# Patient Record
Sex: FEMALE | Race: WHITE | NOT HISPANIC OR LATINO | Employment: OTHER | URBAN - METROPOLITAN AREA
[De-identification: names, ages, dates, MRNs, and addresses within clinical notes are randomized per-mention and may not be internally consistent; named-entity substitution may affect disease eponyms.]

---

## 2017-07-21 ENCOUNTER — TRANSCRIBE ORDERS (OUTPATIENT)
Dept: ADMINISTRATIVE | Facility: HOSPITAL | Age: 62
End: 2017-07-21

## 2017-07-21 DIAGNOSIS — Z12.31 ENCOUNTER FOR MAMMOGRAM TO ESTABLISH BASELINE MAMMOGRAM: Primary | ICD-10-CM

## 2017-07-25 ENCOUNTER — HOSPITAL ENCOUNTER (OUTPATIENT)
Dept: MAMMOGRAPHY | Facility: HOSPITAL | Age: 62
Discharge: HOME | End: 2017-07-25
Payer: COMMERCIAL

## 2017-07-25 DIAGNOSIS — Z12.31 ENCOUNTER FOR MAMMOGRAM TO ESTABLISH BASELINE MAMMOGRAM: ICD-10-CM

## 2017-07-25 PROCEDURE — G0202 SCR MAMMO BI INCL CAD: HCPCS

## 2018-07-03 ENCOUNTER — TRANSCRIBE ORDERS (OUTPATIENT)
Dept: ADMINISTRATIVE | Facility: HOSPITAL | Age: 63
End: 2018-07-03

## 2018-07-03 DIAGNOSIS — Z12.31 VISIT FOR SCREENING MAMMOGRAM: Primary | ICD-10-CM

## 2018-08-30 ENCOUNTER — HOSPITAL ENCOUNTER (OUTPATIENT)
Dept: MAMMOGRAPHY | Facility: HOSPITAL | Age: 63
Discharge: HOME | End: 2018-08-30
Payer: COMMERCIAL

## 2018-08-30 DIAGNOSIS — Z12.31 VISIT FOR SCREENING MAMMOGRAM: ICD-10-CM

## 2018-08-30 PROCEDURE — 77067 SCR MAMMO BI INCL CAD: CPT

## 2018-08-30 PROCEDURE — 77063 BREAST TOMOSYNTHESIS BI: CPT

## 2019-10-21 ENCOUNTER — TRANSCRIBE ORDERS (OUTPATIENT)
Dept: ADMINISTRATIVE | Facility: HOSPITAL | Age: 64
End: 2019-10-21

## 2019-10-21 DIAGNOSIS — Z12.31 SCREENING MAMMOGRAM FOR HIGH-RISK PATIENT: Primary | ICD-10-CM

## 2019-12-17 ENCOUNTER — HOSPITAL ENCOUNTER (OUTPATIENT)
Dept: RADIOLOGY | Age: 64
Discharge: HOME | End: 2019-12-17
Payer: COMMERCIAL

## 2019-12-17 VITALS — HEIGHT: 57 IN | BODY MASS INDEX: 28.05 KG/M2 | WEIGHT: 130 LBS

## 2019-12-17 DIAGNOSIS — Z12.31 SCREENING MAMMOGRAM FOR HIGH-RISK PATIENT: ICD-10-CM

## 2019-12-17 DIAGNOSIS — R92.2 DENSE BREAST TISSUE ON MAMMOGRAM: ICD-10-CM

## 2019-12-17 PROCEDURE — 77063 BREAST TOMOSYNTHESIS BI: CPT

## 2019-12-17 PROCEDURE — 77067 SCR MAMMO BI INCL CAD: CPT

## 2020-12-22 ENCOUNTER — HOSPITAL ENCOUNTER (OUTPATIENT)
Dept: RADIOLOGY | Age: 65
Discharge: HOME | End: 2020-12-22
Payer: COMMERCIAL

## 2020-12-22 VITALS — BODY MASS INDEX: 28.05 KG/M2 | HEIGHT: 57 IN | WEIGHT: 130 LBS

## 2020-12-22 DIAGNOSIS — Z12.31 ENCOUNTER FOR SCREENING MAMMOGRAM FOR MALIGNANT NEOPLASM OF BREAST: ICD-10-CM

## 2020-12-22 PROCEDURE — 77067 SCR MAMMO BI INCL CAD: CPT

## 2020-12-22 PROCEDURE — 77063 BREAST TOMOSYNTHESIS BI: CPT

## 2021-01-12 ENCOUNTER — OFFICE VISIT (OUTPATIENT)
Dept: CARDIAC SURGERY | Facility: CLINIC | Age: 66
End: 2021-01-12
Payer: COMMERCIAL

## 2021-01-12 VITALS
HEIGHT: 57 IN | DIASTOLIC BLOOD PRESSURE: 82 MMHG | TEMPERATURE: 98 F | WEIGHT: 132 LBS | OXYGEN SATURATION: 99 % | SYSTOLIC BLOOD PRESSURE: 158 MMHG | HEART RATE: 70 BPM | BODY MASS INDEX: 28.48 KG/M2 | RESPIRATION RATE: 16 BRPM

## 2021-01-12 DIAGNOSIS — C34.91 SQUAMOUS CELL LUNG CANCER, RIGHT (HCC): Primary | ICD-10-CM

## 2021-01-12 PROCEDURE — 1160F RVW MEDS BY RX/DR IN RCRD: CPT | Performed by: PHYSICIAN ASSISTANT

## 2021-01-12 PROCEDURE — 99245 OFF/OP CONSLTJ NEW/EST HI 55: CPT | Performed by: PHYSICIAN ASSISTANT

## 2021-01-12 PROCEDURE — 1036F TOBACCO NON-USER: CPT | Performed by: PHYSICIAN ASSISTANT

## 2021-01-12 PROCEDURE — 3008F BODY MASS INDEX DOCD: CPT | Performed by: PHYSICIAN ASSISTANT

## 2021-01-12 RX ORDER — HEPARIN SODIUM 5000 [USP'U]/ML
5000 INJECTION, SOLUTION INTRAVENOUS; SUBCUTANEOUS
Status: CANCELLED | OUTPATIENT
Start: 2021-01-12 | End: 2021-01-13

## 2021-01-12 RX ORDER — CEFAZOLIN SODIUM 1 G/50ML
1000 SOLUTION INTRAVENOUS ONCE
Status: CANCELLED | OUTPATIENT
Start: 2021-01-12 | End: 2021-01-12

## 2021-01-12 RX ORDER — ACETAMINOPHEN 325 MG/1
975 TABLET ORAL ONCE
Status: CANCELLED | OUTPATIENT
Start: 2021-01-12 | End: 2021-01-12

## 2021-01-12 RX ORDER — ALBUTEROL SULFATE 90 UG/1
2 AEROSOL, METERED RESPIRATORY (INHALATION) EVERY 4 HOURS PRN
COMMUNITY
End: 2021-03-05

## 2021-01-12 RX ORDER — DIPHENHYDRAMINE HYDROCHLORIDE 25 MG/1
5000 TABLET ORAL DAILY
COMMUNITY

## 2021-01-12 RX ORDER — GABAPENTIN 300 MG/1
600 CAPSULE ORAL ONCE
Status: CANCELLED | OUTPATIENT
Start: 2021-01-12 | End: 2021-01-12

## 2021-01-12 RX ORDER — BUPROPION HYDROCHLORIDE 300 MG/1
300 TABLET ORAL EVERY MORNING
COMMUNITY
Start: 2012-03-27 | End: 2021-06-21 | Stop reason: SDUPTHER

## 2021-01-12 RX ORDER — UMECLIDINIUM BROMIDE AND VILANTEROL TRIFENATATE 62.5; 25 UG/1; UG/1
1 POWDER RESPIRATORY (INHALATION) DAILY
COMMUNITY

## 2021-01-12 RX ORDER — ACETAMINOPHEN 160 MG
5000 TABLET,DISINTEGRATING ORAL DAILY
COMMUNITY

## 2021-01-12 RX ORDER — TRAZODONE HYDROCHLORIDE 150 MG/1
150 TABLET ORAL AS NEEDED
COMMUNITY
End: 2021-04-01 | Stop reason: SDDI

## 2021-01-12 RX ORDER — FAMOTIDINE 40 MG/1
40 TABLET, FILM COATED ORAL EVERY MORNING
COMMUNITY
Start: 2012-03-27 | End: 2021-06-21 | Stop reason: SDUPTHER

## 2021-01-12 RX ORDER — PRAMIPEXOLE DIHYDROCHLORIDE 0.5 MG/1
0.5 TABLET ORAL
COMMUNITY
Start: 2020-12-07 | End: 2021-06-21 | Stop reason: SDUPTHER

## 2021-01-12 NOTE — ASSESSMENT & PLAN NOTE
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

## 2021-01-12 NOTE — PROGRESS NOTES
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  She also underwent a brain MRI for dizziness and there was no evidence of metastatic disease  Her PFTs are adequate for surgical resection  Dr Román Ball is recommending a right thoracoscopic upper lobectomy for treatment of this  Other options including SBRT were discussed  The surgery was discussed and all questions addressed  She is still smoking a cigarette every few days  She must completely quit to prevent post-operative pneumonia  Risks of surgery discussed, consent obtained  Will plan for 1/28  Diagnoses and all orders for this visit:    Squamous cell lung cancer, right ([facility] 75 )  -     Case request operating room: RIGHT VATS UPPER LOBECTOMY, BRONCHOSCOPY FLEXIBLE; Standing  -     PAT Covid Screening; Future  -     Type and screen; Future  -     CBC and Platelet; Future  -     APTT; Future  -     Protime-INR; Future  -     Basic metabolic panel; Future  -     EKG 12 lead; Future  -     Case request operating room: RIGHT VATS UPPER LOBECTOMY, BRONCHOSCOPY FLEXIBLE    Other orders  -     umeclidinium-vilanterol (Anoro Ellipta) 62 5-25 MCG/INH inhaler; Anoro Ellipta 62 5 mcg-25 mcg/actuation powder for inhalation  -     Biotin 5 MG CAPS; Take by mouth daily   -     Cholecalciferol (Vitamin D3) 50 MCG (2000 UT) capsule; Take 5,000 Units by mouth daily   -     buPROPion (WELLBUTRIN XL) 300 mg 24 hr tablet  -     famotidine (PEPCID) 40 MG tablet  -     pramipexole (MIRAPEX) 0 5 mg tablet; Take 0 5 mg by mouth   -     albuterol (PROVENTIL HFA,VENTOLIN HFA) 90 mcg/act inhaler; Inhale 2 puffs every 4 (four) hours as needed  -     traZODone (DESYREL) 150 mg tablet;  Take 150 mg by mouth daily at bedtime  -     Diet NPO; Sips with meds; Standing  -     Nursing communication Please give pre-op Carbohydrate drink to patient 2-4 hours prior to surgery; Standing  -     Void on call to OR; Standing  -     Insert peripheral IV; Standing  -     Place sequential compression device; Standing  -     heparin (porcine) subcutaneous injection 5,000 Units  -     ceFAZolin (ANCEF) IVPB (premix in dextrose) 1,000 mg 50 mL  -     acetaminophen (TYLENOL) tablet 975 mg  -     gabapentin (NEURONTIN) capsule 600 mg          Thoracic History   Diagnosis: Right upper lobe squamous cell carcinoma  Procedures/Surgeries:    Pathology:    Adjuvant Therapy:       Subjective:    Patient ID: Srinath Stevenson is a 77 y o  female  HPI   Ms Lester Oreilly is a 77year old female who presents for evaluation of a recently diagnosed lung cancer  She has a 30 pack year smoking history, still smoking one half cigarette every 2 days  She takes Wellbutrin and uses nicotine gum  She underwent a lung cancer screening CT 11/3/20 which revealed a 2 8x2 7cm right upper lobe spiculated nodule  She underwent a bronchoscopy and EBUS 11/11/20, and no malignancy was found  PET-CT 11/18/20 demonstrates 2 7x2 4cm right upper lobe nodule with SUV 18  There is a small cluster of nodules in the right apex with SUV 1 9  There are no FDG avid mediastinal or hilar lymph nodes  There is uptake in the right parotid gland with SUV 14 without any corresponding mass  IR biopsy 12/2/20 revealed squamous cell carcinoma  PFTs 12/10/20 show an FEV1 82% and DLCO 98%  MRI of neck 12/18/20 for parotid uptake revealed questionable right parotid lesion, question level 2 lymph node  Brain MRI 12/30 was negative for metastatic disease  She had a biopsy of the parotid lesion 1/5/20 and cytology demonstrates a pleomorphic adenoma without evidence of malignancy  She was seen by Dr Burton Blair at [facility] and is scheduled for a lobectomy 2/4     On discussion, her past medical history includes COPD, HTN, HLD, MS diagnosed in 1989, arthritis, anxiety  She has never undergone CT surgery in the past  There is no family history of lung cancer  She has dizziness not daily  She walks every day, and climbs stairs  She gest winded after a few flights but can climb one flight without trouble  She denies fevers, chills or unintentional weight loss  No vision changes or headaches  She has a "twinge" on her left anterior chest occasionally, not daily, which she attributes to anxiety  She denies any coughing or hemoptysis       The following portions of the patient's history were reviewed and updated as appropriate: allergies, current medications, past family history, past medical history, past social history, past surgical history and problem list     Past Medical History:   Diagnosis Date    [facility]      Past Surgical History:   Procedure Laterality Date    AUGMENTATION MAMMAPLASTY Bilateral 11/2014    retro-pectoral silicone    BREAST BIOPSY Left 03/23/2015    u/s core bx    BREAST BIOPSY Left 08/02/2010    high-risk lesion    BREAST EXCISIONAL BIOPSY Left 08/26/2010    high-risk lesion    BREAST EXCISIONAL BIOPSY Left 1994    BREAST EXCISIONAL BIOPSY Right 1984    TOTAL ABDOMINAL HYSTERECTOMY  2010    age 60    TOTAL ABDOMINAL HYSTERECTOMY W/ BILATERAL SALPINGOOPHORECTOMY Bilateral 2010    age 60      Family History   Problem Relation Age of Onset    No Known Problems Mother     No Known Problems Father     No Known Problems Sister     No Known Problems Daughter     No Known Problems Maternal Grandmother     No Known Problems Maternal Grandfather     No Known Problems Paternal Grandmother     Rectal cancer Paternal Grandfather         unknown age   Balbina Slipper No Known Problems Sister     No Known Problems Maternal Aunt     Breast cancer Paternal Aunt         unknown age   Balbina Slipper No Known Problems Paternal Aunt     No Known Problems Paternal Aunt     No Known Problems Paternal Aunt     No Known Problems Paternal Aunt     No Known Problems Paternal Aunt     No Known Problems Paternal Aunt       Social History     Socioeconomic History    Marital status: /Civil Union     Spouse name: Not on file    Number of children: Not on file    Years of education: Not on file    Highest education level: Not on file   Occupational History    Not on file   Social Needs    Financial resource strain: Not on file    Food insecurity     Worry: Not on file     Inability: Not on file   [employer] needs     Medical: Not on file     Non-medical: Not on file   Tobacco Use    Smoking status: Former Smoker     Packs/day: 1 00     Years: 30 00     Pack years: 30 00     Types: Cigarettes    Tobacco comment: [address]   Substance and Sexual Activity    Alcohol use: Not on file    Drug use: Not on file    Sexual activity: Not on file   Lifestyle    Physical activity     Days per week: Not on file     Minutes per session: Not on file    Stress: Not on file   Relationships    Social connections     Talks on phone: Not on file     Gets together: Not on file     Attends Cheondoism service: Not on file     Active member of club or organization: Not on file     Attends meetings of clubs or organizations: Not on file     Relationship status: Not on file    Intimate partner violence     Fear of current or ex partner: Not on file     Emotionally abused: Not on file     Physically abused: Not on file     Forced sexual activity: Not on file   Other Topics Concern    Not on file   Social History Narrative    Not on file      Current Outpatient Medications   Medication Instructions    albuterol (PROVENTIL HFA,VENTOLIN HFA) 90 mcg/act inhaler 2 puffs, Inhalation, Every 4 hours PRN    Biotin 5 MG CAPS Oral, Daily    buPROPion (WELLBUTRIN XL) 300 mg 24 hr tablet No dose, route, or frequency recorded      famotidine (PEPCID) 40 MG tablet No dose, route, or frequency recorded   pramipexole (MIRAPEX) 0 5 mg tablet Take 0 5 mg by mouth     traZODone (DESYREL) 150 mg, Oral, Daily at bedtime    umeclidinium-vilanterol (Anoro Ellipta) 62 5-25 MCG/INH inhaler Anoro Ellipta 62 5 mcg-25 mcg/actuation powder for inhalation    Vitamin D3 5,000 Units, Oral, Daily     Allergies   Allergen Reactions    Sulfa Antibiotics Hives       Review of Systems   Constitutional: Negative for activity change, appetite change, chills, diaphoresis, fatigue, fever and unexpected weight change  Eyes: Negative for visual disturbance  Respiratory: Positive for shortness of breath  Negative for cough, chest tightness and wheezing  Cardiovascular: Negative for chest pain and palpitations  Gastrointestinal: Negative for abdominal pain  Musculoskeletal: Negative for joint swelling and myalgias  Skin: Negative for color change and rash  Neurological: Negative for weakness and headaches  Hematological: Negative for adenopathy  Does not bruise/bleed easily  Psychiatric/Behavioral: Negative for confusion  Objective:   Physical Exam  Constitutional:       General: She is not in acute distress  Appearance: Normal appearance  HENT:      Head: Normocephalic and atraumatic  Eyes:      General: No scleral icterus  Conjunctiva/sclera: Conjunctivae normal    Neck:      Musculoskeletal: Neck supple  Cardiovascular:      Rate and Rhythm: Normal rate and regular rhythm  Pulmonary:      Effort: Pulmonary effort is normal  No respiratory distress  Breath sounds: Normal breath sounds  Abdominal:      General: There is no distension  Palpations: Abdomen is soft  Lymphadenopathy:      Cervical: No cervical adenopathy  Skin:     General: Skin is warm and dry  Neurological:      General: No focal deficit present  Mental Status: She is alert and oriented to person, place, and time     Psychiatric:         Mood and Affect: Mood normal          Behavior: Behavior normal          Thought Content:  Thought content normal          Judgment: Judgment normal      /82   Pulse 70   Temp 98 °F (36 7 °C) (Temporal)   Resp 16   Ht 4' 9" (1 448 m)   Wt 59 9 kg (132 lb)   SpO2 99%   BMI 28 56 kg/m²

## 2021-01-12 NOTE — H&P (VIEW-ONLY)
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  She also underwent a brain MRI for dizziness and there was no evidence of metastatic disease  Her PFTs are adequate for surgical resection  Dr Alexia Clarke is recommending a right thoracoscopic upper lobectomy for treatment of this  Other options including SBRT were discussed  The surgery was discussed and all questions addressed  She is still smoking a cigarette every few days  She must completely quit to prevent post-operative pneumonia  Risks of surgery discussed, consent obtained  Will plan for 1/28  Diagnoses and all orders for this visit:    Squamous cell lung cancer, right ([facility] )  -     Case request operating room: RIGHT VATS UPPER LOBECTOMY, BRONCHOSCOPY FLEXIBLE; Standing  -     PAT Covid Screening; Future  -     Type and screen; Future  -     CBC and Platelet; Future  -     APTT; Future  -     Protime-INR; Future  -     Basic metabolic panel; Future  -     EKG 12 lead; Future  -     Case request operating room: RIGHT VATS UPPER LOBECTOMY, BRONCHOSCOPY FLEXIBLE    Other orders  -     umeclidinium-vilanterol (Anoro Ellipta) 62 5-25 MCG/INH inhaler; Anoro Ellipta 62 5 mcg-25 mcg/actuation powder for inhalation  -     Biotin 5 MG CAPS; Take by mouth daily   -     Cholecalciferol (Vitamin D3) 50 MCG (2000 UT) capsule; Take 5,000 Units by mouth daily   -     buPROPion (WELLBUTRIN XL) 300 mg 24 hr tablet  -     famotidine (PEPCID) 40 MG tablet  -     pramipexole (MIRAPEX) 0 5 mg tablet; Take 0 5 mg by mouth   -     albuterol (PROVENTIL HFA,VENTOLIN HFA) 90 mcg/act inhaler; Inhale 2 puffs every 4 (four) hours as needed  -     traZODone (DESYREL) 150 mg tablet;  Take 150 mg by mouth daily at bedtime  -     Diet NPO; Sips with meds; Standing  -     Nursing communication Please give pre-op Carbohydrate drink to patient 2-4 hours prior to surgery; Standing  -     Void on call to OR; Standing  -     Insert peripheral IV; Standing  -     Place sequential compression device; Standing  -     heparin (porcine) subcutaneous injection 5,000 Units  -     ceFAZolin (ANCEF) IVPB (premix in dextrose) 1,000 mg 50 mL  -     acetaminophen (TYLENOL) tablet 975 mg  -     gabapentin (NEURONTIN) capsule 600 mg          Thoracic History   Diagnosis: Right upper lobe squamous cell carcinoma  Procedures/Surgeries:    Pathology:    Adjuvant Therapy:       Subjective:    Patient ID: Colonel Johnson is a 77 y o  female  HPI   Ms Jovan Garcia is a 77year old female who presents for evaluation of a recently diagnosed lung cancer  She has a 30 pack year smoking history, still smoking one half cigarette every 2 days  She takes Wellbutrin and uses nicotine gum  She underwent a lung cancer screening CT 11/3/20 which revealed a 2 8x2 7cm right upper lobe spiculated nodule  She underwent a bronchoscopy and EBUS 11/11/20, and no malignancy was found  PET-CT 11/18/20 demonstrates 2 7x2 4cm right upper lobe nodule with SUV 18  There is a small cluster of nodules in the right apex with SUV 1 9  There are no FDG avid mediastinal or hilar lymph nodes  There is uptake in the right parotid gland with SUV 14 without any corresponding mass  IR biopsy 12/2/20 revealed squamous cell carcinoma  PFTs 12/10/20 show an FEV1 82% and DLCO 98%  MRI of neck 12/18/20 for parotid uptake revealed questionable right parotid lesion, question level 2 lymph node  Brain MRI 12/30 was negative for metastatic disease  She had a biopsy of the parotid lesion 1/5/20 and cytology demonstrates a pleomorphic adenoma without evidence of malignancy  She was seen by Dr Mayra Long at [facility] and is scheduled for a lobectomy 2/4     On discussion, her past medical history includes COPD, HTN, HLD, MS diagnosed in 1989, arthritis, anxiety  She has never undergone CT surgery in the past  There is no family history of lung cancer  She has dizziness not daily  She walks every day, and climbs stairs  She gest winded after a few flights but can climb one flight without trouble  She denies fevers, chills or unintentional weight loss  No vision changes or headaches  She has a "twinge" on her left anterior chest occasionally, not daily, which she attributes to anxiety  She denies any coughing or hemoptysis       The following portions of the patient's history were reviewed and updated as appropriate: allergies, current medications, past family history, past medical history, past social history, past surgical history and problem list     Past Medical History:   Diagnosis Date    [facility]      Past Surgical History:   Procedure Laterality Date    AUGMENTATION MAMMAPLASTY Bilateral 11/2014    retro-pectoral silicone    BREAST BIOPSY Left 03/23/2015    u/s core bx    BREAST BIOPSY Left 08/02/2010    high-risk lesion    BREAST EXCISIONAL BIOPSY Left 08/26/2010    high-risk lesion    BREAST EXCISIONAL BIOPSY Left 1994    BREAST EXCISIONAL BIOPSY Right 1984    TOTAL ABDOMINAL HYSTERECTOMY  2010    age 60    TOTAL ABDOMINAL HYSTERECTOMY W/ BILATERAL SALPINGOOPHORECTOMY Bilateral 2010    age 60      Family History   Problem Relation Age of Onset    No Known Problems Mother     No Known Problems Father     No Known Problems Sister     No Known Problems Daughter     No Known Problems Maternal Grandmother     No Known Problems Maternal Grandfather     No Known Problems Paternal Grandmother     Rectal cancer Paternal Grandfather         unknown age   Julieann Frankel No Known Problems Sister     No Known Problems Maternal Aunt     Breast cancer Paternal Aunt         unknown age   Julieann Frankel No Known Problems Paternal Aunt     No Known Problems Paternal Aunt     No Known Problems Paternal Aunt     No Known Problems Paternal Aunt     No Known Problems Paternal Aunt     No Known Problems Paternal Aunt       Social History     Socioeconomic History    Marital status: /Civil Union     Spouse name: Not on file    Number of children: Not on file    Years of education: Not on file    Highest education level: Not on file   Occupational History    Not on file   Social Needs    Financial resource strain: Not on file    Food insecurity     Worry: Not on file     Inability: Not on file   [employer] needs     Medical: Not on file     Non-medical: Not on file   Tobacco Use    Smoking status: Former Smoker     Packs/day: 1 00     Years: 30 00     Pack years: 30 00     Types: Cigarettes    Tobacco comment: [address]   Substance and Sexual Activity    Alcohol use: Not on file    Drug use: Not on file    Sexual activity: Not on file   Lifestyle    Physical activity     Days per week: Not on file     Minutes per session: Not on file    Stress: Not on file   Relationships    Social connections     Talks on phone: Not on file     Gets together: Not on file     Attends Gnosticism service: Not on file     Active member of club or organization: Not on file     Attends meetings of clubs or organizations: Not on file     Relationship status: Not on file    Intimate partner violence     Fear of current or ex partner: Not on file     Emotionally abused: Not on file     Physically abused: Not on file     Forced sexual activity: Not on file   Other Topics Concern    Not on file   Social History Narrative    Not on file      Current Outpatient Medications   Medication Instructions    albuterol (PROVENTIL HFA,VENTOLIN HFA) 90 mcg/act inhaler 2 puffs, Inhalation, Every 4 hours PRN    Biotin 5 MG CAPS Oral, Daily    buPROPion (WELLBUTRIN XL) 300 mg 24 hr tablet No dose, route, or frequency recorded      famotidine (PEPCID) 40 MG tablet No dose, route, or frequency recorded   pramipexole (MIRAPEX) 0 5 mg tablet Take 0 5 mg by mouth     traZODone (DESYREL) 150 mg, Oral, Daily at bedtime    umeclidinium-vilanterol (Anoro Ellipta) 62 5-25 MCG/INH inhaler Anoro Ellipta 62 5 mcg-25 mcg/actuation powder for inhalation    Vitamin D3 5,000 Units, Oral, Daily     Allergies   Allergen Reactions    Sulfa Antibiotics Hives       Review of Systems   Constitutional: Negative for activity change, appetite change, chills, diaphoresis, fatigue, fever and unexpected weight change  Eyes: Negative for visual disturbance  Respiratory: Positive for shortness of breath  Negative for cough, chest tightness and wheezing  Cardiovascular: Negative for chest pain and palpitations  Gastrointestinal: Negative for abdominal pain  Musculoskeletal: Negative for joint swelling and myalgias  Skin: Negative for color change and rash  Neurological: Negative for weakness and headaches  Hematological: Negative for adenopathy  Does not bruise/bleed easily  Psychiatric/Behavioral: Negative for confusion  Objective:   Physical Exam  Constitutional:       General: She is not in acute distress  Appearance: Normal appearance  HENT:      Head: Normocephalic and atraumatic  Eyes:      General: No scleral icterus  Conjunctiva/sclera: Conjunctivae normal    Neck:      Musculoskeletal: Neck supple  Cardiovascular:      Rate and Rhythm: Normal rate and regular rhythm  Pulmonary:      Effort: Pulmonary effort is normal  No respiratory distress  Breath sounds: Normal breath sounds  Abdominal:      General: There is no distension  Palpations: Abdomen is soft  Lymphadenopathy:      Cervical: No cervical adenopathy  Skin:     General: Skin is warm and dry  Neurological:      General: No focal deficit present  Mental Status: She is alert and oriented to person, place, and time     Psychiatric:         Mood and Affect: Mood normal          Behavior: Behavior normal          Thought Content:  Thought content normal          Judgment: Judgment normal      /82   Pulse 70   Temp 98 °F (36 7 °C) (Temporal)   Resp 16   Ht 4' 9" (1 448 m)   Wt 59 9 kg (132 lb)   SpO2 99%   BMI 28 56 kg/m²

## 2021-01-13 ENCOUNTER — TELEPHONE (OUTPATIENT)
Dept: HEMATOLOGY ONCOLOGY | Facility: CLINIC | Age: 66
End: 2021-01-13

## 2021-01-13 NOTE — TELEPHONE ENCOUNTER
Patient states that she has surgery scheduled on 1/28/2021  She was given several lab slips and would like to know when these las should be drawn and if any require fasting  Patient is also inquiring about the Covid vaccine, " Am I allowed to get it?"    Patient requesting a call back from Wade Yao, [phone]

## 2021-01-14 ENCOUNTER — LAB (OUTPATIENT)
Dept: LAB | Facility: CLINIC | Age: 66
End: 2021-01-14
Payer: COMMERCIAL

## 2021-01-14 ENCOUNTER — APPOINTMENT (OUTPATIENT)
Dept: LAB | Facility: CLINIC | Age: 66
End: 2021-01-14
Payer: COMMERCIAL

## 2021-01-14 ENCOUNTER — IMMUNIZATIONS (OUTPATIENT)
Dept: FAMILY MEDICINE CLINIC | Facility: HOSPITAL | Age: 66
End: 2021-01-14

## 2021-01-14 ENCOUNTER — TRANSCRIBE ORDERS (OUTPATIENT)
Dept: LAB | Facility: CLINIC | Age: 66
End: 2021-01-14

## 2021-01-14 DIAGNOSIS — Z23 ENCOUNTER FOR IMMUNIZATION: Primary | ICD-10-CM

## 2021-01-14 DIAGNOSIS — C34.91 PRIMARY LUNG CANCER WITH METASTASIS FROM LUNG TO OTHER SITE, RIGHT (HCC): Primary | ICD-10-CM

## 2021-01-14 DIAGNOSIS — C34.91 SQUAMOUS CELL LUNG CANCER, RIGHT (HCC): ICD-10-CM

## 2021-01-14 DIAGNOSIS — C34.91 PRIMARY LUNG CANCER WITH METASTASIS FROM LUNG TO OTHER SITE, RIGHT (HCC): ICD-10-CM

## 2021-01-14 LAB
ANION GAP SERPL CALCULATED.3IONS-SCNC: 7 MMOL/L (ref 4–13)
APTT PPP: 82 SECONDS (ref 23–37)
BUN SERPL-MCNC: 13 MG/DL (ref 5–25)
CALCIUM SERPL-MCNC: 9.2 MG/DL (ref 8.3–10.1)
CHLORIDE SERPL-SCNC: 101 MMOL/L (ref 100–108)
CO2 SERPL-SCNC: 30 MMOL/L (ref 21–32)
CREAT SERPL-MCNC: 0.69 MG/DL (ref 0.6–1.3)
ERYTHROCYTE [DISTWIDTH] IN BLOOD BY AUTOMATED COUNT: 12.7 % (ref 11.6–15.1)
GFR SERPL CREATININE-BSD FRML MDRD: 90 ML/MIN/1.73SQ M
GLUCOSE SERPL-MCNC: 71 MG/DL (ref 65–140)
HCT VFR BLD AUTO: 44.3 % (ref 34.8–46.1)
HGB BLD-MCNC: 14.5 G/DL (ref 11.5–15.4)
INR PPP: 1 (ref 0.84–1.19)
MCH RBC QN AUTO: 31 PG (ref 26.8–34.3)
MCHC RBC AUTO-ENTMCNC: 32.7 G/DL (ref 31.4–37.4)
MCV RBC AUTO: 95 FL (ref 82–98)
PLATELET # BLD AUTO: 388 THOUSANDS/UL (ref 149–390)
PMV BLD AUTO: 10.2 FL (ref 8.9–12.7)
POTASSIUM SERPL-SCNC: 3.8 MMOL/L (ref 3.5–5.3)
PROTHROMBIN TIME: 13.4 SECONDS (ref 11.6–14.5)
RBC # BLD AUTO: 4.68 MILLION/UL (ref 3.81–5.12)
SODIUM SERPL-SCNC: 138 MMOL/L (ref 136–145)
WBC # BLD AUTO: 6.01 THOUSAND/UL (ref 4.31–10.16)

## 2021-01-14 PROCEDURE — 85027 COMPLETE CBC AUTOMATED: CPT

## 2021-01-14 PROCEDURE — 0001A SARS-COV-2 / COVID-19 MRNA VACCINE (PFIZER-BIONTECH) 30 MCG: CPT

## 2021-01-14 PROCEDURE — 80048 BASIC METABOLIC PNL TOTAL CA: CPT

## 2021-01-14 PROCEDURE — 85610 PROTHROMBIN TIME: CPT

## 2021-01-14 PROCEDURE — 85730 THROMBOPLASTIN TIME PARTIAL: CPT

## 2021-01-14 PROCEDURE — 91300 SARS-COV-2 / COVID-19 MRNA VACCINE (PFIZER-BIONTECH) 30 MCG: CPT

## 2021-01-14 PROCEDURE — 93005 ELECTROCARDIOGRAM TRACING: CPT

## 2021-01-16 LAB
ATRIAL RATE: 69 BPM
P AXIS: 73 DEGREES
PR INTERVAL: 126 MS
QRS AXIS: 66 DEGREES
QRSD INTERVAL: 78 MS
QT INTERVAL: 390 MS
QTC INTERVAL: 417 MS
T WAVE AXIS: 52 DEGREES
VENTRICULAR RATE: 69 BPM

## 2021-01-16 PROCEDURE — 93010 ELECTROCARDIOGRAM REPORT: CPT | Performed by: INTERNAL MEDICINE

## 2021-01-20 NOTE — PRE-PROCEDURE INSTRUCTIONS
Pre-Surgery Instructions:   Medication Instructions    Biotin 5 MG CAPS PRN    buPROPion (WELLBUTRIN XL) 300 mg 24 hr tablet take am DOS with small sip of water    Cholecalciferol (Vitamin D3) 50 MCG (2000 UT) capsule stop 7 days prior    famotidine (PEPCID) 40 MG tablet take am DOS with small sip of water    pramipexole (MIRAPEX) 0 5 mg tablet takes @ hs    traZODone (DESYREL) 150 mg tablet takes @ hs    umeclidinium-vilanterol (Anoro Ellipta) 62 5-25 MCG/INH inhaler take am DOS      Spoke to pt med list reviewed and instructed as above   NPO after midnight the hs prior   Instructed no NSAIDs 7 days prior, tylenol only   Showering instructions provided by surgeon office, reviewed @ time of call   Negative COVID screening, 1/21 [facility]   Reviewed visitation policy   Advised no alcohol 24 hours prior  Pt aware   All instructions verbally understood by patient  All questions answered

## 2021-01-21 ENCOUNTER — LAB REQUISITION (OUTPATIENT)
Dept: LAB | Facility: HOSPITAL | Age: 66
End: 2021-01-21
Payer: COMMERCIAL

## 2021-01-21 DIAGNOSIS — C34.91 MALIGNANT NEOPLASM OF UNSPECIFIED PART OF RIGHT BRONCHUS OR LUNG (HCC): ICD-10-CM

## 2021-01-21 DIAGNOSIS — C34.91 SQUAMOUS CELL LUNG CANCER, RIGHT (HCC): ICD-10-CM

## 2021-01-21 PROCEDURE — 88321 CONSLTJ&REPRT SLD PREP ELSWR: CPT | Performed by: PATHOLOGY

## 2021-01-21 PROCEDURE — 87635 SARS-COV-2 COVID-19 AMP PRB: CPT | Performed by: PHYSICIAN ASSISTANT

## 2021-01-22 LAB — SARS-COV-2 RNA RESP QL NAA+PROBE: NEGATIVE

## 2021-01-28 ENCOUNTER — ANESTHESIA (OUTPATIENT)
Dept: PERIOP | Facility: HOSPITAL | Age: 66
DRG: 164 | End: 2021-01-28
Payer: COMMERCIAL

## 2021-01-28 ENCOUNTER — ANESTHESIA EVENT (OUTPATIENT)
Dept: PERIOP | Facility: HOSPITAL | Age: 66
DRG: 164 | End: 2021-01-28
Payer: COMMERCIAL

## 2021-01-28 ENCOUNTER — HOSPITAL ENCOUNTER (INPATIENT)
Facility: HOSPITAL | Age: 66
LOS: 6 days | Discharge: HOME | DRG: 164 | End: 2021-02-03
Attending: THORACIC SURGERY (CARDIOTHORACIC VASCULAR SURGERY) | Admitting: THORACIC SURGERY (CARDIOTHORACIC VASCULAR SURGERY)
Payer: COMMERCIAL

## 2021-01-28 ENCOUNTER — APPOINTMENT (INPATIENT)
Dept: RADIOLOGY | Facility: HOSPITAL | Age: 66
DRG: 164 | End: 2021-01-28
Payer: COMMERCIAL

## 2021-01-28 VITALS — HEART RATE: 78 BPM

## 2021-01-28 DIAGNOSIS — C34.91 SQUAMOUS CELL LUNG CANCER, RIGHT (HCC): ICD-10-CM

## 2021-01-28 LAB
ANION GAP SERPL CALCULATED.3IONS-SCNC: 2 MMOL/L (ref 4–13)
BUN SERPL-MCNC: 11 MG/DL (ref 5–25)
CALCIUM SERPL-MCNC: 8.9 MG/DL (ref 8.3–10.1)
CHLORIDE SERPL-SCNC: 107 MMOL/L (ref 100–108)
CO2 SERPL-SCNC: 31 MMOL/L (ref 21–32)
CREAT SERPL-MCNC: 0.79 MG/DL (ref 0.6–1.3)
ERYTHROCYTE [DISTWIDTH] IN BLOOD BY AUTOMATED COUNT: 12.8 % (ref 11.6–15.1)
GFR SERPL CREATININE-BSD FRML MDRD: 78 ML/MIN/1.73SQ M
GLUCOSE SERPL-MCNC: 98 MG/DL (ref 65–140)
HCT VFR BLD AUTO: 43.4 % (ref 34.8–46.1)
HGB BLD-MCNC: 13.9 G/DL (ref 11.5–15.4)
MAGNESIUM SERPL-MCNC: 1.9 MG/DL (ref 1.6–2.6)
MCH RBC QN AUTO: 30.8 PG (ref 26.8–34.3)
MCHC RBC AUTO-ENTMCNC: 32 G/DL (ref 31.4–37.4)
MCV RBC AUTO: 96 FL (ref 82–98)
PLATELET # BLD AUTO: 353 THOUSANDS/UL (ref 149–390)
PMV BLD AUTO: 10.3 FL (ref 8.9–12.7)
POTASSIUM SERPL-SCNC: 3.8 MMOL/L (ref 3.5–5.3)
RBC # BLD AUTO: 4.51 MILLION/UL (ref 3.81–5.12)
SODIUM SERPL-SCNC: 140 MMOL/L (ref 136–145)
WBC # BLD AUTO: 11.31 THOUSAND/UL (ref 4.31–10.16)

## 2021-01-28 PROCEDURE — 07B74ZX EXCISION OF THORAX LYMPHATIC, PERCUTANEOUS ENDOSCOPIC APPROACH, DIAGNOSTIC: ICD-10-PCS | Performed by: THORACIC SURGERY (CARDIOTHORACIC VASCULAR SURGERY)

## 2021-01-28 PROCEDURE — C2615 SEALANT, PULMONARY, LIQUID: HCPCS | Performed by: THORACIC SURGERY (CARDIOTHORACIC VASCULAR SURGERY)

## 2021-01-28 PROCEDURE — 88309 TISSUE EXAM BY PATHOLOGIST: CPT | Performed by: PATHOLOGY

## 2021-01-28 PROCEDURE — 88307 TISSUE EXAM BY PATHOLOGIST: CPT | Performed by: PATHOLOGY

## 2021-01-28 PROCEDURE — 88305 TISSUE EXAM BY PATHOLOGIST: CPT | Performed by: PATHOLOGY

## 2021-01-28 PROCEDURE — C9290 INJ, BUPIVACAINE LIPOSOME: HCPCS | Performed by: THORACIC SURGERY (CARDIOTHORACIC VASCULAR SURGERY)

## 2021-01-28 PROCEDURE — 31622 DX BRONCHOSCOPE/WASH: CPT | Performed by: THORACIC SURGERY (CARDIOTHORACIC VASCULAR SURGERY)

## 2021-01-28 PROCEDURE — 32674 THORACOSCOPY LYMPH NODE EXC: CPT | Performed by: THORACIC SURGERY (CARDIOTHORACIC VASCULAR SURGERY)

## 2021-01-28 PROCEDURE — 32655 THORACOSCOPY RESECT BULLAE: CPT | Performed by: THORACIC SURGERY (CARDIOTHORACIC VASCULAR SURGERY)

## 2021-01-28 PROCEDURE — 0BBF4ZZ EXCISION OF RIGHT LOWER LUNG LOBE, PERCUTANEOUS ENDOSCOPIC APPROACH: ICD-10-PCS | Performed by: THORACIC SURGERY (CARDIOTHORACIC VASCULAR SURGERY)

## 2021-01-28 PROCEDURE — 88363 XM ARCHIVE TISSUE MOLEC ANAL: CPT | Performed by: PATHOLOGY

## 2021-01-28 PROCEDURE — 83735 ASSAY OF MAGNESIUM: CPT | Performed by: PHYSICIAN ASSISTANT

## 2021-01-28 PROCEDURE — 80048 BASIC METABOLIC PNL TOTAL CA: CPT | Performed by: PHYSICIAN ASSISTANT

## 2021-01-28 PROCEDURE — 71045 X-RAY EXAM CHEST 1 VIEW: CPT

## 2021-01-28 PROCEDURE — 32663 THORACOSCOPY W/LOBECTOMY: CPT | Performed by: THORACIC SURGERY (CARDIOTHORACIC VASCULAR SURGERY)

## 2021-01-28 PROCEDURE — 0BJ08ZZ INSPECTION OF TRACHEOBRONCHIAL TREE, VIA NATURAL OR ARTIFICIAL OPENING ENDOSCOPIC: ICD-10-PCS | Performed by: THORACIC SURGERY (CARDIOTHORACIC VASCULAR SURGERY)

## 2021-01-28 PROCEDURE — 85027 COMPLETE CBC AUTOMATED: CPT | Performed by: PHYSICIAN ASSISTANT

## 2021-01-28 PROCEDURE — 0BTC4ZZ RESECTION OF RIGHT UPPER LUNG LOBE, PERCUTANEOUS ENDOSCOPIC APPROACH: ICD-10-PCS | Performed by: THORACIC SURGERY (CARDIOTHORACIC VASCULAR SURGERY)

## 2021-01-28 RX ORDER — HYDROMORPHONE HCL/PF 1 MG/ML
0.2 SYRINGE (ML) INJECTION
Status: CANCELLED | OUTPATIENT
Start: 2021-01-28

## 2021-01-28 RX ORDER — SODIUM CHLORIDE 9 MG/ML
INJECTION, SOLUTION INTRAVENOUS CONTINUOUS PRN
Status: DISCONTINUED | OUTPATIENT
Start: 2021-01-28 | End: 2021-01-28

## 2021-01-28 RX ORDER — LIDOCAINE HYDROCHLORIDE 10 MG/ML
INJECTION, SOLUTION EPIDURAL; INFILTRATION; INTRACAUDAL; PERINEURAL AS NEEDED
Status: DISCONTINUED | OUTPATIENT
Start: 2021-01-28 | End: 2021-01-28

## 2021-01-28 RX ORDER — MIDAZOLAM HYDROCHLORIDE 2 MG/2ML
INJECTION, SOLUTION INTRAMUSCULAR; INTRAVENOUS AS NEEDED
Status: DISCONTINUED | OUTPATIENT
Start: 2021-01-28 | End: 2021-01-28

## 2021-01-28 RX ORDER — KETAMINE HCL IN NACL, ISO-OSM 100MG/10ML
SYRINGE (ML) INJECTION AS NEEDED
Status: DISCONTINUED | OUTPATIENT
Start: 2021-01-28 | End: 2021-01-28

## 2021-01-28 RX ORDER — FENTANYL CITRATE/PF 50 MCG/ML
25 SYRINGE (ML) INJECTION
Status: CANCELLED | OUTPATIENT
Start: 2021-01-28

## 2021-01-28 RX ORDER — ACETAMINOPHEN 325 MG/1
975 TABLET ORAL ONCE
Status: COMPLETED | OUTPATIENT
Start: 2021-01-28 | End: 2021-01-28

## 2021-01-28 RX ORDER — FENTANYL CITRATE 50 UG/ML
INJECTION, SOLUTION INTRAMUSCULAR; INTRAVENOUS AS NEEDED
Status: DISCONTINUED | OUTPATIENT
Start: 2021-01-28 | End: 2021-01-28

## 2021-01-28 RX ORDER — BUPROPION HYDROCHLORIDE 150 MG/1
300 TABLET ORAL EVERY MORNING
Status: DISCONTINUED | OUTPATIENT
Start: 2021-01-29 | End: 2021-02-03 | Stop reason: HOSPADM

## 2021-01-28 RX ORDER — ONDANSETRON 2 MG/ML
4 INJECTION INTRAMUSCULAR; INTRAVENOUS EVERY 6 HOURS PRN
Status: DISCONTINUED | OUTPATIENT
Start: 2021-01-28 | End: 2021-02-03 | Stop reason: HOSPADM

## 2021-01-28 RX ORDER — DOCUSATE SODIUM 100 MG/1
100 CAPSULE, LIQUID FILLED ORAL 2 TIMES DAILY
Status: DISCONTINUED | OUTPATIENT
Start: 2021-01-28 | End: 2021-02-03 | Stop reason: HOSPADM

## 2021-01-28 RX ORDER — GABAPENTIN 300 MG/1
600 CAPSULE ORAL ONCE
Status: COMPLETED | OUTPATIENT
Start: 2021-01-28 | End: 2021-01-28

## 2021-01-28 RX ORDER — HYDROMORPHONE HCL/PF 1 MG/ML
0.5 SYRINGE (ML) INJECTION
Status: DISCONTINUED | OUTPATIENT
Start: 2021-01-28 | End: 2021-02-03 | Stop reason: HOSPADM

## 2021-01-28 RX ORDER — PROPOFOL 10 MG/ML
INJECTION, EMULSION INTRAVENOUS AS NEEDED
Status: DISCONTINUED | OUTPATIENT
Start: 2021-01-28 | End: 2021-01-28

## 2021-01-28 RX ORDER — SIMVASTATIN 40 MG
40 TABLET ORAL
COMMUNITY
End: 2021-06-21 | Stop reason: SDUPTHER

## 2021-01-28 RX ORDER — SODIUM CHLORIDE, SODIUM LACTATE, POTASSIUM CHLORIDE, CALCIUM CHLORIDE 600; 310; 30; 20 MG/100ML; MG/100ML; MG/100ML; MG/100ML
60 INJECTION, SOLUTION INTRAVENOUS CONTINUOUS
Status: DISCONTINUED | OUTPATIENT
Start: 2021-01-28 | End: 2021-01-29

## 2021-01-28 RX ORDER — DIPHENHYDRAMINE HYDROCHLORIDE 50 MG/ML
12.5 INJECTION INTRAMUSCULAR; INTRAVENOUS ONCE AS NEEDED
Status: CANCELLED | OUTPATIENT
Start: 2021-01-28

## 2021-01-28 RX ORDER — SODIUM CHLORIDE 9 MG/ML
INJECTION INTRAVENOUS AS NEEDED
Status: DISCONTINUED | OUTPATIENT
Start: 2021-01-28 | End: 2021-01-28 | Stop reason: HOSPADM

## 2021-01-28 RX ORDER — SODIUM CHLORIDE, SODIUM LACTATE, POTASSIUM CHLORIDE, CALCIUM CHLORIDE 600; 310; 30; 20 MG/100ML; MG/100ML; MG/100ML; MG/100ML
125 INJECTION, SOLUTION INTRAVENOUS CONTINUOUS
Status: DISCONTINUED | OUTPATIENT
Start: 2021-01-28 | End: 2021-01-28

## 2021-01-28 RX ORDER — HEPARIN SODIUM 5000 [USP'U]/ML
5000 INJECTION, SOLUTION INTRAVENOUS; SUBCUTANEOUS
Status: COMPLETED | OUTPATIENT
Start: 2021-01-28 | End: 2021-01-28

## 2021-01-28 RX ORDER — SENNOSIDES 8.6 MG
1 TABLET ORAL DAILY
Status: DISCONTINUED | OUTPATIENT
Start: 2021-01-28 | End: 2021-02-03 | Stop reason: HOSPADM

## 2021-01-28 RX ORDER — TRIAMTERENE AND HYDROCHLOROTHIAZIDE 37.5; 25 MG/1; MG/1
1 CAPSULE ORAL EVERY MORNING
COMMUNITY
End: 2021-09-28 | Stop reason: SDUPTHER

## 2021-01-28 RX ORDER — FAMOTIDINE 20 MG/1
40 TABLET, FILM COATED ORAL EVERY MORNING
Status: DISCONTINUED | OUTPATIENT
Start: 2021-01-29 | End: 2021-02-03 | Stop reason: HOSPADM

## 2021-01-28 RX ORDER — BUPIVACAINE HYDROCHLORIDE 2.5 MG/ML
INJECTION, SOLUTION EPIDURAL; INFILTRATION; INTRACAUDAL AS NEEDED
Status: DISCONTINUED | OUTPATIENT
Start: 2021-01-28 | End: 2021-01-28 | Stop reason: HOSPADM

## 2021-01-28 RX ORDER — DEXAMETHASONE SODIUM PHOSPHATE 10 MG/ML
INJECTION, SOLUTION INTRAMUSCULAR; INTRAVENOUS AS NEEDED
Status: DISCONTINUED | OUTPATIENT
Start: 2021-01-28 | End: 2021-01-28

## 2021-01-28 RX ORDER — POLYETHYLENE GLYCOL 3350 17 G/17G
17 POWDER, FOR SOLUTION ORAL DAILY
Status: DISCONTINUED | OUTPATIENT
Start: 2021-01-29 | End: 2021-02-03 | Stop reason: HOSPADM

## 2021-01-28 RX ORDER — SODIUM CHLORIDE, SODIUM LACTATE, POTASSIUM CHLORIDE, CALCIUM CHLORIDE 600; 310; 30; 20 MG/100ML; MG/100ML; MG/100ML; MG/100ML
INJECTION, SOLUTION INTRAVENOUS CONTINUOUS PRN
Status: DISCONTINUED | OUTPATIENT
Start: 2021-01-28 | End: 2021-01-28

## 2021-01-28 RX ORDER — EPHEDRINE SULFATE 50 MG/ML
INJECTION INTRAVENOUS AS NEEDED
Status: DISCONTINUED | OUTPATIENT
Start: 2021-01-28 | End: 2021-01-28

## 2021-01-28 RX ORDER — ONDANSETRON 2 MG/ML
INJECTION INTRAMUSCULAR; INTRAVENOUS AS NEEDED
Status: DISCONTINUED | OUTPATIENT
Start: 2021-01-28 | End: 2021-01-28

## 2021-01-28 RX ORDER — ROCURONIUM BROMIDE 10 MG/ML
INJECTION, SOLUTION INTRAVENOUS AS NEEDED
Status: DISCONTINUED | OUTPATIENT
Start: 2021-01-28 | End: 2021-01-28

## 2021-01-28 RX ORDER — ALBUTEROL SULFATE 90 UG/1
2 AEROSOL, METERED RESPIRATORY (INHALATION) EVERY 4 HOURS PRN
Status: DISCONTINUED | OUTPATIENT
Start: 2021-01-28 | End: 2021-02-03 | Stop reason: HOSPADM

## 2021-01-28 RX ORDER — CEFOXITIN 1 G/1
INJECTION, POWDER, FOR SOLUTION INTRAVENOUS AS NEEDED
Status: DISCONTINUED | OUTPATIENT
Start: 2021-01-28 | End: 2021-01-28

## 2021-01-28 RX ORDER — CEFAZOLIN SODIUM 1 G/50ML
1000 SOLUTION INTRAVENOUS ONCE
Status: COMPLETED | OUTPATIENT
Start: 2021-01-28 | End: 2021-01-28

## 2021-01-28 RX ORDER — OXYCODONE HYDROCHLORIDE 5 MG/1
5 TABLET ORAL EVERY 4 HOURS PRN
Status: DISCONTINUED | OUTPATIENT
Start: 2021-01-28 | End: 2021-01-31

## 2021-01-28 RX ORDER — HYDROMORPHONE HCL 110MG/55ML
PATIENT CONTROLLED ANALGESIA SYRINGE INTRAVENOUS AS NEEDED
Status: DISCONTINUED | OUTPATIENT
Start: 2021-01-28 | End: 2021-01-28

## 2021-01-28 RX ORDER — GABAPENTIN 300 MG/1
300 CAPSULE ORAL 3 TIMES DAILY
Status: DISCONTINUED | OUTPATIENT
Start: 2021-01-28 | End: 2021-02-03 | Stop reason: HOSPADM

## 2021-01-28 RX ORDER — PRAMIPEXOLE DIHYDROCHLORIDE 0.5 MG/1
0.5 TABLET ORAL
Status: DISCONTINUED | OUTPATIENT
Start: 2021-01-28 | End: 2021-02-03 | Stop reason: HOSPADM

## 2021-01-28 RX ORDER — ACETAMINOPHEN 325 MG/1
975 TABLET ORAL EVERY 6 HOURS
Status: DISCONTINUED | OUTPATIENT
Start: 2021-01-28 | End: 2021-02-03 | Stop reason: HOSPADM

## 2021-01-28 RX ORDER — ONDANSETRON 2 MG/ML
4 INJECTION INTRAMUSCULAR; INTRAVENOUS ONCE AS NEEDED
Status: CANCELLED | OUTPATIENT
Start: 2021-01-28

## 2021-01-28 RX ADMIN — DOCUSATE SODIUM 100 MG: 100 CAPSULE, LIQUID FILLED ORAL at 18:07

## 2021-01-28 RX ADMIN — ROCURONIUM BROMIDE 50 MG: 50 INJECTION, SOLUTION INTRAVENOUS at 12:35

## 2021-01-28 RX ADMIN — FENTANYL CITRATE 100 MCG: 50 INJECTION INTRAMUSCULAR; INTRAVENOUS at 12:33

## 2021-01-28 RX ADMIN — LIDOCAINE HYDROCHLORIDE 100 MG: 10 INJECTION, SOLUTION EPIDURAL; INFILTRATION; INTRACAUDAL; PERINEURAL at 12:33

## 2021-01-28 RX ADMIN — GABAPENTIN 600 MG: 300 CAPSULE ORAL at 10:25

## 2021-01-28 RX ADMIN — ENOXAPARIN SODIUM 40 MG: 40 INJECTION SUBCUTANEOUS at 18:08

## 2021-01-28 RX ADMIN — HEPARIN SODIUM 5000 UNITS: 5000 INJECTION INTRAVENOUS; SUBCUTANEOUS at 10:29

## 2021-01-28 RX ADMIN — HYDROMORPHONE HYDROCHLORIDE 0.5 MG: 2 INJECTION, SOLUTION INTRAMUSCULAR; INTRAVENOUS; SUBCUTANEOUS at 13:13

## 2021-01-28 RX ADMIN — PROPOFOL 150 MG: 10 INJECTION, EMULSION INTRAVENOUS at 12:34

## 2021-01-28 RX ADMIN — EPHEDRINE SULFATE 10 MG: 50 INJECTION, SOLUTION INTRAVENOUS at 12:35

## 2021-01-28 RX ADMIN — CEFAZOLIN SODIUM 1000 MG: 1 SOLUTION INTRAVENOUS at 12:23

## 2021-01-28 RX ADMIN — MIDAZOLAM 2 MG: 1 INJECTION INTRAMUSCULAR; INTRAVENOUS at 12:22

## 2021-01-28 RX ADMIN — DEXAMETHASONE SODIUM PHOSPHATE 10 MG: 10 INJECTION, SOLUTION INTRAMUSCULAR; INTRAVENOUS at 13:06

## 2021-01-28 RX ADMIN — ONDANSETRON 4 MG: 2 INJECTION INTRAMUSCULAR; INTRAVENOUS at 14:32

## 2021-01-28 RX ADMIN — ACETAMINOPHEN 975 MG: 325 TABLET, FILM COATED ORAL at 23:51

## 2021-01-28 RX ADMIN — SODIUM CHLORIDE, SODIUM LACTATE, POTASSIUM CHLORIDE, AND CALCIUM CHLORIDE 125 ML/HR: .6; .31; .03; .02 INJECTION, SOLUTION INTRAVENOUS at 10:35

## 2021-01-28 RX ADMIN — PHENYLEPHRINE HYDROCHLORIDE 200 MCG: 10 INJECTION INTRAVENOUS at 13:20

## 2021-01-28 RX ADMIN — SODIUM CHLORIDE, SODIUM LACTATE, POTASSIUM CHLORIDE, AND CALCIUM CHLORIDE: .6; .31; .03; .02 INJECTION, SOLUTION INTRAVENOUS at 12:57

## 2021-01-28 RX ADMIN — PRAMIPEXOLE DIHYDROCHLORIDE 0.5 MG: 0.5 TABLET ORAL at 23:52

## 2021-01-28 RX ADMIN — SALMETEROL XINAFOATE 1 PUFF: 50 POWDER, METERED ORAL; RESPIRATORY (INHALATION) at 21:15

## 2021-01-28 RX ADMIN — SENNOSIDES 8.6 MG: 8.6 TABLET, FILM COATED ORAL at 18:07

## 2021-01-28 RX ADMIN — SODIUM CHLORIDE: 9 INJECTION, SOLUTION INTRAVENOUS at 12:40

## 2021-01-28 RX ADMIN — ACETAMINOPHEN 975 MG: 325 TABLET, FILM COATED ORAL at 10:25

## 2021-01-28 RX ADMIN — PHENYLEPHRINE HYDROCHLORIDE 200 MCG: 10 INJECTION INTRAVENOUS at 12:42

## 2021-01-28 RX ADMIN — SUGAMMADEX 150 MG: 100 INJECTION, SOLUTION INTRAVENOUS at 15:10

## 2021-01-28 RX ADMIN — SODIUM CHLORIDE, SODIUM LACTATE, POTASSIUM CHLORIDE, AND CALCIUM CHLORIDE: .6; .31; .03; .02 INJECTION, SOLUTION INTRAVENOUS at 10:32

## 2021-01-28 RX ADMIN — PHENYLEPHRINE HYDROCHLORIDE 30 MCG/MIN: 10 INJECTION INTRAVENOUS at 13:22

## 2021-01-28 RX ADMIN — GABAPENTIN 300 MG: 300 CAPSULE ORAL at 18:07

## 2021-01-28 RX ADMIN — GABAPENTIN 300 MG: 300 CAPSULE ORAL at 23:51

## 2021-01-28 RX ADMIN — EPHEDRINE SULFATE 10 MG: 50 INJECTION, SOLUTION INTRAVENOUS at 12:39

## 2021-01-28 RX ADMIN — ACETAMINOPHEN 975 MG: 325 TABLET, FILM COATED ORAL at 18:07

## 2021-01-28 RX ADMIN — Medication 50 MG: at 12:34

## 2021-01-28 RX ADMIN — ROCURONIUM BROMIDE 20 MG: 50 INJECTION, SOLUTION INTRAVENOUS at 13:45

## 2021-01-28 RX ADMIN — ROCURONIUM BROMIDE 10 MG: 50 INJECTION, SOLUTION INTRAVENOUS at 14:44

## 2021-01-28 NOTE — ANESTHESIA PREPROCEDURE EVALUATION
Procedure:  RIGHT VATS UPPER LOBECTOMY (Right Chest)  BRONCHOSCOPY FLEXIBLE (N/A Bronchus)    Relevant Problems   No relevant active problems             Anesthesia Plan  ASA Score- 3     Anesthesia Type- general with ASA Monitors  Additional Monitors: arterial line  Airway Plan: ETT  Plan Factors-        Patient is a current smoker  Patient instructed to abstain from smoking on day of procedure  Patient did not smoke on day of surgery  Induction- intravenous  Postoperative Plan- Plan for postoperative opioid use  Planned trial extubation    Informed Consent- Anesthetic plan and risks discussed with patient  I personally reviewed this patient with the CRNA  Discussed and agreed on the Anesthesia Plan with the CRNA  Peri Quintanilla

## 2021-01-28 NOTE — QUICK NOTE
Post Op Check:    Saw patient at the bedside once they arrived to the floor  Patients pain is well controlled  They denied any nausea, chest pain, or shortness of breath  Patient is resting comfortably on 2L NC with O2 sat of 99%  Patient asking when she can get out of the bed and walk    PACU CXR: No ptx, subsegmental left lower lobe atelectasis    GEN: NAD, A&O3  Chest: Normal work of breathing, no respiratory distress  On 2L NC O2 sat of 99%  CT to [facility] with AL of 1-1 2L  No palpable crepitus    Abd: S, ND, NT    Plan:  Diet Regular; Regular House   [email or web address]  Pulmonary toliet, IS atleast 10x per hour  Continue to monitor  Pain and nausea control PRN    Tarun Dean, DO  Surgery, PGY-1

## 2021-01-28 NOTE — INTERVAL H&P NOTE
H&P reviewed  After examining the patient I find no changes in the patients condition since the H&P had been written      Vitals:    01/28/21 0937   BP: 131/60   Pulse: 68   Resp: 16   Temp: 98 °F (36 7 °C)   SpO2: 97%

## 2021-01-28 NOTE — OP NOTE
OPERATIVE REPORT  PATIENT NAME: Yanick Sanchez    :  1955  MRN: [id number]  Pt Location: BE OR ROOM 08    SURGERY DATE: 2021    Surgeon(s) and Role:     * Neeraj Cole MD - Primary     * KRISTIN Valera-FRANCHESCA - Assisting     * Iris Castañeda MD - Assisting    Preop Diagnosis:  Squamous cell lung cancer, right ([facility] [address] ) [C34 91]  Clinical stage IA      Post-Op Diagnosis Codes:     * Squamous cell lung cancer, right (HCC) [C34 91]  Clinical stage IA      Procedure(s) (LRB):    BRONCHOSCOPY FLEXIBLE (N/A)  RIGHT THORACOSCOPIC UPPER LOBECTOMY  MEDIASTINAL LYMPH NODE DISSECTION  RIGHT LOWER LOBE BLEB RESECTION    Specimen(s):  ID Type Source Tests Collected by Time Destination   1 : level 10 posterior Tissue Lymph Node TISSUE EXAM Neeraj Cole MD 2021 1324    2 : level 10 superior Tissue Lymph Node TISSUE EXAM Neeraj Cole MD 2021 1325    3 : level 7 Tissue Lymph Node TISSUE EXAM Neeraj Cole MD 2021 1329    4 : AZYGOS NODE Tissue Lymph Node TISSUE EXAM Neeraj Cole MD 2021 1350    5 : RIGHT UPPER LOBE Tissue Lung TISSUE EXAM Neeraj Cole MD 2021 1324    6 : LEVEL 4R Tissue Lymph Node TISSUE EXAM Neeraj Cole MD 2021 1416    7 : LEVEL 2R Tissue Lymph Node TISSUE EXAM Neeraj Cole MD 2021 1417    8 : RIGHT LOWER LOBE BLEB Tissue Lung TISSUE EXAM Neeraj Cole MD 2021 1419        Estimated Blood Loss:   Minimal    Drains:  Chest Tube Right Pleural 24 Fr  (Active)   Number of days: 0       Anesthesia Type:   General    Operative Indications:  Squamous cell lung cancer, right (HCC) [C34 91]  Clinical stage I  Ms Tere Stevenson has a biopsy-proven clinical stage I lung cancer right upper lobe  PET-CT scan was not demonstrate hilar, mediastinal, or distant disease  Her pulmonary function tests are adequate for lobectomy and she is brought to the operating room for resection      Operative Findings:  Right upper lobe lung mass completely resected  Bleb of the right lower lobe is resected  Complications:   None    Procedure and Technique:  The patient is brought to the operating room and placed in the supine position  After institution of adequate general anesthesia, the fiberoptic bronchoscope was inserted  The distal trachea is normal in the jesus is sharp  The airways are examined to a subsegmental level bilaterally  There are no endobronchial lesions, the anatomy appears normal, and secretions are scant  After placement of a double-lumen endotracheal tube the patient is turned into the left lateral decubitus position  The patient's entire right chest is prepped and draped in the usual sterile fashion  Standard port incisions are utilized with a 1 cm incision in the eighth intercostal space in the posterior axillary line as well as a 4 cm incision more anteriorly  The thoracoscope is inserted and there is no evidence of pleural carcinomatosis  The mass is identified within the right upper lobe  The inferior pulmonary ligament is divided  The pleura overlying the posterior hilum was opened  Lymph nodes at level 7 are completely dissected and sent as separate specimens  There are no obvious level 9 or 8 lymph nodes  A lymph node posterior to the upper lobe bronchus is labeled level 10 posterior and another lymph node just superior to the right upper lobe bronchus is labeled 10 superior  The junction between the upper lobe bronchus and the bronchus intermedius is identified in the level 11 lymph node is swept away from the bronchus up into the specimen  The lung was then retracted posteriorly and the pleura overlying the anterior hilum is opened  The middle lobe vein is clearly identified  The upper lobe component of the superior pulmonary vein was then circumferentially dissected and divided with a linear stapler   This exposes the underlying pulmonary artery and the lymphatics between the ongoing pulmonary artery and the truncus anterior are divided and swept up into the specimen  A lymph node just above the truncus anterior and underneath the azygos is resected and labeled azygos node  The truncus anterior is then circumferentially dissected and divided with a linear stapler  A level 12 lymph node is slept up into the specimen  The level  11 lymph node is kept with the specimen  The upper-lobe bronchus is circumferentially dissected and divided with a linear stapler  The distal divided end of the bronchial stump is grasped and retracted laterally exposing the posterior ascending artery  This is circumferentially dissected and divided with a linear stapler  The plane overlying the ongoing pulmonary artery is dissected to completely undermine the fissure  Finally, the fissure is divided first by dividing the horizontal fissure followed by the posterior portion of the oblique fissure  The specimen is then placed inside of a protective bag and removed from the body  The pleura above the azygos vein is then opened and both levels 4R and 2R are dissected for permanent sections  Hemostasis is assured  She is noted to have an approximate 3 cm bleb coming from her anterior basilar segment of her right lower lobe  This is resected with a linear stapler  Paravertebral blocks are then placed with 20 cc of 0 25% Marcaine mixed with 20 cc of Exparel over 8 levels  A 24 English chest tube was then placed through the camera port to lie posteriorly and upwards the apex  The remaining lung is then watched as it reexpands  The anterior access incision was then closed in layers with running absorbable suture to the pectoral fascia, the subcutaneous and subcuticular layers  The chest tube is affixed to the chest wall and placed to [place] drainage  Surprisingly, she is noted to have a fairly large air leak when her lung looked very good at the completion of the operative portion  As such, her incision was reopened and her chest tube removed    Repeat thoracoscopy demonstrates that the pleura has pulled away from the staple line along the superior segment of the right lower lobe  Repeat stapling of this is performed  ProGEL was then utilized through promote pneumo stasis along the staple line  The chest was then reclosed in a similar fashion and the chest tube connected to [place] drainage  Now the air leak is only several hundred mL/min  Dry sterile dressings were applied, the patient was extubated, and transferred to the thoracic recovery area  I was present and scrubbed for the entire procedure  The physician assistant was critical to the performance of this operation  She was scrubbed for the entire procedure, drove the thoracoscope, provided retraction, as well as wound closure       I was present for the entire procedure    Patient Disposition:  PACU     SIGNATURE: Sis Boswell MD  DATE: January 28, 2021  TIME: 2:34 PM

## 2021-01-28 NOTE — ANESTHESIA PREPROCEDURE EVALUATION
Procedure:  RIGHT VATS UPPER LOBECTOMY (Right Chest)  BRONCHOSCOPY FLEXIBLE (N/A Bronchus)    Relevant Problems   No relevant active problems        Physical Exam    Airway    Mallampati score: I  TM Distance: >3 FB  Neck ROM: full     Dental       Cardiovascular      Pulmonary      Other Findings        Anesthesia Plan  ASA Score- 3     Anesthesia Type- general with ASA Monitors  Additional Monitors:   Airway Plan: ETT  Plan Factors-        Patient is a current smoker  Patient did not smoke on day of surgery  Induction- intravenous  Postoperative Plan- Plan for postoperative opioid use  Planned trial extubation    Informed Consent- Anesthetic plan and risks discussed with patient  I personally reviewed this patient with the CRNA  Discussed and agreed on the Anesthesia Plan with the CRNA  Alisia Feliz

## 2021-01-28 NOTE — ANESTHESIA POSTPROCEDURE EVALUATION
Post-Op Assessment Note    CV Status:  Stable  Pain Score: 0    Pain management: satisfactory to patient     Mental Status:  Arousable   Hydration Status:  Stable   PONV Controlled:  None   Airway Patency:  Patent  Airway: intubated   Two or more mitigation strategies used for obstructive sleep apnea   Post Op Vitals Reviewed: Yes      Staff: Anesthesiologist, with CRNAs         No complications documented      BP   120/55   Temp   97 5   Pulse  82   Resp   18   SpO2   100

## 2021-01-29 ENCOUNTER — APPOINTMENT (INPATIENT)
Dept: RADIOLOGY | Facility: HOSPITAL | Age: 66
DRG: 164 | End: 2021-01-29
Payer: COMMERCIAL

## 2021-01-29 LAB
ANION GAP SERPL CALCULATED.3IONS-SCNC: 5 MMOL/L (ref 4–13)
BUN SERPL-MCNC: 11 MG/DL (ref 5–25)
CALCIUM SERPL-MCNC: 8.6 MG/DL (ref 8.3–10.1)
CHLORIDE SERPL-SCNC: 105 MMOL/L (ref 100–108)
CO2 SERPL-SCNC: 28 MMOL/L (ref 21–32)
CREAT SERPL-MCNC: 0.71 MG/DL (ref 0.6–1.3)
ERYTHROCYTE [DISTWIDTH] IN BLOOD BY AUTOMATED COUNT: 12.4 % (ref 11.6–15.1)
GFR SERPL CREATININE-BSD FRML MDRD: 88 ML/MIN/1.73SQ M
GLUCOSE SERPL-MCNC: 125 MG/DL (ref 65–140)
HCT VFR BLD AUTO: 37.8 % (ref 34.8–46.1)
HGB BLD-MCNC: 12.4 G/DL (ref 11.5–15.4)
MAGNESIUM SERPL-MCNC: 2 MG/DL (ref 1.6–2.6)
MCH RBC QN AUTO: 31.2 PG (ref 26.8–34.3)
MCHC RBC AUTO-ENTMCNC: 32.8 G/DL (ref 31.4–37.4)
MCV RBC AUTO: 95 FL (ref 82–98)
PLATELET # BLD AUTO: 363 THOUSANDS/UL (ref 149–390)
PMV BLD AUTO: 10.4 FL (ref 8.9–12.7)
POTASSIUM SERPL-SCNC: 3.9 MMOL/L (ref 3.5–5.3)
RBC # BLD AUTO: 3.98 MILLION/UL (ref 3.81–5.12)
SODIUM SERPL-SCNC: 138 MMOL/L (ref 136–145)
WBC # BLD AUTO: 13.79 THOUSAND/UL (ref 4.31–10.16)

## 2021-01-29 PROCEDURE — 71045 X-RAY EXAM CHEST 1 VIEW: CPT

## 2021-01-29 PROCEDURE — 97166 OT EVAL MOD COMPLEX 45 MIN: CPT

## 2021-01-29 PROCEDURE — 80048 BASIC METABOLIC PNL TOTAL CA: CPT | Performed by: PHYSICIAN ASSISTANT

## 2021-01-29 PROCEDURE — 85027 COMPLETE CBC AUTOMATED: CPT | Performed by: PHYSICIAN ASSISTANT

## 2021-01-29 PROCEDURE — 99024 POSTOP FOLLOW-UP VISIT: CPT | Performed by: THORACIC SURGERY (CARDIOTHORACIC VASCULAR SURGERY)

## 2021-01-29 PROCEDURE — 83735 ASSAY OF MAGNESIUM: CPT | Performed by: PHYSICIAN ASSISTANT

## 2021-01-29 PROCEDURE — 97162 PT EVAL MOD COMPLEX 30 MIN: CPT

## 2021-01-29 RX ORDER — LANOLIN ALCOHOL/MO/W.PET/CERES
3 CREAM (GRAM) TOPICAL
Status: DISCONTINUED | OUTPATIENT
Start: 2021-01-29 | End: 2021-02-03 | Stop reason: HOSPADM

## 2021-01-29 RX ADMIN — SODIUM CHLORIDE, SODIUM LACTATE, POTASSIUM CHLORIDE, AND CALCIUM CHLORIDE 60 ML/HR: .6; .31; .03; .02 INJECTION, SOLUTION INTRAVENOUS at 06:24

## 2021-01-29 RX ADMIN — ACETAMINOPHEN 975 MG: 325 TABLET, FILM COATED ORAL at 23:24

## 2021-01-29 RX ADMIN — ENOXAPARIN SODIUM 40 MG: 40 INJECTION SUBCUTANEOUS at 08:04

## 2021-01-29 RX ADMIN — SALMETEROL XINAFOATE 1 PUFF: 50 POWDER, METERED ORAL; RESPIRATORY (INHALATION) at 08:04

## 2021-01-29 RX ADMIN — HYDROMORPHONE HYDROCHLORIDE 0.5 MG: 1 INJECTION, SOLUTION INTRAMUSCULAR; INTRAVENOUS; SUBCUTANEOUS at 23:25

## 2021-01-29 RX ADMIN — DOCUSATE SODIUM 100 MG: 100 CAPSULE, LIQUID FILLED ORAL at 17:11

## 2021-01-29 RX ADMIN — ACETAMINOPHEN 975 MG: 325 TABLET, FILM COATED ORAL at 17:11

## 2021-01-29 RX ADMIN — ACETAMINOPHEN 975 MG: 325 TABLET, FILM COATED ORAL at 11:52

## 2021-01-29 RX ADMIN — MELATONIN TAB 3 MG 3 MG: 3 TAB at 01:44

## 2021-01-29 RX ADMIN — GABAPENTIN 300 MG: 300 CAPSULE ORAL at 17:11

## 2021-01-29 RX ADMIN — OXYCODONE HYDROCHLORIDE 5 MG: 5 TABLET ORAL at 22:17

## 2021-01-29 RX ADMIN — FAMOTIDINE 40 MG: 20 TABLET ORAL at 08:04

## 2021-01-29 RX ADMIN — MELATONIN TAB 3 MG 3 MG: 3 TAB at 22:17

## 2021-01-29 RX ADMIN — NICOTINE POLACRILEX 2 MG: 2 GUM, CHEWING ORAL at 18:33

## 2021-01-29 RX ADMIN — BUPROPION HYDROCHLORIDE 300 MG: 150 TABLET, FILM COATED, EXTENDED RELEASE ORAL at 08:03

## 2021-01-29 RX ADMIN — GABAPENTIN 300 MG: 300 CAPSULE ORAL at 08:04

## 2021-01-29 RX ADMIN — SENNOSIDES 8.6 MG: 8.6 TABLET, FILM COATED ORAL at 08:04

## 2021-01-29 RX ADMIN — ACETAMINOPHEN 975 MG: 325 TABLET, FILM COATED ORAL at 06:51

## 2021-01-29 RX ADMIN — DOCUSATE SODIUM 100 MG: 100 CAPSULE, LIQUID FILLED ORAL at 08:04

## 2021-01-29 RX ADMIN — TRAZODONE HYDROCHLORIDE 150 MG: 100 TABLET ORAL at 22:17

## 2021-01-29 RX ADMIN — GABAPENTIN 300 MG: 300 CAPSULE ORAL at 20:09

## 2021-01-29 RX ADMIN — POLYETHYLENE GLYCOL 3350 17 G: 17 POWDER, FOR SOLUTION ORAL at 08:04

## 2021-01-29 RX ADMIN — NICOTINE POLACRILEX 2 MG: 2 GUM, CHEWING ORAL at 06:54

## 2021-01-29 NOTE — CASE MANAGEMENT
66yo female is POD#1 right VATS/RUL lobectomy  One chest tube with air leak  She is alert and oriented, independent ADLS, retired, drives  She is  but  from her , they have separate residences  She resides alone in [place] in a 1 story home with 3 MARTHA  She has no DME, no hx HHC, no rehab, no mental illness or D&A  She has no POA  Her  is Fe Loza at [phone] or cell [phone]  He will transport her home at discharge  Her PCP is Dr Petty Rodriguez and she prefers [facility] on [address]  No HHC needs at this time  CM reviewed d/c planning process including the following: identifying help at home, patient preference for d/c planning needs, Discharge Lounge, [facility] Meds to Bed program, availability of treatment team to discuss questions or concerns patient and/or family may have regarding understanding medications and recognizing signs and symptoms once discharged  CM also encouraged patient to follow up with all recommended appointments after discharge  Patient advised of importance for patient and family to participate in managing patients medical well being  Patient/caregiver received discharge checklist  Content reviewed  Patient/caregiver encouraged to participate in discharge plan of care prior to discharge home

## 2021-01-29 NOTE — PLAN OF CARE
Problem: Prexisting or High Potential for Compromised Skin Integrity  Goal: Skin integrity is maintained or improved  Description: INTERVENTIONS:  - Identify patients at risk for skin breakdown  - Assess and monitor skin integrity  - Assess and monitor nutrition and hydration status  - Monitor labs   - Assess for incontinence   - Turn and reposition patient  - Assist with mobility/ambulation  - Relieve pressure over bony prominences  - Avoid friction and shearing  - Provide appropriate hygiene as needed including keeping skin clean and dry  - Evaluate need for skin moisturizer/barrier cream  - Collaborate with interdisciplinary team   - Patient/family teaching  - Consider wound care consult   Outcome: Progressing     Problem: PAIN - ADULT  Goal: Verbalizes/displays adequate comfort level or baseline comfort level  Description: Interventions:  - Encourage patient to monitor pain and request assistance  - Assess pain using appropriate pain scale  - Administer analgesics based on type and severity of pain and evaluate response  - Implement non-pharmacological measures as appropriate and evaluate response  - Consider cultural and social influences on pain and pain management  - Notify physician/advanced practitioner if interventions unsuccessful or patient reports new pain  Outcome: Progressing     Problem: INFECTION - ADULT  Goal: Absence or prevention of progression during hospitalization  Description: INTERVENTIONS:  - Assess and monitor for signs and symptoms of infection  - Monitor lab/diagnostic results  - Monitor all insertion sites, i e  indwelling lines, tubes, and drains  - Monitor endotracheal if appropriate and nasal secretions for changes in amount and color  - [place] appropriate cooling/warming therapies per order  - Administer medications as ordered  - Instruct and encourage patient and family to use good hand hygiene technique  - Identify and instruct in appropriate isolation precautions for identified infection/condition  Outcome: Progressing  Goal: Absence of fever/infection during neutropenic period  Description: INTERVENTIONS:  - Monitor WBC    Outcome: Progressing     Problem: SAFETY ADULT  Goal: Patient will remain free of falls  Description: INTERVENTIONS:  - Assess patient frequently for physical needs  -  Identify cognitive and physical deficits and behaviors that affect risk of falls    -  [place] fall precautions as indicated by assessment   - Educate patient/family on patient safety including physical limitations  - Instruct patient to call for assistance with activity based on assessment  - Modify environment to reduce risk of injury  - Consider OT/PT consult to assist with strengthening/mobility  Outcome: Progressing  Goal: Maintain or return to baseline ADL function  Description: INTERVENTIONS:  -  Assess patient's ability to carry out ADLs; assess patient's baseline for ADL function and identify physical deficits which impact ability to perform ADLs (bathing, care of mouth/teeth, toileting, grooming, dressing, etc )  - Assess/evaluate cause of self-care deficits   - Assess range of motion  - Assess patient's mobility; develop plan if impaired  - Assess patient's need for assistive devices and provide as appropriate  - Encourage maximum independence but intervene and supervise when necessary  - Involve family in performance of ADLs  - Assess for home care needs following discharge   - Consider OT consult to assist with ADL evaluation and planning for discharge  - Provide patient education as appropriate  Outcome: Progressing  Goal: Maintain or return mobility status to optimal level  Description: INTERVENTIONS:  - Assess patient's baseline mobility status (ambulation, transfers, stairs, etc )    - Identify cognitive and physical deficits and behaviors that affect mobility  - Identify mobility aids required to assist with transfers and/or ambulation (gait belt, sit-to-stand, lift, walker, cane, etc )  - [place] fall precautions as indicated by assessment  - Record patient progress and toleration of activity level on Mobility SBAR; progress patient to next Phase/Stage  - Instruct patient to call for assistance with activity based on assessment  - Consider rehabilitation consult to assist with strengthening/weightbearing, etc   Outcome: Progressing     Problem: DISCHARGE PLANNING  Goal: Discharge to home or other facility with appropriate resources  Description: INTERVENTIONS:  - Identify barriers to discharge w/patient and caregiver  - Arrange for needed discharge resources and transportation as appropriate  - Identify discharge learning needs (meds, wound care, etc )  - Arrange for interpretive services to assist at discharge as needed  - Refer to Case Management Department for coordinating discharge planning if the patient needs post-hospital services based on physician/advanced practitioner order or complex needs related to functional status, cognitive ability, or social support system  Outcome: Progressing     Problem: Knowledge Deficit  Goal: Patient/family/caregiver demonstrates understanding of disease process, treatment plan, medications, and discharge instructions  Description: Complete learning assessment and assess knowledge base    Interventions:  - Provide teaching at level of understanding  - Provide teaching via preferred learning methods  Outcome: Progressing     Problem: RESPIRATORY - ADULT  Goal: Achieves optimal ventilation and oxygenation  Description: INTERVENTIONS:  - Assess for changes in respiratory status  - Assess for changes in mentation and behavior  - Position to facilitate oxygenation and minimize respiratory effort  - Oxygen administered by appropriate delivery if ordered  - Initiate smoking cessation education as indicated  - Encourage broncho-pulmonary hygiene including cough, deep breathe, Incentive Spirometry  - Assess the need for suctioning and aspirate as needed  - Assess and instruct to report SOB or any respiratory difficulty  - Respiratory Therapy support as indicated  Outcome: Progressing

## 2021-01-29 NOTE — PHYSICAL THERAPY NOTE
Physical Therapy Evaluation    Patient's Name: Charity Kevin    Admitting Diagnosis  Squamous cell lung cancer, right [facility]) [C34 91]    Problem List  Patient Active Problem List   Diagnosis    Squamous cell lung cancer, right [facility])       Past Medical History  Past Medical History:   Diagnosis Date    Cancer ([facility] )     lung    GERD (gastroesophageal reflux disease)     Hypertension     Pulmonary emphysema ([facility] )     Restless leg syndrome        Past Surgical History  Past Surgical History:   Procedure Laterality Date    AUGMENTATION MAMMAPLASTY Bilateral 11/2014    retro-pectoral silicone    BREAST BIOPSY Left 03/23/2015    u/s core bx    BREAST BIOPSY Left 08/02/2010    high-risk lesion    BREAST EXCISIONAL BIOPSY Left 08/26/2010    high-risk lesion    BREAST EXCISIONAL BIOPSY Left 1994    BREAST EXCISIONAL BIOPSY Right 1984    [place] [address] N/A 1/28/2021    Procedure: Catrina Venegas;  Surgeon: Bel Rodney MD;  Location: [facility] MAIN OR;  Service: Thoracic    [place] THORACOSCOPY SURG LOBECTOMY Right 1/28/2021    Procedure: RIGHT VATS UPPER LOBECTOMY ; MEDIASTINAL LYMPH NODE DISSECTION; right lower lobe bleb resection  ;  Surgeon: Bel Rodney MD;  Location: [facility] MAIN OR;  Service: Thoracic    TOTAL ABDOMINAL HYSTERECTOMY  2010    age 60    TOTAL ABDOMINAL HYSTERECTOMY W/ BILATERAL SALPINGOOPHORECTOMY Bilateral 2010    age 60        01/29/21 0853   PT Last Visit   PT Visit Date 01/29/21   Note Type   Note type Evaluation   Pain Assessment   Pain Assessment Tool Pain Assessment not indicated - pt denies pain   Home Living   Type of [address] [place] [address] One level;Stairs to enter with rails   Bathroom Shower/Tub Walk-in shower   Additional Comments Pt lives in a ranch home with 3 MARTHA   Denies owning or using any DME PTA   Prior Function   Level of [place] Independent with ADLs and functional mobility   Lives With Spouse   Receives Help From Family   ADL Assistance Independent IADLs Independent   Falls in the last 6 months 0   Vocational Retired   Restrictions/Precautions   [facility] Bearing Precautions Per Order No   Other Precautions   (CT)   Cognition   Overall Cognitive Status WFL   Attention Within functional limits   Orientation Level Oriented X4   Memory Within functional limits   Following Commands Follows all commands and directions without difficulty   RLE Assessment   RLE Assessment WFL   LLE Assessment   LLE Assessment WFL   Transfers   Sit to Stand 5  Supervision   Stand to Sit 5  Supervision   Additional Comments Transfers w/o AD   Ambulation/Elevation   Gait pattern   (slow, cautious)   Gait Assistance 5  Supervision   Additional items Assist x 1   Assistive Device None   Distance 160ft   Balance   Static Sitting Good   Dynamic Sitting Fair +   Static Standing Fair +   Dynamic Standing Fair   Ambulatory Fair   Activity Tolerance   Activity Tolerance Patient tolerated treatment well   Medical Staff Made Aware OT Jameson   Nurse Made Aware pt okay to see per RN   Assessment   Assessment Pt is a 77 y o  female seen for PT evaluation s/p admit to [facility] on 1/28/2021  Pt was admitted with a primary dx of: squamous cell lung cancer  Pt is POD#1 s/p "BRONCHOSCOPY FLEXIBLE, RIGHT THORACOSCOPIC UPPER LOBECTOMY, MEDIASTINAL LYMPH NODE DISSECTION, RIGHT LOWER LOBE BLEB RESECTION "  PT now consulted for assessment of mobility and d/c needs  Pt with Ambulate orders  Pts current comorbidities effecting treatment include: h/o Cancer ([address] ), GERD (gastroesophageal reflux disease), Hypertension, Pulmonary emphysema ([address] ), and Restless leg syndrome  Pts current clinical presentation is Evolving (medium complexity) due to Ongoing medical management for primary dx, Current WBS, s/p surgical intervention, CT in place  Prior to admission, pt was independent with ADLs and ambulating w/o an AD  Pt lives with her  in a ranch home with 3 MARTHA   Upon evaluation, pt currently is requiring supervision for transfers and supervision for ambulation 150 ft w/ no AD  Pt presents at PT eval functioning at/near baseline mobility level  No acute PT needs at this time, D/C PT  At conclusion of PT session pt returned back in chair with phone and call bell within reach  Pt denies any further questions at this time  Recommend home with social support upon hospital D/C     Goals   Patient Goals to go home   Plan   PT Frequency One time visit  (D/C PT)   Recommendation   PT Discharge Recommendation Return to previous environment with social support   PT - OK to Discharge Yes  (when medically cleared)   [place] 8 in Bed Without Bedrails 4   Lying on Back to Sitting on Edge of Flat Bed 4   Moving Bed to Chair 3   Standing Up From Chair 3   Walk in Room 3   Climb 3-5 Stairs 3   Basic Mobility Inpatient Raw Score 20   Basic Mobility Standardized Score 43 99       Hema Pelaez, PT, DPT

## 2021-01-29 NOTE — UTILIZATION REVIEW
Initial Clinical Review    Elective surgical procedure  Age/Sex: 77 y o  female  Surgery Date: 01/28/2021  Procedure: BRONCHOSCOPY FLEXIBLE (N/A)  RIGHT THORACOSCOPIC UPPER LOBECTOMY  MEDIASTINAL LYMPH NODE DISSECTION  RIGHT LOWER LOBE BLEB RESECTION  Anesthesia: General  Operative Findings: Right upper lobe lung mass completely resected  Bleb of the right lower lobe is resected  POD#1 Progress Note (01/29/2021):  S/P VATS right upper lobectomy with RLL bleb resection  Continue chest tube to water seal, with air leak      Admission Orders: Date/Time/Statement:   Admission Orders (From admission, onward)     Ordered        01/28/21 1534  INPATIENT ADMISSION  Once                   Orders Placed This Encounter   Procedures    INPATIENT ADMISSION     Standing Status:   Standing     Number of Occurrences:   1     Order Specific Question:   Level of Care     Answer:   Level 1 Stepdown [13]     Order Specific Question:   Estimated length of stay     Answer:   Not Applicable     Vital Signs: /60 (BP Location: Left arm)   Pulse 65   Temp (!) 97 3 °F (36 3 °C) (Oral)   Resp 18   Ht 4' 9" (1 448 m)   Wt 62 1 kg (136 lb 14 5 oz)   SpO2 92%   BMI 29 63 kg/m²   Diet: Regular diet  Mobility: OOB and Ambulating  DVT Prophylaxis: VTE Pharmacologic Prophylaxis: Enoxaparin (Lovenox)  VTE Mechanical Prophylaxis: sequential compression device  Medications/Pain Control:   Scheduled Medications:  acetaminophen, 975 mg, Oral, Q6H  buPROPion, 300 mg, Oral, QAM  docusate sodium, 100 mg, Oral, BID  enoxaparin, 40 mg, Subcutaneous, Daily  famotidine, 40 mg, Oral, QAM  gabapentin, 300 mg, Oral, TID  melatonin, 3 mg, Oral, HS  polyethylene glycol, 17 g, Oral, Daily  pramipexole, 0 5 mg, Oral, HS  salmeterol, 1 puff, Inhalation, Q12H ERUM  senna, 1 tablet, Oral, Daily  tiotropium, 18 mcg, Inhalation, Daily  traZODone, 150 mg, Oral, HS      Continuous IV Infusions:     PRN Meds:  albuterol, 2 puff, Inhalation, Q4H PRN  bisacodyl, 10 mg, Oral, Daily PRN  HYDROmorphone, 0 5 mg, Intravenous, Q3H PRN  nicotine polacrilex, 2 mg, Oral, Q2H PRN  ondansetron, 4 mg, Intravenous, Q6H PRN  oxyCODONE, 5 mg, Oral, Q4H PRN        Network Utilization Review Department  ATTENTION: Please call with any questions or concerns to [phone] and carefully listen to the prompts so that you are directed to the right person  All voicemails are confidential   [place] all requests for admission clinical reviews, approved or denied determinations and any other requests to dedicated fax number below belonging to the campus where the patient is receiving treatment   List of dedicated fax numbers for the Facilities:  [address] DENIALS (Administrative/Medical Necessity) [phone]   [address] (Maternity/NICU/Pediatrics) [phone]   [address] [place] [address] Dr Kanika Landrum 0541 (  Emre Rosario [facility] "Evelina" 103) [address] [address] [address] [address] [place]) [address] [place] [address] [phone]

## 2021-01-29 NOTE — PROGRESS NOTES
Progress Note - Thoracic Surgery   Melina Zhao 77 y o  female MRN: [id number]  Unit/Bed#: [facility] 405-01 Encounter: [id number]    Assessment:  70yF POD1 VATS right upper lobectomy with RLL bleb resection     serosang out on WS with airleak 800-1200  On RA    Plan:  Continue chest tube   Continue regular diet  D/c IVF  IS, OOB and ambulating  DVT PPx Lovenox  Med surg    Subjective/Objective   Subjective:   No acute events  AVSS  Objective:     Blood pressure 119/54, pulse 72, temperature 98 °F (36 7 °C), temperature source Oral, resp  rate 18, height 4' 9" (1 448 m), weight 58 5 kg (129 lb), SpO2 95 %, not currently breastfeeding  ,Body mass index is 27 92 kg/m²  Intake/Output Summary (Last 24 hours) at 1/29/2021 1526  Last data filed at 1/29/2021 0353  Gross per 24 hour   Intake 1500 ml   Output 1800 ml   Net -300 ml       Invasive Devices     Peripheral Intravenous Line            Peripheral IV 01/28/21 Left Hand less than 1 day    Peripheral IV 01/28/21 Right Hand less than 1 day          Drain            Chest Tube Right Pleural 24 Fr  less than 1 day                Physical Exam:    Gen: NAD, AAOx3  CV: RRR  Pulm: no resp distress on RA  R Chest incisions c/d/i  Chest tube in place to WS with airleak  Abd: Soft, non-distended, non-tender      Lab, Imaging and other studies:  I have personally reviewed pertinent lab results    , CBC:   Lab Results   Component Value Date    WBC 13 79 (H) 01/29/2021    HGB 12 4 01/29/2021    HCT 37 8 01/29/2021    MCV 95 01/29/2021     01/29/2021    MCH 31 2 01/29/2021    MCHC 32 8 01/29/2021    RDW 12 4 01/29/2021    MPV 10 4 01/29/2021   , CMP:   Lab Results   Component Value Date    SODIUM 138 01/29/2021    K 3 9 01/29/2021     01/29/2021    CO2 28 01/29/2021    BUN 11 01/29/2021    CREATININE 0 71 01/29/2021    CALCIUM 8 6 01/29/2021    EGFR 88 01/29/2021   , Coagulation: No results found for: PT, INR, APTT  VTE Pharmacologic Prophylaxis: Enoxaparin (Lovenox)  VTE Mechanical Prophylaxis: sequential compression device

## 2021-01-29 NOTE — OCCUPATIONAL THERAPY NOTE
Occupational Therapy Evaluation     Patient Name: Glendy Martinez  FCKCG'Z Date: 1/29/2021  Problem List  Principal Problem:    Squamous cell lung cancer, right [facility])    Past Medical History  Past Medical History:   Diagnosis Date    Cancer ([facility] )     lung    GERD (gastroesophageal reflux disease)     Hypertension     [facility]     Restless leg syndrome      Past Surgical History  Past Surgical History:   Procedure Laterality Date    AUGMENTATION MAMMAPLASTY Bilateral 11/2014    retro-pectoral silicone    BREAST BIOPSY Left 03/23/2015    u/s core bx    BREAST BIOPSY Left 08/02/2010    high-risk lesion    BREAST EXCISIONAL BIOPSY Left 08/26/2010    high-risk lesion    BREAST EXCISIONAL BIOPSY Left 1994    BREAST EXCISIONAL BIOPSY Right 1984    TOTAL ABDOMINAL HYSTERECTOMY  2010    age 60    TOTAL ABDOMINAL HYSTERECTOMY W/ BILATERAL SALPINGOOPHORECTOMY Bilateral 2010    age 60           01/29/21 0852   OT Last Visit   OT Visit Date 01/29/21   Note Type   Note type Evaluation   Restrictions/Precautions   Weight Bearing Precautions Per Order No   Other Precautions   (CT)   Pain Assessment   Pain Assessment Tool 0-10   Pain Score No Pain   Home Living   Type of [address] [place] [address] One level;Stairs to enter with rails   Bathroom Shower/Tub Walk-in shower   Bathroom Toilet Standard   Bathroom Equipment   (denies)   [address]   (denies)   Additional Comments Pt reports living in a 1 story home with 3 MARTHA      Prior Function   Level of [place] Independent with ADLs and functional mobility   Lives With Spouse   Receives Help From Family   ADL Assistance Independent   IADLs Independent   Falls in the last 6 months 0   Vocational Retired   Lifestyle   Autonomy Pt reports being I with ADLS, IADLS and mobility without device PTA  (+)     Reciprocal Relationships Pt reports that her  is able to assist as needed upon d/c    Service to Others Retired   Intrinsic Gratification Enjoys walking her dogs    Subjective   Subjective Pt reports that she has no concerns about returning home  ADL   Where Assessed Chair   Eating Assistance 7  Independent   Grooming Assistance 5  Supervision/Setup   UB Bathing Assistance 5  Supervision/Setup   LB Bathing Assistance 5  Supervision/Setup   UB Dressing Assistance 5  Supervision/Setup   LB Dressing Assistance 5  Supervision/Setup   Toileting Assistance  5  Supervision/Setup   Bed Mobility   Additional Comments Unable to assess, pt seated OOB in chair upon OT arrival  After OT session pt in chair with all needs within reach  Transfers   Sit to Stand 5  Supervision   Stand to Sit 5  Supervision   Functional Mobility   Functional Mobility 5  Supervision   Additional Comments Pt demonstrated household mobility with no device or rest breaks    Balance   Static Sitting Good   Dynamic Sitting Fair +   Static Standing Fair +   Dynamic Standing Fair   Ambulatory Fair   Activity Tolerance   Activity Tolerance Patient tolerated treatment well   Medical Staff Made Aware PT Dipika   Nurse Made Aware RN confirmed okay to see pt   RUE Assessment   RUE Assessment WFL   LUE Assessment   LUE Assessment WFL   Cognition   Overall Cognitive Status WFL   Arousal/Participation Alert; Cooperative   Attention Within functional limits   Orientation Level Oriented X4   Memory Within functional limits   Following Commands Follows all commands and directions without difficulty   Comments Pt is overall pleasant and cooperative  Assessment   Assessment Pt is a 77 y o  female admitted to B on 1/28/2021 w/ squamous cell lung cancer s/p "BRONCHOSCOPY FLEXIBLE, RIGHT THORACOSCOPIC UPPER LOBECTOMY, MEDIASTINAL LYMPH NODE DISSECTION, RIGHT LOWER LOBE BLEB RESECTION" on 1/28/2021  Comorbidities include a h/o Cancer ([address] ), GERD (gastroesophageal reflux disease), Hypertension, Pulmonary emphysema ([address] ), and Restless leg syndrome   Pt with active OT orders and ambulate  orders  Pt resides in a 1 story home with 3 MARTHA with her  who is able to assist as needed upon d/c  Pt was I w/  ADLS and IADLS, (+) drove, & required no use of DME PTA  Currently pt is supervision for functional transfers, functional mobility and ADLS overall  Based on the aforementioned OT evaluation, functional performance deficits, and assessments, pt has been identified as a moderate complexity evaluation  From OT standpoint, anticipate d/c home with family support  Recommend continued participation in [facility] and functional mobility with staff  No further acute OT needs, d/c OT      Goals   Patient Goals To return home   Recommendation   OT Discharge Recommendation Return to previous environment with social support   OT - OK to Discharge Yes  (When medically appropriate)   Modified Jaspreet Scale   Modified Jaspreet Scale 3     DEREK Corral, OTR/L

## 2021-01-29 NOTE — PROGRESS NOTES
Pastoral Care Progress Note    2021  Patient: Charity Kevin : 1955  Admission Date & Time: 2021 0059  MRN: [id number] CSN: [id number]        Fr Noy Beal visited Pt                 21 1300   Clinical Encounter Type   Visited With Patient   Oriental orthodox Encounters   Oriental orthodox Needs Prayer   Sacramental Encounters   Sacrament of Sick-Anointing Anointed

## 2021-01-30 PROCEDURE — 99024 POSTOP FOLLOW-UP VISIT: CPT | Performed by: THORACIC SURGERY (CARDIOTHORACIC VASCULAR SURGERY)

## 2021-01-30 RX ORDER — METHOCARBAMOL 500 MG/1
500 TABLET, FILM COATED ORAL EVERY 6 HOURS SCHEDULED
Status: DISCONTINUED | OUTPATIENT
Start: 2021-01-30 | End: 2021-02-03 | Stop reason: HOSPADM

## 2021-01-30 RX ORDER — KETOROLAC TROMETHAMINE 30 MG/ML
15 INJECTION, SOLUTION INTRAMUSCULAR; INTRAVENOUS ONCE
Status: COMPLETED | OUTPATIENT
Start: 2021-01-30 | End: 2021-01-30

## 2021-01-30 RX ORDER — HYDROMORPHONE HYDROCHLORIDE 2 MG/1
2 TABLET ORAL EVERY 4 HOURS PRN
Status: DISCONTINUED | OUTPATIENT
Start: 2021-01-30 | End: 2021-02-03 | Stop reason: HOSPADM

## 2021-01-30 RX ADMIN — SALMETEROL XINAFOATE 1 PUFF: 50 POWDER, METERED ORAL; RESPIRATORY (INHALATION) at 21:02

## 2021-01-30 RX ADMIN — BUPROPION HYDROCHLORIDE 300 MG: 150 TABLET, FILM COATED, EXTENDED RELEASE ORAL at 09:35

## 2021-01-30 RX ADMIN — METHOCARBAMOL 500 MG: 500 TABLET, FILM COATED ORAL at 17:29

## 2021-01-30 RX ADMIN — MELATONIN TAB 3 MG 3 MG: 3 TAB at 22:42

## 2021-01-30 RX ADMIN — HYDROMORPHONE HYDROCHLORIDE 2 MG: 2 TABLET ORAL at 22:39

## 2021-01-30 RX ADMIN — NICOTINE POLACRILEX 2 MG: 2 GUM, CHEWING ORAL at 09:36

## 2021-01-30 RX ADMIN — METHOCARBAMOL 500 MG: 500 TABLET, FILM COATED ORAL at 23:06

## 2021-01-30 RX ADMIN — NICOTINE POLACRILEX 2 MG: 2 GUM, CHEWING ORAL at 21:10

## 2021-01-30 RX ADMIN — GABAPENTIN 300 MG: 300 CAPSULE ORAL at 16:44

## 2021-01-30 RX ADMIN — POLYETHYLENE GLYCOL 3350 17 G: 17 POWDER, FOR SOLUTION ORAL at 09:35

## 2021-01-30 RX ADMIN — KETOROLAC TROMETHAMINE 15 MG: 30 INJECTION, SOLUTION INTRAMUSCULAR at 09:50

## 2021-01-30 RX ADMIN — METHOCARBAMOL 500 MG: 500 TABLET, FILM COATED ORAL at 12:47

## 2021-01-30 RX ADMIN — GABAPENTIN 300 MG: 300 CAPSULE ORAL at 20:50

## 2021-01-30 RX ADMIN — ACETAMINOPHEN 975 MG: 325 TABLET, FILM COATED ORAL at 12:47

## 2021-01-30 RX ADMIN — NICOTINE POLACRILEX 2 MG: 2 GUM, CHEWING ORAL at 17:29

## 2021-01-30 RX ADMIN — ENOXAPARIN SODIUM 40 MG: 40 INJECTION SUBCUTANEOUS at 09:36

## 2021-01-30 RX ADMIN — OXYCODONE HYDROCHLORIDE 5 MG: 5 TABLET ORAL at 02:24

## 2021-01-30 RX ADMIN — METHOCARBAMOL 500 MG: 500 TABLET, FILM COATED ORAL at 09:36

## 2021-01-30 RX ADMIN — ONDANSETRON 4 MG: 2 INJECTION INTRAMUSCULAR; INTRAVENOUS at 08:04

## 2021-01-30 RX ADMIN — FAMOTIDINE 40 MG: 20 TABLET ORAL at 09:36

## 2021-01-30 RX ADMIN — ACETAMINOPHEN 975 MG: 325 TABLET, FILM COATED ORAL at 06:08

## 2021-01-30 RX ADMIN — TRAZODONE HYDROCHLORIDE 150 MG: 100 TABLET ORAL at 22:39

## 2021-01-30 RX ADMIN — ACETAMINOPHEN 975 MG: 325 TABLET, FILM COATED ORAL at 17:29

## 2021-01-30 RX ADMIN — GABAPENTIN 300 MG: 300 CAPSULE ORAL at 09:36

## 2021-01-30 RX ADMIN — HYDROMORPHONE HYDROCHLORIDE 0.5 MG: 1 INJECTION, SOLUTION INTRAMUSCULAR; INTRAVENOUS; SUBCUTANEOUS at 04:07

## 2021-01-30 RX ADMIN — OXYCODONE HYDROCHLORIDE 5 MG: 5 TABLET ORAL at 06:07

## 2021-01-30 RX ADMIN — OXYCODONE HYDROCHLORIDE 5 MG: 5 TABLET ORAL at 20:50

## 2021-01-30 RX ADMIN — DOCUSATE SODIUM 100 MG: 100 CAPSULE, LIQUID FILLED ORAL at 17:29

## 2021-01-30 RX ADMIN — SENNOSIDES 8.6 MG: 8.6 TABLET, FILM COATED ORAL at 09:36

## 2021-01-30 RX ADMIN — ACETAMINOPHEN 975 MG: 325 TABLET, FILM COATED ORAL at 23:06

## 2021-01-30 RX ADMIN — DOCUSATE SODIUM 100 MG: 100 CAPSULE, LIQUID FILLED ORAL at 09:35

## 2021-01-30 NOTE — PROGRESS NOTES
Progress Note - Thoracic Surgery   Stephanie Carr 77 y o  female MRN: [id number]  Unit/Bed#: [facility] 405-01 Encounter: [id number]    Assessment:  68yo F POD#2 s/p VATS RULobectomy with RLL bleb resection    R CT: minimal output, air leak currently 700-1100 with spikes up to 1800    AVSS on 2L NC    Plan:   Maintain R chest tube to suction given persistent air leak   Pain control -- will add robaxin, consider toradol   Incentive spirometry    Aggressive pulmonary toilet   OOB/ambualte   DVT PPx    Subjective/Objective     Subjective:   Patient reports significant worsening of her pain over past 24 hours  Only pulling about 500 on IS (had previously been 750)  Objective:    Blood pressure 123/56, pulse 67, temperature 97 9 °F (36 6 °C), temperature source Oral, resp  rate 18, height 4' 9" (1 448 m), weight 62 7 kg (138 lb 3 7 oz), SpO2 96 %, not currently breastfeeding  ,Body mass index is 29 91 kg/m²        Intake/Output Summary (Last 24 hours) at 1/30/2021 0728  Last data filed at 1/30/2021 0601  Gross per 24 hour   Intake 1065 ml   Output 1175 ml   Net -110 ml       Invasive Devices     Peripheral Intravenous Line            Peripheral IV 01/28/21 Right Hand 1 day          Drain            Chest Tube Right Pleural 24 Fr  1 day                Physical Exam:   Gen:  appears uncomfortable  CV:  warm, well-perfused  Lungs: nl effort on 2L NC   R CT in place with air leak  Abd:  soft, NT/ND  Ext:  no CCE  Neuro: A&Ox3     Results from last 7 days   Lab Units 01/29/21  0434 01/28/21  1555   WBC Thousand/uL 13 79* 11 31*   HEMOGLOBIN g/dL 12 4 13 9   HEMATOCRIT % 37 8 43 4   PLATELETS Thousands/uL 363 353     Results from last 7 days   Lab Units 01/29/21  0434 01/28/21  1555   POTASSIUM mmol/L 3 9 3 8   CHLORIDE mmol/L 105 107   CO2 mmol/L 28 31   BUN mg/dL 11 11   CREATININE mg/dL 0 71 0 79   CALCIUM mg/dL 8 6 8 9

## 2021-01-31 PROCEDURE — 99024 POSTOP FOLLOW-UP VISIT: CPT | Performed by: THORACIC SURGERY (CARDIOTHORACIC VASCULAR SURGERY)

## 2021-01-31 RX ORDER — HYDROMORPHONE HYDROCHLORIDE 4 MG/1
4 TABLET ORAL EVERY 4 HOURS PRN
Status: DISCONTINUED | OUTPATIENT
Start: 2021-01-31 | End: 2021-02-03 | Stop reason: HOSPADM

## 2021-01-31 RX ADMIN — POLYETHYLENE GLYCOL 3350 17 G: 17 POWDER, FOR SOLUTION ORAL at 08:42

## 2021-01-31 RX ADMIN — BUPROPION HYDROCHLORIDE 300 MG: 150 TABLET, FILM COATED, EXTENDED RELEASE ORAL at 08:42

## 2021-01-31 RX ADMIN — SALMETEROL XINAFOATE 1 PUFF: 50 POWDER, METERED ORAL; RESPIRATORY (INHALATION) at 21:00

## 2021-01-31 RX ADMIN — NICOTINE POLACRILEX 2 MG: 2 GUM, CHEWING ORAL at 18:07

## 2021-01-31 RX ADMIN — FAMOTIDINE 40 MG: 20 TABLET ORAL at 08:42

## 2021-01-31 RX ADMIN — GABAPENTIN 300 MG: 300 CAPSULE ORAL at 17:55

## 2021-01-31 RX ADMIN — ENOXAPARIN SODIUM 40 MG: 40 INJECTION SUBCUTANEOUS at 08:42

## 2021-01-31 RX ADMIN — NICOTINE POLACRILEX 2 MG: 2 GUM, CHEWING ORAL at 08:42

## 2021-01-31 RX ADMIN — HYDROMORPHONE HYDROCHLORIDE 2 MG: 2 TABLET ORAL at 13:19

## 2021-01-31 RX ADMIN — DOCUSATE SODIUM 100 MG: 100 CAPSULE, LIQUID FILLED ORAL at 17:55

## 2021-01-31 RX ADMIN — TIOTROPIUM BROMIDE 18 MCG: 18 CAPSULE ORAL; RESPIRATORY (INHALATION) at 08:42

## 2021-01-31 RX ADMIN — MELATONIN TAB 3 MG 3 MG: 3 TAB at 22:41

## 2021-01-31 RX ADMIN — ACETAMINOPHEN 975 MG: 325 TABLET, FILM COATED ORAL at 13:07

## 2021-01-31 RX ADMIN — TRAZODONE HYDROCHLORIDE 150 MG: 100 TABLET ORAL at 22:41

## 2021-01-31 RX ADMIN — SENNOSIDES 8.6 MG: 8.6 TABLET, FILM COATED ORAL at 08:42

## 2021-01-31 RX ADMIN — GABAPENTIN 300 MG: 300 CAPSULE ORAL at 22:42

## 2021-01-31 RX ADMIN — HYDROMORPHONE HYDROCHLORIDE 2 MG: 2 TABLET ORAL at 06:21

## 2021-01-31 RX ADMIN — METHOCARBAMOL 500 MG: 500 TABLET, FILM COATED ORAL at 17:55

## 2021-01-31 RX ADMIN — SALMETEROL XINAFOATE 1 PUFF: 50 POWDER, METERED ORAL; RESPIRATORY (INHALATION) at 06:41

## 2021-01-31 RX ADMIN — HYDROMORPHONE HYDROCHLORIDE 0.5 MG: 1 INJECTION, SOLUTION INTRAMUSCULAR; INTRAVENOUS; SUBCUTANEOUS at 22:42

## 2021-01-31 RX ADMIN — ACETAMINOPHEN 975 MG: 325 TABLET, FILM COATED ORAL at 06:21

## 2021-01-31 RX ADMIN — ACETAMINOPHEN 975 MG: 325 TABLET, FILM COATED ORAL at 17:55

## 2021-01-31 RX ADMIN — ACETAMINOPHEN 975 MG: 325 TABLET, FILM COATED ORAL at 23:29

## 2021-01-31 RX ADMIN — METHOCARBAMOL 500 MG: 500 TABLET, FILM COATED ORAL at 23:29

## 2021-01-31 RX ADMIN — GABAPENTIN 300 MG: 300 CAPSULE ORAL at 08:42

## 2021-01-31 RX ADMIN — METHOCARBAMOL 500 MG: 500 TABLET, FILM COATED ORAL at 13:07

## 2021-01-31 RX ADMIN — DOCUSATE SODIUM 100 MG: 100 CAPSULE, LIQUID FILLED ORAL at 08:42

## 2021-01-31 RX ADMIN — METHOCARBAMOL 500 MG: 500 TABLET, FILM COATED ORAL at 06:21

## 2021-01-31 NOTE — PROGRESS NOTES
Progress Note - Thoracic Surgery   Manuel Hsieh 77 y o  female MRN: [id number]  Unit/Bed#: [facility] 405-01 Encounter: [id number]    Assessment:  66yo F POD#3 s/p VATS RULobectomy with RLL bleb resection     R CT: 100 cc serosang, air leak ~600 mL/min overnight    AVSS on RA    Plan:   Maintain R chest tube to water seal, monitor air leak   Incentive spirometry    Pulmonary toilet   OOB/ambulate frequently   Pain control   DVT PPx    Subjective/Objective     Subjective:   No acute events overnight  Pain has been better controlled with addition of robaxin and once oxycodone had been switched to PO dilaudid  No further nausea/vomiting  Only pulling 250 on IS  Objective:    Blood pressure 118/52, pulse 75, temperature 97 7 °F (36 5 °C), resp  rate 20, height 4' 9" (1 448 m), weight 63 3 kg (139 lb 8 8 oz), SpO2 91 %, not currently breastfeeding  ,Body mass index is 30 2 kg/m²        Intake/Output Summary (Last 24 hours) at 1/31/2021 0703  Last data filed at 1/31/2021 5501  Gross per 24 hour   Intake 885 ml   Output 2200 ml   Net -1315 ml       Invasive Devices     Peripheral Intravenous Line            Peripheral IV 01/30/21 Right Wrist less than 1 day          Drain            Chest Tube Right Pleural 24 Fr  2 days                Physical Exam:   Gen:  NAD  CV:  warm, well-perfused  Lungs: nl effort on RA   R CT in place with air leak  Abd:  soft, NT/ND  Ext:  no CCE  Neuro: A&Ox3     Results from last 7 days   Lab Units 01/29/21  0434 01/28/21  1555   WBC Thousand/uL 13 79* 11 31*   HEMOGLOBIN g/dL 12 4 13 9   HEMATOCRIT % 37 8 43 4   PLATELETS Thousands/uL 363 353     Results from last 7 days   Lab Units 01/29/21  0434 01/28/21  1555   POTASSIUM mmol/L 3 9 3 8   CHLORIDE mmol/L 105 107   CO2 mmol/L 28 31   BUN mg/dL 11 11   CREATININE mg/dL 0 71 0 79   CALCIUM mg/dL 8 6 8 9

## 2021-02-01 PROCEDURE — 99024 POSTOP FOLLOW-UP VISIT: CPT | Performed by: THORACIC SURGERY (CARDIOTHORACIC VASCULAR SURGERY)

## 2021-02-01 RX ADMIN — DOCUSATE SODIUM 100 MG: 100 CAPSULE, LIQUID FILLED ORAL at 08:43

## 2021-02-01 RX ADMIN — ACETAMINOPHEN 975 MG: 325 TABLET, FILM COATED ORAL at 23:08

## 2021-02-01 RX ADMIN — SALMETEROL XINAFOATE 1 PUFF: 50 POWDER, METERED ORAL; RESPIRATORY (INHALATION) at 08:45

## 2021-02-01 RX ADMIN — MELATONIN TAB 3 MG 3 MG: 3 TAB at 21:02

## 2021-02-01 RX ADMIN — SENNOSIDES 8.6 MG: 8.6 TABLET, FILM COATED ORAL at 08:43

## 2021-02-01 RX ADMIN — DOCUSATE SODIUM 100 MG: 100 CAPSULE, LIQUID FILLED ORAL at 18:05

## 2021-02-01 RX ADMIN — ENOXAPARIN SODIUM 40 MG: 40 INJECTION SUBCUTANEOUS at 08:43

## 2021-02-01 RX ADMIN — PRAMIPEXOLE DIHYDROCHLORIDE 0.5 MG: 0.5 TABLET ORAL at 21:03

## 2021-02-01 RX ADMIN — BUPROPION HYDROCHLORIDE 300 MG: 150 TABLET, FILM COATED, EXTENDED RELEASE ORAL at 08:42

## 2021-02-01 RX ADMIN — GABAPENTIN 300 MG: 300 CAPSULE ORAL at 08:42

## 2021-02-01 RX ADMIN — HYDROMORPHONE HYDROCHLORIDE 4 MG: 4 TABLET ORAL at 23:08

## 2021-02-01 RX ADMIN — FAMOTIDINE 40 MG: 20 TABLET ORAL at 08:42

## 2021-02-01 RX ADMIN — NICOTINE POLACRILEX 2 MG: 2 GUM, CHEWING ORAL at 11:22

## 2021-02-01 RX ADMIN — TRAZODONE HYDROCHLORIDE 150 MG: 100 TABLET ORAL at 21:02

## 2021-02-01 RX ADMIN — METHOCARBAMOL 500 MG: 500 TABLET, FILM COATED ORAL at 18:06

## 2021-02-01 RX ADMIN — NICOTINE POLACRILEX 2 MG: 2 GUM, CHEWING ORAL at 18:06

## 2021-02-01 RX ADMIN — GABAPENTIN 300 MG: 300 CAPSULE ORAL at 18:05

## 2021-02-01 RX ADMIN — ACETAMINOPHEN 975 MG: 325 TABLET, FILM COATED ORAL at 06:09

## 2021-02-01 RX ADMIN — ACETAMINOPHEN 975 MG: 325 TABLET, FILM COATED ORAL at 11:21

## 2021-02-01 RX ADMIN — METHOCARBAMOL 500 MG: 500 TABLET, FILM COATED ORAL at 11:20

## 2021-02-01 RX ADMIN — POLYETHYLENE GLYCOL 3350 17 G: 17 POWDER, FOR SOLUTION ORAL at 08:43

## 2021-02-01 RX ADMIN — METHOCARBAMOL 500 MG: 500 TABLET, FILM COATED ORAL at 23:07

## 2021-02-01 RX ADMIN — HYDROMORPHONE HYDROCHLORIDE 2 MG: 2 TABLET ORAL at 08:42

## 2021-02-01 RX ADMIN — ACETAMINOPHEN 975 MG: 325 TABLET, FILM COATED ORAL at 18:05

## 2021-02-01 RX ADMIN — GABAPENTIN 300 MG: 300 CAPSULE ORAL at 21:02

## 2021-02-01 RX ADMIN — METHOCARBAMOL 500 MG: 500 TABLET, FILM COATED ORAL at 06:09

## 2021-02-01 RX ADMIN — TIOTROPIUM BROMIDE 18 MCG: 18 CAPSULE ORAL; RESPIRATORY (INHALATION) at 08:46

## 2021-02-01 NOTE — PLAN OF CARE
Problem: Prexisting or High Potential for Compromised Skin Integrity  Goal: Skin integrity is maintained or improved  Description: INTERVENTIONS:  - Identify patients at risk for skin breakdown  - Assess and monitor skin integrity  - Assess and monitor nutrition and hydration status  - Monitor labs   - Assess for incontinence   - Turn and reposition patient  - Assist with mobility/ambulation  - Relieve pressure over bony prominences  - Avoid friction and shearing  - Provide appropriate hygiene as needed including keeping skin clean and dry  - Evaluate need for skin moisturizer/barrier cream  - Collaborate with interdisciplinary team   - Patient/family teaching  - Consider wound care consult   Outcome: Progressing     Problem: PAIN - ADULT  Goal: Verbalizes/displays adequate comfort level or baseline comfort level  Description: Interventions:  - Encourage patient to monitor pain and request assistance  - Assess pain using appropriate pain scale  - Administer analgesics based on type and severity of pain and evaluate response  - Implement non-pharmacological measures as appropriate and evaluate response  - Consider cultural and social influences on pain and pain management  - Notify physician/advanced practitioner if interventions unsuccessful or patient reports new pain  Outcome: Progressing     Problem: INFECTION - ADULT  Goal: Absence or prevention of progression during hospitalization  Description: INTERVENTIONS:  - Assess and monitor for signs and symptoms of infection  - Monitor lab/diagnostic results  - Monitor all insertion sites, i e  indwelling lines, tubes, and drains  - Monitor endotracheal if appropriate and nasal secretions for changes in amount and color  - [place] appropriate cooling/warming therapies per order  - Administer medications as ordered  - Instruct and encourage patient and family to use good hand hygiene technique  - Identify and instruct in appropriate isolation precautions for identified infection/condition  Outcome: Progressing  Goal: Absence of fever/infection during neutropenic period  Description: INTERVENTIONS:  - Monitor WBC    Outcome: Progressing     Problem: SAFETY ADULT  Goal: Patient will remain free of falls  Description: INTERVENTIONS:  - Assess patient frequently for physical needs  -  Identify cognitive and physical deficits and behaviors that affect risk of falls    -  [place] fall precautions as indicated by assessment   - Educate patient/family on patient safety including physical limitations  - Instruct patient to call for assistance with activity based on assessment  - Modify environment to reduce risk of injury  - Consider OT/PT consult to assist with strengthening/mobility  Outcome: Progressing  Goal: Maintain or return to baseline ADL function  Description: INTERVENTIONS:  -  Assess patient's ability to carry out ADLs; assess patient's baseline for ADL function and identify physical deficits which impact ability to perform ADLs (bathing, care of mouth/teeth, toileting, grooming, dressing, etc )  - Assess/evaluate cause of self-care deficits   - Assess range of motion  - Assess patient's mobility; develop plan if impaired  - Assess patient's need for assistive devices and provide as appropriate  - Encourage maximum independence but intervene and supervise when necessary  - Involve family in performance of ADLs  - Assess for home care needs following discharge   - Consider OT consult to assist with ADL evaluation and planning for discharge  - Provide patient education as appropriate  Outcome: Progressing  Goal: Maintain or return mobility status to optimal level  Description: INTERVENTIONS:  - Assess patient's baseline mobility status (ambulation, transfers, stairs, etc )    - Identify cognitive and physical deficits and behaviors that affect mobility  - Identify mobility aids required to assist with transfers and/or ambulation (gait belt, sit-to-stand, lift, walker, cane, etc )  - [place] fall precautions as indicated by assessment  - Record patient progress and toleration of activity level on Mobility SBAR; progress patient to next Phase/Stage  - Instruct patient to call for assistance with activity based on assessment  - Consider rehabilitation consult to assist with strengthening/weightbearing, etc   Outcome: Progressing     Problem: DISCHARGE PLANNING  Goal: Discharge to home or other facility with appropriate resources  Description: INTERVENTIONS:  - Identify barriers to discharge w/patient and caregiver  - Arrange for needed discharge resources and transportation as appropriate  - Identify discharge learning needs (meds, wound care, etc )  - Arrange for interpretive services to assist at discharge as needed  - Refer to Case Management Department for coordinating discharge planning if the patient needs post-hospital services based on physician/advanced practitioner order or complex needs related to functional status, cognitive ability, or social support system  Outcome: Progressing     Problem: Knowledge Deficit  Goal: Patient/family/caregiver demonstrates understanding of disease process, treatment plan, medications, and discharge instructions  Description: Complete learning assessment and assess knowledge base    Interventions:  - Provide teaching at level of understanding  - Provide teaching via preferred learning methods  Outcome: Progressing     Problem: RESPIRATORY - ADULT  Goal: Achieves optimal ventilation and oxygenation  Description: INTERVENTIONS:  - Assess for changes in respiratory status  - Assess for changes in mentation and behavior  - Position to facilitate oxygenation and minimize respiratory effort  - Oxygen administered by appropriate delivery if ordered  - Initiate smoking cessation education as indicated  - Encourage broncho-pulmonary hygiene including cough, deep breathe, Incentive Spirometry  - Assess the need for suctioning and aspirate as needed  - Assess and instruct to report SOB or any respiratory difficulty  - Respiratory Therapy support as indicated  Outcome: Progressing     Problem: Potential for Falls  Goal: Patient will remain free of falls  Description: INTERVENTIONS:  - Assess patient frequently for physical needs  -  Identify cognitive and physical deficits and behaviors that affect risk of falls    -  [place] fall precautions as indicated by assessment   - Educate patient/family on patient safety including physical limitations  - Instruct patient to call for assistance with activity based on assessment  - Modify environment to reduce risk of injury  - Consider OT/PT consult to assist with strengthening/mobility  Outcome: Progressing     Problem: GENITOURINARY - ADULT  Goal: Maintains or returns to baseline urinary function  Description: INTERVENTIONS:  - Assess urinary function  - Encourage oral fluids to ensure adequate hydration if ordered  - Administer IV fluids as ordered to ensure adequate hydration  - Administer ordered medications as needed  - Offer frequent toileting  - Follow urinary retention protocol if ordered  Outcome: Progressing  Goal: Absence of urinary retention  Description: INTERVENTIONS:  - Assess patients ability to void and empty bladder  - Monitor I/O  - Bladder scan as needed  - Discuss with physician/AP medications to alleviate retention as needed  - Discuss catheterization for long term situations as appropriate  Outcome: Progressing     Problem: SKIN/TISSUE INTEGRITY - ADULT  Goal: Skin integrity remains intact  Description: INTERVENTIONS  - Identify patients at risk for skin breakdown  - Assess and monitor skin integrity  - Assess and monitor nutrition and hydration status  - Monitor labs (i e  albumin)  - Assess for incontinence   - Turn and reposition patient  - Assist with mobility/ambulation  - Relieve pressure over bony prominences  - Avoid friction and shearing  - Provide appropriate hygiene as needed including keeping skin clean and dry  - Evaluate need for skin moisturizer/barrier cream  - Collaborate with interdisciplinary team (i e  Nutrition, Rehabilitation, etc )   - Patient/family teaching  Outcome: Progressing  Goal: Incision(s), wounds(s) or drain site(s) healing without S/S of infection  Description: INTERVENTIONS  - Assess and document risk factors for skin impairment   - Assess and document dressing, incision, wound bed, drain sites and surrounding tissue  - Consider nutrition services referral as needed  - Oral mucous membranes remain intact  - Provide patient/ family education  Outcome: Progressing  Goal: Oral mucous membranes remain intact  Description: INTERVENTIONS  - Assess oral mucosa and hygiene practices  - Implement preventative oral hygiene regimen  - Implement oral medicated treatments as ordered  - Initiate Nutrition services referral as needed  Outcome: Progressing     Problem: HEMATOLOGIC - ADULT  Goal: Maintains hematologic stability  Description: INTERVENTIONS  - Assess for signs and symptoms of bleeding or hemorrhage  - Monitor labs  - Administer supportive blood products/factors as ordered and appropriate  Outcome: Progressing     Problem: MUSCULOSKELETAL - ADULT  Goal: Maintain or return mobility to safest level of function  Description: INTERVENTIONS:  - Assess patient's ability to carry out ADLs; assess patient's baseline for ADL function and identify physical deficits which impact ability to perform ADLs (bathing, care of mouth/teeth, toileting, grooming, dressing, etc )  - Assess/evaluate cause of self-care deficits   - Assess range of motion  - Assess patient's mobility  - Assess patient's need for assistive devices and provide as appropriate  - Encourage maximum independence but intervene and supervise when necessary  - Involve family in performance of ADLs  - Assess for home care needs following discharge   - Consider OT consult to assist with ADL evaluation and planning for discharge  - Provide patient education as appropriate  Outcome: Progressing  Goal: Maintain proper alignment of affected body part  Description: INTERVENTIONS:  - Support, maintain and protect limb and body alignment  - Provide patient/ family with appropriate education  Outcome: Progressing

## 2021-02-01 NOTE — PROGRESS NOTES
Progress Note - Thoracic Surgery   Antonette Bell 77 y o  female MRN: [id number]  Unit/Bed#: [facility] 405-01 Encounter: [id number]    Assessment:  72yo F POD#4 s/p VATS RULobectomy with RLL bleb resection     R CT: 50 cc , air leak <200 mL/min overnight (600-800 during day)    Plan:   Maintain R chest tube tube water seal   Hopefully will be able to remove CT tomorrow if air leak continues to improve   Incentive spirometry   Pulmonary toilet   OOB/ambulate frequently   [place] control    Subjective/Objective     Subjective:   No acute events overnight  Pain has been better controlled  Using IS but continues to have low volumes  Objective:    Blood pressure 134/59, pulse 76, temperature 99 2 °F (37 3 °C), resp  rate 16, height 4' 9" (1 448 m), weight 64 kg (141 lb), SpO2 92 %, not currently breastfeeding  ,Body mass index is 30 51 kg/m²        Intake/Output Summary (Last 24 hours) at 2/1/2021 0651  Last data filed at 1/31/2021 2200  Gross per 24 hour   Intake 1200 ml   Output 1950 ml   Net -750 ml       Invasive Devices     Peripheral Intravenous Line            Peripheral IV 01/30/21 Right Wrist 1 day          Drain            Chest Tube Right Pleural 24 Fr  3 days                Physical Exam:   Gen:  NAD  CV:  warm, well-perfused  Lungs: nl effort on RA   R chest tube in place with air leak   Incision C/D/I, small amt crepitus  Abd:  soft, NT/ND  Ext:  no CCE  Neuro: A&Ox3     Results from last 7 days   Lab Units 01/29/21  0434 01/28/21  1555   WBC Thousand/uL 13 79* 11 31*   HEMOGLOBIN g/dL 12 4 13 9   HEMATOCRIT % 37 8 43 4   PLATELETS Thousands/uL 363 353     Results from last 7 days   Lab Units 01/29/21  0434 01/28/21  1555   POTASSIUM mmol/L 3 9 3 8   CHLORIDE mmol/L 105 107   CO2 mmol/L 28 31   BUN mg/dL 11 11   CREATININE mg/dL 0 71 0 79   CALCIUM mg/dL 8 6 8 9

## 2021-02-02 LAB
ANION GAP SERPL CALCULATED.3IONS-SCNC: 4 MMOL/L (ref 4–13)
BASOPHILS # BLD AUTO: 0.03 THOUSANDS/ΜL (ref 0–0.1)
BASOPHILS NFR BLD AUTO: 0 % (ref 0–1)
BUN SERPL-MCNC: 12 MG/DL (ref 5–25)
CALCIUM SERPL-MCNC: 9.3 MG/DL (ref 8.3–10.1)
CHLORIDE SERPL-SCNC: 110 MMOL/L (ref 100–108)
CO2 SERPL-SCNC: 27 MMOL/L (ref 21–32)
CREAT SERPL-MCNC: 0.73 MG/DL (ref 0.6–1.3)
EOSINOPHIL # BLD AUTO: 0.32 THOUSAND/ΜL (ref 0–0.61)
EOSINOPHIL NFR BLD AUTO: 5 % (ref 0–6)
ERYTHROCYTE [DISTWIDTH] IN BLOOD BY AUTOMATED COUNT: 13.2 % (ref 11.6–15.1)
GFR SERPL CREATININE-BSD FRML MDRD: 85 ML/MIN/1.73SQ M
GLUCOSE SERPL-MCNC: 85 MG/DL (ref 65–140)
HCT VFR BLD AUTO: 38.4 % (ref 34.8–46.1)
HGB BLD-MCNC: 12 G/DL (ref 11.5–15.4)
IMM GRANULOCYTES # BLD AUTO: 0.03 THOUSAND/UL (ref 0–0.2)
IMM GRANULOCYTES NFR BLD AUTO: 0 % (ref 0–2)
LYMPHOCYTES # BLD AUTO: 1.69 THOUSANDS/ΜL (ref 0.6–4.47)
LYMPHOCYTES NFR BLD AUTO: 25 % (ref 14–44)
MCH RBC QN AUTO: 30.8 PG (ref 26.8–34.3)
MCHC RBC AUTO-ENTMCNC: 31.3 G/DL (ref 31.4–37.4)
MCV RBC AUTO: 99 FL (ref 82–98)
MONOCYTES # BLD AUTO: 0.66 THOUSAND/ΜL (ref 0.17–1.22)
MONOCYTES NFR BLD AUTO: 10 % (ref 4–12)
NEUTROPHILS # BLD AUTO: 4.05 THOUSANDS/ΜL (ref 1.85–7.62)
NEUTS SEG NFR BLD AUTO: 60 % (ref 43–75)
NRBC BLD AUTO-RTO: 0 /100 WBCS
PLATELET # BLD AUTO: 426 THOUSANDS/UL (ref 149–390)
PMV BLD AUTO: 10.1 FL (ref 8.9–12.7)
POTASSIUM SERPL-SCNC: 4.4 MMOL/L (ref 3.5–5.3)
RBC # BLD AUTO: 3.89 MILLION/UL (ref 3.81–5.12)
SODIUM SERPL-SCNC: 141 MMOL/L (ref 136–145)
WBC # BLD AUTO: 6.78 THOUSAND/UL (ref 4.31–10.16)

## 2021-02-02 PROCEDURE — 80048 BASIC METABOLIC PNL TOTAL CA: CPT | Performed by: SURGERY

## 2021-02-02 PROCEDURE — 85025 COMPLETE CBC W/AUTO DIFF WBC: CPT | Performed by: SURGERY

## 2021-02-02 PROCEDURE — 99024 POSTOP FOLLOW-UP VISIT: CPT | Performed by: THORACIC SURGERY (CARDIOTHORACIC VASCULAR SURGERY)

## 2021-02-02 RX ORDER — KETOROLAC TROMETHAMINE 30 MG/ML
15 INJECTION, SOLUTION INTRAMUSCULAR; INTRAVENOUS EVERY 6 HOURS PRN
Status: DISCONTINUED | OUTPATIENT
Start: 2021-02-02 | End: 2021-02-03 | Stop reason: HOSPADM

## 2021-02-02 RX ADMIN — TIOTROPIUM BROMIDE 18 MCG: 18 CAPSULE ORAL; RESPIRATORY (INHALATION) at 08:03

## 2021-02-02 RX ADMIN — SALMETEROL XINAFOATE 1 PUFF: 50 POWDER, METERED ORAL; RESPIRATORY (INHALATION) at 08:03

## 2021-02-02 RX ADMIN — SENNOSIDES 8.6 MG: 8.6 TABLET, FILM COATED ORAL at 08:06

## 2021-02-02 RX ADMIN — NICOTINE POLACRILEX 2 MG: 2 GUM, CHEWING ORAL at 08:06

## 2021-02-02 RX ADMIN — TRAZODONE HYDROCHLORIDE 150 MG: 100 TABLET ORAL at 21:57

## 2021-02-02 RX ADMIN — DOCUSATE SODIUM 100 MG: 100 CAPSULE, LIQUID FILLED ORAL at 08:06

## 2021-02-02 RX ADMIN — ENOXAPARIN SODIUM 40 MG: 40 INJECTION SUBCUTANEOUS at 08:06

## 2021-02-02 RX ADMIN — HYDROMORPHONE HYDROCHLORIDE 4 MG: 4 TABLET ORAL at 06:35

## 2021-02-02 RX ADMIN — POLYETHYLENE GLYCOL 3350 17 G: 17 POWDER, FOR SOLUTION ORAL at 08:02

## 2021-02-02 RX ADMIN — GABAPENTIN 300 MG: 300 CAPSULE ORAL at 08:06

## 2021-02-02 RX ADMIN — NICOTINE POLACRILEX 2 MG: 2 GUM, CHEWING ORAL at 17:58

## 2021-02-02 RX ADMIN — FAMOTIDINE 40 MG: 20 TABLET ORAL at 08:06

## 2021-02-02 RX ADMIN — GABAPENTIN 300 MG: 300 CAPSULE ORAL at 17:56

## 2021-02-02 RX ADMIN — SALMETEROL XINAFOATE 1 PUFF: 50 POWDER, METERED ORAL; RESPIRATORY (INHALATION) at 21:59

## 2021-02-02 RX ADMIN — MELATONIN TAB 3 MG 3 MG: 3 TAB at 21:57

## 2021-02-02 RX ADMIN — METHOCARBAMOL 500 MG: 500 TABLET, FILM COATED ORAL at 06:35

## 2021-02-02 RX ADMIN — HYDROMORPHONE HYDROCHLORIDE 2 MG: 2 TABLET ORAL at 21:57

## 2021-02-02 RX ADMIN — METHOCARBAMOL 500 MG: 500 TABLET, FILM COATED ORAL at 17:56

## 2021-02-02 RX ADMIN — ACETAMINOPHEN 975 MG: 325 TABLET, FILM COATED ORAL at 06:35

## 2021-02-02 RX ADMIN — GABAPENTIN 300 MG: 300 CAPSULE ORAL at 21:57

## 2021-02-02 RX ADMIN — ACETAMINOPHEN 975 MG: 325 TABLET, FILM COATED ORAL at 11:52

## 2021-02-02 RX ADMIN — BUPROPION HYDROCHLORIDE 300 MG: 150 TABLET, FILM COATED, EXTENDED RELEASE ORAL at 08:04

## 2021-02-02 RX ADMIN — PRAMIPEXOLE DIHYDROCHLORIDE 0.5 MG: 0.5 TABLET ORAL at 21:59

## 2021-02-02 RX ADMIN — KETOROLAC TROMETHAMINE 15 MG: 30 INJECTION, SOLUTION INTRAMUSCULAR at 11:51

## 2021-02-02 RX ADMIN — METHOCARBAMOL 500 MG: 500 TABLET, FILM COATED ORAL at 11:51

## 2021-02-02 RX ADMIN — ACETAMINOPHEN 975 MG: 325 TABLET, FILM COATED ORAL at 18:30

## 2021-02-02 NOTE — UTILIZATION REVIEW
Continued Stay Review    Date: 02/02/2021                         Current Patient Class: Inpatient  Current Level of Care: Med/Surg    HPI:65 y o  female initially admitted on 01/28/2021   S/P RIGHT VATS UPPER LOBECTOMY ; MEDIASTINAL LYMPH NODE DISSECTION; right lower lobe bleb resection [62030 (CPT®)]  BRONCHOSCOPY FLEXIBLE [id number] (CPT®)]  1/28/21  Assessment/Plan: POD#5  Right chest Tube to water seal, + AL,  Increased drainage thia AM (140ml),  Keep Chest Tube in today  Analgesia PRN  Pulmonary toilet - IS  Continue ICS (medication)        Pertinent Labs/Diagnostic Results:     Results from last 7 days   Lab Units 02/02/21  0623 01/29/21  0434 01/28/21  1555   WBC Thousand/uL 6 78 13 79* 11 31*   HEMOGLOBIN g/dL 12 0 12 4 13 9   HEMATOCRIT % 38 4 37 8 43 4   PLATELETS Thousands/uL 426* 363 353   NEUTROS ABS Thousands/µL 4 05  --   --      Results from last 7 days   Lab Units 02/02/21  0623 01/29/21  0434 01/28/21  1555   SODIUM mmol/L 141 138 140   POTASSIUM mmol/L 4 4 3 9 3 8   CHLORIDE mmol/L 110* 105 107   CO2 mmol/L 27 28 31   ANION GAP mmol/L 4 5 2*   BUN mg/dL 12 11 11   CREATININE mg/dL 0 73 0 71 0 79   EGFR ml/min/1 73sq m 85 88 78   CALCIUM mg/dL 9 3 8 6 8 9   MAGNESIUM mg/dL  --  2 0 1 9     Results from last 7 days   Lab Units 02/02/21  0623 01/29/21  0434 01/28/21  1555   GLUCOSE RANDOM mg/dL 85 125 98     Vital Signs: /53   Pulse 69   Temp 97 9 °F (36 6 °C)   Resp 18   Ht 4' 9" (1 448 m)   Wt 63 5 kg (140 lb)   SpO2 91%   BMI 30 30 kg/m²       Medications:   Scheduled Medications:  acetaminophen, 975 mg, Oral, Q6H  buPROPion, 300 mg, Oral, QAM  docusate sodium, 100 mg, Oral, BID  enoxaparin, 40 mg, Subcutaneous, Daily  famotidine, 40 mg, Oral, QAM  gabapentin, 300 mg, Oral, TID  melatonin, 3 mg, Oral, HS  methocarbamol, 500 mg, Oral, Q6H ERUM  polyethylene glycol, 17 g, Oral, Daily  pramipexole, 0 5 mg, Oral, HS  salmeterol, 1 puff, Inhalation, Q12H ERUM  senna, 1 tablet, Oral, Daily  tiotropium, 18 mcg, Inhalation, Daily  traZODone, 150 mg, Oral, HS      Continuous IV Infusions:     PRN Meds:  albuterol, 2 puff, Inhalation, Q4H PRN  bisacodyl, 10 mg, Oral, Daily PRN  HYDROmorphone, 0 5 mg, Intravenous, Q3H PRN  HYDROmorphone, 2 mg, Oral, Q4H PRN  HYDROmorphone, 4 mg, Oral, Q4H PRN  ketorolac, 15 mg, Intravenous, Q6H PRN  nicotine polacrilex, 2 mg, Oral, Q2H PRN  ondansetron, 4 mg, Intravenous, Q6H PRN        Discharge Plan: D    Network Utilization Review Department  ATTENTION: Please call with any questions or concerns to [phone] and carefully listen to the prompts so that you are directed to the right person  All voicemails are confidential   Tammie Lorenzana all requests for admission clinical reviews, approved or denied determinations and any other requests to dedicated fax number below belonging to the campus where the patient is receiving treatment   List of dedicated fax numbers for the Facilities:  [address] DENIALS (Administrative/Medical Necessity) [phone]   [address] (Maternity/NICU/Pediatrics) [phone] [address] Kanika Landrum 0782 (  Emre Lai "Evelina" 103) [address] [place] [address] [place] [address] [place] [address] [phone]

## 2021-02-02 NOTE — PROGRESS NOTES
Progress Note - Thoracic Surgery   Hernandez Rowan 77 y o  female MRN: [id number]  Unit/Bed#: [facility] 405-01 Encounter: [id number]    Assessment:  77 y o  female with SCC of the lung POD5 from RU Lobectomy    Chest tube: R-sided, 50 mL of SS over last 24 hours on WS, + AL of 60-80 over the past 12 hours, increased to 120-140 with cough this morning    400 incentive spirometry, was closer to 1000 yesterday per RN      Plan:    Likely keep chest tube today  ICS/Pulmonary toilet/Pain control  Lovenox dvt ppx    Subjective/Objective     Subjective: No issues per nursing, no acute events  Pain moderately well controlled      Objective:     Vitals: Temp:  [97 5 °F (36 4 °C)-97 7 °F (36 5 °C)] 97 7 °F (36 5 °C)  HR:  [63-77] 77  Resp:  [15-17] 15  BP: (107-148)/(47-63) 148/63  Body mass index is 30 51 kg/m²  I/O       01/31 0701 - 02/01 0700 02/01 0701 - 02/02 0700    P  O  1200 1340    Total Intake(mL/kg) 1200 (18 8) 1340 (20 9)    Urine (mL/kg/hr) 1900 (1 2) 1200 (0 8)    Chest Tube 50 50    Total Output 1950 1250    Net -750 +90                Physical Exam:  GEN: NAD  HEENT: MMM  CV: RRR  Lung: Normal effort, no respiratory distress  Ab: Soft, NT/ND  Extrem: No CCE  Neuro: A+Ox3    Incisions CDI    Lab, Imaging and other studies: I have personally reviewed pertinent reports    , CBC with diff: No results found for: WBC, HGB, HCT, MCV, PLT, ADJUSTEDWBC, MCH, MCHC, RDW, MPV, NRBC, BMP/CMP: No results found for: SODIUM, K, CL, CO2, ANIONGAP, BUN, CREATININE, GLUCOSE, CALCIUM, AST, ALT, ALKPHOS, PROT, BILITOT, EGFR, Coags: No results found for: PT, PTT, INR  VTE Pharmacologic Prophylaxis: Enoxaparin (Lovenox)  VTE Mechanical Prophylaxis: sequential compression device

## 2021-02-02 NOTE — PLAN OF CARE
Problem: Prexisting or High Potential for Compromised Skin Integrity  Goal: Skin integrity is maintained or improved  Description: INTERVENTIONS:  - Identify patients at risk for skin breakdown  - Assess and monitor skin integrity  - Assess and monitor nutrition and hydration status  - Monitor labs   - Assess for incontinence   - Turn and reposition patient  - Assist with mobility/ambulation  - Relieve pressure over bony prominences  - Avoid friction and shearing  - Provide appropriate hygiene as needed including keeping skin clean and dry  - Evaluate need for skin moisturizer/barrier cream  - Collaborate with interdisciplinary team   - Patient/family teaching  - Consider wound care consult   Outcome: Progressing     Problem: PAIN - ADULT  Goal: Verbalizes/displays adequate comfort level or baseline comfort level  Description: Interventions:  - Encourage patient to monitor pain and request assistance  - Assess pain using appropriate pain scale  - Administer analgesics based on type and severity of pain and evaluate response  - Implement non-pharmacological measures as appropriate and evaluate response  - Consider cultural and social influences on pain and pain management  - Notify physician/advanced practitioner if interventions unsuccessful or patient reports new pain  Outcome: Progressing     Problem: INFECTION - ADULT  Goal: Absence or prevention of progression during hospitalization  Description: INTERVENTIONS:  - Assess and monitor for signs and symptoms of infection  - Monitor lab/diagnostic results  - Monitor all insertion sites, i e  indwelling lines, tubes, and drains  - Monitor endotracheal if appropriate and nasal secretions for changes in amount and color  - [place] appropriate cooling/warming therapies per order  - Administer medications as ordered  - Instruct and encourage patient and family to use good hand hygiene technique  - Identify and instruct in appropriate isolation precautions for identified infection/condition  Outcome: Progressing  Goal: Absence of fever/infection during neutropenic period  Description: INTERVENTIONS:  - Monitor WBC    Outcome: Progressing     Problem: SAFETY ADULT  Goal: Patient will remain free of falls  Description: INTERVENTIONS:  - Assess patient frequently for physical needs  -  Identify cognitive and physical deficits and behaviors that affect risk of falls    -  [place] fall precautions as indicated by assessment   - Educate patient/family on patient safety including physical limitations  - Instruct patient to call for assistance with activity based on assessment  - Modify environment to reduce risk of injury  - Consider OT/PT consult to assist with strengthening/mobility  Outcome: Progressing  Goal: Maintain or return to baseline ADL function  Description: INTERVENTIONS:  -  Assess patient's ability to carry out ADLs; assess patient's baseline for ADL function and identify physical deficits which impact ability to perform ADLs (bathing, care of mouth/teeth, toileting, grooming, dressing, etc )  - Assess/evaluate cause of self-care deficits   - Assess range of motion  - Assess patient's mobility; develop plan if impaired  - Assess patient's need for assistive devices and provide as appropriate  - Encourage maximum independence but intervene and supervise when necessary  - Involve family in performance of ADLs  - Assess for home care needs following discharge   - Consider OT consult to assist with ADL evaluation and planning for discharge  - Provide patient education as appropriate  Outcome: Progressing  Goal: Maintain or return mobility status to optimal level  Description: INTERVENTIONS:  - Assess patient's baseline mobility status (ambulation, transfers, stairs, etc )    - Identify cognitive and physical deficits and behaviors that affect mobility  - Identify mobility aids required to assist with transfers and/or ambulation (gait belt, sit-to-stand, lift, walker, cane, etc )  - [place] fall precautions as indicated by assessment  - Record patient progress and toleration of activity level on Mobility SBAR; progress patient to next Phase/Stage  - Instruct patient to call for assistance with activity based on assessment  - Consider rehabilitation consult to assist with strengthening/weightbearing, etc   Outcome: Progressing     Problem: DISCHARGE PLANNING  Goal: Discharge to home or other facility with appropriate resources  Description: INTERVENTIONS:  - Identify barriers to discharge w/patient and caregiver  - Arrange for needed discharge resources and transportation as appropriate  - Identify discharge learning needs (meds, wound care, etc )  - Arrange for interpretive services to assist at discharge as needed  - Refer to Case Management Department for coordinating discharge planning if the patient needs post-hospital services based on physician/advanced practitioner order or complex needs related to functional status, cognitive ability, or social support system  Outcome: Progressing     Problem: Knowledge Deficit  Goal: Patient/family/caregiver demonstrates understanding of disease process, treatment plan, medications, and discharge instructions  Description: Complete learning assessment and assess knowledge base    Interventions:  - Provide teaching at level of understanding  - Provide teaching via preferred learning methods  Outcome: Progressing     Problem: RESPIRATORY - ADULT  Goal: Achieves optimal ventilation and oxygenation  Description: INTERVENTIONS:  - Assess for changes in respiratory status  - Assess for changes in mentation and behavior  - Position to facilitate oxygenation and minimize respiratory effort  - Oxygen administered by appropriate delivery if ordered  - Initiate smoking cessation education as indicated  - Encourage broncho-pulmonary hygiene including cough, deep breathe, Incentive Spirometry  - Assess the need for suctioning and aspirate as needed  - Assess and instruct to report SOB or any respiratory difficulty  - Respiratory Therapy support as indicated  Outcome: Progressing     Problem: GENITOURINARY - ADULT  Goal: Maintains or returns to baseline urinary function  Description: INTERVENTIONS:  - Assess urinary function  - Encourage oral fluids to ensure adequate hydration if ordered  - Administer IV fluids as ordered to ensure adequate hydration  - Administer ordered medications as needed  - Offer frequent toileting  - Follow urinary retention protocol if ordered  Outcome: Progressing  Goal: Absence of urinary retention  Description: INTERVENTIONS:  - Assess patients ability to void and empty bladder  - Monitor I/O  - Bladder scan as needed  - Discuss with physician/AP medications to alleviate retention as needed  - Discuss catheterization for long term situations as appropriate  Outcome: Progressing     Problem: SKIN/TISSUE INTEGRITY - ADULT  Goal: Skin integrity remains intact  Description: INTERVENTIONS  - Identify patients at risk for skin breakdown  - Assess and monitor skin integrity  - Assess and monitor nutrition and hydration status  - Monitor labs (i e  albumin)  - Assess for incontinence   - Turn and reposition patient  - Assist with mobility/ambulation  - Relieve pressure over bony prominences  - Avoid friction and shearing  - Provide appropriate hygiene as needed including keeping skin clean and dry  - Evaluate need for skin moisturizer/barrier cream  - Collaborate with interdisciplinary team (i e  Nutrition, Rehabilitation, etc )   - Patient/family teaching  Outcome: Progressing  Goal: Incision(s), wounds(s) or drain site(s) healing without S/S of infection  Description: INTERVENTIONS  - Assess and document risk factors for skin impairment   - Assess and document dressing, incision, wound bed, drain sites and surrounding tissue  - Consider nutrition services referral as needed  - Oral mucous membranes remain intact  - Provide patient/ family education  Outcome: Progressing  Goal: Oral mucous membranes remain intact  Description: INTERVENTIONS  - Assess oral mucosa and hygiene practices  - Implement preventative oral hygiene regimen  - Implement oral medicated treatments as ordered  - Initiate Nutrition services referral as needed  Outcome: Progressing     Problem: HEMATOLOGIC - ADULT  Goal: Maintains hematologic stability  Description: INTERVENTIONS  - Assess for signs and symptoms of bleeding or hemorrhage  - Monitor labs  - Administer supportive blood products/factors as ordered and appropriate  Outcome: Progressing     Problem: MUSCULOSKELETAL - ADULT  Goal: Maintain or return mobility to safest level of function  Description: INTERVENTIONS:  - Assess patient's ability to carry out ADLs; assess patient's baseline for ADL function and identify physical deficits which impact ability to perform ADLs (bathing, care of mouth/teeth, toileting, grooming, dressing, etc )  - Assess/evaluate cause of self-care deficits   - Assess range of motion  - Assess patient's mobility  - Assess patient's need for assistive devices and provide as appropriate  - Encourage maximum independence but intervene and supervise when necessary  - Involve family in performance of ADLs  - Assess for home care needs following discharge   - Consider OT consult to assist with ADL evaluation and planning for discharge  - Provide patient education as appropriate  Outcome: Progressing  Goal: Maintain proper alignment of affected body part  Description: INTERVENTIONS:  - Support, maintain and protect limb and body alignment  - Provide patient/ family with appropriate education  Outcome: Progressing     Problem: Potential for Falls  Goal: Patient will remain free of falls  Description: INTERVENTIONS:  - Assess patient frequently for physical needs  -  Identify cognitive and physical deficits and behaviors that affect risk of falls    -  [place] fall precautions as indicated by assessment   - Educate patient/family on patient safety including physical limitations  - Instruct patient to call for assistance with activity based on assessment  - Modify environment to reduce risk of injury  - Consider OT/PT consult to assist with strengthening/mobility  Outcome: Progressing

## 2021-02-03 ENCOUNTER — IMMUNIZATIONS (INPATIENT)
Dept: FAMILY MEDICINE CLINIC | Facility: HOSPITAL | Age: 66
DRG: 164 | End: 2021-02-03
Payer: COMMERCIAL

## 2021-02-03 ENCOUNTER — APPOINTMENT (INPATIENT)
Dept: RADIOLOGY | Facility: HOSPITAL | Age: 66
DRG: 164 | End: 2021-02-03
Payer: COMMERCIAL

## 2021-02-03 VITALS
OXYGEN SATURATION: 91 % | RESPIRATION RATE: 20 BRPM | HEIGHT: 57 IN | TEMPERATURE: 97.9 F | WEIGHT: 141.98 LBS | SYSTOLIC BLOOD PRESSURE: 127 MMHG | DIASTOLIC BLOOD PRESSURE: 54 MMHG | HEART RATE: 70 BPM | BODY MASS INDEX: 30.63 KG/M2

## 2021-02-03 DIAGNOSIS — Z23 ENCOUNTER FOR IMMUNIZATION: Primary | ICD-10-CM

## 2021-02-03 PROCEDURE — NC001 PR NO CHARGE: Performed by: THORACIC SURGERY (CARDIOTHORACIC VASCULAR SURGERY)

## 2021-02-03 PROCEDURE — 71046 X-RAY EXAM CHEST 2 VIEWS: CPT

## 2021-02-03 PROCEDURE — 99024 POSTOP FOLLOW-UP VISIT: CPT | Performed by: THORACIC SURGERY (CARDIOTHORACIC VASCULAR SURGERY)

## 2021-02-03 PROCEDURE — XW023T6 INTRODUCTION OF COVID-19 VACCINE DOSE 2 INTO MUSCLE, PERCUTANEOUS APPROACH, NEW TECHNOLOGY GROUP 6: ICD-10-PCS | Performed by: THORACIC SURGERY (CARDIOTHORACIC VASCULAR SURGERY)

## 2021-02-03 PROCEDURE — 91300 SARS-COV-2 / COVID-19 MRNA VACCINE (PFIZER-BIONTECH) 30 MCG: CPT

## 2021-02-03 PROCEDURE — 0002A SARS-COV-2 / COVID-19 MRNA VACCINE (PFIZER-BIONTECH) 30 MCG: CPT

## 2021-02-03 RX ORDER — IBUPROFEN 600 MG/1
600 TABLET ORAL EVERY 6 HOURS SCHEDULED
Qty: 28 TABLET | Refills: 0 | Status: SHIPPED | OUTPATIENT
Start: 2021-02-03 | End: 2021-04-01

## 2021-02-03 RX ORDER — OXYCODONE HYDROCHLORIDE 5 MG/1
5 TABLET ORAL EVERY 4 HOURS PRN
Qty: 30 TABLET | Refills: 0 | Status: SHIPPED | OUTPATIENT
Start: 2021-02-03 | End: 2021-02-13

## 2021-02-03 RX ORDER — OXYCODONE HYDROCHLORIDE 5 MG/1
5 TABLET ORAL EVERY 4 HOURS PRN
Qty: 30 TABLET | Refills: 0
Start: 2021-02-03 | End: 2021-02-03 | Stop reason: SDUPTHER

## 2021-02-03 RX ORDER — GABAPENTIN 300 MG/1
300 CAPSULE ORAL 3 TIMES DAILY
Qty: 63 CAPSULE | Refills: 0 | Status: SHIPPED | OUTPATIENT
Start: 2021-02-03 | End: 2021-03-05

## 2021-02-03 RX ORDER — DOCUSATE SODIUM 100 MG/1
100 CAPSULE, LIQUID FILLED ORAL 2 TIMES DAILY
Qty: 20 CAPSULE | Refills: 0 | Status: SHIPPED | OUTPATIENT
Start: 2021-02-03 | End: 2021-03-05

## 2021-02-03 RX ORDER — ACETAMINOPHEN 325 MG/1
975 TABLET ORAL EVERY 6 HOURS
Qty: 84 TABLET | Refills: 0 | Status: SHIPPED | OUTPATIENT
Start: 2021-02-03 | End: 2021-02-10

## 2021-02-03 RX ADMIN — FAMOTIDINE 40 MG: 20 TABLET ORAL at 08:39

## 2021-02-03 RX ADMIN — ENOXAPARIN SODIUM 40 MG: 40 INJECTION SUBCUTANEOUS at 08:39

## 2021-02-03 RX ADMIN — NICOTINE POLACRILEX 2 MG: 2 GUM, CHEWING ORAL at 10:49

## 2021-02-03 RX ADMIN — HYDROMORPHONE HYDROCHLORIDE 4 MG: 4 TABLET ORAL at 02:59

## 2021-02-03 RX ADMIN — TIOTROPIUM BROMIDE 18 MCG: 18 CAPSULE ORAL; RESPIRATORY (INHALATION) at 08:40

## 2021-02-03 RX ADMIN — ACETAMINOPHEN 975 MG: 325 TABLET, FILM COATED ORAL at 13:06

## 2021-02-03 RX ADMIN — DOCUSATE SODIUM 100 MG: 100 CAPSULE, LIQUID FILLED ORAL at 08:39

## 2021-02-03 RX ADMIN — GABAPENTIN 300 MG: 300 CAPSULE ORAL at 08:39

## 2021-02-03 RX ADMIN — SENNOSIDES 8.6 MG: 8.6 TABLET, FILM COATED ORAL at 08:39

## 2021-02-03 RX ADMIN — ACETAMINOPHEN 975 MG: 325 TABLET, FILM COATED ORAL at 05:08

## 2021-02-03 RX ADMIN — BUPROPION HYDROCHLORIDE 300 MG: 150 TABLET, FILM COATED, EXTENDED RELEASE ORAL at 08:40

## 2021-02-03 RX ADMIN — METHOCARBAMOL 500 MG: 500 TABLET, FILM COATED ORAL at 13:05

## 2021-02-03 RX ADMIN — METHOCARBAMOL 500 MG: 500 TABLET, FILM COATED ORAL at 05:08

## 2021-02-03 RX ADMIN — SALMETEROL XINAFOATE 1 PUFF: 50 POWDER, METERED ORAL; RESPIRATORY (INHALATION) at 08:40

## 2021-02-03 RX ADMIN — KETOROLAC TROMETHAMINE 15 MG: 30 INJECTION, SOLUTION INTRAMUSCULAR at 07:34

## 2021-02-03 NOTE — QUICK NOTE
02/03/21    Procedure: Chest tube removal    Right chest tube removed in routine fashion without incident  The patient tolerated the procedure well  A dry, sterile dressing was placed  Will check a pa/lat chest x-ray       Edison Wooten PA-C

## 2021-02-03 NOTE — DISCHARGE SUMMARY
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  She also underwent a brain MRI for dizziness and there was no evidence of metastatic disease  Her PFTs are adequate for surgical resection  Dr Emmanuelle Burton is recommending a right thoracoscopic upper lobectomy for treatment of this  Other options including SBRT were discussed  The surgery was discussed and all questions addressed  Procedures Performed:   1/28/2021 VATS right upper lobectomy and right lower lobe bleb resection    Summary of Hospital Course: Patient presented for elective VATS right upper lobectomy and right lower lobe bleb resection which she underwent with no immediate complications on 5/57/6111  Post-operatively she went to the floor with her chest tube to water seal and was breathing comfortably on 2L NC the evening of POD0  Her air leak was initially about 1 2L on thopaz although output was never high  She was weaned from nasal cannula by POD3 and her air leak steadily improved day by day  Her air leak ceased the evening of 2/2 and did not return so early afternoon 2/3 (POD6) her chest tube was removed  A post-pull CXR was performed and she was deemed appropriate for discharge  On the day of discharge she was ambulating in the crane without assistive device or help from an aide, and was tolerating a regular diet  All questions were answered to her satisfaction and she was discharged in good condition      Significant Findings, Care, Treatment and Services Provided:   1/28/2021 VATS right upper lobectomy and right lower lobe bleb resection    Complications: None    Discharge Diagnosis: RUL squamous cell carcinoma, [id number]     Resolved Problems  Date Reviewed: 2/3/2021    None          Condition at Discharge: good         Discharge instructions/Information to patient and family:   See after visit summary for information provided to patient and family  Provisions for Follow-Up Care:  See after visit summary for information related to follow-up care and any pertinent home health orders  PCP: Luis Angel Sims DO    Disposition: Home    Planned Readmission: No      Discharge Statement   I spent 25 minutes discharging the patient  This time was spent on the day of discharge  I had direct contact with the patient on the day of discharge  Additional documentation is required if more than 30 minutes were spent on discharge  Discharge Medications:  See after visit summary for reconciled discharge medications provided to patient and family

## 2021-02-03 NOTE — PROGRESS NOTES
Progress Note - Thoracic Surgery   Rolo Marine 77 y o  female MRN: [id number]  Unit/Bed#: [facility] 405-01 Encounter: [id number]      Assessment:  77 y o  female POD6 from RULobectomy     Chest tube (R): 175 cc SS, WS, negative AL for past 8 hours    Plan:  Likely discontinue chest tube and follow up PA/Lateral CXR  Possible discharge later today pending chest tube discontinuation/CXR      Subjective/Objective     Subjective: No issues per nursing, no acute events  Objective:     Vitals: Temp:  [97 9 °F (36 6 °C)-98 4 °F (36 9 °C)] 97 9 °F (36 6 °C)  HR:  [59-72] 70  Resp:  [16-20] 20  BP: (115-150)/(54-71) 127/54  Body mass index is 30 72 kg/m²  I/O       02/01 0701 - 02/02 0700 02/02 0701 - 02/03 0700    P  O  1340 1720    Total Intake(mL/kg) 1340 (21 1) 1720 (27 1)    Urine (mL/kg/hr) 1200 (0 8) 1700 (1 1)    Chest Tube 150 175    Total Output 1350 1875    Net -10 -155                Physical Exam:  GEN: NAD  HEENT: MMM  CV: RRR  Lung: Normal effort, no respiratory distress  Ab: Soft, NT/ND  Extrem: No CCE  Neuro: A+Ox3    Lab, Imaging and other studies: I have personally reviewed pertinent reports    , CBC with diff:   No results found for: WBC, HGB, HCT, MCV, PLT, ADJUSTEDWBC, MCH, MCHC, RDW, MPV, NRBC, BMP/CMP:   No results found for: SODIUM, K, CL, CO2, ANIONGAP, BUN, CREATININE, GLUCOSE, CALCIUM, AST, ALT, ALKPHOS, PROT, BILITOT, EGFR, Coags: No results found for: PT, PTT, INR  VTE Pharmacologic Prophylaxis: Enoxaparin (Lovenox)  VTE Mechanical Prophylaxis: sequential compression device

## 2021-02-03 NOTE — DISCHARGE INSTRUCTIONS
Gently wash your incisions daily with soap and water, do not soak in a tub  Do not apply any lotions, creams, or ointments to incisions  No lifting over 10 lbs or strenuous exercise  No driving until seen at your post operative visit  The blue stitch will be removed at your post operative visit  Please obtain a pa/lat chest xray at a [facility] within 3 days of your follow up visit  You will be prescribed 3 medications to take at home, that should be taken exactly as directed  These have been shown to greatly improve post-operative pain:     1  Gabapentin 300mg tablet  Take 1 tablet by mouth 3x a day for 3 weeks  2    Tylenol 325mg tablet  Take 2 tablets by mouth, every 6 hours, for 1 week  3    Ibuprofen 200mg tablet  Take 3 tablets by mouth, 3x a day with meals for 1 week  You will also be prescribed a medication that you may take on an as needed basis:  Oxycodone 5mg tablet  Take 1-2 tablets every 4 hours as needed for pain

## 2021-02-04 ENCOUNTER — TELEPHONE (OUTPATIENT)
Dept: OTHER | Facility: HOSPITAL | Age: 66
End: 2021-02-04

## 2021-02-04 NOTE — TELEPHONE ENCOUNTER
Spoke to patient at 1220  She is having pain at her chest tube site, taking only oxycodone  No redness or pus like drainage  I recommended taking the gabapentin TID, tylenol QID and ibuprofen TID as prescribed which will help significantly with pain  She will call us with temperature over 100 4 or any redness/drainage of incision

## 2021-02-08 ENCOUNTER — HOSPITAL ENCOUNTER (OUTPATIENT)
Dept: VASCULAR ULTRASOUND | Facility: HOSPITAL | Age: 66
Discharge: HOME | End: 2021-02-08
Payer: COMMERCIAL

## 2021-02-08 ENCOUNTER — TELEPHONE (OUTPATIENT)
Dept: CARDIAC SURGERY | Facility: CLINIC | Age: 66
End: 2021-02-08

## 2021-02-08 DIAGNOSIS — M79.661 PAIN IN BOTH LOWER LEGS: ICD-10-CM

## 2021-02-08 DIAGNOSIS — M79.661 PAIN IN BOTH LOWER LEGS: Primary | ICD-10-CM

## 2021-02-08 DIAGNOSIS — M79.662 PAIN IN BOTH LOWER LEGS: ICD-10-CM

## 2021-02-08 DIAGNOSIS — M79.662 PAIN IN BOTH LOWER LEGS: Primary | ICD-10-CM

## 2021-02-08 PROCEDURE — 93970 EXTREMITY STUDY: CPT

## 2021-02-08 NOTE — TELEPHONE ENCOUNTER
Spoke to patient at 1500 for routine post-op call  She is feeling better since starting pain regimen we discussed a few days ago  She is not using any oxycodone  She denies fevers, chills, cough, constipation, or incisional redness/purulent drainage  She has some bruising near one of her incisions, not worsening  She has some shortness of breath with stair climbing  She walks her dog around the block every day and feels well doing it  She reports cramping in her lower legs for the past few days, no swelling or color change  I will order bilateral venous duplex of lower extremities to rule out DVT

## 2021-02-09 ENCOUNTER — TELEPHONE (OUTPATIENT)
Dept: CARDIAC SURGERY | Facility: CLINIC | Age: 66
End: 2021-02-09

## 2021-02-09 PROCEDURE — 93970 EXTREMITY STUDY: CPT | Performed by: SURGERY

## 2021-02-09 NOTE — TELEPHONE ENCOUNTER
Spoke to patient at 1225  LE duplex negative bilaterally  Encouraged plenty of fluids, trying Gatorade, a banana, and plenty of walking

## 2021-02-13 ENCOUNTER — APPOINTMENT (OUTPATIENT)
Dept: RADIOLOGY | Age: 66
End: 2021-02-13
Payer: COMMERCIAL

## 2021-02-13 DIAGNOSIS — C34.91 SQUAMOUS CELL LUNG CANCER, RIGHT (HCC): ICD-10-CM

## 2021-02-13 PROCEDURE — 71046 X-RAY EXAM CHEST 2 VIEWS: CPT

## 2021-02-16 ENCOUNTER — TELEPHONE (OUTPATIENT)
Dept: SURGICAL ONCOLOGY | Facility: CLINIC | Age: 66
End: 2021-02-16

## 2021-02-16 ENCOUNTER — OFFICE VISIT (OUTPATIENT)
Dept: CARDIAC SURGERY | Facility: CLINIC | Age: 66
End: 2021-02-16

## 2021-02-16 VITALS
SYSTOLIC BLOOD PRESSURE: 163 MMHG | TEMPERATURE: 98.7 F | HEART RATE: 67 BPM | DIASTOLIC BLOOD PRESSURE: 71 MMHG | RESPIRATION RATE: 16 BRPM | WEIGHT: 134.7 LBS | BODY MASS INDEX: 29.06 KG/M2 | HEIGHT: 57 IN | OXYGEN SATURATION: 96 %

## 2021-02-16 DIAGNOSIS — Z78.9 NEED FOR FOLLOW-UP BY SOCIAL WORKER: Primary | ICD-10-CM

## 2021-02-16 DIAGNOSIS — C34.91 SQUAMOUS CELL LUNG CANCER, RIGHT (HCC): ICD-10-CM

## 2021-02-16 PROCEDURE — 99024 POSTOP FOLLOW-UP VISIT: CPT | Performed by: PHYSICIAN ASSISTANT

## 2021-02-16 NOTE — TELEPHONE ENCOUNTER
Chart reviewed  Patient underwent surgery 1/28/2021  Should start chemo six weeks post op  Please schedule with medical oncology within the next seven to ten days

## 2021-02-16 NOTE — PROGRESS NOTES
Thoracic Follow-Up  Assessment/Plan:    Squamous cell lung cancer, right ([facility] [address] )  Ms Peri Daily presents for her first post-operative visit following right upper lobectomy for known lung cancer  Her final pathology demonstrates T2N1 disease, or Stage IIB  She will require adjuvant chemotherapy for this, will place medical oncology referral to discuss  Her CXR looks appropriate post-operatively  She is still experiencing some incisional pain for which we recommended continuing gabapentin and ibuprofen  She has no formal activity restrictions but should listen to her body for pain  She will return in 4 weeks with another CXR for a second post-operative visit  Diagnoses and all orders for this visit:    Need for follow-up by   -     Ambulatory referral to social work care management program; Future    Squamous cell lung cancer, right ([facility] [address] )  -     XR chest pa & lateral; Future  -     Ambulatory referral to Hematology / Oncology; Future          Thoracic History   Diagnosis: 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  Procedure: right thoracoscopic upper lobectomy and lower lobe wedge resection 1/28/21  Pathology:  Single focus 3 1x3  1x3 0cm G3 invasive, keratinizing squamous cell carcinoma right upper lobe  No visceral pleura invasion, there is lymphatic invasion present  All margins uninvolved by tumor  1/14 lymph nodes involved, +right peribronchial  Negative 2R, 4R, 10R, azygous and 7  AJCC Prognostic Stage Group (8th Ed ):  Stage IIB - pT2a, pN1, MX, G3           Subjective:    Patient ID: Varghese Young is a 74 y o  female  HPI   Ms Peri Daily is a 74year old female who underwent a right thoracoscopic upper lobectomy and lower lobe wedge resection 1/28/21 for clinical stage I lung cancer  Her hospital course was complicated by air leak which eventually resolved by POD#6  Her chest tube was removed, and she was discharged that day, 2/3/21     She had called the office in the interim with complains of lower extremity pain, so bilateral ultrasounds were obtained and negative for DVT  CXR 2/13/21 reveals no consolidation, pneumothorax or significant pleural effusion  On discussion, she is not smoking  She is experiencing incisional pain, slowly improving  She is taking gabapentin, ibuprofen, Tylenol and oxycodone only at night  She has some shortness of breath on exertion which is also slowly improving  No fevers, chills, incisional redness or drainage  Her leg pain has resolved  The following portions of the patient's history were reviewed and updated as appropriate: allergies, current medications, past family history, past medical history, past social history, past surgical history and problem list     Review of Systems   Constitutional: Negative for activity change, appetite change, chills, diaphoresis, fatigue, fever and unexpected weight change  Respiratory: Positive for shortness of breath  Negative for cough and chest tightness  Cardiovascular: Positive for chest pain (incisional)  Musculoskeletal: Negative for joint swelling and myalgias  Skin: Negative for color change and rash  Neurological: Negative for weakness  Hematological: Negative for adenopathy  Does not bruise/bleed easily  Objective:   Physical Exam  Constitutional:       General: She is not in acute distress  Appearance: Normal appearance  HENT:      Head: Normocephalic and atraumatic  Eyes:      General: No scleral icterus  Conjunctiva/sclera: Conjunctivae normal    Neck:      Musculoskeletal: Neck supple  Cardiovascular:      Rate and Rhythm: Normal rate and regular rhythm  Pulmonary:      Effort: Pulmonary effort is normal  No respiratory distress  Breath sounds: Normal breath sounds  Comments: Right thoracoscopic incisions healing well, one prolene suture removed without incident   Abdominal:      General: There is no distension  Palpations: Abdomen is soft  Lymphadenopathy:      Cervical: No cervical adenopathy  Skin:     General: Skin is warm and dry  Neurological:      General: No focal deficit present  Mental Status: She is alert and oriented to person, place, and time  Psychiatric:         Mood and Affect: Mood normal          Behavior: Behavior normal          Thought Content:  Thought content normal          Judgment: Judgment normal      /71   Pulse 67   Temp 98 7 °F (37 1 °C) (Tympanic)   Resp 16   Ht 4' 9" (1 448 m)   Wt 61 1 kg (134 lb 11 2 oz)   SpO2 96%   BMI 29 15 kg/m²     Xr Chest Pa & Lateral    Result Date: 2/3/2021  Impression Development of small right apical pneumothorax and subcutaneous emphysema status post chest tube removal Workstation performed: [id number]

## 2021-02-16 NOTE — TELEPHONE ENCOUNTER
New Patient Encounter    New Patient Intake Form   Patient Details:  Charity eKvin  1955  [id number]    Background Information:  [address] starts by opening a telephone encounter and gathering the following information   Who is calling to schedule? If not self, relationship to patient? provider   Referring Provider [place]   What is the diagnosis? Lung ca   Is this diagnosis confirmed? Yes   When was the diagnosis? 2/16   Is there a confirmed diagnosis from a biopsy/tissue reviewed by pathology? 1/28   Were outside slides requested? No   Is patient aware of diagnosis? Yes   Is there a personal history and what kind? na   Is there a family history and what kind? na   Reason for visit? New Diagnosis   Have you had any imaging or labs done? If so: when, where? yes  [facility]   Are records in [employer]? yes   If patient has a prior history of breast cancer were old records obtained? NA   Was the patient told to bring a disk? no   Does the patient smoke or Vape? no   If yes, how many packs or cartridges per day? na   Scheduling Information:   Preferred [place]: [facility] and [place]     Are there any dates/time the patient cannot be seen? na   Miscellaneous: appointment was requested  After completing the above information, please route to Financial Counselor and the appropriate Nurse Navigator for review

## 2021-02-17 ENCOUNTER — TELEPHONE (OUTPATIENT)
Dept: CARDIAC SURGERY | Facility: CLINIC | Age: 66
End: 2021-02-17

## 2021-02-17 NOTE — TELEPHONE ENCOUNTER
Pt had questions about CXR report  All questions addressed  Still working on medical oncology appointment      ----- Message from markedup sent at 2/17/2021  2:18 PM EST -----  Regarding: Pathology report questions  Contact: [phone]  Patient reached out to us in regards to concerns regarding her pathology report       Please advise

## 2021-02-19 NOTE — TELEPHONE ENCOUNTER
Pt returned my call  I reviewed appt details and office location  I explained that her follow up appointments can be in [facility] or [facility]

## 2021-02-19 NOTE — TELEPHONE ENCOUNTER
Per Kalyn Nelson, pt has been scheduled to see Dr Roger Allan on 3/2/2021  I left detailed VM for pacheco sutton appt info and asked that she call [facility] [place] to confirm  I also notified Dr Firman Boeck office

## 2021-02-24 ENCOUNTER — PATIENT OUTREACH (OUTPATIENT)
Dept: CASE MANAGEMENT | Facility: HOSPITAL | Age: 66
End: 2021-02-24

## 2021-02-24 NOTE — PROGRESS NOTES
Oncology LSW attempted to reach out to PT to review her distress thermometer  PT self-scored a 6/10 and reported depression, fears, nervousness, and worry as her primary stressors  PT also indicated 4 physical concerns  LSW left detailed voicemail requesting return call at PT's earliest convenience  This is LSW's first outreach attempt

## 2021-02-24 NOTE — PROGRESS NOTES
Oncology LSW received return call from PT  PT reported that she was still feeling nervous after her consult, as she had several follow up doctors appointments that she needed to attend  PT explained that, at this time, she was primarily concerned about vaginal bleeding that she experiencing for several days  PT reported that she had been self-diagnosing herself online and was making herself worry more  LSW asked PT if she was being seen to address this issue, to which PT explained that she would been seen this Friday at [address]  PT continued to voice concerns about not being seen until Friday  LSW reviewed PT's appointment schedule and explained that she would reach out to PT's doctor to see if they would be able to move up the appointment or at least have a member of office staff reach out  PT was agreeable with this plan  LSW proceeded to send in-basket message to Dr Eran Kimble  PT then asked if [address] [place] [address] was facilitating any support groups that she could attend  LSW explained that, while the [facility] was not directly facilitating groups, she would certainly be able to provide her with information for the [address] of the [address]  LSW explained where to find their program calendar and that she would send her the information to PT's email directly  PT was agreeable with this plan  LSW will continue to follow up with PT provide her with emotional support throughout the duration of her treatment

## 2021-02-26 ENCOUNTER — OFFICE VISIT (OUTPATIENT)
Dept: OBGYN CLINIC | Facility: CLINIC | Age: 66
End: 2021-02-26
Payer: COMMERCIAL

## 2021-02-26 VITALS
WEIGHT: 134 LBS | BODY MASS INDEX: 30.14 KG/M2 | HEIGHT: 56 IN | SYSTOLIC BLOOD PRESSURE: 136 MMHG | DIASTOLIC BLOOD PRESSURE: 72 MMHG

## 2021-02-26 DIAGNOSIS — N89.8 VAGINAL EROSION SECONDARY TO PESSARY USE, INITIAL ENCOUNTER (HCC): Primary | ICD-10-CM

## 2021-02-26 DIAGNOSIS — T83.89XA VAGINAL EROSION SECONDARY TO PESSARY USE, INITIAL ENCOUNTER (HCC): Primary | ICD-10-CM

## 2021-02-26 PROBLEM — H93.13 TINNITUS OF BOTH EARS: Status: ACTIVE | Noted: 2018-11-06

## 2021-02-26 PROBLEM — G25.81 RESTLESS LEGS SYNDROME (RLS): Status: ACTIVE | Noted: 2017-11-07

## 2021-02-26 PROBLEM — K21.9 GERD (GASTROESOPHAGEAL REFLUX DISEASE): Status: ACTIVE | Noted: 2021-02-26

## 2021-02-26 PROBLEM — I10 HYPERTENSION, ESSENTIAL: Status: ACTIVE | Noted: 2017-11-07

## 2021-02-26 PROBLEM — J44.9 CHRONIC OBSTRUCTIVE PULMONARY DISEASE (HCC): Status: ACTIVE | Noted: 2019-10-07

## 2021-02-26 PROCEDURE — 99203 OFFICE O/P NEW LOW 30 MIN: CPT | Performed by: OBSTETRICS & GYNECOLOGY

## 2021-02-26 NOTE — PROGRESS NOTES
Gynecology   Merrick Vargas 74 y o  female MRN: [id number]    Assessment/Plan     Assessment:  Pessary ulceration from vaginal atrophy    Plan: Will touch base with Oncologist re: safety of using vaginal estrogen pre-chemo in light of newly-diagnosed CA  If contraindicated then will take pessary break and use Trimosan  History of Present Illness     HPI:  Merrick Vargas is a 74 y o  female who presents with intermittent VB x 3 days  Has had a pessary since 2017 for vaginal vault prolapse  Has never been on vaginal estrogen or other vaginal ointment  Usually takes her pessary out for cleaning once a week  Recently diagnosed with squamous cell CA of right lung with one positive lymph node 1/28/2021  Plan is start chemotherapy 6 weeks postop  Has not seen Oncologist yet          Historical Information   Past Medical History:   Diagnosis Date    Cancer ([facility] [address] )     lung    GERD (gastroesophageal reflux disease)     Hypertension     Pulmonary emphysema (HCC)     Restless leg syndrome      Past Surgical History:   Procedure Laterality Date    AUGMENTATION MAMMAPLASTY Bilateral 11/2014    retro-pectoral silicone    BREAST BIOPSY Left 03/23/2015    u/s core bx    BREAST BIOPSY Left 08/02/2010    high-risk lesion    BREAST EXCISIONAL BIOPSY Left 08/26/2010    high-risk lesion    BREAST EXCISIONAL BIOPSY Left 1994    BREAST EXCISIONAL BIOPSY Right 1984    [place] [address] N/A 1/28/2021    Procedure: Tayler Wright;  Surgeon: Virginia Andino MD;  [facility]: [facility];  Service: Thoracic    NC THORACOSCOPY SURG LOBECTOMY Right 1/28/2021    Procedure: RIGHT VATS UPPER LOBECTOMY ; MEDIASTINAL LYMPH NODE DISSECTION; right lower lobe bleb resection  ;  Surgeon: Virginia Andino MD;  [facility]: [facility];  Service: Thoracic              TOTAL ABDOMINAL HYSTERECTOMY W/ BILATERAL SALPINGOOPHORECTOMY with bladder suspension Bilateral 2010    age 60     OB/GYN History:   OB History    1    Para   1    Term   1            AB        Living           SAB        TAB        Ectopic        Multiple        Live Births                     Family History   Problem Relation Age of Onset    No Known Problems Mother     COPD Father     Hypertension Father     No Known Problems Sister     No Known Problems Daughter     No Known Problems Maternal Grandmother     No Known Problems Maternal Grandfather     No Known Problems Paternal Grandmother     Rectal cancer Paternal Grandfather         unknown age   Willena Rand No Known Problems Sister     No Known Problems Maternal Aunt     Breast cancer Paternal Aunt         unknown age   Willena Rand No Known Problems Paternal Aunt     No Known Problems Paternal Aunt     No Known Problems Paternal Aunt     No Known Problems Paternal Aunt     No Known Problems Paternal Aunt     No Known Problems Paternal Aunt     COPD Brother      Social History   Social History     Substance and Sexual Activity   Alcohol Use Yes    Frequency: 4 or more times a week    Comment: wine @ hs      Social History     Substance and Sexual Activity   Drug Use Never     Social History     Tobacco Use   Smoking Status Former Smoker    Packs/day:     Years:     Pack years:     Types: Cigarettes    Start date:     Quit date:     Years since quittin 1   Smokeless Tobacco Never Used   Tobacco Comment    quit 2021   NICORETTE GUM SUBSTITUE CURRENTLY     E-Cigarette/Vaping     E-Cigarette/Vaping Substances       Meds/Allergies   Current Outpatient Medications on File Prior to Visit   Medication Sig    Biotin 5 MG CAPS Take by mouth daily     buPROPion (WELLBUTRIN XL) 300 mg 24 hr tablet Take 300 mg by mouth every morning     Cholecalciferol (Vitamin D3) 50 MCG ( UT) capsule Take 5,000 Units by mouth daily     famotidine (PEPCID) 40 MG tablet Take 40 mg by mouth every morning     gabapentin (NEURONTIN) 300 mg capsule Take 1 capsule (300 mg total) by mouth 3 (three) times a day for 21 days    ibuprofen (MOTRIN) 600 mg tablet Take 1 tablet (600 mg total) by mouth every 6 (six) hours for 7 days    pramipexole (MIRAPEX) 0 5 mg tablet Take 0 5 mg by mouth daily at bedtime     simvastatin (ZOCOR) 40 mg tablet Take 40 mg by mouth every other day    triamterene-hydrochlorothiazide (DYAZIDE) 37 5-25 mg per capsule Take 1 capsule by mouth every morning    umeclidinium-vilanterol (Anoro Ellipta) 62 5-25 MCG/INH inhaler Inhale 1 puff daily     albuterol (PROVENTIL HFA,VENTOLIN HFA) 90 mcg/act inhaler Inhale 2 puffs every 4 (four) hours as needed    docusate sodium (COLACE) 100 mg capsule Take 1 capsule (100 mg total) by mouth 2 (two) times a day    traZODone (DESYREL) 150 mg tablet Take 150 mg by mouth daily at bedtime     No current facility-administered medications on file prior to visit  Allergies   Allergen Reactions    Sulfa Antibiotics Hives     Review of Systems   Constitution: Negative for chills, decreased appetite, fever and malaise/fatigue  Respiratory: Negative for cough, shortness of breath, sputum production and wheezing  Gastrointestinal: Negative for bloating, abdominal pain, nausea and vomiting  Genitourinary: Positive for non-menstrual bleeding and pelvic pain  Negative for dysuria and urgency  Objective   Vitals: Blood pressure 136/72, height 4' 7 75" (1 416 m), weight 60 8 kg (134 lb), not currently breastfeeding  Physical Exam  Constitutional:       Appearance: Normal appearance  Genitourinary:      Vulva, inguinal canal and urethra normal       No inguinal adenopathy present in the right or left side  Bladder is not tender  Vaginal bleeding, ulceration and prolapse present  Cervix is absent  Uterus is absent  No right or left adnexal mass present  Right adnexa absent  Right adnexa not tender  Left adnexa absent  Left adnexa not tender  HENT:      Head: Normocephalic  Cardiovascular:      Rate and Rhythm: Normal rate and regular rhythm  Pulmonary:      Effort: Pulmonary effort is normal    Abdominal:      General: There is no distension  Palpations: Abdomen is soft  Tenderness: There is no abdominal tenderness  Musculoskeletal:         General: No swelling  Lymphadenopathy:      Lower Body: No right inguinal adenopathy  No left inguinal adenopathy  Neurological:      General: No focal deficit present  Mental Status: She is alert and oriented to person, place, and time  Skin:     General: Skin is warm and dry  Psychiatric:         Mood and Affect: Mood normal          Behavior: Behavior normal    Vitals signs reviewed

## 2021-03-02 ENCOUNTER — TELEPHONE (OUTPATIENT)
Dept: HEMATOLOGY ONCOLOGY | Facility: CLINIC | Age: 66
End: 2021-03-02

## 2021-03-02 ENCOUNTER — CONSULT (OUTPATIENT)
Dept: HEMATOLOGY ONCOLOGY | Facility: CLINIC | Age: 66
End: 2021-03-02
Payer: COMMERCIAL

## 2021-03-02 ENCOUNTER — DOCUMENTATION (OUTPATIENT)
Dept: HEMATOLOGY ONCOLOGY | Facility: CLINIC | Age: 66
End: 2021-03-02

## 2021-03-02 VITALS
TEMPERATURE: 98.2 F | HEART RATE: 63 BPM | HEIGHT: 57 IN | WEIGHT: 134 LBS | BODY MASS INDEX: 28.91 KG/M2 | OXYGEN SATURATION: 98 % | RESPIRATION RATE: 18 BRPM | DIASTOLIC BLOOD PRESSURE: 78 MMHG | SYSTOLIC BLOOD PRESSURE: 160 MMHG

## 2021-03-02 DIAGNOSIS — C34.91 SQUAMOUS CELL LUNG CANCER, RIGHT (HCC): Primary | ICD-10-CM

## 2021-03-02 DIAGNOSIS — T45.1X5A CHEMOTHERAPY INDUCED NAUSEA AND VOMITING: ICD-10-CM

## 2021-03-02 DIAGNOSIS — C34.91 SQUAMOUS CELL LUNG CANCER, RIGHT (HCC): ICD-10-CM

## 2021-03-02 DIAGNOSIS — C77.9 REGIONAL LYMPH NODE METASTASIS PRESENT (HCC): Primary | ICD-10-CM

## 2021-03-02 DIAGNOSIS — R11.2 CHEMOTHERAPY INDUCED NAUSEA AND VOMITING: ICD-10-CM

## 2021-03-02 DIAGNOSIS — J44.9 CHRONIC OBSTRUCTIVE PULMONARY DISEASE, UNSPECIFIED COPD TYPE (HCC): ICD-10-CM

## 2021-03-02 DIAGNOSIS — G35 MULTIPLE SCLEROSIS (HCC): ICD-10-CM

## 2021-03-02 DIAGNOSIS — I10 HYPERTENSION, ESSENTIAL: ICD-10-CM

## 2021-03-02 PROCEDURE — 99245 OFF/OP CONSLTJ NEW/EST HI 55: CPT | Performed by: INTERNAL MEDICINE

## 2021-03-02 RX ORDER — ONDANSETRON 4 MG/1
4 TABLET, FILM COATED ORAL EVERY 6 HOURS PRN
Qty: 30 TABLET | Refills: 1 | Status: SHIPPED | OUTPATIENT
Start: 2021-03-02 | End: 2021-06-17 | Stop reason: ALTCHOICE

## 2021-03-02 NOTE — PROGRESS NOTES
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  With invasion of lymphatic channel and metastatic carcinoma in 1 of 8 peribronchial lymph nodes  Other lymph nodes at level 10 7 his eye Walter 4R and 2R were negative for malignancy  1 of 14 positive peribronchial lymph node  Patient is recovering from surgery  She has some discomfort at place of surgery  Has chronic exertional dyspnea  Very little nonproductive cough  Has tiredness  PET scan had also shown positive activity in right parotid gland that was biopsied and was negative for malignancy and she will be followed by ENT specialist   Remote past history of suspected MS in 1989  She had some tingling  CSF came back suspicious and MRI brain came back suspicious  Patient was treated for MS at that time  No active treatment for the last several years  No more tingling in her hands  Patient gave me all this information  ROS:  03/02/21 Reviewed 12 systems: See symptoms in HPI  Presently no other neurological, cardiac, pulmonary, GI and  symptoms other than listed in HPI  Other symptoms are in HPI  No  fever, chills, bleeding, bone pains, skin rash, weight loss, night sweats, arthritic symptoms,   weakness, numbness, claudication and gait problem  No frequent infections  Not unusually sensitive to heat or cold  No swelling of the ankles  No swollen glands  Patient is anxious  Historical Information   Past Medical History:   Diagnosis Date    Cancer ([facility] [address] )     lung    GERD (gastroesophageal reflux disease)     Hypertension     Pulmonary emphysema (HCC)     Restless leg syndrome      Past Surgical History:   Procedure Laterality Date    AUGMENTATION MAMMAPLASTY Bilateral 2014    retro-pectoral silicone    BREAST BIOPSY Left 2015    u/s core bx    BREAST BIOPSY Left 2010    high-risk lesion    BREAST EXCISIONAL BIOPSY Left 2010    high-risk lesion    BREAST EXCISIONAL BIOPSY Left     BREAST EXCISIONAL BIOPSY Right     HI [address] N/A 2021    Procedure: Oral Amanda;  Surgeon: Mari Feliciano MD;  [facility];  Service: Thoracic    HI THORACOSCOPY SURG LOBECTOMY Right 2021    Procedure: RIGHT VATS UPPER LOBECTOMY ; MEDIASTINAL LYMPH NODE DISSECTION; right lower lobe bleb resection  ;  Surgeon: Mari Feliciano MD;  [facility];  Service: Thoracic    TOTAL ABDOMINAL HYSTERECTOMY  2010    age 60    TOTAL ABDOMINAL HYSTERECTOMY W/ BILATERAL SALPINGOOPHORECTOMY Bilateral 2010    age 60     Social History   Social History     Substance and Sexual Activity   Alcohol Use Yes    Frequency: 4 or more times a week    Comment: wine @ hs      Social History     Substance and Sexual Activity   Drug Use Never     Social History     Tobacco Use   Smoking Status Former Smoker    Packs/day: 1 00    Years: 30 00    Pack years: 30 00    Types: Cigarettes    Start date:     Quit date:     Years since quittin 1   Smokeless Tobacco Never Used   Tobacco Comment    quit 2021   NICORETTE GUM SUBSTITUE CURRENTLY     Family History:   Family History   Problem Relation Age of Onset    No Known Problems Mother     COPD Father     Hypertension Father     No Known Problems Sister     No Known Problems Daughter     No Known Problems Maternal Grandmother     No Known Problems Maternal Grandfather     No Known Problems Paternal Grandmother     Rectal cancer Paternal Grandfather         unknown age   Rajinder Moose No Known Problems Sister     No Known Problems Maternal Aunt     Breast cancer Paternal Aunt         unknown age   Rajinder Moose No Known Problems Paternal Aunt     No Known Problems Paternal Aunt     No Known Problems Paternal Aunt     No Known Problems Paternal Aunt     No Known Problems Paternal Aunt     No Known Problems Paternal Aunt     COPD Brother          Current Outpatient Medications:     albuterol (PROVENTIL HFA,VENTOLIN HFA) 90 mcg/act inhaler, Inhale 2 puffs every 4 (four) hours as needed, Disp: , Rfl:     Biotin 5 MG CAPS, Take by mouth daily , Disp: , Rfl:     buPROPion (WELLBUTRIN XL) 300 mg 24 hr tablet, Take 300 mg by mouth every morning , Disp: , Rfl:     Cholecalciferol (Vitamin D3) 50 MCG (2000 UT) capsule, Take 5,000 Units by mouth daily , Disp: , Rfl:     famotidine (PEPCID) 40 MG tablet, Take 40 mg by mouth every morning , Disp: , Rfl:     ibuprofen (MOTRIN) 600 mg tablet, Take 1 tablet (600 mg total) by mouth every 6 (six) hours for 7 days, Disp: 28 tablet, Rfl: 0    pramipexole (MIRAPEX) 0 5 mg tablet, Take 0 5 mg by mouth daily at bedtime , Disp: , Rfl:     simvastatin (ZOCOR) 40 mg tablet, Take 40 mg by mouth every other day, Disp: , Rfl:     traZODone (DESYREL) 150 mg tablet, Take 150 mg by mouth daily at bedtime, Disp: , Rfl:     triamterene-hydrochlorothiazide (DYAZIDE) 37 5-25 mg per capsule, Take 1 capsule by mouth every morning, Disp: , Rfl:     umeclidinium-vilanterol (Anoro Ellipta) 62 5-25 MCG/INH inhaler, Inhale 1 puff daily , Disp: , Rfl:     docusate sodium (COLACE) 100 mg capsule, Take 1 capsule (100 mg total) by mouth 2 (two) times a day, Disp: 20 capsule, Rfl: 0    gabapentin (NEURONTIN) 300 mg capsule, Take 1 capsule (300 mg total) by mouth 3 (three) times a day for 21 days (Patient not taking: Reported on 3/2/2021), Disp: 63 capsule, Rfl: 0    Allergies   Allergen Reactions    Sulfa Antibiotics Hives     @ ROS@  Physical Exam:  Vitals:    03/02/21 1006   BP: 160/78   BP Location: Left arm   Patient Position: Sitting   Cuff Size: Adult   Pulse: 63   Resp: 18   Temp: 98 2 °F (36 8 °C)   SpO2: 98%   Weight: 60 8 kg (134 lb)   Height: 4' 9" (1 448 m)     Alert, oriented, not in distress, no icterus, no oral thrush, no palpable neck mass, clear lung fields, regular heart rate, abdomen  soft and non tender, no palpable abdominal mass, no ascites, no edema of ankles, no calf tenderness, no focal neurological deficit, no skin rash, no palpable lymphadenopathy in the neck and axillary areas,  no clubbing  Patient is anxious  Performance status 1  Lab Results: I have reviewed all pertinent labs    LABS:  Results for orders placed or performed during the hospital encounter of 01/28/21   CBC   Result Value Ref Range    WBC 11 31 (H) 4 31 - 10 16 Thousand/uL    RBC 4 51 3 81 - 5 12 Million/uL    Hemoglobin 13 9 11 5 - 15 4 g/dL    Hematocrit 43 4 34 8 - 46 1 %    MCV 96 82 - 98 fL    MCH 30 8 26 8 - 34 3 pg    MCHC 32 0 31 4 - 37 4 g/dL    RDW 12 8 11 6 - 15 1 %    Platelets 064 940 - 743 Thousands/uL    MPV 10 3 8 9 - 12 7 fL   Basic metabolic panel   Result Value Ref Range    Sodium 140 136 - 145 mmol/L    Potassium 3 8 3 5 - 5 3 mmol/L    Chloride 107 100 - 108 mmol/L    CO2 31 21 - 32 mmol/L    ANION GAP 2 (L) 4 - 13 mmol/L    BUN 11 5 - 25 mg/dL    Creatinine 0 79 0 60 - 1 30 mg/dL    Glucose 98 65 - 140 mg/dL    Calcium 8 9 8 3 - 10 1 mg/dL    eGFR 78 ml/min/1 73sq m   Magnesium   Result Value Ref Range    Magnesium 1 9 1 6 - 2 6 mg/dL   Basic metabolic panel   Result Value Ref Range    Sodium 138 136 - 145 mmol/L    Potassium 3 9 3 5 - 5 3 mmol/L    Chloride 105 100 - 108 mmol/L    CO2 28 21 - 32 mmol/L    ANION GAP 5 4 - 13 mmol/L    BUN 11 5 - 25 mg/dL    Creatinine 0 71 0 60 - 1 30 mg/dL    Glucose 125 65 - 140 mg/dL    Calcium 8 6 8 3 - 10 1 mg/dL    eGFR 88 ml/min/1 73sq m   CBC   Result Value Ref Range    WBC 13 79 (H) 4 31 - 10 16 Thousand/uL    RBC 3 98 3 81 - 5 12 Million/uL    Hemoglobin 12 4 11 5 - 15 4 g/dL    Hematocrit 37 8 34 8 - 46 1 %    MCV 95 82 - 98 fL    MCH 31 2 26 8 - 34 3 pg    MCHC 32 8 31 4 - 37 4 g/dL    RDW 12 4 11 6 - 15 1 %    Platelets 537 151 - 859 Thousands/uL    MPV 10 4 8 9 - 12 7 fL   Magnesium   Result Value Ref Range    Magnesium 2 0 1 6 - 2 6 mg/dL   CBC and differential   Result Value Ref Range    WBC 6 78 4 31 - 10 16 Thousand/uL    RBC 3 89 3 81 - 5 12 Million/uL    Hemoglobin 12 0 11 5 - 15 4 g/dL    Hematocrit 38 4 34 8 - 46 1 %    MCV 99 (H) 82 - 98 fL    MCH 30 8 26 8 - 34 3 pg    MCHC 31 3 (L) 31 4 - 37 4 g/dL    RDW 13 2 11 6 - 15 1 %    MPV 10 1 8 9 - 12 7 fL    Platelets 139 (H) 740 - 390 Thousands/uL    nRBC 0 /100 WBCs    Neutrophils Relative 60 43 - 75 %    Immat GRANS % 0 0 - 2 %    Lymphocytes Relative 25 14 - 44 %    Monocytes Relative 10 4 - 12 %    Eosinophils Relative 5 0 - 6 %    Basophils Relative 0 0 - 1 %    Neutrophils Absolute 4 05 1 85 - 7 62 Thousands/µL    Immature Grans Absolute 0 03 0 00 - 0 20 Thousand/uL    Lymphocytes Absolute 1 69 0 60 - 4 47 Thousands/µL    Monocytes Absolute 0 66 0 17 - 1 22 Thousand/µL    Eosinophils Absolute 0 32 0 00 - 0 61 Thousand/µL    Basophils Absolute 0 03 0 00 - 0 10 Thousands/µL   Basic metabolic panel   Result Value Ref Range    Sodium 141 136 - 145 mmol/L    Potassium 4 4 3 5 - 5 3 mmol/L    Chloride 110 (H) 100 - 108 mmol/L    CO2 27 21 - 32 mmol/L    ANION GAP 4 4 - 13 mmol/L    BUN 12 5 - 25 mg/dL    Creatinine 0 73 0 60 - 1 30 mg/dL    Glucose 85 65 - 140 mg/dL    Calcium 9 3 8 3 - 10 1 mg/dL    eGFR 85 ml/min/1 73sq m   Tissue Exam   Result Value Ref Range    Case Report       Surgical Pathology Report                         Case: [id number]                                   Authorizing Provider:  Natalia Frey MD       Collected: 01/28/2021 1324              Ordering Location:     [address] [place] [address] Road      Received:            01/28/2021 [facility]                                                      Pathologist:           Shayy Ackerman MD                                                         Specimens:   A) - Lymph Node, level 10 posterior                                                                 B) - Lymph Node, level 10 superior                                                                  C) - Lymph Node, level 7                                                                            D) - Lymph Node, AZYGOS NODE                                                                        E) - Lung, RIGHT UPPER LOBE                                                                          F) - Lymph Node, LEVEL 4R                                                                           G) - Lymph Node, LEVEL 2R                                                                           H) - Lung, RIGHT LOWER LOBE BLEB                                                           Final Diagnosis       A  Lymph node, level 10 posterior:     - Negative for malignancy (0/1)  B   Lymph node, level 10 superior:     - Negative for malignancy (0/1)  C   Lymph node, level 7:     - Negative for malignancy (0/1)  D   Lymph node, Azygos :     - Negative for malignancy (0/1)  E   Lung, right upper lobe, lobectomy:     - Invasive poorly differentiated keratinzing squamous cell carcinoma  - Lymphatic channel invasion by tumor present  - Metastatic carcinoma present in one of eight peribronchial lymph nodes (1/8)  - All margins are negative for tumor      - Emphysematous changes  - Chronic respiratory bronchiolitis  - Fibrotic pulmonary nodule with dystrophic calcification and ossification  F   Lymph node, level 4R:     - Negative for malignancy (0/1)      G  Lymph node, level 2R:     - Negative for malignancy (0/1)  H   Lung, right lower lobe bleb:     - Pulmonary bleb with associated inflammatory changes  - Negative for malignancy  Additional Information       All reported additional testing was performed with appropriately reactive controls  These tests were developed and their performance characteristics determined by [address] [place] [address] [facility]ty Laboratory or appropriate performing facility, though some tests may be performed on tissues which have not been validated for performance characteristics (such as staining performed on alcohol exposed cell blocks and decalcified tissues)  Results should be interpreted with caution and in the context of the patients clinical condition  These tests may not be cleared or approved by the [place]  Food and Drug Administration, though the FDA has determined that such clearance or approval is not necessary  These tests are used for clinical purposes and they should not be regarded as investigational or for research  This laboratory has been approved by [place] 88, designated as a high-complexity laboratory and is qualified to perform these tests         Synoptic Checklist       LUNG  (LUNG: RESECTION - All Specimens)      8th Edition - Protocol posted: 2/26/2020      SPECIMEN      Procedure:    Lobectomy       Specimen Laterality:    Right       TUMOR      Tumor Site:    Upper lobe of lung       Histologic Type:     Invasive squamous cell carcinoma, keratinizing       Histologic Grade:    G3: Poorly differentiated       Spread Through Air Spaces (DAYA):    Not identified       Tumor Size:            Total Tumor Size (size of entire tumor):    Greatest Dimension (Centimeters): 3 1 cm          Additional Dimension (Centimeters):    3 1 cm          Additional Dimension (Centimeters):    3 cm      Tumor Focality:    Single focus       Visceral Pleura Invasion:    Not identified       Direct Invasion of Adjacent Structures: Adjacent structures present but not involved       Treatment Effect:    No known presurgical therapy       Lymphovascular Invasion:    Present         :    Lymphatic       MARGINS      Margins: All margins are uninvolved by tumor         Margins Examined:    Bronchial         Margins Examined:    Vascular         Margins Examined:    Parenchymal         Distance of Invasive Carcinoma from Closest Margin (Centimeters):    0 8 cm          Closest Margin:    Bronchial           Closest Margin:    Vascular           Closest Margin:    Parenchymal       LYMPH NODES      Number of Lymph Nodes Involved:    1         Grisel Stations Involved:    Right: Peribronchial       Extranodal Extension:    Not identified       Number of Lymph Nodes Examined:    14         Grisel Stations Examined:    2R: Upper paratracheal         Grisel Stations Examined:    4R: Lower paratracheal         Grisel Stations Examined:    10R: Hilar         Grisel Stations Examined:    Right: Azygos         Grisel Stations Examined:    7: Subcarinal       PATHOLOGIC STAGE CLASSIFICATION (pTNM, AJCC 8th Edition)      Primary Tumor (pT):    pT2a       Regional Lymph Nodes (pN):    pN1       ADDITIONAL FINDINGS      Additional Findings:    Emphysema       Comment(s)    Comment(s):    AJCC Prognostic Stage Group (8th Ed ):  Stage IIB - pT2a, pN1, MX, G3       Gross Description          A  The specimen is received in formalin, labeled with the patient's name and hospital number, and is designated "level 10 posterior  The specimen consists of a gray-brown nodular portion of soft tissue measuring 0 5 x 0 5 x 0 2 cm  Entirely submitted in between sponges in 1 cassette  B  The specimen is received in formalin, labeled with the patient's name and hospital number, and is designated "level 10 superior  The specimen consists of a gray-brown nodular portion of soft tissue measuring 1 2 x 0 8 x 0 5 cm    Bisected lengthwise and entirely submitted in between sponges in 1 cassette  C  The specimen is received in formalin, labeled with the patient's name and hospital number, and is designated "level 7  The specimen consists of 6 gray-brown nodular portions of soft tissue ranging in size from 0 5-1 6 cm in greatest dimension  The specimen is entirely submitted as follows:    1-2:  Each cassette contains 3 nodular portions of soft tissue, submitted in between sponges         D  The specimen is received in formalin, labeled with the patient's name and hospital number, and is designated "Azygos node  The specimen consists of 2 gray-brown nodular portions of soft tissue measuring 0 7 x 0 6 x 0 5 cm and 0 6 x 0 6 x 0 5 cm  Entirely submitted in between sponges in 1 cassette  E  The specimen is received in formalin, labeled with the patient's name and hospital number, and is designated "right upper lobe  The specimen consists of a 189 g (post fixation) upper lobe of the right lung (14 2 x 8 6 x 5 8 cm) and bronchial remnant (0 9 cm in length by 2 0 cm in diameter)  A hilar staple line is identified measuring 14 2 cm in length  There is a 3 1 x 3 1 x 3 0 cm tan pink, lobulated, and ill-defined mass located in the central aspect of the right upper lobe  The mass is 0 2 cm away from the pleura which has a tan purple, smooth and glistening surface and is 0 8 cm away from the uninvolved bronchial resection margin and vascular margins  The mass grossly appears to involve the bronchi  Also, the mass is 0 8 cm away from the closest hilar staple line  The lung parenchyma surrounding the mass is red, soft, contains tan purulent material and contains focal firm areas measuring up to 1 0 cm in greatest dimension that abut the pleura  Together, the mass and purulent/firm areas measure 5 2 x 3 1 x 3 0 cm  Several (approximately 12) tan-white nodules are identified throughout the right upper lobe ranging in size from 0 1-0 2 cm in maximal dimension    Also, there is a 4 0 x 3 6 cm tan yellow fibrofatty area attached to the pleural surface of the lung (inked black)  The lung parenchyma is red-brown, spongy and contains emphysematous changes  8 hilar lymph nodes are found, the largest measuring 0 8 cm in maximal dimension  Representative sections are submitted as follows:    1:  Bronchial resection margin, en face  2:  Vascular margins, en face  3-4:  Mass and pleura  5:  Hilar staple line closest to mass  6-7:  Mass and adjacent lung  8: Mass involving bronchi, perpendicular sections, bronchial resection margin re-inked green  9-10:  Purulent lung parenchyma and fibrofatty tissue attached to lung, fibrofatty tissue inked black  11:  Purulent lung parenchyma  12:  4 nodules, entirely submitted   13-15:  Purulent/firm areas surrounding mass that are adjacent to pleural surface (inked blue)  16-17:  Emphysematous lung  18:  Lung  19-20:  Each cassette contains 4 nodules, entirely submitted, in between sponges  21:  3 possible hilar lymph nodes, entirely submitted, submitted in between sponges  22-23:  Each cassette contains 1 possible hilar lymph node, bisected, entirely submitted  24-26:  Each cassette contains 1 possible hilar lymph node, trisected, entirely submitted       F  The specimen is received in formalin, labeled with the patient's name and hospital number, and is designated "level 4R  The specimen consists of a gray-brown nodular portion of soft tissue measuring 0 8 x 0 5 x 0 5 cm  Entirely submitted in between sponges in 1 cassette  G  The specimen is received in formalin, labeled with the patient's name and hospital number, and is designated "level 2R  The specimen consists of a tan-gray nodular portion of soft tissue measuring 0 3 x 0 3 x 0 2 cm  Also, there is a dark red-brown portion of soft tissue measuring 0 5 x 0 1 x 0 1 cm  Entirely submitted in between sponges in 1 cassette  H   The specimen is received in formalin, labeled with the patient's name and hospital number, and is designated "right lower lobe bleb  The specimen consists of a 1 7 x 0 9 x 0 5 cm wedge resection of tan lung parenchyma that is mostly occupied by multiple gray metallic staples  The staple line is inked black  There is a 1 1 cm bleb that is 1 mm away from the stapled margin  The staple line is removed along its length  The specimen is serially sectioned perpendicular to the staple line and entirely submitted, except for the staple line, in 1 cassette  Note: The estimated total formalin fixation time based upon information provided by the submitting clinician and the standard processing schedule 22 25 hours  RRavotti             Imaging Studies: I have personally reviewed pertinent reports  November  PET [facility]  Result Impression   IMPRESSION:  1  FDG avid stable right upper lobe lung mass, still suspicious for lung  malignancy despite negative biopsy  2  Smaller stable, mildly FDG avid nodules in the posterior right lung apex  could be inflammatory or metastatic  3  Intense uptake in or adjacent to the deep lobe of the right parotid gland  MRI recommended for further evaluation   MRI NECK SOFT TISSUE [address]  Result Impression   Impression:     Question parotid lesion is present on the right  This abuts the parotid gland  however the majority appears to be posterior to the sternocleidomastoid muscle  raising question of metastatic disease to a level 2 lymph node  Parotid  malignancy is not excluded  Right upper lobe lung nodule is poorly evaluated on this modality                  Workstation:[id number]         Pathology, and Other Studies: I have personally reviewed pertinent reports        Report  NON-GYNECOLOGICAL CYTOLOGYResulted: 1/7/2021 12:20 PM  [facility]  Component Name Value Ref Range   Non-Gynecological Cytology [facility] [address] [id number] [phone]    PATHOLOGY REPORT             MR#:             [id number]  Patient:         Irene Heredia  /Sex:         1955  F  Provider:        SYLVIA LUO  Location[place]        [facility]  [place] [place]  [place] Date:    2021  N21-53    CYTOLOGIC DIAGNOSIS:  A  Fine Needle Aspiration with Cytotech Assistance and Cell Block,  Right Parotid Mass  Adequacy: Adequate for evaluation  General Category: Negative for Diagnostic Malignancy  Descriptive Interpretation: Cytologic features consistent with  pleomorphic adenoma     Reviewed CBC, BMP, magnesium, PET scan, MRI scan and pathology report and discussed with patient and her  and explained  Assessment and Plan:      T2 N1 stage II B poorly differentiated squamous cell cancer right upper lung with 1 positive peribronchial lymph node and lymphatic channel  No distant metastatic disease on PET-CT scan except for positive activity at right parotid gland and that was biopsied and was negative for malignancy and will be followed by ENT specialist      I discussed adjuvant chemotherapy with patient and her   I had carboplatin or cisplatin plus Taxol in mind but because of remote past history of MS I wanted to minimize her risk of neurological deficit and decided to go with carboplatin plus Gemzar  After some discussion they also felt to avoid 2 neurotoxic agents  I discussed Noemi Obrien trial and those patients had adenocarcinoma and not squamous cell cancer but the approval is for non-small cell lung cancer stage I B to 3A  Her chance of having EGFR mutation is probably 1%  Specimen [facility]  She does not want to wait for the results of EGFR mutation for tagrisso  She wants to proceed with carboplatin and Gemzar and decide about tagrisso later if specimen tested positive for EGFR mutation at Exon 19 or 21   Discussed Gemzar and carboplatin in detail especially side effects individually and collectively     She understands carboplatin has neuro toxic  Side effects  Patient signed informed consent for carboplatin and Gemzar after receiving printed and verbal information  Also she signed for blood consent in case that would be needed  My nurse spent time with her too  Arrangements are being made to get her started on carboplatin plus Gemzar  All discussed in very much detail  Questions answered  Discussed the importance of self-breast examination, eating healthy foods, activities as tolerated and health screening tests  Patient has already stopped smoking cigarettes  She is capable of self-care  Goal will be cure if possible     Discussed precautions against coronavirus    1  Squamous cell lung cancer, right [facility])    - Ambulatory referral to Hematology / Oncology    2  Chronic obstructive pulmonary disease, unspecified COPD type ([facility] [address] )      3  Hypertension, essential      4  Multiple sclerosis (HCC)     Weekly blood counts and CMP  Informed consent for carboplatin and Gemzar and blood consent  Can start next week  Follow-up in 4 week    Patient voiced understanding and agreement in the discussion  Counseling / Coordination of Care    Ankush Holcomb   Provided counseling and support

## 2021-03-02 NOTE — PATIENT INSTRUCTIONS
Weekly blood counts and CMP  Informed consent for carboplatin and Gemzar and blood consent  Can start next week    Follow-up in 4 week

## 2021-03-02 NOTE — TELEPHONE ENCOUNTER
Returned telephone call spoke with pt  Updated that port placement has been entered and that IR will be calling to schedule

## 2021-03-02 NOTE — TELEPHONE ENCOUNTER
Patient is calling in requesting for a port placement order to be placed in her chart since she has thought about the port placement and wants one   She can be reached back at [phone]

## 2021-03-02 NOTE — PROGRESS NOTES
Reviewed handouts with pt and , Carboplatin and Gemzar and gave copies of handouts  Instructed possible side effects and importance of reporting any symptoms  Instructed Zofran and will send rx to [facility] on [id number]  Chemo and blood consent signed

## 2021-03-02 NOTE — H&P (VIEW-ONLY)
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  With invasion of lymphatic channel and metastatic carcinoma in 1 of 8 peribronchial lymph nodes  Other lymph nodes at level 10 7 his eye Walter 4R and 2R were negative for malignancy  1 of 14 positive peribronchial lymph node  Patient is recovering from surgery  She has some discomfort at place of surgery  Has chronic exertional dyspnea  Very little nonproductive cough  Has tiredness  PET scan had also shown positive activity in right parotid gland that was biopsied and was negative for malignancy and she will be followed by ENT specialist   Remote past history of suspected MS in 1989  She had some tingling  CSF came back suspicious and MRI brain came back suspicious  Patient was treated for MS at that time  No active treatment for the last several years  No more tingling in her hands  Patient gave me all this information  ROS:  03/02/21 Reviewed 12 systems: See symptoms in HPI  Presently no other neurological, cardiac, pulmonary, GI and  symptoms other than listed in HPI  Other symptoms are in HPI  No  fever, chills, bleeding, bone pains, skin rash, weight loss, night sweats, arthritic symptoms,   weakness, numbness, claudication and gait problem  No frequent infections  Not unusually sensitive to heat or cold  No swelling of the ankles  No swollen glands  Patient is anxious  Historical Information   Past Medical History:   Diagnosis Date    Cancer ([facility] [address] )     lung    GERD (gastroesophageal reflux disease)     Hypertension     Pulmonary emphysema (HCC)     Restless leg syndrome      Past Surgical History:   Procedure Laterality Date    AUGMENTATION MAMMAPLASTY Bilateral 2014    retro-pectoral silicone    BREAST BIOPSY Left 2015    u/s core bx    BREAST BIOPSY Left 2010    high-risk lesion    BREAST EXCISIONAL BIOPSY Left 2010    high-risk lesion    BREAST EXCISIONAL BIOPSY Left     BREAST EXCISIONAL BIOPSY Right     [address] N/A 2021    Procedure: Herschell Needle;  Surgeon: Tulio Crowley MD;  [facility];  Service: Thoracic    AR THORACOSCOPY SURG LOBECTOMY Right 2021    Procedure: RIGHT VATS UPPER LOBECTOMY ; MEDIASTINAL LYMPH NODE DISSECTION; right lower lobe bleb resection  ;  Surgeon: Tulio Crowley MD;  [facility];  Service: Thoracic    TOTAL ABDOMINAL HYSTERECTOMY  2010    age 60    TOTAL ABDOMINAL HYSTERECTOMY W/ BILATERAL SALPINGOOPHORECTOMY Bilateral 2010    age 60     Social History   Social History     Substance and Sexual Activity   Alcohol Use Yes    Frequency: 4 or more times a week    Comment: wine @ hs      Social History     Substance and Sexual Activity   Drug Use Never     Social History     Tobacco Use   Smoking Status Former Smoker    Packs/day: 1 00    Years: 30 00    Pack years: 30 00    Types: Cigarettes    Start date:     Quit date:     Years since quittin 1   Smokeless Tobacco Never Used   Tobacco Comment    quit 2021   NICORETTE GUM SUBSTITUE CURRENTLY     Family History:   Family History   Problem Relation Age of Onset    No Known Problems Mother     COPD Father     Hypertension Father     No Known Problems Sister     No Known Problems Daughter     No Known Problems Maternal Grandmother     No Known Problems Maternal Grandfather     No Known Problems Paternal Grandmother     Rectal cancer Paternal Grandfather         unknown age   Rashida Joshua No Known Problems Sister     No Known Problems Maternal Aunt     Breast cancer Paternal Aunt         unknown age   Rashida Joshua No Known Problems Paternal Aunt     No Known Problems Paternal Aunt     No Known Problems Paternal Aunt     No Known Problems Paternal Aunt     No Known Problems Paternal Aunt     No Known Problems Paternal Aunt     COPD Brother          Current Outpatient Medications:     albuterol (PROVENTIL HFA,VENTOLIN HFA) 90 mcg/act inhaler, Inhale 2 puffs every 4 (four) hours as needed, Disp: , Rfl:     Biotin 5 MG CAPS, Take by mouth daily , Disp: , Rfl:     buPROPion (WELLBUTRIN XL) 300 mg 24 hr tablet, Take 300 mg by mouth every morning , Disp: , Rfl:     Cholecalciferol (Vitamin D3) 50 MCG (2000 UT) capsule, Take 5,000 Units by mouth daily , Disp: , Rfl:     famotidine (PEPCID) 40 MG tablet, Take 40 mg by mouth every morning , Disp: , Rfl:     ibuprofen (MOTRIN) 600 mg tablet, Take 1 tablet (600 mg total) by mouth every 6 (six) hours for 7 days, Disp: 28 tablet, Rfl: 0    pramipexole (MIRAPEX) 0 5 mg tablet, Take 0 5 mg by mouth daily at bedtime , Disp: , Rfl:     simvastatin (ZOCOR) 40 mg tablet, Take 40 mg by mouth every other day, Disp: , Rfl:     traZODone (DESYREL) 150 mg tablet, Take 150 mg by mouth daily at bedtime, Disp: , Rfl:     triamterene-hydrochlorothiazide (DYAZIDE) 37 5-25 mg per capsule, Take 1 capsule by mouth every morning, Disp: , Rfl:     umeclidinium-vilanterol (Anoro Ellipta) 62 5-25 MCG/INH inhaler, Inhale 1 puff daily , Disp: , Rfl:     docusate sodium (COLACE) 100 mg capsule, Take 1 capsule (100 mg total) by mouth 2 (two) times a day, Disp: 20 capsule, Rfl: 0    gabapentin (NEURONTIN) 300 mg capsule, Take 1 capsule (300 mg total) by mouth 3 (three) times a day for 21 days (Patient not taking: Reported on 3/2/2021), Disp: 63 capsule, Rfl: 0    Allergies   Allergen Reactions    Sulfa Antibiotics Hives     @ ROS@  Physical Exam:  Vitals:    03/02/21 1006   BP: 160/78   BP Location: Left arm   Patient Position: Sitting   Cuff Size: Adult   Pulse: 63   Resp: 18   Temp: 98 2 °F (36 8 °C)   SpO2: 98%   Weight: 60 8 kg (134 lb)   Height: 4' 9" (1 448 m)     Alert, oriented, not in distress, no icterus, no oral thrush, no palpable neck mass, clear lung fields, regular heart rate, abdomen  soft and non tender, no palpable abdominal mass, no ascites, no edema of ankles, no calf tenderness, no focal neurological deficit, no skin rash, no palpable lymphadenopathy in the neck and axillary areas,  no clubbing  Patient is anxious  Performance status 1  Lab Results: I have reviewed all pertinent labs    LABS:  Results for orders placed or performed during the hospital encounter of 01/28/21   CBC   Result Value Ref Range    WBC 11 31 (H) 4 31 - 10 16 Thousand/uL    RBC 4 51 3 81 - 5 12 Million/uL    Hemoglobin 13 9 11 5 - 15 4 g/dL    Hematocrit 43 4 34 8 - 46 1 %    MCV 96 82 - 98 fL    MCH 30 8 26 8 - 34 3 pg    MCHC 32 0 31 4 - 37 4 g/dL    RDW 12 8 11 6 - 15 1 %    Platelets 449 023 - 806 Thousands/uL    MPV 10 3 8 9 - 12 7 fL   Basic metabolic panel   Result Value Ref Range    Sodium 140 136 - 145 mmol/L    Potassium 3 8 3 5 - 5 3 mmol/L    Chloride 107 100 - 108 mmol/L    CO2 31 21 - 32 mmol/L    ANION GAP 2 (L) 4 - 13 mmol/L    BUN 11 5 - 25 mg/dL    Creatinine 0 79 0 60 - 1 30 mg/dL    Glucose 98 65 - 140 mg/dL    Calcium 8 9 8 3 - 10 1 mg/dL    eGFR 78 ml/min/1 73sq m   Magnesium   Result Value Ref Range    Magnesium 1 9 1 6 - 2 6 mg/dL   Basic metabolic panel   Result Value Ref Range    Sodium 138 136 - 145 mmol/L    Potassium 3 9 3 5 - 5 3 mmol/L    Chloride 105 100 - 108 mmol/L    CO2 28 21 - 32 mmol/L    ANION GAP 5 4 - 13 mmol/L    BUN 11 5 - 25 mg/dL    Creatinine 0 71 0 60 - 1 30 mg/dL    Glucose 125 65 - 140 mg/dL    Calcium 8 6 8 3 - 10 1 mg/dL    eGFR 88 ml/min/1 73sq m   CBC   Result Value Ref Range    WBC 13 79 (H) 4 31 - 10 16 Thousand/uL    RBC 3 98 3 81 - 5 12 Million/uL    Hemoglobin 12 4 11 5 - 15 4 g/dL    Hematocrit 37 8 34 8 - 46 1 %    MCV 95 82 - 98 fL    MCH 31 2 26 8 - 34 3 pg    MCHC 32 8 31 4 - 37 4 g/dL    RDW 12 4 11 6 - 15 1 %    Platelets 316 521 - 416 Thousands/uL    MPV 10 4 8 9 - 12 7 fL   Magnesium   Result Value Ref Range    Magnesium 2 0 1 6 - 2 6 mg/dL   CBC and differential   Result Value Ref Range    WBC 6 78 4 31 - 10 16 Thousand/uL    RBC 3 89 3 81 - 5 12 Million/uL    Hemoglobin 12 0 11 5 - 15 4 g/dL    Hematocrit 38 4 34 8 - 46 1 %    MCV 99 (H) 82 - 98 fL    MCH 30 8 26 8 - 34 3 pg    MCHC 31 3 (L) 31 4 - 37 4 g/dL    RDW 13 2 11 6 - 15 1 %    MPV 10 1 8 9 - 12 7 fL    Platelets 243 (H) 771 - 390 Thousands/uL    nRBC 0 /100 WBCs    Neutrophils Relative 60 43 - 75 %    Immat GRANS % 0 0 - 2 %    Lymphocytes Relative 25 14 - 44 %    Monocytes Relative 10 4 - 12 %    Eosinophils Relative 5 0 - 6 %    Basophils Relative 0 0 - 1 %    Neutrophils Absolute 4 05 1 85 - 7 62 Thousands/µL    Immature Grans Absolute 0 03 0 00 - 0 20 Thousand/uL    Lymphocytes Absolute 1 69 0 60 - 4 47 Thousands/µL    Monocytes Absolute 0 66 0 17 - 1 22 Thousand/µL    Eosinophils Absolute 0 32 0 00 - 0 61 Thousand/µL    Basophils Absolute 0 03 0 00 - 0 10 Thousands/µL   Basic metabolic panel   Result Value Ref Range    Sodium 141 136 - 145 mmol/L    Potassium 4 4 3 5 - 5 3 mmol/L    Chloride 110 (H) 100 - 108 mmol/L    CO2 27 21 - 32 mmol/L    ANION GAP 4 4 - 13 mmol/L    BUN 12 5 - 25 mg/dL    Creatinine 0 73 0 60 - 1 30 mg/dL    Glucose 85 65 - 140 mg/dL    Calcium 9 3 8 3 - 10 1 mg/dL    eGFR 85 ml/min/1 73sq m   Tissue Exam   Result Value Ref Range    Case Report       Surgical Pathology Report                         Case: [id number]                                   Authorizing Provider:  Latanya Murphy MD       Collected: 01/28/2021 1324              Ordering Location:     [address] [place] [address] Road      Received:            01[facility]28/2021 [facility]                                                      Pathologist:           Shonda Weiner MD                                                         Specimens:   A) - Lymph Node, level 10 posterior                                                                 B) - Lymph Node, level 10 superior                                                                  C) - Lymph Node, level 7                                                                            D) - Lymph Node, AZYGOS NODE                                                                        E) - Lung, RIGHT UPPER LOBE                                                                          F) - Lymph Node, LEVEL 4R                                                                           G) - Lymph Node, LEVEL 2R                                                                           H) - Lung, RIGHT LOWER LOBE BLEB                                                           Final Diagnosis       A  Lymph node, level 10 posterior:     - Negative for malignancy (0/1)  B   Lymph node, level 10 superior:     - Negative for malignancy (0/1)  C   Lymph node, level 7:     - Negative for malignancy (0/1)  D   Lymph node, Azygos :     - Negative for malignancy (0/1)  E   Lung, right upper lobe, lobectomy:     - Invasive poorly differentiated keratinzing squamous cell carcinoma  - Lymphatic channel invasion by tumor present  - Metastatic carcinoma present in one of eight peribronchial lymph nodes (1/8)  - All margins are negative for tumor      - Emphysematous changes  - Chronic respiratory bronchiolitis  - Fibrotic pulmonary nodule with dystrophic calcification and ossification  F   Lymph node, level 4R:     - Negative for malignancy (0/1)      G  Lymph node, level 2R:     - Negative for malignancy (0/1)  H   Lung, right lower lobe bleb:     - Pulmonary bleb with associated inflammatory changes  - Negative for malignancy  Additional Information       All reported additional testing was performed with appropriately reactive controls  These tests were developed and their performance characteristics determined by [facility] or appropriate performing facility, though some tests may be performed on tissues which have not been validated for performance characteristics (such as staining performed on alcohol exposed cell blocks and decalcified tissues)  Results should be interpreted with caution and in the context of the patients clinical condition  These tests may not be cleared or approved by the [place]  Food and Drug Administration, though the FDA has determined that such clearance or approval is not necessary  These tests are used for clinical purposes and they should not be regarded as investigational or for research  This laboratory has been approved by CLIA 88, designated as a high-complexity laboratory and is qualified to perform these tests         Synoptic Checklist       LUNG  (LUNG: RESECTION - All Specimens)      8th Edition - Protocol posted: 2/26/2020      SPECIMEN      Procedure:    Lobectomy       Specimen Laterality:    Right       TUMOR      Tumor Site:    Upper lobe of lung       Histologic Type:     Invasive squamous cell carcinoma, keratinizing       Histologic Grade:    G3: Poorly differentiated       Spread Through Air Spaces (DAYA):    Not identified       Tumor Size:            Total Tumor Size (size of entire tumor):    Greatest Dimension (Centimeters): 3 1 cm          Additional Dimension (Centimeters):    3 1 cm          Additional Dimension (Centimeters):    3 cm      Tumor Focality:    Single focus       Visceral Pleura Invasion:    Not identified       Direct Invasion of Adjacent Structures: Adjacent structures present but not involved       Treatment Effect:    No known presurgical therapy       Lymphovascular Invasion:    Present         :    Lymphatic       MARGINS      Margins: All margins are uninvolved by tumor         Margins Examined:    Bronchial         Margins Examined:    Vascular         Margins Examined:    Parenchymal         Distance of Invasive Carcinoma from Closest Margin (Centimeters):    0 8 cm          Closest Margin:    Bronchial           Closest Margin:    Vascular           Closest Margin:    Parenchymal       LYMPH NODES      Number of Lymph Nodes Involved:    1         Grisel Stations Involved:    Right: Peribronchial       Extranodal Extension:    Not identified       Number of Lymph Nodes Examined:    14         Grisel Stations Examined:    2R: Upper paratracheal         Grisel Stations Examined:    4R: Lower paratracheal         Grisel Stations Examined:    10R: Hilar         Grisel Stations Examined:    Right: Azygos         Grisel Stations Examined:    7: Subcarinal       PATHOLOGIC STAGE CLASSIFICATION (pTNM, AJCC 8th Edition)      Primary Tumor (pT):    pT2a       Regional Lymph Nodes (pN):    pN1       ADDITIONAL FINDINGS      Additional Findings:    Emphysema       Comment(s)    Comment(s):    AJCC Prognostic Stage Group (8th Ed ):  Stage IIB - pT2a, pN1, MX, G3       Gross Description          A  The specimen is received in formalin, labeled with the patient's name and hospital number, and is designated "level 10 posterior  The specimen consists of a gray-brown nodular portion of soft tissue measuring 0 5 x 0 5 x 0 2 cm  Entirely submitted in between sponges in 1 cassette  B  The specimen is received in formalin, labeled with the patient's name and hospital number, and is designated "level 10 superior  The specimen consists of a gray-brown nodular portion of soft tissue measuring 1 2 x 0 8 x 0 5 cm    Bisected lengthwise and entirely submitted in between sponges in 1 cassette  C  The specimen is received in formalin, labeled with the patient's name and hospital number, and is designated "level 7  The specimen consists of 6 gray-brown nodular portions of soft tissue ranging in size from 0 5-1 6 cm in greatest dimension  The specimen is entirely submitted as follows:    1-2:  Each cassette contains 3 nodular portions of soft tissue, submitted in between sponges         D  The specimen is received in formalin, labeled with the patient's name and hospital number, and is designated "Azygos node  The specimen consists of 2 gray-brown nodular portions of soft tissue measuring 0 7 x 0 6 x 0 5 cm and 0 6 x 0 6 x 0 5 cm  Entirely submitted in between sponges in 1 cassette  E  The specimen is received in formalin, labeled with the patient's name and hospital number, and is designated "right upper lobe  The specimen consists of a 189 g (post fixation) upper lobe of the right lung (14 2 x 8 6 x 5 8 cm) and bronchial remnant (0 9 cm in length by 2 0 cm in diameter)  A hilar staple line is identified measuring 14 2 cm in length  There is a 3 1 x 3 1 x 3 0 cm tan pink, lobulated, and ill-defined mass located in the central aspect of the right upper lobe  The mass is 0 2 cm away from the pleura which has a tan purple, smooth and glistening surface and is 0 8 cm away from the uninvolved bronchial resection margin and vascular margins  The mass grossly appears to involve the bronchi  Also, the mass is 0 8 cm away from the closest hilar staple line  The lung parenchyma surrounding the mass is red, soft, contains tan purulent material and contains focal firm areas measuring up to 1 0 cm in greatest dimension that abut the pleura  Together, the mass and purulent/firm areas measure 5 2 x 3 1 x 3 0 cm  Several (approximately 12) tan-white nodules are identified throughout the right upper lobe ranging in size from 0 1-0 2 cm in maximal dimension    Also, there is a 4 0 x 3 6 cm tan yellow fibrofatty area attached to the pleural surface of the lung (inked black)  The lung parenchyma is red-brown, spongy and contains emphysematous changes  8 hilar lymph nodes are found, the largest measuring 0 8 cm in maximal dimension  Representative sections are submitted as follows:    1:  Bronchial resection margin, en face  2:  Vascular margins, en face  3-4:  Mass and pleura  5:  Hilar staple line closest to mass  6-7:  Mass and adjacent lung  8: Mass involving bronchi, perpendicular sections, bronchial resection margin re-inked green  9-10:  Purulent lung parenchyma and fibrofatty tissue attached to lung, fibrofatty tissue inked black  11:  Purulent lung parenchyma  12:  4 nodules, entirely submitted   13-15:  Purulent/firm areas surrounding mass that are adjacent to pleural surface (inked blue)  16-17:  Emphysematous lung  18:  Lung  19-20:  Each cassette contains 4 nodules, entirely submitted, in between sponges  21:  3 possible hilar lymph nodes, entirely submitted, submitted in between sponges  22-23:  Each cassette contains 1 possible hilar lymph node, bisected, entirely submitted  24-26:  Each cassette contains 1 possible hilar lymph node, trisected, entirely submitted       F  The specimen is received in formalin, labeled with the patient's name and hospital number, and is designated "level 4R  The specimen consists of a gray-brown nodular portion of soft tissue measuring 0 8 x 0 5 x 0 5 cm  Entirely submitted in between sponges in 1 cassette  G  The specimen is received in formalin, labeled with the patient's name and hospital number, and is designated "level 2R  The specimen consists of a tan-gray nodular portion of soft tissue measuring 0 3 x 0 3 x 0 2 cm  Also, there is a dark red-brown portion of soft tissue measuring 0 5 x 0 1 x 0 1 cm  Entirely submitted in between sponges in 1 cassette  H   The specimen is received in formalin, labeled with the patient's name and hospital number, and is designated "right lower lobe bleb  The specimen consists of a 1 7 x 0 9 x 0 5 cm wedge resection of tan lung parenchyma that is mostly occupied by multiple gray metallic staples  The staple line is inked black  There is a 1 1 cm bleb that is 1 mm away from the stapled margin  The staple line is removed along its length  The specimen is serially sectioned perpendicular to the staple line and entirely submitted, except for the staple line, in 1 cassette  Note: The estimated total formalin fixation time based upon information provided by the submitting clinician and the standard processing schedule 22 25 hours  RRavotti             Imaging Studies: I have personally reviewed pertinent reports  November  PET [facility]  Result Impression   IMPRESSION:  1  FDG avid stable right upper lobe lung mass, still suspicious for lung  malignancy despite negative biopsy  2  Smaller stable, mildly FDG avid nodules in the posterior right lung apex  could be inflammatory or metastatic  3  Intense uptake in or adjacent to the deep lobe of the right parotid gland  MRI recommended for further evaluation   MRI NECK SOFT TISSUE [address]  Result Impression   Impression:     Question parotid lesion is present on the right  This abuts the parotid gland  however the majority appears to be posterior to the sternocleidomastoid muscle  raising question of metastatic disease to a level 2 lymph node  Parotid  malignancy is not excluded  Right upper lobe lung nodule is poorly evaluated on this modality                  Workstation:[id number]         Pathology, and Other Studies: I have personally reviewed pertinent reports        Report  NON-GYNECOLOGICAL CYTOLOGYResulted: 1/7/2021 12:20 PM  [facility]  Component Name Value Ref Range   Non-Gynecological Cytology [facility] [address] [place], [place] [address]  [phone]    PATHOLOGY REPORT             MR#:             [id number]  Patient:         Mike Casillas  /Sex:         1955  F  Provider:        SYLVIA LUO  Location:        [facility]  [place] [place]  [place] Date:    2021  N21-53    CYTOLOGIC DIAGNOSIS:  A  Fine Needle Aspiration with Cytotech Assistance and Cell Block,  Right Parotid Mass  Adequacy: Adequate for evaluation  General Category: Negative for Diagnostic Malignancy  Descriptive Interpretation: Cytologic features consistent with  pleomorphic adenoma     Reviewed CBC, BMP, magnesium, PET scan, MRI scan and pathology report and discussed with patient and her  and explained  Assessment and Plan:      T2 N1 stage II B poorly differentiated squamous cell cancer right upper lung with 1 positive peribronchial lymph node and lymphatic channel  No distant metastatic disease on PET-CT scan except for positive activity at right parotid gland and that was biopsied and was negative for malignancy and will be followed by ENT specialist      I discussed adjuvant chemotherapy with patient and her   I had carboplatin or cisplatin plus Taxol in mind but because of remote past history of MS I wanted to minimize her risk of neurological deficit and decided to go with carboplatin plus Gemzar  After some discussion they also felt to avoid 2 neurotoxic agents  I discussed Ayo Brown trial and those patients had adenocarcinoma and not squamous cell cancer but the approval is for non-small cell lung cancer stage I B to 3A  Her chance of having EGFR mutation is probably 1%  [facility]  She does not want to wait for the results of EGFR mutation for tagrisso  She wants to proceed with carboplatin and Gemzar and decide about tagrisso later if specimen tested positive for EGFR mutation at Exon 19 or 21   Discussed Gemzar and carboplatin in detail especially side effects individually and collectively     She understands carboplatin has neuro toxic  Side effects  Patient signed informed consent for carboplatin and Gemzar after receiving printed and verbal information  Also she signed for blood consent in case that would be needed  My nurse spent time with her too  Arrangements are being made to get her started on carboplatin plus Gemzar  All discussed in very much detail  Questions answered  Discussed the importance of self-breast examination, eating healthy foods, activities as tolerated and health screening tests  Patient has already stopped smoking cigarettes  She is capable of self-care  Goal will be cure if possible     Discussed precautions against coronavirus    1  Squamous cell lung cancer, right [facility])    - Ambulatory referral to Hematology / Oncology    2  Chronic obstructive pulmonary disease, unspecified COPD type ([facility] [address] )      3  Hypertension, essential      4  Multiple sclerosis (HCC)     Weekly blood counts and CMP  Informed consent for carboplatin and Gemzar and blood consent  Can start next week  Follow-up in 4 week    Patient voiced understanding and agreement in the discussion  Counseling / Coordination of Care    Suyapa Ivory   Provided counseling and support

## 2021-03-03 ENCOUNTER — ANESTHESIA EVENT (OUTPATIENT)
Dept: PERIOP | Facility: AMBULARY SURGERY CENTER | Age: 66
End: 2021-03-03
Payer: COMMERCIAL

## 2021-03-03 ENCOUNTER — PATIENT OUTREACH (OUTPATIENT)
Dept: CASE MANAGEMENT | Facility: HOSPITAL | Age: 66
End: 2021-03-03

## 2021-03-03 NOTE — PROGRESS NOTES
Oncology LSW received email from PT stating that she was interested in participating in a support group through the [facility] [address], but was unable to register for the group, as it stated that the Zoom link would be sent to current members  LSW returned PT's email explained that she would reach out to Desire Manning with the [address] to see if she would be able to assist with registering PT for the group  LSW emailed Desire Manning and explained PT's situation  LSW requested return correspondence at [facility]'s earliest convenience  LSW outreached PT via phone to let her know the process of becoming part of the cancer support group she had elected  LSW explained that there was a screening process and that, once that was completed, PT would be able to participate in the group next week  LSW stated that PT should call [phone] and request to speak to Desire Manning to complete the screening process  PT was agreeable with this plan  LSW also explained that there was a group scheduled for 3/16 that was specifically targeted at people who were newly diagnosed  LSW proceeded to walk PT through where to find this on the Cancer Support [facility]'s program calendar and where to register  PT explained that she was still overwhelmed with it all and had several medical questions regarding treatment moving forward  LSW asked PT if she had spoken with a nurse navigator, to which PT stated that she had not  LSW explained that she would reach out and request that the nurse navigator make contact with PT to address her questions prior to starting treatment  PT was agreeable with this plan and thanked LSW for her time

## 2021-03-04 ENCOUNTER — TELEPHONE (OUTPATIENT)
Dept: HEMATOLOGY ONCOLOGY | Facility: CLINIC | Age: 66
End: 2021-03-04

## 2021-03-04 NOTE — TELEPHONE ENCOUNTER
Patient is questioning what is the percentage rate for hair loss and weight gain on her treatment? Patient is questioning if she can dye her hair every 4 weeks while treatment? Patient advised if she is having dental work, procedures, etc that she must notify Dr Amauri Polanco while actively on treatment to make sure it is ok to have done based on her blood counts etc   Patient verbalized understanding of above  Will forward to Dr Breanne Guidry RN  OK to retask to oncology nurse triage

## 2021-03-04 NOTE — TELEPHONE ENCOUNTER
Per Dr Michelle Kathleen, a percentage rate for hair loss or weight can can't be provided as everyone reacts differently to treatment  Per Dr Michelle Kathleen, there is no restrictions regarding hair dye and that is up to the patient's discretion

## 2021-03-05 NOTE — PRE-PROCEDURE INSTRUCTIONS
Pre-Surgery Instructions:   Medication Instructions    Biotin 5 MG CAPS Patient was instructed by Physician and understands   buPROPion (WELLBUTRIN XL) 300 mg 24 hr tablet Instructed patient per Anesthesia Guidelines   Cholecalciferol (Vitamin D3) 50 MCG (2000 UT) capsule Patient was instructed by Physician and understands   famotidine (PEPCID) 40 MG tablet Instructed patient per Anesthesia Guidelines   ibuprofen (MOTRIN) 600 mg tablet Patient was instructed by Physician and understands   pramipexole (MIRAPEX) 0 5 mg tablet Instructed patient per Anesthesia Guidelines   simvastatin (ZOCOR) 40 mg tablet Instructed patient per Anesthesia Guidelines   traZODone (DESYREL) 150 mg tablet Instructed patient per Anesthesia Guidelines   triamterene-hydrochlorothiazide (DYAZIDE) 37 5-25 mg per capsule Instructed patient per Anesthesia Guidelines   umeclidinium-vilanterol (Anoro Ellipta) 62 5-25 MCG/INH inhaler Instructed patient per Anesthesia Guidelines  Pre op and bathing instructions reviewed  Pt has hibiclens  Pt  Verbalized understanding of current visitor restrictions  Pt  Verbalized an understanding of all instructions reviewed and offers no concerns at this time  Instructed to avoid all ASA/NSAIDs and OTC Vit/Supp from now until after surgery   Tylenol ok prn  Instructed to take per normal schedule except DOS   DOS Instructed to take Wellbutrin,Pepcid,Zocor with a few sips of H2O and to use Anoro Ellipta inhaler

## 2021-03-06 RX ORDER — SODIUM CHLORIDE 9 MG/ML
20 INJECTION, SOLUTION INTRAVENOUS ONCE
Status: CANCELLED | OUTPATIENT
Start: 2021-03-17

## 2021-03-06 RX ORDER — SODIUM CHLORIDE 9 MG/ML
20 INJECTION, SOLUTION INTRAVENOUS ONCE
Status: CANCELLED | OUTPATIENT
Start: 2021-03-10

## 2021-03-09 ENCOUNTER — ANESTHESIA (OUTPATIENT)
Dept: PERIOP | Facility: AMBULARY SURGERY CENTER | Age: 66
End: 2021-03-09
Payer: COMMERCIAL

## 2021-03-09 ENCOUNTER — TELEPHONE (OUTPATIENT)
Dept: HEMATOLOGY ONCOLOGY | Facility: CLINIC | Age: 66
End: 2021-03-09

## 2021-03-09 ENCOUNTER — HOSPITAL ENCOUNTER (OUTPATIENT)
Facility: AMBULARY SURGERY CENTER | Age: 66
Setting detail: OUTPATIENT SURGERY
Discharge: HOME | End: 2021-03-09
Attending: RADIOLOGY | Admitting: RADIOLOGY
Payer: COMMERCIAL

## 2021-03-09 ENCOUNTER — LAB (OUTPATIENT)
Dept: LAB | Facility: AMBULARY SURGERY CENTER | Age: 66
End: 2021-03-09
Payer: COMMERCIAL

## 2021-03-09 ENCOUNTER — APPOINTMENT (OUTPATIENT)
Dept: RADIOLOGY | Facility: AMBULARY SURGERY CENTER | Age: 66
End: 2021-03-09
Payer: COMMERCIAL

## 2021-03-09 VITALS
TEMPERATURE: 97.3 F | OXYGEN SATURATION: 96 % | SYSTOLIC BLOOD PRESSURE: 131 MMHG | RESPIRATION RATE: 18 BRPM | DIASTOLIC BLOOD PRESSURE: 60 MMHG | HEART RATE: 67 BPM | WEIGHT: 131 LBS | HEIGHT: 57 IN | BODY MASS INDEX: 28.26 KG/M2

## 2021-03-09 LAB
ALBUMIN SERPL BCP-MCNC: 3.7 G/DL (ref 3.5–5)
ALP SERPL-CCNC: 74 U/L (ref 46–116)
ALT SERPL W P-5'-P-CCNC: 31 U/L (ref 12–78)
ANION GAP SERPL CALCULATED.3IONS-SCNC: 3 MMOL/L (ref 4–13)
AST SERPL W P-5'-P-CCNC: 16 U/L (ref 5–45)
BASOPHILS # BLD AUTO: 0.08 THOUSANDS/ΜL (ref 0–0.1)
BASOPHILS NFR BLD AUTO: 2 % (ref 0–1)
BILIRUB SERPL-MCNC: 0.29 MG/DL (ref 0.2–1)
BUN SERPL-MCNC: 15 MG/DL (ref 5–25)
CALCIUM SERPL-MCNC: 9.2 MG/DL (ref 8.3–10.1)
CHLORIDE SERPL-SCNC: 108 MMOL/L (ref 100–108)
CO2 SERPL-SCNC: 31 MMOL/L (ref 21–32)
CREAT SERPL-MCNC: 0.72 MG/DL (ref 0.6–1.3)
EOSINOPHIL # BLD AUTO: 0.29 THOUSAND/ΜL (ref 0–0.61)
EOSINOPHIL NFR BLD AUTO: 6 % (ref 0–6)
ERYTHROCYTE [DISTWIDTH] IN BLOOD BY AUTOMATED COUNT: 12.9 % (ref 11.6–15.1)
GFR SERPL CREATININE-BSD FRML MDRD: 86 ML/MIN/1.73SQ M
GLUCOSE P FAST SERPL-MCNC: 97 MG/DL (ref 65–99)
HCT VFR BLD AUTO: 43 % (ref 34.8–46.1)
HGB BLD-MCNC: 13.6 G/DL (ref 11.5–15.4)
IMM GRANULOCYTES # BLD AUTO: 0.01 THOUSAND/UL (ref 0–0.2)
IMM GRANULOCYTES NFR BLD AUTO: 0 % (ref 0–2)
LYMPHOCYTES # BLD AUTO: 1.67 THOUSANDS/ΜL (ref 0.6–4.47)
LYMPHOCYTES NFR BLD AUTO: 32 % (ref 14–44)
MCH RBC QN AUTO: 30.9 PG (ref 26.8–34.3)
MCHC RBC AUTO-ENTMCNC: 31.6 G/DL (ref 31.4–37.4)
MCV RBC AUTO: 98 FL (ref 82–98)
MONOCYTES # BLD AUTO: 0.53 THOUSAND/ΜL (ref 0.17–1.22)
MONOCYTES NFR BLD AUTO: 10 % (ref 4–12)
NEUTROPHILS # BLD AUTO: 2.62 THOUSANDS/ΜL (ref 1.85–7.62)
NEUTS SEG NFR BLD AUTO: 50 % (ref 43–75)
NRBC BLD AUTO-RTO: 0 /100 WBCS
PLATELET # BLD AUTO: 396 THOUSANDS/UL (ref 149–390)
PMV BLD AUTO: 9.5 FL (ref 8.9–12.7)
POTASSIUM SERPL-SCNC: 4.5 MMOL/L (ref 3.5–5.3)
PROT SERPL-MCNC: 7.3 G/DL (ref 6.4–8.2)
RBC # BLD AUTO: 4.4 MILLION/UL (ref 3.81–5.12)
SODIUM SERPL-SCNC: 142 MMOL/L (ref 136–145)
WBC # BLD AUTO: 5.2 THOUSAND/UL (ref 4.31–10.16)

## 2021-03-09 PROCEDURE — 77001 FLUOROGUIDE FOR VEIN DEVICE: CPT | Performed by: RADIOLOGY

## 2021-03-09 PROCEDURE — 36561 INSERT TUNNELED CV CATH: CPT | Performed by: RADIOLOGY

## 2021-03-09 PROCEDURE — 80053 COMPREHEN METABOLIC PANEL: CPT | Performed by: INTERNAL MEDICINE

## 2021-03-09 PROCEDURE — 77001 FLUOROGUIDE FOR VEIN DEVICE: CPT

## 2021-03-09 PROCEDURE — 85025 COMPLETE CBC W/AUTO DIFF WBC: CPT | Performed by: INTERNAL MEDICINE

## 2021-03-09 PROCEDURE — 76937 US GUIDE VASCULAR ACCESS: CPT | Performed by: RADIOLOGY

## 2021-03-09 PROCEDURE — 36415 COLL VENOUS BLD VENIPUNCTURE: CPT | Performed by: INTERNAL MEDICINE

## 2021-03-09 PROCEDURE — C1788 PORT, INDWELLING, IMP: HCPCS | Performed by: RADIOLOGY

## 2021-03-09 DEVICE — PORT DIGINTY 8FR MICRO-STICK KIT HEMODIAL MID SIZE
Type: IMPLANTABLE DEVICE | Site: CHEST | Status: NON-FUNCTIONAL
Removed: 2022-03-07

## 2021-03-09 RX ORDER — SODIUM CHLORIDE, SODIUM LACTATE, POTASSIUM CHLORIDE, CALCIUM CHLORIDE 600; 310; 30; 20 MG/100ML; MG/100ML; MG/100ML; MG/100ML
125 INJECTION, SOLUTION INTRAVENOUS CONTINUOUS
Status: DISCONTINUED | OUTPATIENT
Start: 2021-03-09 | End: 2021-03-09 | Stop reason: HOSPADM

## 2021-03-09 RX ORDER — FENTANYL CITRATE/PF 50 MCG/ML
25 SYRINGE (ML) INJECTION
Status: CANCELLED | OUTPATIENT
Start: 2021-03-09

## 2021-03-09 RX ORDER — ONDANSETRON 2 MG/ML
4 INJECTION INTRAMUSCULAR; INTRAVENOUS ONCE AS NEEDED
Status: CANCELLED | OUTPATIENT
Start: 2021-03-09

## 2021-03-09 RX ORDER — FENTANYL CITRATE 50 UG/ML
INJECTION, SOLUTION INTRAMUSCULAR; INTRAVENOUS AS NEEDED
Status: DISCONTINUED | OUTPATIENT
Start: 2021-03-09 | End: 2021-03-09

## 2021-03-09 RX ORDER — CEFAZOLIN SODIUM 1 G/50ML
1000 SOLUTION INTRAVENOUS ONCE
Status: COMPLETED | OUTPATIENT
Start: 2021-03-09 | End: 2021-03-09

## 2021-03-09 RX ORDER — LIDOCAINE HYDROCHLORIDE AND EPINEPHRINE 10; 10 MG/ML; UG/ML
INJECTION, SOLUTION INFILTRATION; PERINEURAL AS NEEDED
Status: DISCONTINUED | OUTPATIENT
Start: 2021-03-09 | End: 2021-03-09 | Stop reason: HOSPADM

## 2021-03-09 RX ORDER — MIDAZOLAM HYDROCHLORIDE 2 MG/2ML
INJECTION, SOLUTION INTRAMUSCULAR; INTRAVENOUS AS NEEDED
Status: DISCONTINUED | OUTPATIENT
Start: 2021-03-09 | End: 2021-03-09

## 2021-03-09 RX ADMIN — FENTANYL CITRATE 25 MCG: 50 INJECTION, SOLUTION INTRAMUSCULAR; INTRAVENOUS at 08:25

## 2021-03-09 RX ADMIN — MIDAZOLAM HYDROCHLORIDE 1 MG: 1 INJECTION, SOLUTION INTRAMUSCULAR; INTRAVENOUS at 08:25

## 2021-03-09 RX ADMIN — FENTANYL CITRATE 25 MCG: 50 INJECTION, SOLUTION INTRAMUSCULAR; INTRAVENOUS at 08:32

## 2021-03-09 RX ADMIN — MIDAZOLAM HYDROCHLORIDE 1 MG: 1 INJECTION, SOLUTION INTRAMUSCULAR; INTRAVENOUS at 08:34

## 2021-03-09 RX ADMIN — SODIUM CHLORIDE, SODIUM LACTATE, POTASSIUM CHLORIDE, AND CALCIUM CHLORIDE: .6; .31; .03; .02 INJECTION, SOLUTION INTRAVENOUS at 08:14

## 2021-03-09 RX ADMIN — CEFAZOLIN SODIUM 1000 MG: 1 SOLUTION INTRAVENOUS at 08:14

## 2021-03-09 NOTE — TELEPHONE ENCOUNTER
Patient calling to confirm schedule for lab work prior to treatment  Reviewed labs are to be drawn once weekly - standing order is in the chart  Patient states she was told to take her Zofran at a specific time with her treatment  She can not remember how she should take this? I reviewed this is typically an as needed medication    Will send to RN to confirm if there are different instructions for the patient    Please let me know and I can return the call

## 2021-03-09 NOTE — DISCHARGE INSTRUCTIONS
Implanted Venous Access Port     WHAT YOU NEED TO KNOW:   An implanted venous access port is a device used to give treatments and take blood  It may also be called a central venous access device (CVAD)  The port is a small container that is placed under your skin, usually in your upper chest  The port is attached to a catheter that enters a large vein  DISCHARGE INSTRUCTIONS:   Resume your normal diet  Small sips of flat soda will help with mild nausea  Prevent an infection:   · Wash your hands often  Use soap and water  Clean your hands before and after you care for your port  Remind everyone who cares for your port to wash their hands  · Check your skin for infection every day  Look for redness, swelling, or fluid oozing from the port site  Care for your port:   1  You may shower beginning 48 hours after procedure  2  Change dressing if it becomes wet  3  Remove dressing after 24 hours  Leave glue in place  4  It is normal for some bruising to occur  5  Use Tylenol for pain  6  Limit use of arm on the side that your port was placed  Lift nothing heavier than 5 pounds for 1 week, and then gradually increase activity as tolerated  7  DO NOT apply ointment, lotion or cream to port site until incision is healed  Allow glue to fall off  DO NOT attempt to peel glue from skin even it it begins to flake  8, After the port incision is healed you may swim, bathe  Notify the Interventional Radiologist if you have any of the followin  Fever above 101 F    2  Increased redness or swelling after 1st day  3  Increased pain after 1st day  4  Any sign of infection (drainage from port site, skin separation, hot to touch)  5  Persistent nausea or vomiting  Contact Interventional Radiology at [phone] [facility] PATIENTS: Contact Interventional Radiology at [phone]) ([address]: Contact Interventional Radiology at [phone])

## 2021-03-09 NOTE — OP NOTE
Chest port placement 3/9/21    History:      Lung carcinoma     Procedure:     The patient was identified verbally and by wristband  Timeout was performed  Informed consent was obtained       All elements of maximal sterile barrier technique were followed (cap, mask, sterile gown, sterile gloves, large sterile sheet, hand hygiene and 2% chlorhexidine for cutaneous antisepsis)      Following obtaining informed consent, the patient was prepped and draped in the usual sterile fashion  Using ultrasound guidance, access was gained to the patient's right  internal jugular vein using a micropuncture system  The micropuncture wire was removed and a  035 wire was advanced to the level of the inferior vena cava using fluoroscopic guidance      Using 1% lidocaine, the region of the right anterior chest was was anesthetized  A small incision was made using a 15 blade scalpel  The port pocket was created using blunt dissection      The catheter tubing was tunneled from the incision to the venotomy  The micropuncture dilator was exchanged for a peel-away sheath, using fluoroscopic guidance  The catheter was place through the peel-away sheath  The catheter was measured and cut to size  The catheter was attached to the port  The port was flushed with saline to ensure patency without evidence of leakage  The port was placed in the port pocket  The port was sutured in the pocket using 3-0 Vicryl      The incisions were approximated with 3-0 Vicryl and tissue adhesive  The patient tolerated the procedure well without apparent immediate complications  The patient left the IR department in unchanged condition      Dr  [address] performed and directly supervised the entire procedure      Findings:      Using ultrasound guidance, the right internal jugular vein was cannulated using Seldinger technique  The right internal jugular vein was evaluated as a potential access site  The right internal jugular vein was patent, and free of thrombus  Static images of the vessel was obtained  Visualization of real time needle entry into the vessel was obtained      Fluoroscopic spot image demonstrates a newly placed single lumen chest port via the right internal jugular vein with the most central aspect at the SVC/RA junction   The catheter tubing is smooth in contour         IMPRESSION:     Successful placement of a single lumen chest port via the right internal jugular vein

## 2021-03-09 NOTE — ANESTHESIA POSTPROCEDURE EVALUATION
Post-Op Assessment Note    CV Status:  Stable    Pain management: adequate     Mental Status:  Alert and awake   Hydration Status:  Euvolemic   PONV Controlled:  Controlled   Airway Patency:  Patent      Post Op Vitals Reviewed: Yes      Staff: CRNA         No complications documented      BP      Temp (!) 97 3 °F (36 3 °C) (03/09/21 0900)    Pulse 66 (03/09/21 0900)   Resp 18 (03/09/21 0900)    SpO2 100 % (03/09/21 0900)

## 2021-03-09 NOTE — ANESTHESIA PREPROCEDURE EVALUATION
Procedure:  INSERTION VENOUS PORT ( PORT-A-CATH) IR (N/A Chest)    Relevant Problems   CARDIO   (+) Hyperlipidemia   (+) Hypertension, essential      GI/HEPATIC   (+) GERD (gastroesophageal reflux disease)      NEURO/PSYCH   (+) Depression   (+) History of colonic polyps      PULMONARY   (+) Chronic obstructive pulmonary disease (HCC)            Cancer (HCC) lung   Pulmonary emphysema (HCC)    Hypertension    Restless leg syndrome    GERD (gastroesophageal reflux disease)    Anxiety    COPD (chronic obstructive pulmonary disease) (HCC)    Hyperlipidemia        S/P  LOBECTOMY  Anesthesia Plan  ASA Score- 3     Anesthesia Type- IV sedation with anesthesia with ASA Monitors  Additional Monitors:   Airway Plan:           Plan Factors-Exercise tolerance (METS): >4 METS  Chart reviewed  EKG reviewed  Imaging results reviewed  Existing labs reviewed  Patient summary reviewed  Induction- intravenous  Postoperative Plan-     Informed Consent- Anesthetic plan and risks discussed with patient  I personally reviewed this patient with the CRNA  Discussed and agreed on the Anesthesia Plan with the CRNA  Adam Thibodeaux

## 2021-03-09 NOTE — INTERVAL H&P NOTE
Patient arrived to [facility] for port placement    The procedure and risks were discussed with the patient  All questions were answered  Informed consent was obtained  H & P reviewed after examining the patient and I find no changes in the patient condition since the H & P has been written  /69   Pulse 67   Temp 98 3 °F (36 8 °C) (Temporal)   Resp 18   Ht 4' 9" (1 448 m)   Wt 59 4 kg (131 lb)   SpO2 100%   BMI 28 35 kg/m²     Patient re-evaluated   Accept as history and physical     Rebecca Holiday, DO/March 9, 2021/7:59 AM

## 2021-03-10 ENCOUNTER — TELEPHONE (OUTPATIENT)
Dept: HEMATOLOGY ONCOLOGY | Facility: CLINIC | Age: 66
End: 2021-03-10

## 2021-03-10 ENCOUNTER — HOSPITAL ENCOUNTER (OUTPATIENT)
Dept: INFUSION CENTER | Facility: HOSPITAL | Age: 66
Discharge: HOME | End: 2021-03-10
Attending: INTERNAL MEDICINE
Payer: COMMERCIAL

## 2021-03-10 ENCOUNTER — PATIENT OUTREACH (OUTPATIENT)
Dept: CASE MANAGEMENT | Facility: HOSPITAL | Age: 66
End: 2021-03-10

## 2021-03-10 VITALS
SYSTOLIC BLOOD PRESSURE: 170 MMHG | WEIGHT: 134.7 LBS | DIASTOLIC BLOOD PRESSURE: 69 MMHG | HEIGHT: 56 IN | TEMPERATURE: 99 F | HEART RATE: 67 BPM | BODY MASS INDEX: 30.3 KG/M2 | RESPIRATION RATE: 18 BRPM

## 2021-03-10 DIAGNOSIS — C77.9 REGIONAL LYMPH NODE METASTASIS PRESENT (HCC): Primary | ICD-10-CM

## 2021-03-10 DIAGNOSIS — C34.91 SQUAMOUS CELL LUNG CANCER, RIGHT (HCC): ICD-10-CM

## 2021-03-10 PROCEDURE — 96367 TX/PROPH/DG ADDL SEQ IV INF: CPT

## 2021-03-10 PROCEDURE — 96417 CHEMO IV INFUS EACH ADDL SEQ: CPT

## 2021-03-10 PROCEDURE — 96413 CHEMO IV INFUSION 1 HR: CPT

## 2021-03-10 RX ORDER — SODIUM CHLORIDE 9 MG/ML
20 INJECTION, SOLUTION INTRAVENOUS ONCE
Status: COMPLETED | OUTPATIENT
Start: 2021-03-10 | End: 2021-03-10

## 2021-03-10 RX ADMIN — GEMCITABINE 1520.2 MG: 38 INJECTION, SOLUTION INTRAVENOUS at 14:06

## 2021-03-10 RX ADMIN — SODIUM CHLORIDE 20 ML/HR: 0.9 INJECTION, SOLUTION INTRAVENOUS at 13:08

## 2021-03-10 RX ADMIN — ONDANSETRON: 2 SOLUTION INTRAMUSCULAR; INTRAVENOUS at 12:58

## 2021-03-10 RX ADMIN — FOSAPREPITANT 150 MG: 150 INJECTION, POWDER, LYOPHILIZED, FOR SOLUTION INTRAVENOUS at 13:30

## 2021-03-10 RX ADMIN — CARBOPLATIN 429.5 MG: 10 INJECTION, SOLUTION INTRAVENOUS at 14:39

## 2021-03-10 NOTE — PROGRESS NOTES
[facility] RN had asked MSW to meet with pt as this was pt's first day of treatment and she had self scored a 5 on the Distress Thermometer  MSW introduced self to pt and she was open and receptive to chairside counseling  The pt shared her journey from being diagnosed to starting treatment this day  She shared how she has done the research on the chemo drugs and she was expressing some concerns over the side effects  One of the pt's greatest fears and concerns was the fact that she was going to lose her hair  She had many questions about this and MSW spent time discussing hair loss, suggestions on going to a salon prior to losing her hair and suggestions for salons  MSW will request the prescription for the wig  MSW explained the process for paying for the wig and then requesting reimbursement from her insurance company  The pt expressed concerns over the cost of a wig and MSW spoke with her about the [employer] and how this would be able to assist if/where needed  The protocol for using these funds was discussed and the pt verbalized understanding  The pt spoke about a limited support system but she does have the support of her   While they are still , they have been  for 30 years  The pt described how supportive and involved he has been for her as she went through her surgery and now her treatment  Pt has not children  The pt has made outreach to the [employer] and attended her first zoom support group last evening  She described how much she gained from this group and feels as though it was beneficial for her  Pt plans on continuing with this group  Pt will plan to receive her next chemo at [place] and the remaining at [facility]  Emotional support provided

## 2021-03-10 NOTE — TELEPHONE ENCOUNTER
Patient is calling in inquiring on what is the address to where she will be getting her infusion treatment, I have provided her with     [address] [facility], [place], [place], [address]     Patient voiced understanding

## 2021-03-10 NOTE — PLAN OF CARE
Problem: Potential for Falls  Goal: Patient will remain free of falls  Description: INTERVENTIONS:  - Assess patient frequently for physical needs  -  Identify cognitive and physical deficits and behaviors that affect risk of falls    -  [place] fall precautions as indicated by assessment   - Educate patient/family on patient safety including physical limitations  - Instruct patient to call for assistance with activity based on assessment  - Modify environment to reduce risk of injury  - Consider OT/PT consult to assist with strengthening/mobility  Outcome: Progressing

## 2021-03-11 ENCOUNTER — TELEPHONE (OUTPATIENT)
Dept: HEMATOLOGY ONCOLOGY | Facility: CLINIC | Age: 66
End: 2021-03-11

## 2021-03-11 DIAGNOSIS — C34.91 SQUAMOUS CELL LUNG CANCER, RIGHT (HCC): Primary | ICD-10-CM

## 2021-03-11 NOTE — TELEPHONE ENCOUNTER
Patient called to request a order for a  Cranial Prosthesis   Patient would like this order mailed to [address] [id number]   Patient can be reached at [phone]

## 2021-03-14 ENCOUNTER — APPOINTMENT (OUTPATIENT)
Dept: RADIOLOGY | Age: 66
End: 2021-03-14
Payer: COMMERCIAL

## 2021-03-14 ENCOUNTER — APPOINTMENT (OUTPATIENT)
Dept: LAB | Age: 66
End: 2021-03-14
Payer: COMMERCIAL

## 2021-03-14 DIAGNOSIS — C34.91 SQUAMOUS CELL LUNG CANCER, RIGHT (HCC): ICD-10-CM

## 2021-03-14 DIAGNOSIS — C77.9 REGIONAL LYMPH NODE METASTASIS PRESENT (HCC): ICD-10-CM

## 2021-03-14 PROCEDURE — 71046 X-RAY EXAM CHEST 2 VIEWS: CPT

## 2021-03-16 ENCOUNTER — OFFICE VISIT (OUTPATIENT)
Dept: CARDIAC SURGERY | Facility: CLINIC | Age: 66
End: 2021-03-16

## 2021-03-16 VITALS
WEIGHT: 132.06 LBS | DIASTOLIC BLOOD PRESSURE: 80 MMHG | BODY MASS INDEX: 29.71 KG/M2 | HEIGHT: 56 IN | SYSTOLIC BLOOD PRESSURE: 168 MMHG | HEART RATE: 74 BPM | TEMPERATURE: 97.4 F | RESPIRATION RATE: 16 BRPM | OXYGEN SATURATION: 97 %

## 2021-03-16 DIAGNOSIS — C34.91 SQUAMOUS CELL LUNG CANCER, RIGHT (HCC): Primary | ICD-10-CM

## 2021-03-16 PROCEDURE — 99024 POSTOP FOLLOW-UP VISIT: CPT | Performed by: PHYSICIAN ASSISTANT

## 2021-03-16 NOTE — PROGRESS NOTES
Thoracic Follow-Up  Assessment/Plan:    Squamous cell lung cancer, right ([facility] [address] )  Ms Marion Maddox presents for a second post-operative visit following right thoracoscopic upper lobectomy and lower lobe wedge resection for what turned out to be Stage IIB (T2, N1) squamous cell carcinoma of the lung  She started chemotherapy last week and reports some fatigue, dizziness, and nausea related to this  She is eating well and staying hydrated  Her CXR demonstrates no abnormalities  She has shortness of breath on exertion still  I urged her to try to remain active during this time of therapy to keep up her stamina  She will return to our office 6 months from surgery with a chest CT for lung cancer surveillance  We will continue biannual imaging for 3 years, then extend to yearly if scans are appropriate  All of her and her 's questions were addressed, and they are in agreement with this plan  Diagnoses and all orders for this visit:    Squamous cell lung cancer, right ([facility] [address] )  -     CT chest wo contrast; Future          Thoracic History   Diagnosis: 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  Procedure: right thoracoscopic upper lobectomy and lower lobe wedge resection 1/28/21  Pathology:  Single focus 3 1x3  1x3 0cm G3 invasive, keratinizing squamous cell carcinoma right upper lobe  No visceral pleura invasion, there is lymphatic invasion present  All margins uninvolved by tumor  1/14 lymph nodes involved, +right peribronchial  Negative 2R, 4R, 10R, azygous and 7      AJCC Prognostic Stage Group (8th Ed ):  Stage IIB - pT2a, pN1, MX, G3    Adjuvant therapy: carboplatin plus Gemzar     Subjective:    Patient ID: Melina Zhao is a 74 y o  female  HPI   Ms Marion Maddox is a 74year old female who presents for a second post-operative visit following right thoracoscopic upper lobectomy and lower lobe wedge resection 1/28/21 for what turned out to be a Stage IIb (T2, N1) lung cancer   She began adjuvant chemotherapy 3/10/21  CXR 3/14/21 reveals no effusion or pneumothorax  There is reduced volume on the right after lobectomy  On discussion, she is using nicorette gum and not smoking  She has up to 4-5 rib pain on right and burning of skin near incisions  She is not taking pain medication  She feels nauseated since starting chemotherapy and fatigued  She still has shortness of breath on exertion, she is walking frequently  Her appetite is good and she is eating well  She is dizzy occasionally since starting chemo  The following portions of the patient's history were reviewed and updated as appropriate: allergies, current medications, past family history, past medical history, past social history, past surgical history and problem list    gggg  Review of Systems   Constitutional: Positive for fatigue  Negative for activity change, appetite change, chills, diaphoresis, fever and unexpected weight change  Respiratory: Positive for shortness of breath  Negative for cough  Cardiovascular: Positive for chest pain (discomfort on right side near incisions)  Gastrointestinal: Positive for nausea  Musculoskeletal: Negative for joint swelling and myalgias  Skin: Negative for rash and wound  Neurological: Positive for dizziness and numbness (paresthesias of skin near incisions)  Objective:   Physical Exam  Constitutional:       General: She is not in acute distress  Appearance: Normal appearance  HENT:      Head: Normocephalic and atraumatic  Eyes:      General: No scleral icterus  Conjunctiva/sclera: Conjunctivae normal    Neck:      Musculoskeletal: Neck supple  Cardiovascular:      Rate and Rhythm: Normal rate and regular rhythm  Pulmonary:      Effort: Pulmonary effort is normal  No respiratory distress  Breath sounds: Normal breath sounds  Comments: Right thoracoscopic incisions well healed  Abdominal:      General: There is no distension        Palpations: Abdomen is soft    Lymphadenopathy:      Cervical: No cervical adenopathy  Skin:     General: Skin is warm and dry  Neurological:      General: No focal deficit present  Mental Status: She is alert and oriented to person, place, and time  Psychiatric:         Mood and Affect: Mood normal      /80   Pulse 74   Temp (!) 97 4 °F (36 3 °C) (Tympanic)   Resp 16   Ht 4' 8" (1 422 m)   Wt 59 9 kg (132 lb 0 9 oz)   SpO2 97%   BMI 29 61 kg/m²     Xr Chest Pa & Lateral    Result Date: 2/17/2021  Impression Right upper lobectomy with improving apical pneumothorax, trace  Improving subcutaneous emphysema in the right chest wall    Workstation performed: [id number]

## 2021-03-16 NOTE — ASSESSMENT & PLAN NOTE
Ms Yennifer Ley presents for a second post-operative visit following right thoracoscopic upper lobectomy and lower lobe wedge resection for what turned out to be Stage IIB (T2, N1) squamous cell carcinoma of the lung  She started chemotherapy last week and reports some fatigue, dizziness, and nausea related to this  She is eating well and staying hydrated  Her CXR demonstrates no abnormalities  She has shortness of breath on exertion still  I urged her to try to remain active during this time of therapy to keep up her stamina  She will return to our office 6 months from surgery with a chest CT for lung cancer surveillance  We will continue biannual imaging for 3 years, then extend to yearly if scans are appropriate  All of her and her 's questions were addressed, and they are in agreement with this plan

## 2021-03-17 ENCOUNTER — PATIENT OUTREACH (OUTPATIENT)
Dept: CASE MANAGEMENT | Facility: HOSPITAL | Age: 66
End: 2021-03-17

## 2021-03-17 ENCOUNTER — HOSPITAL ENCOUNTER (OUTPATIENT)
Dept: INFUSION CENTER | Facility: HOSPITAL | Age: 66
Discharge: HOME | End: 2021-03-17
Attending: INTERNAL MEDICINE
Payer: COMMERCIAL

## 2021-03-17 VITALS
SYSTOLIC BLOOD PRESSURE: 160 MMHG | DIASTOLIC BLOOD PRESSURE: 72 MMHG | TEMPERATURE: 98.6 F | BODY MASS INDEX: 29.71 KG/M2 | HEART RATE: 90 BPM | WEIGHT: 132.06 LBS | HEIGHT: 56 IN | RESPIRATION RATE: 18 BRPM

## 2021-03-17 DIAGNOSIS — C34.91 SQUAMOUS CELL LUNG CANCER, RIGHT (HCC): ICD-10-CM

## 2021-03-17 DIAGNOSIS — R11.2 CHEMOTHERAPY INDUCED NAUSEA AND VOMITING: Primary | ICD-10-CM

## 2021-03-17 DIAGNOSIS — T45.1X5A CHEMOTHERAPY INDUCED NAUSEA AND VOMITING: Primary | ICD-10-CM

## 2021-03-17 DIAGNOSIS — C77.9 REGIONAL LYMPH NODE METASTASIS PRESENT (HCC): Primary | ICD-10-CM

## 2021-03-17 PROCEDURE — 96367 TX/PROPH/DG ADDL SEQ IV INF: CPT

## 2021-03-17 PROCEDURE — 96413 CHEMO IV INFUSION 1 HR: CPT

## 2021-03-17 RX ORDER — PROCHLORPERAZINE MALEATE 10 MG
10 TABLET ORAL EVERY 8 HOURS PRN
Qty: 30 TABLET | Refills: 0 | Status: SHIPPED | OUTPATIENT
Start: 2021-03-17 | End: 2022-05-19 | Stop reason: ALTCHOICE

## 2021-03-17 RX ORDER — SODIUM CHLORIDE 9 MG/ML
20 INJECTION, SOLUTION INTRAVENOUS ONCE
Status: COMPLETED | OUTPATIENT
Start: 2021-03-17 | End: 2021-03-17

## 2021-03-17 RX ADMIN — ONDANSETRON: 2 SOLUTION INTRAMUSCULAR; INTRAVENOUS at 12:15

## 2021-03-17 RX ADMIN — SODIUM CHLORIDE 20 ML/HR: 0.9 INJECTION, SOLUTION INTRAVENOUS at 12:15

## 2021-03-17 RX ADMIN — GEMCITABINE 1520.2 MG: 38 INJECTION, SOLUTION INTRAVENOUS at 13:00

## 2021-03-17 NOTE — PROGRESS NOTES
Pt arrived to infusion center co of nausea and dry heaving since last treatment  Pt reports no vomiting and no diarrhea  Pt states she is taking prescribed zofran with little relief  Pt also reports dizziness  Pt has started using sea bands this am  Pt reports decreased po intake and feels she has not been drinking enough  Above reported to Jesus Diaz RN in med onc office  Pt instructed on importance of maintaining proper hydration  Per [employer] script sent to pharmacy for compazine  Pt made aware of same

## 2021-03-17 NOTE — PROGRESS NOTES
Oncology LSW attempted to outreach PT to provide her with support and to address any areas of need she may have  LSW left detailed voicemail requesting return call at PT's earliest convenience  Oncology LSW received return call from PT  PT reported that she was feeling rather nauseated and tired following her last treatment  PT reported that she did not initially feel this way, but instead felt that way a few days after treatment  PT explained that she was feeling better today and went to [place] for further infusion treatment  PT reported that the doctor was going to make changes to her nausea medication  PT reported that she had met with the ladies doing the [employer] today in at treatment and had a very enjoyable time  PT explained to LSW that she was interested in looking at getting a wig, but was unsure about the process  LSW explained that, due to her being relatively new, she was unsure of the process and would reach out to her co-worker for guidance  Once LSW received guidance on the matter, she explained that a RX would typically cover getting insurance to pay for a wig or reimbursment would be requested for the wig purchased by PT from insurance  PT reported that she was worried about reaching out to her insurance, as she found her plan to be very "confusing " LSW explained that she would reach out to the [employer] and request that PT be outreached to go over her plan further and in more detail  PT was agreeable with this plan  LSW asked that PT reach out to her insurance plan directly to see if they will cover a wig and, if so, what is the maximum budget  LSW stated that, if PT's insurance would not cover a wig, that they would reach out to the [facility] to see if [employer] could be utilized  PT was agreeable with this as well  LSW explained that she would outreach PT by the end of the week to see what she has found out  PT agreeable

## 2021-03-17 NOTE — PLAN OF CARE
Problem: Potential for Falls  Goal: Patient will remain free of falls  Description: INTERVENTIONS:  - Assess patient frequently for physical needs  -  Identify cognitive and physical deficits and behaviors that affect risk of falls    -  [place] fall precautions as indicated by assessment   - Educate patient/family on patient safety including physical limitations  - Instruct patient to call for assistance with activity based on assessment  - Modify environment to reduce risk of injury  - Consider OT/PT consult to assist with strengthening/mobility  Outcome: Progressing

## 2021-03-18 ENCOUNTER — PATIENT OUTREACH (OUTPATIENT)
Dept: CASE MANAGEMENT | Facility: HOSPITAL | Age: 66
End: 2021-03-18

## 2021-03-18 ENCOUNTER — TELEPHONE (OUTPATIENT)
Dept: OBGYN CLINIC | Facility: CLINIC | Age: 66
End: 2021-03-18

## 2021-03-18 NOTE — TELEPHONE ENCOUNTER
Spoke to pt and let her know VIVEK would call her today and that she was out on a medical leave before her oncologist appt  She said the printouts the chemo office gave her of whats next and AVS that was pg 1 and then 2-7pgs of provider info and problem lists  She is just questioning also why VIVEK name is on everything

## 2021-03-18 NOTE — PROGRESS NOTES
MSW received call from pt this day  Pt wanted to share how she purchased a wig and how she was not feeling satisfied with her purchase  Pt further explained how she selected a wig that had no bangs and requested they cut bangs  The salon informed the pt that if they were to do that , the pt would be required to buy the wig  Once the pt agreed and the wig was purchased, she was not pleased with the bangs  The pt was asking now about how to get another wig  MSW encouraged the pt to call the [address] [place] [address] [address] as she would be eligible for a free wig  It was explained to the pt that they offer certain days to the wig salon but an appointment must be made  Additionally, there are donated wigs at the [facility] if the pt is interested  Pt expressed an interest   MSW will plan to meet pt after her appointment in Med Onc on 3/29 and give pt the wigs to try  Emotional support offered  MSW will continue to follow

## 2021-03-18 NOTE — TELEPHONE ENCOUNTER
Pt is little upset looking at reports and notes in epic  Noble Juares saying things are noted in there from her last visit with [facility] that did not happen     -stated she brought her own ring in a baggie and it was not inspected  And #4 ring with port removed and was never put back in as stated in epic    -no counseling was ever done done in re: to prolapse  Stated no bleeding in epic and she was bleeding, that's why she came into the office  Did not discuss pelvic floor issues or robotic surgery  And she received no cb and her other Dr was not contacted  And VIVEK name is on every single pg for all her medical issues  Cancer counselor recommended she reach out to us in re: to these documentation and get back to them  Told pt I will relay her concerns to [facility]        ----- Message from Radha Fermin sent at 3/18/2021 11:40 AM EDT -----  Regarding: Non-Urgent Medical Question  Contact: [phone]  I have not received any information regarding my last visit in feb  Dr Zack Batista told me she was going to contact  Dr Delfina Wren and get bact to me but never did  The bleeding stopped but my confusion is page 4 on epic stating things that did not happen with Dr Zack Batista    Please let me know what those notes mean  Thank you  Estela Streeter

## 2021-03-19 ENCOUNTER — DOCUMENTATION (OUTPATIENT)
Dept: HEMATOLOGY ONCOLOGY | Facility: CLINIC | Age: 66
End: 2021-03-19

## 2021-03-19 ENCOUNTER — TELEPHONE (OUTPATIENT)
Dept: SURGICAL ONCOLOGY | Facility: CLINIC | Age: 66
End: 2021-03-19

## 2021-03-19 NOTE — TELEPHONE ENCOUNTER
Patient called regarding office notes in MyChart from appt  On 3/16/21  She would like clarification on them  Please call her at [phone]

## 2021-03-19 NOTE — PROGRESS NOTES
recvd email from [facility] that pt has some questions about her ins & she didn't understand the benefits  Called the pt 7 she sd its not the benefits that she has questions about ts denials for some testing the pt sd that she recvd a denial packet in the mail from the ins co stating that the test are experimental  She gave me only a few of the cpt codes that are on the denial 97303 & 54285 & 23363 & 52038  Called francesco c &  sd that the genetic counselor estefani be the person to talk to  jose manuel alan for a # to reach her  Called the pt back to let her know that I am working on this  Maurice Pearson got pts voicemail left her a message to call me back   Emailed [facility]

## 2021-03-19 NOTE — TELEPHONE ENCOUNTER
Attempted to call at 4pm 3/18/2021 - no VM  LMOM @ 1215pm 3/19/2021  Message sent to Oncologist Re: estrogen cream

## 2021-03-19 NOTE — PROGRESS NOTES
STP twice on 3/19/201:  Patient expressed multiple times how disappointed she was in me due to multiple factors  She insisted the chart notes did not reflect what I did the date of service  Upon review she was looking at Karthik Cardona from 2016; my name appeared as  due to me having opened up problem list the date of service  She then expressed (for second time) her disappointment that I did not contact her Oncologist right away  I discussed how I could not contact the Oncologist until AFTER her first appointment with him  Unfortunately I ended up on a medical leave 3/4-3/15, which I apologized for  She continued to express her disappointment and rehashed all events which I had tried to explain in multiple ways  I again asked for her understanding regarding my illness and that understanding should go both ways  She stated she would talk to someone else about this and I hung up the phone

## 2021-03-24 RX ORDER — SODIUM CHLORIDE 9 MG/ML
20 INJECTION, SOLUTION INTRAVENOUS ONCE
Status: CANCELLED | OUTPATIENT
Start: 2021-04-07

## 2021-03-28 ENCOUNTER — APPOINTMENT (OUTPATIENT)
Dept: LAB | Age: 66
End: 2021-03-28
Payer: COMMERCIAL

## 2021-03-28 DIAGNOSIS — C34.91 SQUAMOUS CELL LUNG CANCER, RIGHT (HCC): ICD-10-CM

## 2021-03-28 DIAGNOSIS — C77.9 REGIONAL LYMPH NODE METASTASIS PRESENT (HCC): ICD-10-CM

## 2021-03-28 LAB
ALBUMIN SERPL BCP-MCNC: 3.6 G/DL (ref 3.5–5)
ALP SERPL-CCNC: 76 U/L (ref 46–116)
ALT SERPL W P-5'-P-CCNC: 27 U/L (ref 12–78)
ANION GAP SERPL CALCULATED.3IONS-SCNC: 4 MMOL/L (ref 4–13)
AST SERPL W P-5'-P-CCNC: 9 U/L (ref 5–45)
BASOPHILS # BLD MANUAL: 0.02 THOUSAND/UL (ref 0–0.1)
BASOPHILS NFR MAR MANUAL: 1 % (ref 0–1)
BILIRUB SERPL-MCNC: 0.41 MG/DL (ref 0.2–1)
BUN SERPL-MCNC: 11 MG/DL (ref 5–25)
CALCIUM SERPL-MCNC: 8.9 MG/DL (ref 8.3–10.1)
CHLORIDE SERPL-SCNC: 106 MMOL/L (ref 100–108)
CO2 SERPL-SCNC: 29 MMOL/L (ref 21–32)
CREAT SERPL-MCNC: 0.71 MG/DL (ref 0.6–1.3)
EOSINOPHIL # BLD MANUAL: 0.02 THOUSAND/UL (ref 0–0.4)
EOSINOPHIL NFR BLD MANUAL: 1 % (ref 0–6)
ERYTHROCYTE [DISTWIDTH] IN BLOOD BY AUTOMATED COUNT: 11.9 % (ref 11.6–15.1)
GFR SERPL CREATININE-BSD FRML MDRD: 88 ML/MIN/1.73SQ M
GLUCOSE P FAST SERPL-MCNC: 98 MG/DL (ref 65–99)
HCT VFR BLD AUTO: 34.8 % (ref 34.8–46.1)
HGB BLD-MCNC: 11.5 G/DL (ref 11.5–15.4)
LYMPHOCYTES # BLD AUTO: 1.31 THOUSAND/UL (ref 0.6–4.47)
LYMPHOCYTES # BLD AUTO: 55 % (ref 14–44)
MCH RBC QN AUTO: 30.7 PG (ref 26.8–34.3)
MCHC RBC AUTO-ENTMCNC: 33 G/DL (ref 31.4–37.4)
MCV RBC AUTO: 93 FL (ref 82–98)
MONOCYTES # BLD AUTO: 0.17 THOUSAND/UL (ref 0–1.22)
MONOCYTES NFR BLD: 7 % (ref 4–12)
NEUTROPHILS # BLD MANUAL: 0.84 THOUSAND/UL (ref 1.85–7.62)
NEUTS SEG NFR BLD AUTO: 35 % (ref 43–75)
NRBC BLD AUTO-RTO: 0 /100 WBCS
PLATELET # BLD AUTO: 194 THOUSANDS/UL (ref 149–390)
PLATELET BLD QL SMEAR: ADEQUATE
PMV BLD AUTO: 9.8 FL (ref 8.9–12.7)
POLYCHROMASIA BLD QL SMEAR: PRESENT
POTASSIUM SERPL-SCNC: 3.8 MMOL/L (ref 3.5–5.3)
PROT SERPL-MCNC: 7.2 G/DL (ref 6.4–8.2)
RBC # BLD AUTO: 3.74 MILLION/UL (ref 3.81–5.12)
SODIUM SERPL-SCNC: 139 MMOL/L (ref 136–145)
TOTAL CELLS COUNTED SPEC: 100
VARIANT LYMPHS # BLD AUTO: 1 %
WBC # BLD AUTO: 2.39 THOUSAND/UL (ref 4.31–10.16)

## 2021-03-28 PROCEDURE — 85027 COMPLETE CBC AUTOMATED: CPT

## 2021-03-28 PROCEDURE — 80053 COMPREHEN METABOLIC PANEL: CPT

## 2021-03-28 PROCEDURE — 85007 BL SMEAR W/DIFF WBC COUNT: CPT

## 2021-03-28 PROCEDURE — 36415 COLL VENOUS BLD VENIPUNCTURE: CPT

## 2021-03-29 ENCOUNTER — OFFICE VISIT (OUTPATIENT)
Dept: HEMATOLOGY ONCOLOGY | Facility: CLINIC | Age: 66
End: 2021-03-29
Payer: COMMERCIAL

## 2021-03-29 ENCOUNTER — PATIENT OUTREACH (OUTPATIENT)
Dept: CASE MANAGEMENT | Facility: HOSPITAL | Age: 66
End: 2021-03-29

## 2021-03-29 VITALS
HEIGHT: 56 IN | WEIGHT: 134 LBS | HEART RATE: 72 BPM | DIASTOLIC BLOOD PRESSURE: 94 MMHG | RESPIRATION RATE: 18 BRPM | TEMPERATURE: 98.4 F | SYSTOLIC BLOOD PRESSURE: 192 MMHG | BODY MASS INDEX: 30.14 KG/M2

## 2021-03-29 DIAGNOSIS — D70.1 CHEMOTHERAPY INDUCED NEUTROPENIA (HCC): ICD-10-CM

## 2021-03-29 DIAGNOSIS — F41.9 ANXIETY IN CANCER PATIENT: ICD-10-CM

## 2021-03-29 DIAGNOSIS — R42 DIZZINESS: ICD-10-CM

## 2021-03-29 DIAGNOSIS — T45.1X5A CHEMOTHERAPY INDUCED NEUTROPENIA (HCC): ICD-10-CM

## 2021-03-29 DIAGNOSIS — R11.2 CHEMOTHERAPY INDUCED NAUSEA AND VOMITING: Primary | ICD-10-CM

## 2021-03-29 DIAGNOSIS — T45.1X5A CHEMOTHERAPY INDUCED NAUSEA AND VOMITING: Primary | ICD-10-CM

## 2021-03-29 DIAGNOSIS — C34.91 SQUAMOUS CELL LUNG CANCER, RIGHT (HCC): ICD-10-CM

## 2021-03-29 PROCEDURE — 99215 OFFICE O/P EST HI 40 MIN: CPT | Performed by: PHYSICIAN ASSISTANT

## 2021-03-29 PROCEDURE — 1036F TOBACCO NON-USER: CPT | Performed by: PHYSICIAN ASSISTANT

## 2021-03-29 PROCEDURE — 1160F RVW MEDS BY RX/DR IN RCRD: CPT | Performed by: PHYSICIAN ASSISTANT

## 2021-03-29 PROCEDURE — 3008F BODY MASS INDEX DOCD: CPT | Performed by: PHYSICIAN ASSISTANT

## 2021-03-29 NOTE — PROGRESS NOTES
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 status post resection, currently receiving adjuvant chemotherapy, Gemzar and carboplatin  She is tolerating well  She has had some nausea which is managed with Compazine  She asked regarding hair loss  Hair loss can still occur  She does not notice significant hair loss at this time  Our  had already spoken with her regarding obtaining awake  We reviewed Gemzar and carboplatin again per the patient's request   She has questions regarding which 1 she received on which days  Reviewed this  Reviewed the reason for this schedule of her chemotherapy  No change in therapy  Follow-up in 1 month as already scheduled  She is to call sooner with questions  Encouraged her not to look up questions on the Internet but to call the office and speak with our staff as this is more reliable  She also describes symptoms of dizziness  These seem to be positional in her discussion  She and her  states that she does not hydrate enough  She had been drinking 4 cups of coffee at this time  She states this is half caffeinated half decaf  She is only drinking 32 oz of water a day  She is encouraged to drink more water  Also suggested wearing compression socks for which she states she already does have at home  She is aware not to sleep with compression socks on     - CBC and differential; Future  - Ambulatory referral to Palliative Care; Future  - Ambulatory referral to Palliative Care    2  Chemotherapy induced neutropenia [facility])    Patient had labs yesterday  Her [address] remains decreased, below 1000  She will need to have repeat labs tomorrow  I suspect her [address] will be sufficient to receive chemotherapy  Patient was fixated on the other types of white blood cells in the relative percentages of her differential   Provided reassurance regarding this  Is expected to have affects of her white blood cells secondary to chemotherapy  She has questions regarding leukemia  Reviewed that she does not have leukemia  Her neutrophils are decreased due to chemotherapy  This is an expected side effect  Provided reassurance  Again encouraged patient not to look up these values on the Internet as this can be anxiety provoking  She is encouraged to call the office with questions  - CBC and differential; Future  - Ambulatory referral to Palliative Care; Future  - Ambulatory referral to Palliative Care    3  Chemotherapy induced nausea and vomiting    Patient did not have benefit with Zofran  She is having benefit with Compazine  She is advised to use this as needed  She does not need refills at this time  4  Anxiety in cancer patient     Patient exhibited anxiety during today's visit  She was tearful on multiple times  Her anxiety is causing her to have difficulty sleeping  Reviewed trying Xanax Ativan  She states she did not wish to do this  She was recommended to try melatonin every night  She had had try 5 mg and then if not beneficial she could increase to 10 mg  It is best to use this every night  Also suggested Benadryl if this makes her drowsy  She also asked regarding medical marijuana to help with her symptoms and anxiety  She was referred to palliative Care to arrange this  I did review that this is out-of-pocket a not covered by insurance        HPI:  Oncology History   Squamous cell lung cancer, right ([facility] )   1/12/2021 Initial Diagnosis    Squamous cell lung cancer, right ([facility] )     3/10/2021 -  Chemotherapy    fosaprepitant (EMEND) 150 mg in sodium chloride 0 9 % 250 mL IVPB, 150 mg, Intravenous, Once, 1 of 6 cycles  Administration: 150 mg (3/10/2021)  CARBOplatin (PARAPLATIN) 429 5 mg in sodium chloride 0 9 % 250 mL IVPB, 429 5 mg, Intravenous, Once, 1 of 6 cycles  Administration: 429 5 mg (3/10/2021)  gemcitabine (GEMZAR) 1,520 2 mg in sodium chloride 0 9 % 250 mL infusion, 1,000 mg/m2 = 1,520 2 mg, Intravenous, Once, 1 of 6 cycles  Administration: 1,520 2 mg (3/10/2021), 1,520 2 mg (3/17/2021)       78-year-old female presents for follow-up regarding stage II B squamous cell carcinoma of the lung  Patient had significant smoking history, onset at age 10  She went for routine lung cancer screening and this showed mass in right upper lung  She then had a PET / CT on November 2020 and had right upper lobectomy and lymph node sampling on 01/28/2021  Pathology showed 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 with invasion of lymphatic channel with metastatic carcinoma in 1 of 8 peribronchial lymph nodes  1 of 14 positive peribronchial lymph node  PET-CT also showed positivity in the right parotid gland that was biopsied and negative for malignancy  This was performed by ENT specialist     At her consultation visit with our office she reported a past her history of suspected MS in [facility]; CSF came back suspicious an MRI brain came back suspicious therefore she was treated for MS at that time  She has not required active treatment in the last several years  Due to MS history patient was given carboplatin plus Gemzar  She was not considered for cisplatin and Taxol due to remote history of MS and wanting to minimize risk of neurological deficit with cisplatin Taxol  Patient also had agreed to avoid neurotoxic agents  The Adaura trial was also reviewed with patient  Patient specimen was sent to [facility] for molecular testing  This was negative for EGFR  This was positive for PDL1  ROS: Review of Systems   Constitutional: Positive for fatigue  Negative for appetite change (feels like she is over eating)  HENT: Negative for trouble swallowing      Respiratory: Positive for shortness of breath (on exertion since surgery )  Negative for cough  Cardiovascular: Negative for leg swelling  Gastrointestinal: Positive for constipation (hx of; going every day) and nausea (compazine is helpful; zofran is not helpful)  Negative for abdominal pain, blood in stool and vomiting  Genitourinary: Negative for difficulty urinating, dyspareunia and hematuria  Musculoskeletal: Negative for arthralgias  Skin: Negative  Neurological: Positive for dizziness  Psychiatric/Behavioral: The patient is nervous/anxious          Past Medical History:   Diagnosis Date    Anxiety     Cancer [facility])     lung    COPD (chronic obstructive pulmonary disease) ([facility] )     GERD (gastroesophageal reflux disease)     Hyperlipidemia     Hypertension     Pulmonary emphysema (HCC)     Restless leg syndrome        Past Surgical History:   Procedure Laterality Date    AUGMENTATION MAMMAPLASTY Bilateral 11/2014    retro-pectoral silicone    BREAST BIOPSY Left 03/23/2015    u/s core bx    BREAST BIOPSY Left 08/02/2010    high-risk lesion    BREAST EXCISIONAL BIOPSY Left 08/26/2010    high-risk lesion    BREAST EXCISIONAL BIOPSY Left 1994    BREAST EXCISIONAL BIOPSY Right 1984    FL GUIDED CENTRAL VENOUS ACCESS DEVICE INSERTION  3/9/2021    [address] N/A 1/28/2021    Procedure: BRONCHOSCOPY FLEXIBLE;  Surgeon: Diana Sykes MD;  [facility];  Service: Thoracic    NE INSJ TUNNELED CTR VAD W/SUBQ PORT AGE 5 YR/> N/A 3/9/2021    Procedure: INSERTION VENOUS PORT ( PORT-A-CATH) IR;  Surgeon: Nga Root DO;  [facility];  Service: Interventional Radiology    NE THORACOSCOPY SURG LOBECTOMY Right 1/28/2021    Procedure: RIGHT VATS UPPER LOBECTOMY ; MEDIASTINAL LYMPH NODE DISSECTION; right lower lobe bleb resection  ;  Surgeon: Diana Sykes MD;  [facility];  Service: Thoracic    TONSILLECTOMY      TOTAL ABDOMINAL HYSTERECTOMY  2010    age 60    TOTAL ABDOMINAL HYSTERECTOMY W/ BILATERAL SALPINGOOPHORECTOMY Bilateral 2010    age 60       Social History     Socioeconomic History    Marital status: /Civil Union     Spouse name: None    Number of children: None    Years of education: None    Highest education level: None   Occupational History    None   Social Needs    Financial resource strain: None    Food insecurity     Worry: None     Inability: None    Transportation needs     Medical: None     Non-medical: None   Tobacco Use    Smoking status: Former Smoker     Packs/day: 0 25     Years: 30 00     Pack years: 7 50     Types: Cigarettes     Start date:      Quit date: 1/15/2021     Years since quittin 2    Smokeless tobacco: Never Used    Tobacco comment: quit 2021  NICORETTE GUM SUBSTITUE CURRENTLY   Substance and Sexual Activity    Alcohol use:  Yes     Alcohol/week: 7 0 standard drinks     Types: 7 Glasses of wine per week     Frequency: 4 or more times a week     Drinks per session: 1 or 2     Comment: wine @      Drug use: Never    Sexual activity: None   Lifestyle    Physical activity     Days per week: None     Minutes per session: None    Stress: None   Relationships    Social connections     Talks on phone: None     Gets together: None     Attends Orthodoxy service: None     Active member of club or organization: None     Attends meetings of clubs or organizations: None     Relationship status: None    Intimate partner violence     Fear of current or ex partner: None     Emotionally abused: None     Physically abused: None     Forced sexual activity: None   Other Topics Concern    None   Social History Narrative    None       Family History   Problem Relation Age of Onset    No Known Problems Mother     COPD Father     Hypertension Father     No Known Problems Sister     No Known Problems Daughter     No Known Problems Maternal Grandmother     No Known Problems Maternal Grandfather     No Known Problems Paternal Grandmother     Rectal cancer Paternal Grandfather         unknown age   Parsi Crystal No Known Problems Sister     No Known Problems Maternal Aunt     Breast cancer Paternal Aunt         unknown age   Paris Crystal No Known Problems Paternal Aunt     No Known Problems Paternal Aunt     No Known Problems Paternal Aunt     No Known Problems Paternal Aunt     No Known Problems Paternal Aunt     No Known Problems Paternal Aunt     COPD Brother        Allergies   Allergen Reactions    Sulfa Antibiotics Hives         Current Outpatient Medications:     Biotin 5 MG CAPS, Take by mouth daily , Disp: , Rfl:     buPROPion (WELLBUTRIN XL) 300 mg 24 hr tablet, Take 300 mg by mouth every morning , Disp: , Rfl:     Cholecalciferol (Vitamin D3) 50 MCG (2000 UT) capsule, Take 5,000 Units by mouth daily , Disp: , Rfl:     CRANIAL PROSTHESIS, RX,, One wig, as needed, Disp: 1 Piece, Rfl: 0    famotidine (PEPCID) 40 MG tablet, Take 40 mg by mouth every morning , Disp: , Rfl:     ondansetron (ZOFRAN) 4 mg tablet, Take 1 tablet (4 mg total) by mouth every 6 (six) hours as needed for nausea or vomiting, Disp: 30 tablet, Rfl: 1    pramipexole (MIRAPEX) 0 5 mg tablet, Take 0 5 mg by mouth daily at bedtime , Disp: , Rfl:     prochlorperazine (COMPAZINE) 10 mg tablet, Take 1 tablet (10 mg total) by mouth every 8 (eight) hours as needed for nausea or vomiting, Disp: 30 tablet, Rfl: 0    simvastatin (ZOCOR) 40 mg tablet, Take 40 mg by mouth every other day, Disp: , Rfl:     triamterene-hydrochlorothiazide (DYAZIDE) 37 5-25 mg per capsule, Take 1 capsule by mouth every morning, Disp: , Rfl:     umeclidinium-vilanterol (Anoro Ellipta) 62 5-25 MCG/INH inhaler, Inhale 1 puff daily , Disp: , Rfl:     ibuprofen (MOTRIN) 600 mg tablet, Take 1 tablet (600 mg total) by mouth every 6 (six) hours for 7 days, Disp: 28 tablet, Rfl: 0    traZODone (DESYREL) 150 mg tablet, Take 150 mg by mouth as needed , Disp: , Rfl:       Physical Exam:  BP (!) 192/94 (BP Location: Left arm, Patient Position: Sitting, Cuff Size: Adult)   Pulse 72   Temp 98 4 °F (36 9 °C)   Resp 18   Ht 4' 8 3" (1 43 m)   Wt 60 8 kg (134 lb)   BMI 29 72 kg/m²     Patient rested and drank a bottle of water  BP improved; 164/78    Physical Exam  Constitutional:       General: She is not in acute distress  Appearance: She is well-developed  She is not ill-appearing  HENT:      Head: Normocephalic and atraumatic  Eyes:      General: No scleral icterus  Conjunctiva/sclera: Conjunctivae normal    Neck:      Musculoskeletal: Normal range of motion and neck supple  Cardiovascular:      Rate and Rhythm: Normal rate and regular rhythm  Heart sounds: Normal heart sounds  No murmur  Pulmonary:      Effort: Pulmonary effort is normal  No respiratory distress  Breath sounds: Normal breath sounds  Abdominal:      Palpations: Abdomen is soft  Tenderness: There is no abdominal tenderness  Musculoskeletal: Normal range of motion  General: No tenderness  Right lower leg: No edema  Left lower leg: No edema  Lymphadenopathy:      Cervical: No cervical adenopathy  Skin:     General: Skin is warm and dry  Neurological:      Mental Status: She is alert and oriented to person, place, and time  Cranial Nerves: No cranial nerve deficit     Psychiatric:      Comments: Anxious  Cried twice during the visit            Labs:  Lab Results   Component Value Date    WBC 2 39 (L) 03/28/2021    HGB 11 5 03/28/2021    HCT 34 8 03/28/2021    MCV 93 03/28/2021     03/28/2021     Lab Results   Component Value Date     01/05/2016    K 3 8 03/28/2021     03/28/2021    CO2 29 03/28/2021    ANIONGAP 9 01/05/2016    BUN 11 03/28/2021    CREATININE 0 71 03/28/2021    GLUCOSE 104 01/05/2016    GLUF 98 03/28/2021    CALCIUM 8 9 03/28/2021    AST 9 03/28/2021    ALT 27 03/28/2021    ALKPHOS 76 03/28/2021    PROT 7 8 01/04/2016    BILITOT 0 67 01/04/2016    EGFR 88 03/28/2021     Patient voiced understanding and agreement in the above discussion  Aware to contact our office with questions/symptoms in the interim  This note has been generated by voice recognition software system  Therefore, there may be spelling, grammar, and or syntax errors  Please contact if questions arise

## 2021-03-29 NOTE — PATIENT INSTRUCTIONS
You can try 5 mg melatonin every night for 1 week  If no benefit, you can increase to 10 mg melatonin every night

## 2021-03-29 NOTE — PROGRESS NOTES
MSW met with the pt this day in the Med Onc office as she was meeting with KRISTIN Chan  The pt was present with her  and she appeared a bit anxious after seeing her lab results via My Chart  Pt also reported to feeling light heated and thus had her  drive her to this appointment  Pt states she was initially going to drive herself however when she awoke feeling lightheaded, she opted to come with her   MSW reinforced the importance of having another person present as it is helpful to hear the information presented  MSW provided the pt with some donations of head pieces that she had expressed an interest in during a prior conversation  MSW will plan to meet with pt during her infusion later this week to follow up and reassess any needs

## 2021-03-30 ENCOUNTER — APPOINTMENT (OUTPATIENT)
Dept: LAB | Facility: CLINIC | Age: 66
End: 2021-03-30
Payer: COMMERCIAL

## 2021-03-30 DIAGNOSIS — T45.1X5A CHEMOTHERAPY INDUCED NEUTROPENIA (HCC): ICD-10-CM

## 2021-03-30 DIAGNOSIS — D70.1 CHEMOTHERAPY INDUCED NEUTROPENIA (HCC): ICD-10-CM

## 2021-03-30 DIAGNOSIS — C34.91 SQUAMOUS CELL LUNG CANCER, RIGHT (HCC): ICD-10-CM

## 2021-03-30 LAB
BASOPHILS # BLD AUTO: 0.02 THOUSANDS/ΜL (ref 0–0.1)
BASOPHILS NFR BLD AUTO: 1 % (ref 0–1)
EOSINOPHIL # BLD AUTO: 0.04 THOUSAND/ΜL (ref 0–0.61)
EOSINOPHIL NFR BLD AUTO: 2 % (ref 0–6)
ERYTHROCYTE [DISTWIDTH] IN BLOOD BY AUTOMATED COUNT: 12.9 % (ref 11.6–15.1)
HCT VFR BLD AUTO: 35.9 % (ref 34.8–46.1)
HGB BLD-MCNC: 11.7 G/DL (ref 11.5–15.4)
IMM GRANULOCYTES # BLD AUTO: 0.01 THOUSAND/UL (ref 0–0.2)
IMM GRANULOCYTES NFR BLD AUTO: 0 % (ref 0–2)
LYMPHOCYTES # BLD AUTO: 1.09 THOUSANDS/ΜL (ref 0.6–4.47)
LYMPHOCYTES NFR BLD AUTO: 45 % (ref 14–44)
MCH RBC QN AUTO: 31 PG (ref 26.8–34.3)
MCHC RBC AUTO-ENTMCNC: 32.6 G/DL (ref 31.4–37.4)
MCV RBC AUTO: 95 FL (ref 82–98)
MONOCYTES # BLD AUTO: 0.37 THOUSAND/ΜL (ref 0.17–1.22)
MONOCYTES NFR BLD AUTO: 16 % (ref 4–12)
NEUTROPHILS # BLD AUTO: 0.86 THOUSANDS/ΜL (ref 1.85–7.62)
NEUTS SEG NFR BLD AUTO: 36 % (ref 43–75)
NRBC BLD AUTO-RTO: 0 /100 WBCS
PLATELET # BLD AUTO: 480 THOUSANDS/UL (ref 149–390)
PMV BLD AUTO: 9.2 FL (ref 8.9–12.7)
RBC # BLD AUTO: 3.77 MILLION/UL (ref 3.81–5.12)
WBC # BLD AUTO: 2.39 THOUSAND/UL (ref 4.31–10.16)

## 2021-03-30 PROCEDURE — 36415 COLL VENOUS BLD VENIPUNCTURE: CPT

## 2021-03-30 PROCEDURE — 85025 COMPLETE CBC W/AUTO DIFF WBC: CPT

## 2021-03-31 ENCOUNTER — DOCUMENTATION (OUTPATIENT)
Dept: HEMATOLOGY ONCOLOGY | Facility: CLINIC | Age: 66
End: 2021-03-31

## 2021-03-31 ENCOUNTER — HOSPITAL ENCOUNTER (OUTPATIENT)
Dept: INFUSION CENTER | Facility: HOSPITAL | Age: 66
Discharge: HOME | End: 2021-03-31
Attending: INTERNAL MEDICINE
Payer: COMMERCIAL

## 2021-03-31 VITALS
DIASTOLIC BLOOD PRESSURE: 70 MMHG | RESPIRATION RATE: 18 BRPM | SYSTOLIC BLOOD PRESSURE: 140 MMHG | TEMPERATURE: 97.8 F | HEART RATE: 68 BPM | OXYGEN SATURATION: 94 %

## 2021-03-31 DIAGNOSIS — C77.9 REGIONAL LYMPH NODE METASTASIS PRESENT (HCC): ICD-10-CM

## 2021-03-31 DIAGNOSIS — E86.0 DEHYDRATION: ICD-10-CM

## 2021-03-31 DIAGNOSIS — T45.1X5A CHEMOTHERAPY INDUCED NEUTROPENIA (HCC): Primary | ICD-10-CM

## 2021-03-31 DIAGNOSIS — C34.91 SQUAMOUS CELL LUNG CANCER, RIGHT (HCC): ICD-10-CM

## 2021-03-31 DIAGNOSIS — C77.9 REGIONAL LYMPH NODE METASTASIS PRESENT (HCC): Primary | ICD-10-CM

## 2021-03-31 DIAGNOSIS — D70.1 CHEMOTHERAPY INDUCED NEUTROPENIA (HCC): Primary | ICD-10-CM

## 2021-03-31 DIAGNOSIS — T45.1X5A CHEMOTHERAPY INDUCED NEUTROPENIA (HCC): ICD-10-CM

## 2021-03-31 DIAGNOSIS — D70.1 CHEMOTHERAPY INDUCED NEUTROPENIA (HCC): ICD-10-CM

## 2021-03-31 PROCEDURE — 96372 THER/PROPH/DIAG INJ SC/IM: CPT

## 2021-03-31 PROCEDURE — 96360 HYDRATION IV INFUSION INIT: CPT

## 2021-03-31 RX ORDER — SODIUM CHLORIDE 9 MG/ML
20 INJECTION, SOLUTION INTRAVENOUS ONCE
Status: CANCELLED | OUTPATIENT
Start: 2021-04-14

## 2021-03-31 RX ADMIN — SODIUM CHLORIDE 500 ML: 0.9 INJECTION, SOLUTION INTRAVENOUS at 11:35

## 2021-03-31 RX ADMIN — TBO-FILGRASTIM 300 MCG: 300 INJECTION, SOLUTION SUBCUTANEOUS at 12:30

## 2021-03-31 NOTE — PROGRESS NOTES
Received message from Antoni Francisco, RN at [facility] infusion  Patient's ANC 0 86 and patient complaining of dizziness and is asking if she can get IV hydration  Reviewed with Dr Echo Sanchez and Alvino Blanchard  Per Dr Echo Sanchez treatment to be deferred by one week and patient to get granix today  Per Dr Echo Sanchez, Granix to be added to treatment plan after each treatment  Per Alvino Blanchard patient can have 500cc hydration  Therapy plans added and treamtment plan updated   Antoni Francisco RN updated with plan and will help get patient rescheduled

## 2021-03-31 NOTE — PROGRESS NOTES
Patient presents to infusion for chemo  Blood work from 3/28/21 ANC low at 0 84  Patient had repeat CBC yesterday as advised by Dr Alonso Nash office with [address] only improved to 0 86  Patient c/o dizziness and admits to not decreasing her caffeine intake and not drinking enough water  BP today 140/70  Patient requests IV hydration to help with dizziness  Spoke to Ai, Dr Haro Devoid would like to defer chemo one week and give patient granix today  Granix will also be added to future chemo treatments day after chemo  They will also give 500 mls of NSS today  Patient upset that  left work to bring her here today and she is not getting chemo  Patient educated on risk of infection and side effects of Granix including possible bone pain  Schedule for future appointments changed according to new schedule with today's deferment at [facility]

## 2021-03-31 NOTE — PLAN OF CARE
Problem: Potential for Falls  Goal: Patient will remain free of falls  Description: INTERVENTIONS:  - Assess patient frequently for physical needs  -  Identify cognitive and physical deficits and behaviors that affect risk of falls    -  [place] fall precautions as indicated by assessment   - Educate patient/family on patient safety including physical limitations  - Instruct patient to call for assistance with activity based on assessment  - Modify environment to reduce risk of injury  - Consider OT/PT consult to assist with strengthening/mobility  Outcome: Progressing

## 2021-04-01 ENCOUNTER — CONSULT (OUTPATIENT)
Dept: PALLIATIVE MEDICINE | Facility: CLINIC | Age: 66
End: 2021-04-01
Payer: COMMERCIAL

## 2021-04-01 ENCOUNTER — TELEPHONE (OUTPATIENT)
Dept: PALLIATIVE MEDICINE | Facility: CLINIC | Age: 66
End: 2021-04-01

## 2021-04-01 VITALS
WEIGHT: 132.94 LBS | HEART RATE: 72 BPM | DIASTOLIC BLOOD PRESSURE: 80 MMHG | TEMPERATURE: 98.4 F | SYSTOLIC BLOOD PRESSURE: 122 MMHG | OXYGEN SATURATION: 99 % | BODY MASS INDEX: 29.49 KG/M2 | RESPIRATION RATE: 18 BRPM

## 2021-04-01 DIAGNOSIS — G35 MULTIPLE SCLEROSIS (HCC): ICD-10-CM

## 2021-04-01 DIAGNOSIS — F41.9 ANXIETY DISORDER, UNSPECIFIED TYPE: ICD-10-CM

## 2021-04-01 DIAGNOSIS — J44.9 CHRONIC OBSTRUCTIVE PULMONARY DISEASE, UNSPECIFIED COPD TYPE (HCC): ICD-10-CM

## 2021-04-01 DIAGNOSIS — G25.81 RESTLESS LEGS SYNDROME (RLS): ICD-10-CM

## 2021-04-01 DIAGNOSIS — H93.13 TINNITUS OF BOTH EARS: ICD-10-CM

## 2021-04-01 DIAGNOSIS — M48.00 SPINAL STENOSIS, UNSPECIFIED SPINAL REGION: ICD-10-CM

## 2021-04-01 DIAGNOSIS — C77.9 REGIONAL LYMPH NODE METASTASIS PRESENT (HCC): ICD-10-CM

## 2021-04-01 DIAGNOSIS — R11.0 NAUSEA: ICD-10-CM

## 2021-04-01 DIAGNOSIS — G89.3 CANCER RELATED PAIN: ICD-10-CM

## 2021-04-01 DIAGNOSIS — F32.A DEPRESSION, UNSPECIFIED DEPRESSION TYPE: ICD-10-CM

## 2021-04-01 DIAGNOSIS — C34.91 SQUAMOUS CELL LUNG CANCER, RIGHT (HCC): Primary | ICD-10-CM

## 2021-04-01 PROCEDURE — 99244 OFF/OP CNSLTJ NEW/EST MOD 40: CPT | Performed by: NURSE PRACTITIONER

## 2021-04-01 RX ORDER — DIPHENHYDRAMINE HCL 25 MG
25 TABLET ORAL EVERY 8 HOURS PRN
Qty: 30 TABLET | Refills: 0 | Status: SHIPPED | OUTPATIENT
Start: 2021-04-01 | End: 2022-05-19 | Stop reason: ALTCHOICE

## 2021-04-01 RX ORDER — LIDOCAINE AND PRILOCAINE 25; 25 MG/G; MG/G
CREAM TOPICAL AS NEEDED
Qty: 30 G | Refills: 0 | Status: SHIPPED | OUTPATIENT
Start: 2021-04-01 | End: 2022-05-19 | Stop reason: ALTCHOICE

## 2021-04-01 NOTE — PROGRESS NOTES
Outpatient Consultation - Palliative and Supportive Care   Kemi Cleary 74 y o  female [id number]    Assessment & Plan  Problem List Items Addressed This Visit        Respiratory    Squamous cell lung cancer, right ([address] ) - Primary    Relevant Medications    lidocaine-prilocaine (EMLA) cream    diphenhydrAMINE (BENADRYL) 25 mg tablet    Chronic obstructive pulmonary disease (HCC)    Relevant Medications    diphenhydrAMINE (BENADRYL) 25 mg tablet       Nervous and Auditory    Multiple sclerosis (HCC)       Other    Tinnitus of both ears    Spinal stenosis    Restless legs syndrome (RLS)    Depression    Regional lymph node metastasis present (HCC)    Anxiety disorder    Relevant Medications    diphenhydrAMINE (BENADRYL) 25 mg tablet      Other Visit Diagnoses     Cancer related pain        Relevant Medications    lidocaine-prilocaine (EMLA) cream    Nausea        Relevant Medications    diphenhydrAMINE (BENADRYL) 25 mg tablet        - Counseling on health screening and disease prevention, COVID-19 specific (CPT V65 49)    - Stop all NSAIDS    - May take Tylenol, 1000mg Q8H PRN PO for mild pain    - [place] on state website for MMJ    Medications adjusted this encounter:  Requested Prescriptions     Signed Prescriptions Disp Refills    lidocaine-prilocaine (EMLA) cream 30 g 0     Sig: Apply topically as needed for mild pain Apply to port site before accessing   diphenhydrAMINE (BENADRYL) 25 mg tablet 30 tablet 0     Sig: Take 1 tablet (25 mg total) by mouth every 8 (eight) hours as needed (Nausea)     No orders of the defined types were placed in this encounter  Medications Discontinued During This Encounter   Medication Reason    ibuprofen (MOTRIN) 600 mg tablet Drug interaction    traZODone (DESYREL) 150 mg tablet Non-compliance       PPS: [id number] was seen today for symptoms and planning cares related to above illnesses    Above orders were sent electronically, or provided in hardcopy in clinic  I have reviewed the patient's controlled substance dispensing history in the Prescription Drug Monitoring Program in compliance with the [facility] regulations before prescribing any controlled substances  We appreciate the referral, and wish for her to continue to follow with us  If there are questions or concerns, please contact us through our clinic/answering service 24 hours a day, seven days a week  NURIS Daugherty  [facility] Palliative [facility] Supportive [facility]          Visit Information    Accompanied By: Osmin Canas    Source of History: Patient    History Limitations: Medical Knowledge    Contacts:Spouse, Art - [phone]    History of Present Illness      Tomy Cooley is a 74 y o  female who presents as a referral from [facility] On[facility] for primary palliative diagnosis of Lung Cancer  Consultation is requested for: symptom management, goal of care assessment and decisional support, disease process education and discussion of prognosis, advance care planning, emotional support in the setting of serious illness  Tara Lopez is a 74year old woman who was recently diagnosed with squamous cell carcinoma of the right lung  She follows with Dr Robert Fiedl of Oncology and is receiving Gemzar and Carboplatin treatments  She has 10 weeks of treatment left and was unable to receive treatment this week due to her blood counts  Granix was added to her regimen yesterday  Tara Lopez presents to our office with specific complaints of anxiety, nausea, difficulty sleeping, and weight gain  She offers several questions regarding her treatment which I have answered to the best of my ability  I have reached out to Dr Cassandra Mckeon office regarding Dyazide administration in the setting of her dehydration with Britni's permission  Anxiety is causing a decrease in quality of life  She finds that looking at her calender is exacerbating her anxiety    She is fearful of trying any of the anxiolytics offered (Ativan, Xana, Klonopin, Valium) due to the risks of overdose and dependence  She will consider these medications if needed but is more agreeable to trialing MMJ  I discussed this with her in great detail and provided the state MMJ pamphlet  She will take it home and follow-up with our Dr Willy Guerra  Her nausea is no longer well controlled by Compazine  Zofran also not effective  Nausea is often during the night, accompanies dry heaves without vomiting  Dizziness is not necessarily associated with nausea and comes without warning  She feels that she is dehydrated and received IV hydration at her last cancer treatment  She has difficulty sleeping, both falling asleep and staying asleep  This may be related to anxiety per her report  Does not take Trazadone anymore as she does not like taking medication  Appetite is still good  Gaining weight recently  Has a mild backache today but it is subsiding  Her port is bothering her and her right rib cage has been sore since her surgery on January 28  He reports this to Dr Osmani Reyna who felt the site was healing  She is not interested in prescription for opioids  Her spouse, Art, is supportive and visits daily  He is available to her for support should she need it  They are  but friendly  She also has neighbors whom she can rely on for help  She does feel safe when at home alone and is functional with ADLs  She is retired  She has an adult daughter who lives in [place] that she does not speak to often  Discussed Five Wishes document  She will take it home and review it with her       Pertinent Palliative Care Domains    Physical Symptoms:ambulates    Psychological Symptoms:severe anxiety    Social Aspects: support system , neighbors      Code Status: full  Advance Directive and Living Will:    Five Wishes document pending    Historical Data  Past Medical History:   Diagnosis Date    Anxiety     [facility]     lung    COPD (chronic obstructive pulmonary disease) (HCC)     GERD (gastroesophageal reflux disease)     Hyperlipidemia     Hypertension     Pulmonary emphysema (HCC)     Restless leg syndrome      Past Surgical History:   Procedure Laterality Date    AUGMENTATION MAMMAPLASTY Bilateral 11/2014    retro-pectoral silicone    BREAST BIOPSY Left 03/23/2015    u/s core bx    BREAST BIOPSY Left 08/02/2010    high-risk lesion    BREAST EXCISIONAL BIOPSY Left 08/26/2010    high-risk lesion    BREAST EXCISIONAL BIOPSY Left 1994    BREAST EXCISIONAL BIOPSY Right 1984    FL GUIDED CENTRAL VENOUS ACCESS DEVICE INSERTION  3/9/2021    VT [address] N/A 1/28/2021    Procedure: BRONCHOSCOPY FLEXIBLE;  Surgeon: Daisy Felton MD;  [facility];  Service: Thoracic    VT INSJ TUNNELED CTR VAD W/SUBQ PORT AGE 5 YR/> N/A 3/9/2021    Procedure: INSERTION VENOUS PORT ( PORT-A-CATH) IR;  Surgeon: Jeffery Churchill DO;  [facility];  Service: Interventional Radiology    VT THORACOSCOPY SURG LOBECTOMY Right 1/28/2021    Procedure: RIGHT VATS UPPER LOBECTOMY ; MEDIASTINAL LYMPH NODE DISSECTION; right lower lobe bleb resection  ;  Surgeon: Daisy Felton MD;  [facility];  Service: Thoracic    TONSILLECTOMY      TOTAL ABDOMINAL HYSTERECTOMY  2010    age 60    TOTAL ABDOMINAL HYSTERECTOMY W/ BILATERAL SALPINGOOPHORECTOMY Bilateral 2010    age 60     Social History     Socioeconomic History    Marital status: /Civil Union     Spouse name: Not on file    Number of children: Not on file    Years of education: Not on file    Highest education level: Not on file   Occupational History    Not on file   Social Needs    Financial resource strain: Not on file    Food insecurity     Worry: Not on file     Inability: Not on file    Transportation needs     Medical: Not on file     Non-medical: Not on file   Tobacco Use    Smoking status: Former Smoker Packs/day: 0 25     Years: 30 00     Pack years: 7 50     Types: Cigarettes     Start date: 5     Quit date: 1/15/2021     Years since quittin 2    Smokeless tobacco: Never Used    Tobacco comment: quit 2021  NICORETTE GUM SUBSTITUE CURRENTLY   Substance and Sexual Activity    Alcohol use:  Yes     Alcohol/week: 7 0 standard drinks     Types: 7 Glasses of wine per week     Frequency: 4 or more times a week     Drinks per session: 1 or 2     Comment: wine @      Drug use: Never    Sexual activity: Not on file   Lifestyle    Physical activity     Days per week: Not on file     Minutes per session: Not on file    Stress: Not on file   Relationships    Social connections     Talks on phone: Not on file     Gets together: Not on file     Attends Nondenominational service: Not on file     Active member of club or organization: Not on file     Attends meetings of clubs or organizations: Not on file     Relationship status: Not on file    Intimate partner violence     Fear of current or ex partner: Not on file     Emotionally abused: Not on file     Physically abused: Not on file     Forced sexual activity: Not on file   Other Topics Concern    Not on file   Social History Narrative    Not on file     Family History   Problem Relation Age of Onset    No Known Problems Mother     COPD Father     Hypertension Father     No Known Problems Sister     No Known Problems Daughter     No Known Problems Maternal Grandmother     No Known Problems Maternal Grandfather     No Known Problems Paternal Grandmother     Rectal cancer Paternal Grandfather         unknown age   Wash Caller No Known Problems Sister     No Known Problems Maternal Aunt     Breast cancer Paternal Aunt         unknown age   Wash Caller No Known Problems Paternal Aunt     No Known Problems Paternal Aunt     No Known Problems Paternal Aunt     No Known Problems Paternal Aunt     No Known Problems Paternal Aunt     No Known Problems Paternal Aunt     COPD Brother      Allergies   Allergen Reactions    Sulfa Antibiotics Hives     Current Outpatient Medications   Medication Sig Dispense Refill    Biotin 5 MG CAPS Take by mouth daily       buPROPion (WELLBUTRIN XL) 300 mg 24 hr tablet Take 300 mg by mouth every morning       Cholecalciferol (Vitamin D3) 50 MCG (2000 UT) capsule Take 5,000 Units by mouth daily       CRANIAL PROSTHESIS, RX, One wig, as needed 1 Piece 0    famotidine (PEPCID) 40 MG tablet Take 40 mg by mouth every morning       prochlorperazine (COMPAZINE) 10 mg tablet Take 1 tablet (10 mg total) by mouth every 8 (eight) hours as needed for nausea or vomiting 30 tablet 0    simvastatin (ZOCOR) 40 mg tablet Take 40 mg by mouth every other day      triamterene-hydrochlorothiazide (DYAZIDE) 37 5-25 mg per capsule Take 1 capsule by mouth every morning      umeclidinium-vilanterol (Anoro Ellipta) 62 5-25 MCG/INH inhaler Inhale 1 puff daily       diphenhydrAMINE (BENADRYL) 25 mg tablet Take 1 tablet (25 mg total) by mouth every 8 (eight) hours as needed (Nausea) 30 tablet 0    lidocaine-prilocaine (EMLA) cream Apply topically as needed for mild pain Apply to port site before accessing  30 g 0    ondansetron (ZOFRAN) 4 mg tablet Take 1 tablet (4 mg total) by mouth every 6 (six) hours as needed for nausea or vomiting (Patient not taking: Reported on 4/1/2021) 30 tablet 1    pramipexole (MIRAPEX) 0 5 mg tablet Take 0 5 mg by mouth daily at bedtime        No current facility-administered medications for this visit  Review of Systems   Constitution: Positive for weight gain  Negative for decreased appetite, fever, malaise/fatigue and night sweats  HENT: Negative for hoarse voice  Eyes: Negative for pain  Cardiovascular: Negative for chest pain  Respiratory: Negative for cough  Endocrine: Negative for polydipsia  Skin: Negative for rash  Musculoskeletal: Positive for back pain          Right rib cage pain   Gastrointestinal: Positive for nausea  Negative for anorexia and vomiting  Neurological: Negative for seizures  Psychiatric/Behavioral: The patient is nervous/anxious  Vital Signs    /80 (BP Location: Left arm, Patient Position: Sitting, Cuff Size: Standard)   Pulse 72   Temp 98 4 °F (36 9 °C) (Temporal)   Resp 18   Wt 60 3 kg (132 lb 15 oz)   SpO2 99%   BMI 29 49 kg/m²     Physical Exam and Objective Data  Physical Exam  Constitutional:       Appearance: Normal appearance  HENT:      Head: Normocephalic  Right Ear: External ear normal       Left Ear: External ear normal       Nose: No congestion  Eyes:      Pupils: Pupils are equal, round, and reactive to light  Neck:      Musculoskeletal: Neck supple  Cardiovascular:      Rate and Rhythm: Normal rate and regular rhythm  Pulmonary:      Effort: Pulmonary effort is normal       Breath sounds: No stridor  Abdominal:      General: There is no distension  Tenderness: There is no guarding  Musculoskeletal:         General: No swelling  Skin:     General: Skin is warm and dry  Comments: Healing incision over port site   Neurological:      General: No focal deficit present  Mental Status: She is alert and oriented to person, place, and time  Psychiatric:         Behavior: Behavior normal          Thought Content:  Thought content normal          Judgment: Judgment normal       Comments: Anxiety           Radiology and Laboratory:  I personally reviewed and interpreted the following results: WBC and absolute neutrophils to explain to patient     60 minutes was spent face to face with Baldomero Baird and her  with greater than 50% of the time spent in counseling or coordination of care including discussions of etiology of diagnosis, pathogenesis of diagnosis, prognosis of diagnosis, diagnostic results, impression, and recommendations, risks and benefits of treatment, instructions for disease self management, treatment instructions, follow up requirements, risk factors and risk reduction of disease, patient and family counseling/involvement in care and compliance with treatment regimen  Additional time was also spent in discussing coronavirus vaccine indications, availability, and logistical challenges  All of the patient's questions were answered during this discussion

## 2021-04-01 NOTE — TELEPHONE ENCOUNTER
Prior authorization for pt's Lidocaine - prilocaine 2 5- 2 5%  has been initiated  Via CMM and sent along with last office note  [facility] [id number]  Ph 5     [id number] determination

## 2021-04-01 NOTE — TELEPHONE ENCOUNTER
Prior Authorization needed for Lidocaine-Prilocaine 2 5-2 5% cream    [facility]: [employer] [phone]

## 2021-04-01 NOTE — PATIENT INSTRUCTIONS
Benedryl, 25 mg up to 3 times daily for nausea  May take up to 50 mg at a time the next day  Monitor for sleep  Do not take Aleve, Motrin, Ibuprofen, Advil    May take Tylenol up to 1000mg three times a day  Call us if any symptoms are uncontrolled or with any questions  Call us when you have your registration number for [facility] to make an appointment with Dr Anshul Castaneda

## 2021-04-02 ENCOUNTER — TELEPHONE (OUTPATIENT)
Dept: PALLIATIVE MEDICINE | Facility: CLINIC | Age: 66
End: 2021-04-02

## 2021-04-02 NOTE — TELEPHONE ENCOUNTER
Medical Marijuana Pre-Visit Screening for Palliative & Supportive Care    Referral Source: Dr Yoselyn Neal office  Diagnosis: abnormal cancer of lung  Is the diagnosis an approved serious medical condition as outlined by [place] Act 16: yes  History/Symptoms: sleeplessness, nausea, pain in legs,     Does the patient's diagnosis fall within the current scope of our Palliative & Supportive Care practice: yes  Does the patient intend to use MMJ with palliative intent: Yes    Does the patient currently have a signed controlled substances contract with another provider: No  Is the patient a resident of [place] with a valid state ID or 's license: Yes  Has the patient registered on the [address] [place] [address]  website: Yes  [email or web address]     Prior to any scheduled visit, the patient has been informed of the following:  · [facility] providers are knowledgeable about many ways to help people  A visit to discuss MMJ does not mean that the provider agrees that this is the best way to help you and may make other recommendations  · There is an expectation and requirement by the [place] MMJ law for continuity of care if your certification is completed  · You will be expected to sign an informed consent  · A PDMP report will be reviewed before your visit  · This may effect your ability to purchase a handgun  · Medical marijuana products are not covered by insurance  The dispensaries do not accept credit cards  · The certification does not exempt you from any employer based drug screening programs and may effect your ability to participate in federally funded programs  · [facility] does not allow for medical marijuana possession or use at any of it's inpatient facilities  If it is brought it you will be asked to send it home with a designated representative (friend or family member)    If not one is available to take the product(s) home they will be stored in a secure location until you are discharged    · Please spend time becoming familiar with the information on the website before your visit: Chandler ayers    Date of scheduled visit: 4/5/21 at 2:20 with Dr Kristine Gooden

## 2021-04-02 NOTE — TELEPHONE ENCOUNTER
instructed pt to try Lidocaine 4% otc cream  Report effectiveness  Pt called her pcp to report instructions to change antihypertensive med re diuretic  pcp discontinued Dyazide  Pt voices concern that her bp will get high again  Instructed pt that BP will be checked at upcoming office visits  If BP begins to creep up she can call her PCP

## 2021-04-04 PROBLEM — Z79.899 MEDICAL MARIJUANA USE: Status: ACTIVE | Noted: 2021-04-04

## 2021-04-04 PROBLEM — Z51.5 PALLIATIVE CARE PATIENT: Status: ACTIVE | Noted: 2021-04-04

## 2021-04-05 ENCOUNTER — TELEPHONE (OUTPATIENT)
Dept: HEMATOLOGY ONCOLOGY | Facility: CLINIC | Age: 66
End: 2021-04-05

## 2021-04-05 ENCOUNTER — APPOINTMENT (OUTPATIENT)
Dept: LAB | Facility: CLINIC | Age: 66
End: 2021-04-05
Payer: COMMERCIAL

## 2021-04-05 ENCOUNTER — TRANSCRIBE ORDERS (OUTPATIENT)
Dept: LAB | Facility: CLINIC | Age: 66
End: 2021-04-05

## 2021-04-05 ENCOUNTER — OFFICE VISIT (OUTPATIENT)
Dept: PALLIATIVE MEDICINE | Facility: CLINIC | Age: 66
End: 2021-04-05
Payer: COMMERCIAL

## 2021-04-05 VITALS
HEART RATE: 88 BPM | OXYGEN SATURATION: 98 % | RESPIRATION RATE: 18 BRPM | BODY MASS INDEX: 30.37 KG/M2 | TEMPERATURE: 98.7 F | HEIGHT: 56 IN | SYSTOLIC BLOOD PRESSURE: 146 MMHG | DIASTOLIC BLOOD PRESSURE: 72 MMHG | WEIGHT: 135 LBS

## 2021-04-05 DIAGNOSIS — G89.3 CANCER RELATED PAIN: ICD-10-CM

## 2021-04-05 DIAGNOSIS — Z51.5 PALLIATIVE CARE PATIENT: ICD-10-CM

## 2021-04-05 DIAGNOSIS — H93.13 TINNITUS OF BOTH EARS: ICD-10-CM

## 2021-04-05 DIAGNOSIS — G35 MULTIPLE SCLEROSIS (HCC): ICD-10-CM

## 2021-04-05 DIAGNOSIS — D70.1 CHEMOTHERAPY INDUCED NEUTROPENIA (HCC): ICD-10-CM

## 2021-04-05 DIAGNOSIS — F41.9 ANXIETY DISORDER, UNSPECIFIED TYPE: ICD-10-CM

## 2021-04-05 DIAGNOSIS — C34.91 SQUAMOUS CELL LUNG CANCER, RIGHT (HCC): Primary | ICD-10-CM

## 2021-04-05 DIAGNOSIS — G47.01 INSOMNIA DUE TO MEDICAL CONDITION: ICD-10-CM

## 2021-04-05 DIAGNOSIS — M48.00 SPINAL STENOSIS, UNSPECIFIED SPINAL REGION: ICD-10-CM

## 2021-04-05 DIAGNOSIS — Z79.899 MEDICAL MARIJUANA USE: ICD-10-CM

## 2021-04-05 DIAGNOSIS — C34.91 SQUAMOUS CELL LUNG CANCER, RIGHT (HCC): ICD-10-CM

## 2021-04-05 DIAGNOSIS — F32.A DEPRESSION, UNSPECIFIED DEPRESSION TYPE: ICD-10-CM

## 2021-04-05 DIAGNOSIS — J44.9 CHRONIC OBSTRUCTIVE PULMONARY DISEASE, UNSPECIFIED COPD TYPE (HCC): ICD-10-CM

## 2021-04-05 DIAGNOSIS — T45.1X5A CHEMOTHERAPY INDUCED NEUTROPENIA (HCC): ICD-10-CM

## 2021-04-05 DIAGNOSIS — K21.9 GASTROESOPHAGEAL REFLUX DISEASE, UNSPECIFIED WHETHER ESOPHAGITIS PRESENT: ICD-10-CM

## 2021-04-05 DIAGNOSIS — C77.9 REGIONAL LYMPH NODE METASTASIS PRESENT (HCC): ICD-10-CM

## 2021-04-05 DIAGNOSIS — G25.81 RESTLESS LEGS SYNDROME (RLS): ICD-10-CM

## 2021-04-05 PROCEDURE — 3078F DIAST BP <80 MM HG: CPT | Performed by: INTERNAL MEDICINE

## 2021-04-05 PROCEDURE — 99214 OFFICE O/P EST MOD 30 MIN: CPT | Performed by: INTERNAL MEDICINE

## 2021-04-05 PROCEDURE — 3077F SYST BP >= 140 MM HG: CPT | Performed by: INTERNAL MEDICINE

## 2021-04-05 NOTE — TELEPHONE ENCOUNTER
Prior Authorization needed for Lidocaine-Prilocaine Cream 30GM    ID# [id number]    Phone: [phone]

## 2021-04-05 NOTE — TELEPHONE ENCOUNTER
Patient calling to check if it is ok for her to have her blood work drawn today for chemo on Wednesday  Patient is already going to be at the [facility] today for another appointment  She does not want to have to drive back tomorrow to do her blood work  I explained she can have labs drawn today    Patient verbalized understanding

## 2021-04-05 NOTE — PATIENT INSTRUCTIONS
It was a pleasure to meet you today  Thank you for coming in  · We discussed medical marijuana today and I have issued a certification for its use  You should expect an e-mail from the [place] Dept of Health verifying this  · You and I reviewed the J informed consent document today  We are providing you with a copy of the signed document  · List of dispensaries provided  I recommend calling your preferred dispensary to set up an appointment with the dispensary pharmacist to review symptoms and product options  · Symptoms to discuss: anxiousness, cancer-related pain, nausea  · Avoid inhaled products  · Return in about 6 months  · Call us for refills on medications that we supply, as needed  · If something changes and you need to come in sooner, please call our office  PRESCRIPTION REFILL REMINDER:  All medication refills should be requested prior to [facility] on Friday  Any refill requests after noon on Friday would be addressed the following Monday  Please protect yourself from the novel Coronavirus (COVID-19)! The numbers of cases of Coronavirus are spiking in [address]  This is not a more dangerous virus, but a sign that more people in a community are spreading the virus  Please check the local disease reports near you if you consider travelling this summer  We do not advise travel to any community or State with a rising viral caseload   Wash your hands! Soap and water, or hand  with at least 60% alcohol, are both effective at killing the virus   Wear a mask! This will help protect others from any virus particles you might spread  Your mouth and nose BOTH need to be covered   Keep the distance! Keep 6 feet of distance from others people, even if they seem healthy  Keeping distance protects you from the other person's virus spread   Get a vaccine! The [employer] are approved for emergency use in the [place]    These vaccines have been shown to be 90+% effective at preventing severe infections when combined with masking, hand-washing, and distancing  As of 1/26/2021, the following priority groups may get either vaccine:  o nursing home residents and staff  o front-line health workers  o ALL persons over age 72 (ages 76 and up can be seen at [facility])  o ANY person over age 12 that has a qualifying risk factor, such as diabetes, lung disease, or obesity    Please use  the following website  to check your eligibility in [place]:   [employer]    If you are under age 72, but still qualify, you may get a shot from many other locations outside [facility]: [facility], [employer], [facility], [facility]    We are recommending that ALL our patients get two shots of either vaccine, as early as it is available to them  Please keep an eye on the [employer], [address] [place] [address], or call 9-125-GQAPDXK to see when you will become eligible  If you are eligible by the criteria above, please ask our team to get you a shot! Numbers of Coronavirus cases are spiking in many [place] States  This is not a more dangerous virus, but a sign that more people in a community are spreading the virus  Please check the local disease reports near you if you consider travelling  As of 1/25/21, we do NOT advise travel outside the [facility] ([place] or [place])  Check out [employer] for [place] data that are updated daily:   [employer] aliyah   Global Epidemics from [facility]), will give you gysyve-[id number] information on virus cases:   https://globalepidemics  org/

## 2021-04-05 NOTE — PROGRESS NOTES
Follow-up with Palliative and [address]  74 y o  female [id number]    ASSESSMENT & PLAN:  1  Squamous cell lung cancer, right ([facility] [address] )    2  Regional lymph node metastasis present ([facility] )    3  Chronic obstructive pulmonary disease, unspecified COPD type ([facility] )    4  Multiple sclerosis ([facility] )    5  Tinnitus of both ears    6  Spinal stenosis, unspecified spinal region    7  Restless legs syndrome (RLS)    8  Anxiety disorder, unspecified type    9  Depression, unspecified depression type    10  Gastroesophageal reflux disease, unspecified whether esophagitis present    11  Cancer related pain    12  Insomnia due to medical condition    13  Medical marijuana use    14  Palliative care patient           The patient qualifies for use of MMJ in the [facility] by having the following medical condition: cancer, multiple sclerosis   From a palliative care standpoint the patient is suffering with insomnia, cancer-related pain, nausea, anxiousness  These symptoms and side effects might be alleviated with use of MMJ products  The patient registered online  The patient read and I reviewed the informed consent document with the patient  I answered all questions related to it before the patient signed it  The patient's medical certification was completed on this date  The patient was given a signed copy of the informed consent and medical certification   I issued a certification for MMJ use with palliative intent, to help alleviate cancer-related and MS-related symptoms and cancer-treatment-related side effects  I do not endorse the belief that MMJ can treat cancer and strongly encouraged the patient to continue treatments and surveillance as recommend by cancer specialists   Emotional support provided     Medication safety issues addressed - no driving under the influence of narcotics, watch for adverse effects including AMS and respiratory depression, keep medications stored in a safe/locked environment  Requested Prescriptions      No prescriptions requested or ordered in this encounter       There are no discontinued medications  Representatives have queried the patient's controlled substance dispensing history in the Prescription Drug Monitoring Program in compliance with regulations before I have prescribed any controlled substances  The prescription history is consistent with prescribed therapy and our practice policies  40 minutes were spent face to face with patient with greater than 50% of the time spent in counseling or coordination of care including discussions of symptom assessment and management, medication review, psychosocial support, chart review, imaging review, advanced directives and medical marijuana  All of the patient's questions were answered during this discussion  Return in about 6 months (around 10/5/2021)  SUBJECTIVE:  Chief Complaint   Patient presents with    Follow-up     MMJ     Cancer    Cancer Pain    Anxiety    Insomnia    Multiple Sclerosis    Nausea        HPI    Rose Mary Dwyer is a 74 y o  female w/ SCC of the right lung metastatic to lymph node, multiple sclerosis, COPD, tinnitus, spinal stenosis, RLS, depression + anxiety, cancer-related pain, nausea  She follows w/ Dr Yaima Jerry (Medical Oncology)  Patient is known to [facility] clinic; seen by Maricel LAWLER 4/1/21 for symptom management  Visit today for [facility] certification  Patient has strong aversions to taking medications such as opioids and benzodiazepines  She would like to try more natural medications for symptom relief, including medical marijuana, but she is hesitant to start this as well given potential side effects (including euphoria)  Counseled at length  Patient is hopeful that MMJ might help alleviate anxiousness, insomnia, nausea, and cancer-related pain  Patient states she is only sleeping one hour at a time, likely d/t anxiousness   Anxiety continues to decrease her QOL, and is heightened when she thinks about needing to continue treatments through August 2021 (at least), and setbacks along her treatment path  PRN medications are helpful for nausea  Pain is noted in her back, R rib cage, and area around her chemoport  PDMP shows no concerns  The following portions of the medical history were reviewed: past medical history, problem list, medication list, and social history  Current Outpatient Medications:     Biotin 5 MG CAPS, Take by mouth daily , Disp: , Rfl:     buPROPion (WELLBUTRIN XL) 300 mg 24 hr tablet, Take 300 mg by mouth every morning , Disp: , Rfl:     Cholecalciferol (Vitamin D3) 50 MCG (2000 UT) capsule, Take 5,000 Units by mouth daily , Disp: , Rfl:     CRANIAL PROSTHESIS, RX,, One wig, as needed, Disp: 1 Piece, Rfl: 0    diphenhydrAMINE (BENADRYL) 25 mg tablet, Take 1 tablet (25 mg total) by mouth every 8 (eight) hours as needed (Nausea), Disp: 30 tablet, Rfl: 0    famotidine (PEPCID) 40 MG tablet, Take 40 mg by mouth every morning , Disp: , Rfl:     pramipexole (MIRAPEX) 0 5 mg tablet, Take 0 5 mg by mouth daily at bedtime , Disp: , Rfl:     prochlorperazine (COMPAZINE) 10 mg tablet, Take 1 tablet (10 mg total) by mouth every 8 (eight) hours as needed for nausea or vomiting, Disp: 30 tablet, Rfl: 0    simvastatin (ZOCOR) 40 mg tablet, Take 40 mg by mouth every other day, Disp: , Rfl:     umeclidinium-vilanterol (Anoro Ellipta) 62 5-25 MCG/INH inhaler, Inhale 1 puff daily , Disp: , Rfl:     lidocaine-prilocaine (EMLA) cream, Apply topically as needed for mild pain Apply to port site before accessing   (Patient not taking: Reported on 4/5/2021), Disp: 30 g, Rfl: 0    ondansetron (ZOFRAN) 4 mg tablet, Take 1 tablet (4 mg total) by mouth every 6 (six) hours as needed for nausea or vomiting (Patient not taking: Reported on 4/1/2021), Disp: 30 tablet, Rfl: 1    triamterene-hydrochlorothiazide (DYAZIDE) 37 5-25 mg per capsule, Take 1 capsule by mouth every morning, Disp: , Rfl:     Review of Systems   Constitutional: Positive for activity change, fatigue and unexpected weight change (weight gain)  Negative for appetite change  HENT: Negative for trouble swallowing  Eyes: Negative for pain and redness  Respiratory: Negative for shortness of breath  Gastrointestinal: Positive for nausea  Negative for abdominal pain  Endocrine: Negative for polydipsia and polyphagia  Musculoskeletal:        R thoracic pain   Allergic/Immunologic: Positive for immunocompromised state  Neurological: Negative for facial asymmetry  Psychiatric/Behavioral: Positive for dysphoric mood and sleep disturbance  The patient is nervous/anxious  OBJECTIVE:  /72 (BP Location: Right arm, Patient Position: Sitting, Cuff Size: Standard)   Pulse 88   Temp 98 7 °F (37 1 °C) (Temporal)   Resp 18   Ht 4' 8 3" (1 43 m)   Wt 61 2 kg (135 lb)   SpO2 98%   BMI 29 94 kg/m²   Vital signs reviewed  Physical Exam:  Constitutional: Chronically ill-appearing elderly female  Appears well-developed and well-nourished  In no acute physical distress  Head: Normocephalic and atraumatic  Eyes: EOM are normal  No ocular discharge  No scleral icterus  Neck: No visible adenopathy or masses  Respiratory: Effort normal  No stridor  No respiratory distress  Gastrointestinal: No abdominal distension  Musculoskeletal: No edema  Neurological: Alert, oriented and appropriately conversant  No focal deficits  Follows commands appropriately  Skin: No diaphoresis, no rashes seen on exposed areas of skin  Psychiatric: Anxious at times, tearful at times  Behavior, judgment and thought content appear normal      Juanita Dash MD  [facility]  [phone]    Portions of this document may have been created using dictation software and as such some "sound alike" terms may have been generated by the system   Do not hesitate to contact me with any questions or clarifications

## 2021-04-07 ENCOUNTER — HOSPITAL ENCOUNTER (OUTPATIENT)
Dept: INFUSION CENTER | Facility: CLINIC | Age: 66
Discharge: HOME | End: 2021-04-07
Payer: COMMERCIAL

## 2021-04-07 VITALS
DIASTOLIC BLOOD PRESSURE: 80 MMHG | RESPIRATION RATE: 18 BRPM | OXYGEN SATURATION: 99 % | TEMPERATURE: 99.1 F | HEART RATE: 70 BPM | SYSTOLIC BLOOD PRESSURE: 152 MMHG | BODY MASS INDEX: 30.14 KG/M2 | WEIGHT: 134 LBS | HEIGHT: 56 IN

## 2021-04-07 DIAGNOSIS — C34.91 SQUAMOUS CELL LUNG CANCER, RIGHT (HCC): ICD-10-CM

## 2021-04-07 DIAGNOSIS — C77.9 REGIONAL LYMPH NODE METASTASIS PRESENT (HCC): Primary | ICD-10-CM

## 2021-04-07 PROCEDURE — 96417 CHEMO IV INFUS EACH ADDL SEQ: CPT

## 2021-04-07 PROCEDURE — 96413 CHEMO IV INFUSION 1 HR: CPT

## 2021-04-07 PROCEDURE — 96367 TX/PROPH/DG ADDL SEQ IV INF: CPT

## 2021-04-07 RX ORDER — SODIUM CHLORIDE 9 MG/ML
20 INJECTION, SOLUTION INTRAVENOUS ONCE
Status: COMPLETED | OUTPATIENT
Start: 2021-04-07 | End: 2021-04-07

## 2021-04-07 RX ADMIN — GEMCITABINE HYDROCHLORIDE 1520.2 MG: 1 INJECTION, SOLUTION INTRAVENOUS at 10:26

## 2021-04-07 RX ADMIN — CARBOPLATIN 374 MG: 10 INJECTION, SOLUTION INTRAVENOUS at 11:07

## 2021-04-07 RX ADMIN — DEXAMETHASONE SODIUM PHOSPHATE: 10 INJECTION, SOLUTION INTRAMUSCULAR; INTRAVENOUS at 09:23

## 2021-04-07 RX ADMIN — FOSAPREPITANT 150 MG: 150 INJECTION, POWDER, LYOPHILIZED, FOR SOLUTION INTRAVENOUS at 09:51

## 2021-04-07 RX ADMIN — SODIUM CHLORIDE 20 ML/HR: 0.9 INJECTION, SOLUTION INTRAVENOUS at 09:26

## 2021-04-07 NOTE — PROGRESS NOTES
Patient tolerated treatment without incident  Port flushed and de-accessed as per protocol  Patient's appointment time for Granix  Injection tomorrow confirmed  AVS printed and given to patient

## 2021-04-07 NOTE — PROGRESS NOTES
Patient presents today for treatment with Gemzar and Carboplatin  Patient with complaints of dizziness and blurred vision for the past three days  VSS  Patient states she stopped taking BP medication based on recommendation of family doctor  Patient requesting hydration before her chemo after she reports reading about hydration on a cancer support website yesterday  Platelet count noted [phone],000  Spoke with Elizabeth Falcon RN in Dr Neela Alberts office and per Victorina Taylor PA-C, okay to proceed with treatment today with elevated platelet count  No hydration needed today  Encouraged to take anti-anxiety medications as ordered  Patient verbalizing frustration with delay in treatment time today  Explained to patient that the delay is due to the importance of following up with doctors based on symptoms she reported, for her safety  Patient states next time " I just won't tell anyone about anything anymore because I don't like waiting "  Patient educated about importance of disclosing all symptoms to medical staff  Verbalized understanding

## 2021-04-08 ENCOUNTER — PATIENT OUTREACH (OUTPATIENT)
Dept: CASE MANAGEMENT | Facility: HOSPITAL | Age: 66
End: 2021-04-08

## 2021-04-08 ENCOUNTER — HOSPITAL ENCOUNTER (OUTPATIENT)
Dept: INFUSION CENTER | Facility: CLINIC | Age: 66
Discharge: HOME | End: 2021-04-08
Payer: COMMERCIAL

## 2021-04-08 VITALS — TEMPERATURE: 98.1 F

## 2021-04-08 DIAGNOSIS — D70.1 CHEMOTHERAPY INDUCED NEUTROPENIA (HCC): ICD-10-CM

## 2021-04-08 DIAGNOSIS — C77.9 REGIONAL LYMPH NODE METASTASIS PRESENT (HCC): Primary | ICD-10-CM

## 2021-04-08 DIAGNOSIS — T45.1X5A CHEMOTHERAPY INDUCED NEUTROPENIA (HCC): ICD-10-CM

## 2021-04-08 DIAGNOSIS — C34.91 SQUAMOUS CELL LUNG CANCER, RIGHT (HCC): ICD-10-CM

## 2021-04-08 PROCEDURE — 96372 THER/PROPH/DIAG INJ SC/IM: CPT

## 2021-04-08 RX ADMIN — TBO-FILGRASTIM 300 MCG: 300 INJECTION, SOLUTION SUBCUTANEOUS at 12:14

## 2021-04-08 NOTE — PROGRESS NOTES
Patient is here for granix  she offers no complaints at this time  she states she was a little nauseous this am but took her nausea meds and it helped  Patient is afebrile  granix given in right arm and she tolerated it well  next appointment confirmed and she declined avs

## 2021-04-08 NOTE — PROGRESS NOTES
MSW made outreach to pt this day to offer support  Pt was receptive of this call  Pt shared that she was able to meet with Palliative care and started the process for [facility]  She is hopeful this help her with sleeping an anxiety  Pt has transferred her care to the [address] [place] [address], however, this MSW will remain her oncology social worker for the duration of her care  Overall, pt appears to be doing well at this time  She has been doing yard work and continues to walk her dog twice a day  MSW provided support and will continue to follow

## 2021-04-12 ENCOUNTER — APPOINTMENT (OUTPATIENT)
Dept: LAB | Facility: CLINIC | Age: 66
End: 2021-04-12
Payer: COMMERCIAL

## 2021-04-12 DIAGNOSIS — C77.9 REGIONAL LYMPH NODE METASTASIS PRESENT (HCC): ICD-10-CM

## 2021-04-12 DIAGNOSIS — C34.91 SQUAMOUS CELL LUNG CANCER, RIGHT (HCC): ICD-10-CM

## 2021-04-14 ENCOUNTER — HOSPITAL ENCOUNTER (OUTPATIENT)
Dept: INFUSION CENTER | Facility: CLINIC | Age: 66
Discharge: HOME | End: 2021-04-14
Payer: COMMERCIAL

## 2021-04-14 VITALS
DIASTOLIC BLOOD PRESSURE: 72 MMHG | TEMPERATURE: 98.5 F | HEART RATE: 60 BPM | OXYGEN SATURATION: 97 % | SYSTOLIC BLOOD PRESSURE: 156 MMHG | BODY MASS INDEX: 30.26 KG/M2 | WEIGHT: 134.5 LBS | RESPIRATION RATE: 18 BRPM | HEIGHT: 56 IN

## 2021-04-14 DIAGNOSIS — C34.91 SQUAMOUS CELL LUNG CANCER, RIGHT (HCC): ICD-10-CM

## 2021-04-14 DIAGNOSIS — C77.9 REGIONAL LYMPH NODE METASTASIS PRESENT (HCC): Primary | ICD-10-CM

## 2021-04-14 DIAGNOSIS — D70.1 CHEMOTHERAPY INDUCED NEUTROPENIA (HCC): ICD-10-CM

## 2021-04-14 DIAGNOSIS — T45.1X5A CHEMOTHERAPY INDUCED NEUTROPENIA (HCC): ICD-10-CM

## 2021-04-14 PROCEDURE — 96413 CHEMO IV INFUSION 1 HR: CPT

## 2021-04-14 PROCEDURE — 96367 TX/PROPH/DG ADDL SEQ IV INF: CPT

## 2021-04-14 RX ORDER — SODIUM CHLORIDE 9 MG/ML
20 INJECTION, SOLUTION INTRAVENOUS ONCE
Status: COMPLETED | OUTPATIENT
Start: 2021-04-14 | End: 2021-04-14

## 2021-04-14 RX ADMIN — DEXAMETHASONE SODIUM PHOSPHATE: 10 INJECTION, SOLUTION INTRAMUSCULAR; INTRAVENOUS at 08:33

## 2021-04-14 RX ADMIN — GEMCITABINE HYDROCHLORIDE 1520.2 MG: 1 INJECTION, SOLUTION INTRAVENOUS at 09:12

## 2021-04-14 RX ADMIN — SODIUM CHLORIDE 20 ML/HR: 0.9 INJECTION, SOLUTION INTRAVENOUS at 08:33

## 2021-04-14 NOTE — PLAN OF CARE
Problem: Potential for Falls  Goal: Patient will remain free of falls  Description: INTERVENTIONS:  - Assess patient frequently for physical needs  -  Identify cognitive and physical deficits and behaviors that affect risk of falls    -  [place] fall precautions as indicated by assessment   - Educate patient/family on patient safety including physical limitations  - Instruct patient to call for assistance with activity based on assessment  - Modify environment to reduce risk of injury  - Consider OT/PT consult to assist with strengthening/mobility  Outcome: Progressing

## 2021-04-14 NOTE — NURSING NOTE
Patient arrives to infusion center for Gemzar today  Patient labs reviewed from 4/12, labs within parameters for treatment today  Patient reports she still has issues with obtaining restful sleep, but that she has not yet had the appointment for medical marijuana supply  Patient concerned about PCP cessation of her blood pressure pill (Diazide), patient encouraged to call her PCP for follow up  Port accessed without difficulty  Printout of labwork provided upon patient request  Patient resting on recliner chair, call bell within reach

## 2021-04-14 NOTE — NURSING NOTE
Patient tolerated treatment well, offers no complaints  Patient aware of next appointment TOMORROW, declines AVS  Port deaccessed without issue, patient tolerated well  Patient again encouraged to follow up with PCP regarding her blood pressure medication concern  Patient AAox4 upon DC, ambulating with no gait disturbance

## 2021-04-15 ENCOUNTER — HOSPITAL ENCOUNTER (OUTPATIENT)
Dept: INFUSION CENTER | Facility: CLINIC | Age: 66
Discharge: HOME | End: 2021-04-15
Payer: COMMERCIAL

## 2021-04-15 VITALS — TEMPERATURE: 96.7 F

## 2021-04-15 DIAGNOSIS — T45.1X5A CHEMOTHERAPY INDUCED NEUTROPENIA (HCC): ICD-10-CM

## 2021-04-15 DIAGNOSIS — C34.91 SQUAMOUS CELL LUNG CANCER, RIGHT (HCC): ICD-10-CM

## 2021-04-15 DIAGNOSIS — D70.1 CHEMOTHERAPY INDUCED NEUTROPENIA (HCC): ICD-10-CM

## 2021-04-15 DIAGNOSIS — C77.9 REGIONAL LYMPH NODE METASTASIS PRESENT (HCC): Primary | ICD-10-CM

## 2021-04-15 PROCEDURE — 96372 THER/PROPH/DIAG INJ SC/IM: CPT

## 2021-04-15 RX ADMIN — TBO-FILGRASTIM 300 MCG: 300 INJECTION, SOLUTION SUBCUTANEOUS at 10:18

## 2021-04-21 ENCOUNTER — DOCUMENTATION (OUTPATIENT)
Dept: HEMATOLOGY ONCOLOGY | Facility: CLINIC | Age: 66
End: 2021-04-21

## 2021-04-21 RX ORDER — SODIUM CHLORIDE 9 MG/ML
20 INJECTION, SOLUTION INTRAVENOUS ONCE
Status: CANCELLED | OUTPATIENT
Start: 2021-04-28

## 2021-04-21 RX ORDER — SODIUM CHLORIDE 9 MG/ML
20 INJECTION, SOLUTION INTRAVENOUS ONCE
Status: CANCELLED | OUTPATIENT
Start: 2021-05-13

## 2021-04-21 NOTE — PROGRESS NOTES
Called ins & per the automated system pt has an active cap blue plan effect 01/01/11 plan runs on a cal year & the deduct is $500 met  Co-ins 100% out of pocket $6600 met $769  pcp co-pay $25 specialist -pay $25 ER co-pay $25 needed specific benefits for in pt & out pt services left my # for cap blue to call me back

## 2021-04-26 ENCOUNTER — APPOINTMENT (OUTPATIENT)
Dept: LAB | Age: 66
End: 2021-04-26
Payer: COMMERCIAL

## 2021-04-26 DIAGNOSIS — C34.91 SQUAMOUS CELL LUNG CANCER, RIGHT (HCC): ICD-10-CM

## 2021-04-26 DIAGNOSIS — C77.9 REGIONAL LYMPH NODE METASTASIS PRESENT (HCC): ICD-10-CM

## 2021-04-28 ENCOUNTER — HOSPITAL ENCOUNTER (OUTPATIENT)
Dept: INFUSION CENTER | Facility: CLINIC | Age: 66
Discharge: HOME | End: 2021-04-28
Payer: COMMERCIAL

## 2021-04-28 VITALS
HEART RATE: 61 BPM | SYSTOLIC BLOOD PRESSURE: 122 MMHG | TEMPERATURE: 96.6 F | WEIGHT: 135 LBS | RESPIRATION RATE: 18 BRPM | BODY MASS INDEX: 29.12 KG/M2 | OXYGEN SATURATION: 96 % | HEIGHT: 57 IN | DIASTOLIC BLOOD PRESSURE: 70 MMHG

## 2021-04-28 DIAGNOSIS — C34.91 SQUAMOUS CELL LUNG CANCER, RIGHT (HCC): ICD-10-CM

## 2021-04-28 DIAGNOSIS — C77.9 REGIONAL LYMPH NODE METASTASIS PRESENT (HCC): Primary | ICD-10-CM

## 2021-04-28 PROCEDURE — 96417 CHEMO IV INFUS EACH ADDL SEQ: CPT

## 2021-04-28 PROCEDURE — 96413 CHEMO IV INFUSION 1 HR: CPT

## 2021-04-28 PROCEDURE — 96367 TX/PROPH/DG ADDL SEQ IV INF: CPT

## 2021-04-28 RX ORDER — SODIUM CHLORIDE 9 MG/ML
20 INJECTION, SOLUTION INTRAVENOUS ONCE
Status: COMPLETED | OUTPATIENT
Start: 2021-04-28 | End: 2021-04-28

## 2021-04-28 RX ADMIN — FOSAPREPITANT 150 MG: 150 INJECTION, POWDER, LYOPHILIZED, FOR SOLUTION INTRAVENOUS at 09:42

## 2021-04-28 RX ADMIN — CARBOPLATIN 399 MG: 10 INJECTION, SOLUTION INTRAVENOUS at 10:54

## 2021-04-28 RX ADMIN — DEXAMETHASONE SODIUM PHOSPHATE: 10 INJECTION, SOLUTION INTRAMUSCULAR; INTRAVENOUS at 09:19

## 2021-04-28 RX ADMIN — SODIUM CHLORIDE 20 ML/HR: 0.9 INJECTION, SOLUTION INTRAVENOUS at 09:15

## 2021-04-28 RX ADMIN — GEMCITABINE HYDROCHLORIDE 1520.2 MG: 1 INJECTION, SOLUTION INTRAVENOUS at 10:18

## 2021-04-28 NOTE — PLAN OF CARE
Problem: Potential for Falls  Goal: Patient will remain free of falls  Description: INTERVENTIONS:  - Assess patient frequently for physical needs  -  Identify cognitive and physical deficits and behaviors that affect risk of falls  -  [place] fall precautions as indicated by assessment   - Educate patient/family on patient safety including physical limitations  - Instruct patient to call for assistance with activity based on assessment  - Modify environment to reduce risk of injury  - Consider OT/PT consult to assist with strengthening/mobility  Outcome: Progressing     Problem: Knowledge Deficit  Goal: Patient/family/caregiver demonstrates understanding of disease process, treatment plan, medications, and discharge instructions  Description: Complete learning assessment and assess knowledge base    Interventions:  - Provide teaching at level of understanding  - Provide teaching via preferred learning methods  Outcome: Progressing

## 2021-04-28 NOTE — PROGRESS NOTES
Pt tolerated Gemzar and Carbo infusion without complications  Pt aware of next appointment   Denied AVS

## 2021-04-28 NOTE — PROGRESS NOTES
Pt to clinic for Gemzar and Carbo infusion  Pt offers no complaints at this time  Will continue to monitor

## 2021-04-29 ENCOUNTER — HOSPITAL ENCOUNTER (OUTPATIENT)
Dept: INFUSION CENTER | Facility: CLINIC | Age: 66
Discharge: HOME | End: 2021-04-29
Payer: COMMERCIAL

## 2021-04-29 VITALS — TEMPERATURE: 99.5 F

## 2021-04-29 DIAGNOSIS — C34.91 SQUAMOUS CELL LUNG CANCER, RIGHT (HCC): ICD-10-CM

## 2021-04-29 DIAGNOSIS — C77.9 REGIONAL LYMPH NODE METASTASIS PRESENT (HCC): Primary | ICD-10-CM

## 2021-04-29 DIAGNOSIS — T45.1X5A CHEMOTHERAPY INDUCED NEUTROPENIA (HCC): ICD-10-CM

## 2021-04-29 DIAGNOSIS — D70.1 CHEMOTHERAPY INDUCED NEUTROPENIA (HCC): ICD-10-CM

## 2021-04-29 PROCEDURE — 96372 THER/PROPH/DIAG INJ SC/IM: CPT

## 2021-04-29 RX ADMIN — TBO-FILGRASTIM 300 MCG: 300 INJECTION, SOLUTION SUBCUTANEOUS at 12:08

## 2021-04-29 NOTE — PROGRESS NOTES
Patient presents today for Granix injection  Patient offers no complaints  Afebrile  Granix administered into right arm without incident  Patient's next appointment confirmed  AVS offered and declined

## 2021-04-30 ENCOUNTER — OFFICE VISIT (OUTPATIENT)
Dept: HEMATOLOGY ONCOLOGY | Facility: CLINIC | Age: 66
End: 2021-04-30
Payer: COMMERCIAL

## 2021-04-30 ENCOUNTER — TELEPHONE (OUTPATIENT)
Dept: HEMATOLOGY ONCOLOGY | Facility: CLINIC | Age: 66
End: 2021-04-30

## 2021-04-30 VITALS
WEIGHT: 135 LBS | SYSTOLIC BLOOD PRESSURE: 124 MMHG | DIASTOLIC BLOOD PRESSURE: 74 MMHG | TEMPERATURE: 98.2 F | HEIGHT: 57 IN | HEART RATE: 68 BPM | RESPIRATION RATE: 18 BRPM | BODY MASS INDEX: 29.12 KG/M2

## 2021-04-30 DIAGNOSIS — J44.9 CHRONIC OBSTRUCTIVE PULMONARY DISEASE, UNSPECIFIED COPD TYPE (HCC): ICD-10-CM

## 2021-04-30 DIAGNOSIS — T45.1X5A CHEMOTHERAPY INDUCED NEUTROPENIA (HCC): ICD-10-CM

## 2021-04-30 DIAGNOSIS — G35 MULTIPLE SCLEROSIS (HCC): ICD-10-CM

## 2021-04-30 DIAGNOSIS — C77.9 REGIONAL LYMPH NODE METASTASIS PRESENT (HCC): ICD-10-CM

## 2021-04-30 DIAGNOSIS — T45.1X5A CHEMOTHERAPY-INDUCED NAUSEA: ICD-10-CM

## 2021-04-30 DIAGNOSIS — D70.1 CHEMOTHERAPY INDUCED NEUTROPENIA (HCC): ICD-10-CM

## 2021-04-30 DIAGNOSIS — C34.91 SQUAMOUS CELL LUNG CANCER, RIGHT (HCC): Primary | ICD-10-CM

## 2021-04-30 DIAGNOSIS — I10 HYPERTENSION, ESSENTIAL: ICD-10-CM

## 2021-04-30 DIAGNOSIS — R11.0 CHEMOTHERAPY-INDUCED NAUSEA: ICD-10-CM

## 2021-04-30 PROCEDURE — 3008F BODY MASS INDEX DOCD: CPT | Performed by: INTERNAL MEDICINE

## 2021-04-30 PROCEDURE — 1160F RVW MEDS BY RX/DR IN RCRD: CPT | Performed by: INTERNAL MEDICINE

## 2021-04-30 PROCEDURE — 1036F TOBACCO NON-USER: CPT | Performed by: INTERNAL MEDICINE

## 2021-04-30 PROCEDURE — 99214 OFFICE O/P EST MOD 30 MIN: CPT | Performed by: INTERNAL MEDICINE

## 2021-04-30 NOTE — TELEPHONE ENCOUNTER
Returned telephone call spoke with pt  Updated that per Dr Rosita Almazan is getting a total 4 cycles of carbo and gemzar  Last day of chemo tx is schedule for 5/27/21 and she has a fu appt with dr Tung Khan in June  Pt states she understands

## 2021-04-30 NOTE — TELEPHONE ENCOUNTER
Patient calling regarding chemo schedule  She saw Dr Yaima Jerry today and spoke with him about chemo being completed Mid June  Per patients current infusion schedule her last chemo is scheduled 5/27/21  There were additional appointments after that but they are canceled in the system  Patient wants to confirm with Dr Yaima Jerry when treatment will be completed and why appointments on her schedule were canceled? Patient states she is to have 4 total cycles but she wants to confirm that        Will send to RN to confirm   Call back number [phone]

## 2021-04-30 NOTE — PROGRESS NOTES
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 with 1 of 14 positive peribronchial lymph node with metastatic disease and patient has been tolerating treatments without much problem  She has some discomfort at surgical site  Has minimal cough and exertional dyspnea  Has some tiredness  She had PET-CT scan in November 2020   She  had right upper lobe lobectomy and lymph node sampling on 01/28/2021  Pathology report was 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  With invasion of lymphatic channel and metastatic carcinoma in 1 of 8 peribronchial lymph nodes  Other lymph nodes at level 10 7 his eye Walter 4R and 2R were negative for malignancy  1 of 14 positive peribronchial lymph node  PET scan had also shown positive activity in right parotid gland that was biopsied and was negative for malignancy and she will be followed by ENT specialist   Remote past history of suspected MS in 1989  She had some tingling  CSF came back suspicious and MRI brain came back suspicious  Patient was treated for MS at that time  No active treatment for the last several years  No more tingling in her hands  Patient gave me all this information  Because of history of MS she is being given carboplatin plus Gemzar and not carboplatin plus Taxol that was discussed with patient before starting chemotherapy and again now  She also receives Granix because of neutropenia  ROS:  04/30/21 Reviewed 12 systems: See symptoms in HPI  Presently no other neurological, cardiac, pulmonary, GI and  symptoms other than listed in HPI  Other symptoms are in HPI    No  fever, chills, bleeding, bone pains, skin rash, weight loss, night sweats, arthritic symptoms, weakness, numbness, claudication and gait problem  No frequent infections  Not unusually sensitive to heat or cold  No swelling of the ankles  No swollen glands  Patient is anxious       No new symptoms      Historical Information   Past Medical History:   Diagnosis Date    Anxiety     Cancer [facility] [address] )     lung    [facility]     GERD (gastroesophageal reflux disease)     Hyperlipidemia     Hypertension     Pulmonary emphysema (HCC)     Restless leg syndrome      Past Surgical History:   Procedure Laterality Date    AUGMENTATION MAMMAPLASTY Bilateral 11/2014    retro-pectoral silicone    BREAST BIOPSY Left 03/23/2015    u/s core bx    BREAST BIOPSY Left 08/02/2010    high-risk lesion    BREAST EXCISIONAL BIOPSY Left 08/26/2010    high-risk lesion    BREAST EXCISIONAL BIOPSY Left 1994    BREAST EXCISIONAL BIOPSY Right 1984    FL GUIDED CENTRAL VENOUS ACCESS DEVICE INSERTION  3/9/2021    PA [address] N/A 1/28/2021    Procedure: BRONCHOSCOPY FLEXIBLE;  Surgeon: Summer Huffman MD;  [facility];  Service: Thoracic    PA INSJ TUNNELED CTR VAD W/SUBQ PORT AGE 5 YR/> N/A 3/9/2021    Procedure: INSERTION VENOUS PORT ( PORT-A-CATH) IR;  Surgeon: Loreta Jim DO;  [facility];  Service: Interventional Radiology    PA THORACOSCOPY SURG LOBECTOMY Right 1/28/2021    Procedure: RIGHT VATS UPPER LOBECTOMY ; MEDIASTINAL LYMPH NODE DISSECTION; right lower lobe bleb resection  ;  Surgeon: Summer Huffman MD;  Location: [facility];  Service: Thoracic    TONSILLECTOMY      TOTAL ABDOMINAL HYSTERECTOMY  2010    age 60    TOTAL ABDOMINAL HYSTERECTOMY W/ BILATERAL SALPINGOOPHORECTOMY Bilateral 2010    age 60     Social History   Social History     Substance and Sexual Activity   Alcohol Use Yes    Alcohol/week: 7 0 standard drinks    Types: 7 Glasses of wine per week    Frequency: 4 or more times a week    Drinks per session: 1 or 2 Comment: wine @       Social History     Substance and Sexual Activity   Drug Use Never     Social History     Tobacco Use   Smoking Status Former Smoker    Packs/day: 0 25    Years: 30 00    Pack years: 7 50    Types: Cigarettes    Start date: 5    Quit date: 1/15/2021    Years since quittin 2   Smokeless Tobacco Never Used   Tobacco Comment    quit 2021   NICORETTE GUM SUBSTITUE CURRENTLY     Family History:   Family History   Problem Relation Age of Onset    No Known Problems Mother     COPD Father     Hypertension Father     No Known Problems Sister     No Known Problems Daughter     No Known Problems Maternal Grandmother     No Known Problems Maternal Grandfather     No Known Problems Paternal Grandmother     Rectal cancer Paternal Grandfather         unknown age   Ricco Pert No Known Problems Sister     No Known Problems Maternal Aunt     Breast cancer Paternal Aunt         unknown age   Ricco Pert No Known Problems Paternal Aunt     No Known Problems Paternal Aunt     No Known Problems Paternal Aunt     No Known Problems Paternal Aunt     No Known Problems Paternal Aunt     No Known Problems Paternal Aunt     COPD Brother          Current Outpatient Medications:     Biotin 5 MG CAPS, Take by mouth daily , Disp: , Rfl:     buPROPion (WELLBUTRIN XL) 300 mg 24 hr tablet, Take 300 mg by mouth every morning , Disp: , Rfl:     Cholecalciferol (Vitamin D3) 50 MCG ( UT) capsule, Take 5,000 Units by mouth daily , Disp: , Rfl:     CRANIAL PROSTHESIS, RX,, One wig, as needed, Disp: 1 Piece, Rfl: 0    diphenhydrAMINE (BENADRYL) 25 mg tablet, Take 1 tablet (25 mg total) by mouth every 8 (eight) hours as needed (Nausea), Disp: 30 tablet, Rfl: 0    famotidine (PEPCID) 40 MG tablet, Take 40 mg by mouth every morning , Disp: , Rfl:     pramipexole (MIRAPEX) 0 5 mg tablet, Take 0 5 mg by mouth daily at bedtime , Disp: , Rfl:     prochlorperazine (COMPAZINE) 10 mg tablet, Take 1 tablet (10 mg total) by mouth every 8 (eight) hours as needed for nausea or vomiting, Disp: 30 tablet, Rfl: 0    simvastatin (ZOCOR) 40 mg tablet, Take 40 mg by mouth every other day, Disp: , Rfl:     umeclidinium-vilanterol (Anoro Ellipta) 62 5-25 MCG/INH inhaler, Inhale 1 puff daily , Disp: , Rfl:     lidocaine-prilocaine (EMLA) cream, Apply topically as needed for mild pain Apply to port site before accessing  (Patient not taking: Reported on 4/5/2021), Disp: 30 g, Rfl: 0    ondansetron (ZOFRAN) 4 mg tablet, Take 1 tablet (4 mg total) by mouth every 6 (six) hours as needed for nausea or vomiting (Patient not taking: Reported on 4/1/2021), Disp: 30 tablet, Rfl: 1    triamterene-hydrochlorothiazide (DYAZIDE) 37 5-25 mg per capsule, Take 1 capsule by mouth every morning, Disp: , Rfl:   No current facility-administered medications for this visit  Allergies   Allergen Reactions    Sulfa Antibiotics Hives     @ ROS@  Physical Exam:  Vitals:    04/30/21 0948   BP: 124/74   BP Location: Left arm   Patient Position: Sitting   Cuff Size: Adult   Pulse: 68   Resp: 18   Temp: 98 2 °F (36 8 °C)   Weight: 61 2 kg (135 lb)   Height: 4' 9 48" (1 46 m)     Alert, oriented, not in distress, no icterus, no oral thrush, no palpable neck mass, clear lung fields, regular heart rate, abdomen  soft and non tender, no palpable abdominal mass, no ascites, no edema of ankles, no calf tenderness, no focal neurological deficit, no skin rash, no palpable lymphadenopathy in the neck and axillary areas,  no clubbing  Patient is anxious  Performance status 1     No significant change in examination since last visit  Lab Results: I have reviewed all pertinent labs    LABS:    Results for orders placed or performed in visit on 04/16/21   CBC and differential   Result Value Ref Range    WBC 5 01 4 31 - 10 16 Thousand/uL    RBC 3 20 (L) 3 81 - 5 12 Million/uL    Hemoglobin 10 3 (L) 11 5 - 15 4 g/dL    Hematocrit 31 9 (L) 34 8 - 46 1 %     (H) 82 - 98 fL    MCH 32 2 26 8 - 34 3 pg    MCHC 32 3 31 4 - 37 4 g/dL    RDW 16 4 (H) 11 6 - 15 1 %    MPV 9 7 8 9 - 12 7 fL    Platelets 786 112 - 373 Thousands/uL    nRBC 0 /100 WBCs    Neutrophils Relative 56 43 - 75 %    Immat GRANS % 0 0 - 2 %    Lymphocytes Relative 25 14 - 44 %    Monocytes Relative 15 (H) 4 - 12 %    Eosinophils Relative 3 0 - 6 %    Basophils Relative 1 0 - 1 %    Neutrophils Absolute 2 85 1 85 - 7 62 Thousands/µL    Immature Grans Absolute 0 02 0 00 - 0 20 Thousand/uL    Lymphocytes Absolute 1 24 0 60 - 4 47 Thousands/µL    Monocytes Absolute 0 74 0 17 - 1 22 Thousand/µL    Eosinophils Absolute 0 13 0 00 - 0 61 Thousand/µL    Basophils Absolute 0 03 0 00 - 0 10 Thousands/µL     SmartPhrase Guide    Comprehensive metabolic panel (Order #[id number]) on 4/9/21   Questions    Collection Question Answer   Has the patient been fasting for 8 hours or more? No      Comprehensive metabolic panel  Order: [id number]  Status:  Final result   Visible to patient:  Yes ([facility] MyChart) Next appt:  05/05/2021 at 08:00 AM in Infusion Therapy ([facility] BED [facility]) Dx:  Squamous cell lung cancer, right (HCC)   Ref Range & Units 4/26/21 6:44 PM   Sodium 136 - 145 mmol/L 138    Potassium 3 5 - 5 3 mmol/L 4 7    Chloride 100 - 108 mmol/L 106    CO2 21 - 32 mmol/L 28    ANION GAP 4 - 13 mmol/L 4    BUN 5 - 25 mg/dL 11    Creatinine 0 60 - 1 30 mg/dL 0 80    Comment: Standardized to IDMS reference method   Glucose 65 - 140 mg/dL 77    Comment: If the patient is fasting, the ADA then defines impaired fasting glucose as > 100 mg/dL and diabetes as > or equal to 123 mg/dL  Specimen collection should occur prior to Sulfasalazine administration due to the potential for falsely depressed results  Specimen collection should occur prior to Sulfapyridine administration due to the potential for falsely elevated results     Calcium 8 3 - 10 1 mg/dL 9 5    AST 5 - 45 U/L 14    Comment: Specimen collection should occur prior to Sulfasalazine administration due to the potential for falsely depressed results  ALT 12 - 78 U/L 24    Comment: Specimen collection should occur prior to Sulfasalazine and/or Sulfapyridine administration due to the potential for falsely depressed results  Alkaline Phosphatase 46 - 116 U/L 76    Total Protein 6 4 - 8 2 g/dL 7 3    Albumin 3 5 - 5 0 g/dL 4 0    Total Bilirubin 0 20 - 1 00 mg/dL 0 28    Comment: Use of this assay is not recommended for patients undergoing treatment with eltrombopag due to the potential for falsely elevated results  eGFR ml/min/1 73sq m 68       Narrative    National Kidney Disease Foundation guidelines for Chronic Kidney Disease (CKD):     Stage 1 with normal or high GFR (GFR > 90 mL/min/1 73 square meters)     Stage 2 Mild CKD (GFR = 60-89 mL/min/1 73 square meters)     Stage 3A Moderate CKD (GFR = 45-59 mL/min/1 73 square meters)     Stage 3B Moderate CKD (GFR = 30-44 mL/min/1 73 square meters)     Stage 4 Severe CKD (GFR = 15-29 mL/min/1 73 square meters)     Stage 5 End Stage CKD (GFR <15 mL/min/1 73 square meters)   Note: GFR calculation is accurate only with a steady state creatinine      Specimen Collected: 04/26/21  6:44 PM Last Resulted: 04/26/21  9:16 PM        Lab Flowsheet     Order Details     View Encounter     Lab and Collection Details     Routing     Result History                 Imaging Studies: I have personally reviewed pertinent reports  November  PET [facility]  Result Impression   IMPRESSION:  1  FDG avid stable right upper lobe lung mass, still suspicious for lung  malignancy despite negative biopsy  2  Smaller stable, mildly FDG avid nodules in the posterior right lung apex  could be inflammatory or metastatic  3  Intense uptake in or adjacent to the deep lobe of the right parotid gland    MRI recommended for further evaluation   MRI NECK SOFT TISSUE [address] [facility]  Result Impression   Impression:     Question parotid lesion is present on the right  This abuts the parotid gland  however the majority appears to be posterior to the sternocleidomastoid muscle  raising question of metastatic disease to a level 2 lymph node  Parotid  malignancy is not excluded  Right upper lobe lung nodule is poorly evaluated on this modality                  Workstation:[id number]         Pathology, and Other Studies: I have personally reviewed pertinent reports  Report  NON-GYNECOLOGICAL CYTOLOGYResulted: 2021 12:20 PM  [facility]  Component Name Value Ref Range   Non-Gynecological Cytology [facility]  [address] [place] [address]  [phone]    PATHOLOGY REPORT             MR#:             [id number]  Patient:         Ofelia Grant  /Sex:         1955  F  Provider:        SYLVIA LUO  Location:        IVR_Intervent  Radiol  OP ([facility])  Collect Date:    2021  N21-53    CYTOLOGIC DIAGNOSIS:  A  Fine Needle Aspiration with Cytotech Assistance and Cell Block,  Right Parotid Mass  Adequacy: Adequate for evaluation  General Category: Negative for Diagnostic Malignancy  Descriptive Interpretation: Cytologic features consistent with  pleomorphic adenoma     Reviewed CBC, CMP  and PET scan and discussed with patient and her  and explained  Assessment and Plan:     Patient is here with her   She has been receiving concurrent chemotherapy a combination of carboplatin plus  Gemzar and radiation for 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 with 1 of 14 positive peribronchial lymph node with metastatic disease and patient has been tolerating treatments without much problem  She has some discomfort at surgical site  Has minimal cough and exertional dyspnea  Has some tiredness  She had PET-CT scan in 2020     She  had right upper lobe lobectomy and lymph node sampling on 01/28/2021  Pathology report was 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  With invasion of lymphatic channel and metastatic carcinoma in 1 of 8 peribronchial lymph nodes  Other lymph nodes at level 10 7 his eye Walter 4R and 2R were negative for malignancy  1 of 14 positive peribronchial lymph node  PET scan had also shown positive activity in right parotid gland that was biopsied and was negative for malignancy and she will be followed by ENT specialist   Remote past history of suspected MS in 1989  She had some tingling  CSF came back suspicious and MRI brain came back suspicious  Patient was treated for MS at that time  No active treatment for the last several years  No more tingling in her hands  Patient gave me all this information  Because of history of MS she is being given carboplatin plus Gemzar and not carboplatin plus Taxol that was discussed with patient before starting chemotherapy and again now  She also receives Granix because of neutropenia  Tadeo Elliott Physical examination and test results are as recorded and discussed in detail  No change in therapy at this time  She is in the midst of cycle 3  Planning for a total of 4 cycles  We discussed disease and treatment plan again  All discussed in very much detail  Questions answered  Discussed the importance of self-breast examination, eating healthy foods, activities as tolerated and health screening tests  Patient has already stopped smoking cigarettes  She is capable of self-care  Goal will be cure if possible     Discussed precautions against coronavirus   Patient will continue to follow with her primary physician and other consultants  Discussed diagnoses, orders and instructions below    1  Squamous cell lung cancer, right ([facility] [address] )      2  Chronic obstructive pulmonary disease, unspecified [facility]      3  Chemotherapy induced neutropenia (HCC)      4   Regional lymph node metastasis present ([facility] )      5  Chemotherapy-induced nausea      6  Multiple sclerosis ([facility] )      7  Hypertension, essential        Patient voiced understanding and agrees    Counseling / Coordination of Care    Julia Souza   Provided counseling and support

## 2021-05-04 ENCOUNTER — APPOINTMENT (OUTPATIENT)
Dept: LAB | Facility: CLINIC | Age: 66
End: 2021-05-04
Payer: COMMERCIAL

## 2021-05-04 ENCOUNTER — DOCUMENTATION (OUTPATIENT)
Dept: HEMATOLOGY ONCOLOGY | Facility: CLINIC | Age: 66
End: 2021-05-04

## 2021-05-04 ENCOUNTER — TRANSCRIBE ORDERS (OUTPATIENT)
Dept: LAB | Facility: CLINIC | Age: 66
End: 2021-05-04

## 2021-05-04 ENCOUNTER — TELEPHONE (OUTPATIENT)
Dept: HEMATOLOGY ONCOLOGY | Facility: CLINIC | Age: 66
End: 2021-05-04

## 2021-05-04 DIAGNOSIS — I10 ESSENTIAL HYPERTENSION, MALIGNANT: ICD-10-CM

## 2021-05-04 DIAGNOSIS — C34.91 SQUAMOUS CELL LUNG CANCER, RIGHT (HCC): Primary | ICD-10-CM

## 2021-05-04 DIAGNOSIS — E78.2 MIXED HYPERLIPIDEMIA: ICD-10-CM

## 2021-05-04 DIAGNOSIS — E13.69 OTHER SPECIFIED DIABETES MELLITUS WITH OTHER SPECIFIED COMPLICATION, UNSPECIFIED WHETHER LONG TERM INSULIN USE (HCC): Primary | ICD-10-CM

## 2021-05-04 DIAGNOSIS — E13.69 OTHER SPECIFIED DIABETES MELLITUS WITH OTHER SPECIFIED COMPLICATION, UNSPECIFIED WHETHER LONG TERM INSULIN USE (HCC): ICD-10-CM

## 2021-05-04 LAB
EST. AVERAGE GLUCOSE BLD GHB EST-MCNC: 105 MG/DL
HBA1C MFR BLD: 5.3 %
LDLC SERPL DIRECT ASSAY-MCNC: 97 MG/DL (ref 0–100)

## 2021-05-04 PROCEDURE — 3044F HG A1C LEVEL LT 7.0%: CPT | Performed by: INTERNAL MEDICINE

## 2021-05-04 PROCEDURE — 83036 HEMOGLOBIN GLYCOSYLATED A1C: CPT

## 2021-05-04 PROCEDURE — 83721 ASSAY OF BLOOD LIPOPROTEIN: CPT

## 2021-05-04 NOTE — TELEPHONE ENCOUNTER
Patient states that her 4/30 office visit summary first paragraph mentions radiation  and would like to clarify due to her never receiving radiation before  Patient would also like confirm that the blood work completed today is ok for tomorrows infusion  Best call back [phone]

## 2021-05-04 NOTE — PROGRESS NOTES
Patient had question how mention of radiation in OV note  Reviewed with Dr Stephani Gage, per Dr Stephani Gage patient not receiving radiation

## 2021-05-04 NOTE — TELEPHONE ENCOUNTER
Reviewed with Dr Tommy Peguero  Patient is not receiving radiation  Dr Tommy Peguero will make correction  ANC 0 94  Dr Tommy Peguero would like a STAT cbcd drawn at infusion prior to initiation of treatment tomorrow to recheck counts  Dr Tommy Peguero also increased her granix dose to help with her counts  --- Please update patient

## 2021-05-05 ENCOUNTER — TELEPHONE (OUTPATIENT)
Dept: HEMATOLOGY ONCOLOGY | Facility: CLINIC | Age: 66
End: 2021-05-05

## 2021-05-05 ENCOUNTER — HOSPITAL ENCOUNTER (OUTPATIENT)
Dept: INFUSION CENTER | Facility: CLINIC | Age: 66
Discharge: HOME | End: 2021-05-05
Payer: COMMERCIAL

## 2021-05-05 VITALS
SYSTOLIC BLOOD PRESSURE: 168 MMHG | WEIGHT: 134 LBS | OXYGEN SATURATION: 98 % | BODY MASS INDEX: 28.91 KG/M2 | HEIGHT: 57 IN | RESPIRATION RATE: 16 BRPM | HEART RATE: 66 BPM | DIASTOLIC BLOOD PRESSURE: 70 MMHG

## 2021-05-05 DIAGNOSIS — C77.9 REGIONAL LYMPH NODE METASTASIS PRESENT (HCC): ICD-10-CM

## 2021-05-05 DIAGNOSIS — E86.0 DEHYDRATION: Primary | ICD-10-CM

## 2021-05-05 DIAGNOSIS — C77.9 REGIONAL LYMPH NODE METASTASIS PRESENT (HCC): Primary | ICD-10-CM

## 2021-05-05 DIAGNOSIS — D70.1 CHEMOTHERAPY INDUCED NEUTROPENIA (HCC): Primary | ICD-10-CM

## 2021-05-05 DIAGNOSIS — C34.91 SQUAMOUS CELL LUNG CANCER, RIGHT (HCC): ICD-10-CM

## 2021-05-05 DIAGNOSIS — T45.1X5A CHEMOTHERAPY INDUCED NEUTROPENIA (HCC): ICD-10-CM

## 2021-05-05 DIAGNOSIS — D70.1 CHEMOTHERAPY INDUCED NEUTROPENIA (HCC): ICD-10-CM

## 2021-05-05 DIAGNOSIS — E86.0 DEHYDRATION: ICD-10-CM

## 2021-05-05 DIAGNOSIS — T45.1X5A CHEMOTHERAPY INDUCED NEUTROPENIA (HCC): Primary | ICD-10-CM

## 2021-05-05 LAB
ANISOCYTOSIS BLD QL SMEAR: PRESENT
BASOPHILS # BLD MANUAL: 0.04 THOUSAND/UL (ref 0–0.1)
BASOPHILS NFR MAR MANUAL: 2 % (ref 0–1)
EOSINOPHIL # BLD MANUAL: 0.02 THOUSAND/UL (ref 0–0.4)
EOSINOPHIL NFR BLD MANUAL: 1 % (ref 0–6)
ERYTHROCYTE [DISTWIDTH] IN BLOOD BY AUTOMATED COUNT: 16.1 % (ref 11.6–15.1)
HCT VFR BLD AUTO: 29.9 % (ref 34.8–46.1)
HGB BLD-MCNC: 9.8 G/DL (ref 11.5–15.4)
LYMPHOCYTES # BLD AUTO: 0.88 THOUSAND/UL (ref 0.6–4.47)
LYMPHOCYTES # BLD AUTO: 44 % (ref 14–44)
MCH RBC QN AUTO: 32.5 PG (ref 26.8–34.3)
MCHC RBC AUTO-ENTMCNC: 32.8 G/DL (ref 31.4–37.4)
MCV RBC AUTO: 99 FL (ref 82–98)
MONOCYTES # BLD AUTO: 0.12 THOUSAND/UL (ref 0–1.22)
MONOCYTES NFR BLD: 6 % (ref 4–12)
NEUTROPHILS # BLD MANUAL: 0.92 THOUSAND/UL (ref 1.85–7.62)
NEUTS SEG NFR BLD AUTO: 46 % (ref 43–75)
NRBC BLD AUTO-RTO: 0 /100 WBCS
PLATELET # BLD AUTO: 286 THOUSANDS/UL (ref 149–390)
PLATELET BLD QL SMEAR: ADEQUATE
PMV BLD AUTO: 8.8 FL (ref 8.9–12.7)
RBC # BLD AUTO: 3.02 MILLION/UL (ref 3.81–5.12)
TOTAL CELLS COUNTED SPEC: 100
VARIANT LYMPHS # BLD AUTO: 1 %
WBC # BLD AUTO: 2.01 THOUSAND/UL (ref 4.31–10.16)

## 2021-05-05 PROCEDURE — 96360 HYDRATION IV INFUSION INIT: CPT

## 2021-05-05 PROCEDURE — 85007 BL SMEAR W/DIFF WBC COUNT: CPT | Performed by: INTERNAL MEDICINE

## 2021-05-05 PROCEDURE — 96372 THER/PROPH/DIAG INJ SC/IM: CPT

## 2021-05-05 PROCEDURE — 85027 COMPLETE CBC AUTOMATED: CPT | Performed by: INTERNAL MEDICINE

## 2021-05-05 RX ORDER — ONDANSETRON 4 MG/1
4 TABLET, ORALLY DISINTEGRATING ORAL ONCE
Status: CANCELLED
Start: 2021-05-05

## 2021-05-05 RX ORDER — ONDANSETRON 4 MG/1
4 TABLET, ORALLY DISINTEGRATING ORAL ONCE
Status: COMPLETED | OUTPATIENT
Start: 2021-05-05 | End: 2021-05-05

## 2021-05-05 RX ADMIN — SODIUM CHLORIDE 500 ML: 0.9 INJECTION, SOLUTION INTRAVENOUS at 09:18

## 2021-05-05 RX ADMIN — TBO-FILGRASTIM 480 MCG: 480 INJECTION, SOLUTION SUBCUTANEOUS at 10:28

## 2021-05-05 RX ADMIN — ONDANSETRON 4 MG: 4 TABLET, ORALLY DISINTEGRATING ORAL at 10:28

## 2021-05-05 NOTE — PLAN OF CARE
Problem: Potential for Falls  Goal: Patient will remain free of falls  Description: INTERVENTIONS:  - Assess patient frequently for physical needs  -  Identify cognitive and physical deficits and behaviors that affect risk of falls  -  [place] fall precautions as indicated by assessment   - Educate patient/family on patient safety including physical limitations  - Instruct patient to call for assistance with activity based on assessment  - Modify environment to reduce risk of injury  - Consider OT/PT consult to assist with strengthening/mobility  Outcome: Progressing     Problem: SAFETY ADULT  Goal: Patient will remain free of falls  Description: INTERVENTIONS:  - Assess patient frequently for physical needs  -  Identify cognitive and physical deficits and behaviors that affect risk of falls  -  [place] fall precautions as indicated by assessment   - Educate patient/family on patient safety including physical limitations  - Instruct patient to call for assistance with activity based on assessment  - Modify environment to reduce risk of injury  - Consider OT/PT consult to assist with strengthening/mobility  Outcome: Progressing     Problem: Knowledge Deficit  Goal: Patient/family/caregiver demonstrates understanding of disease process, treatment plan, medications, and discharge instructions  Description: Complete learning assessment and assess knowledge base    Interventions:  - Provide teaching at level of understanding  - Provide teaching via preferred learning methods  Outcome: Progressing

## 2021-05-05 NOTE — PROGRESS NOTES
Patient arrive for Day 8 Gemzar  Patient complaining of nausea  Patient stated she has not been hydrating very well at home  Patient states at her OV with Dr Foster Killian, she was questioning whether she could have IV hydration  This morning, patient asking again and also asking for medication for nausea  Patient has zofran on treatment plan orders  Patient [address] from 5/4 0 94  Order in [place] to draw STAT cbc on arrival  Blood work drawn with port access  Patients issues reported to Yang Laws RN in Dr Adrianna Mason office  Patient will receive a 500ml saline bolus today in addition, patient can still receive zofran on treatment plan orders if she does not meet parameters for chemo from blood work drawn today

## 2021-05-05 NOTE — TELEPHONE ENCOUNTER
I spoke with patient yesterday  Leg swelling has gone down  She has cut down Decadron from 40 mg 1 day a week to 20 mg 1 day a week as we had discussed  We talked about Mepron prophylaxis for Pneumocystis  She would prefer to wait because now she has lower dose of Decadron, 20 mg and not 40 mg

## 2021-05-05 NOTE — PROGRESS NOTES
Patient tolerated treatment today without issues  Patients schedule adjusted due to treatment delay  Patient verified next infusion appointment and new schedule provided

## 2021-05-05 NOTE — PROGRESS NOTES
Patients [address] [facility] resilted at 0 92 today  Treatment to be held for 1 week  Patient will receive Granix injection and also an oral dose of zofran

## 2021-05-06 ENCOUNTER — TELEPHONE (OUTPATIENT)
Dept: INFUSION CENTER | Facility: CLINIC | Age: 66
End: 2021-05-06

## 2021-05-06 ENCOUNTER — TELEPHONE (OUTPATIENT)
Dept: HEMATOLOGY ONCOLOGY | Facility: CLINIC | Age: 66
End: 2021-05-06

## 2021-05-06 ENCOUNTER — HOSPITAL ENCOUNTER (OUTPATIENT)
Dept: INFUSION CENTER | Facility: CLINIC | Age: 66
End: 2021-05-06

## 2021-05-06 NOTE — TELEPHONE ENCOUNTER
Patient states that her treatment yesterday was canceled due to her WBC and would like to confirm her schedule as she is confirmed with the adjustments made   Best call back [phone]

## 2021-05-06 NOTE — TELEPHONE ENCOUNTER
Cee Lynn called concerned about her new chemo schedule  She is confused why she could not keep her tx schedule as it was  I did transfer her to the [place] line so she could have this discussion with the office

## 2021-05-06 NOTE — TELEPHONE ENCOUNTER
Returned telephone call spoke with pt  Explained that tx plan has been deferred by 1 week which pushes all future tx days forward by 1 week  She states she sort of understands but she prefers wed and thurs rather that thur and Friday  Explained I would call infusion to check for availability and call her back if dates could be changed to wed and thurs  Telephone call to infusion and at this time no availability on 5/12

## 2021-05-12 ENCOUNTER — TELEPHONE (OUTPATIENT)
Dept: HEMATOLOGY ONCOLOGY | Facility: CLINIC | Age: 66
End: 2021-05-12

## 2021-05-12 ENCOUNTER — APPOINTMENT (OUTPATIENT)
Dept: LAB | Facility: CLINIC | Age: 66
End: 2021-05-12
Payer: COMMERCIAL

## 2021-05-12 DIAGNOSIS — C34.91 SQUAMOUS CELL LUNG CANCER, RIGHT (HCC): ICD-10-CM

## 2021-05-12 DIAGNOSIS — C77.9 REGIONAL LYMPH NODE METASTASIS PRESENT (HCC): ICD-10-CM

## 2021-05-12 DIAGNOSIS — E86.0 DEHYDRATION: ICD-10-CM

## 2021-05-12 DIAGNOSIS — E86.0 DEHYDRATION: Primary | ICD-10-CM

## 2021-05-12 NOTE — TELEPHONE ENCOUNTER
Patient calling to see if IV hydration can be added to her chemo tomorrow  Patient states she spoke with Dr Robert Field about this and he told her this can be added at any time if she felt she needed it  Patients last appointment for chemo on her schedule is 6/3/21  Patient asking if this is her last treatment?   Patient thought that she was to get 12 total treatments  6/3/21 is day 8 of cycle #4    Will send to RN to review with Dr Robert Field  Call back number for patient

## 2021-05-12 NOTE — TELEPHONE ENCOUNTER
Per Dr Robret Field, patient can receive hydration  Patient was informed at the time of her last OV on 4/30 that she was only getting 4 cycles of treatment  Patient's last day of chemo is 6/3, she has an appointment on 6/4 for granix

## 2021-05-12 NOTE — TELEPHONE ENCOUNTER
Returned call to patient  Reviewed current chemo schedule is correct  Last treatment is 6/3/21  Patient will get IV hydration tomorrow  Patient verbalized understanding

## 2021-05-13 ENCOUNTER — HOSPITAL ENCOUNTER (OUTPATIENT)
Dept: INFUSION CENTER | Facility: CLINIC | Age: 66
Discharge: HOME | End: 2021-05-13
Payer: COMMERCIAL

## 2021-05-13 VITALS
TEMPERATURE: 98.9 F | WEIGHT: 136 LBS | HEART RATE: 86 BPM | RESPIRATION RATE: 18 BRPM | DIASTOLIC BLOOD PRESSURE: 70 MMHG | OXYGEN SATURATION: 99 % | SYSTOLIC BLOOD PRESSURE: 160 MMHG | HEIGHT: 57 IN | BODY MASS INDEX: 29.34 KG/M2

## 2021-05-13 DIAGNOSIS — D70.1 CHEMOTHERAPY INDUCED NEUTROPENIA (HCC): ICD-10-CM

## 2021-05-13 DIAGNOSIS — C34.91 SQUAMOUS CELL LUNG CANCER, RIGHT (HCC): ICD-10-CM

## 2021-05-13 DIAGNOSIS — T45.1X5A CHEMOTHERAPY INDUCED NEUTROPENIA (HCC): ICD-10-CM

## 2021-05-13 DIAGNOSIS — E86.0 DEHYDRATION: ICD-10-CM

## 2021-05-13 DIAGNOSIS — C77.9 REGIONAL LYMPH NODE METASTASIS PRESENT (HCC): Primary | ICD-10-CM

## 2021-05-13 PROCEDURE — 96367 TX/PROPH/DG ADDL SEQ IV INF: CPT

## 2021-05-13 PROCEDURE — 96413 CHEMO IV INFUSION 1 HR: CPT

## 2021-05-13 RX ORDER — SODIUM CHLORIDE 9 MG/ML
20 INJECTION, SOLUTION INTRAVENOUS ONCE
Status: COMPLETED | OUTPATIENT
Start: 2021-05-13 | End: 2021-05-13

## 2021-05-13 RX ADMIN — SODIUM CHLORIDE 500 ML: 0.9 INJECTION, SOLUTION INTRAVENOUS at 12:52

## 2021-05-13 RX ADMIN — SODIUM CHLORIDE 20 ML/HR: 0.9 INJECTION, SOLUTION INTRAVENOUS at 12:50

## 2021-05-13 RX ADMIN — DEXAMETHASONE SODIUM PHOSPHATE: 10 INJECTION, SOLUTION INTRAMUSCULAR; INTRAVENOUS at 12:52

## 2021-05-13 RX ADMIN — GEMCITABINE HYDROCHLORIDE 1520.2 MG: 1 INJECTION, SOLUTION INTRAVENOUS at 13:35

## 2021-05-13 NOTE — PROGRESS NOTES
Patient tolerated treatment today without issues  Patient aware to return tomorrow at 1400 for Granix injection  AVS provided

## 2021-05-13 NOTE — PLAN OF CARE
Problem: Potential for Falls  Goal: Patient will remain free of falls  Description: INTERVENTIONS:  - Assess patient frequently for physical needs  -  Identify cognitive and physical deficits and behaviors that affect risk of falls  -  [place] fall precautions as indicated by assessment   - Educate patient/family on patient safety including physical limitations  - Instruct patient to call for assistance with activity based on assessment  - Modify environment to reduce risk of injury  - Consider OT/PT consult to assist with strengthening/mobility  Outcome: Progressing     Problem: SAFETY ADULT  Goal: Patient will remain free of falls  Description: INTERVENTIONS:  - Assess patient frequently for physical needs  -  Identify cognitive and physical deficits and behaviors that affect risk of falls  -  [place] fall precautions as indicated by assessment   - Educate patient/family on patient safety including physical limitations  - Instruct patient to call for assistance with activity based on assessment  - Modify environment to reduce risk of injury  - Consider OT/PT consult to assist with strengthening/mobility  Outcome: Progressing     Problem: Knowledge Deficit  Goal: Patient/family/caregiver demonstrates understanding of disease process, treatment plan, medications, and discharge instructions  Description: Complete learning assessment and assess knowledge base    Interventions:  - Provide teaching at level of understanding  - Provide teaching via preferred learning methods  Outcome: Progressing

## 2021-05-14 ENCOUNTER — HOSPITAL ENCOUNTER (OUTPATIENT)
Dept: INFUSION CENTER | Facility: CLINIC | Age: 66
Discharge: HOME | End: 2021-05-14
Payer: COMMERCIAL

## 2021-05-14 VITALS — TEMPERATURE: 99.7 F

## 2021-05-14 DIAGNOSIS — C77.9 REGIONAL LYMPH NODE METASTASIS PRESENT (HCC): Primary | ICD-10-CM

## 2021-05-14 DIAGNOSIS — C34.91 SQUAMOUS CELL LUNG CANCER, RIGHT (HCC): ICD-10-CM

## 2021-05-14 DIAGNOSIS — D70.1 CHEMOTHERAPY INDUCED NEUTROPENIA (HCC): ICD-10-CM

## 2021-05-14 DIAGNOSIS — T45.1X5A CHEMOTHERAPY INDUCED NEUTROPENIA (HCC): ICD-10-CM

## 2021-05-14 PROCEDURE — 96372 THER/PROPH/DIAG INJ SC/IM: CPT

## 2021-05-14 RX ADMIN — TBO-FILGRASTIM 480 MCG: 480 INJECTION, SOLUTION SUBCUTANEOUS at 14:03

## 2021-05-18 ENCOUNTER — PATIENT OUTREACH (OUTPATIENT)
Dept: CASE MANAGEMENT | Facility: HOSPITAL | Age: 66
End: 2021-05-18

## 2021-05-18 NOTE — PROGRESS NOTES
MSW made outreach to pt this day and she was open and receptive of this call  The pt reports that she has been struggling with her Granix shot that she receives post chemo  The pt explained how this dose had to be increased due to her low WBC and she felt very ill last week after receiving this shot  The pt also shared that she has missed a chemo due to her blood counts  The pt did share that  she continues with her support group through the [employer]  She shared the many benefits she has received from this group  The pt also spoke favorably of the art cart in the infusion center  She shared the pieces of jewelry she has made for herself and her friends  The pt looks forward to this opportunity to help pass the time when she is receiving her treatment  The pt did share that she is planning on receiving 2 more treatments and expects to be completed with her therapy  She had many unanswered questions about her future as she spoke about the cancer being on a lymph node  MSW spent time listening and providing emotional support  MSW assured pt that the counselor services continue on even when her treatments has completed  Pt was appreciative to learn this  MSW encouraged the pt to call as needed and MSW will continue to follow

## 2021-05-19 ENCOUNTER — TELEPHONE (OUTPATIENT)
Dept: HEMATOLOGY ONCOLOGY | Facility: CLINIC | Age: 66
End: 2021-05-19

## 2021-05-19 RX ORDER — SODIUM CHLORIDE 9 MG/ML
20 INJECTION, SOLUTION INTRAVENOUS ONCE
Status: CANCELLED | OUTPATIENT
Start: 2021-05-26

## 2021-05-19 NOTE — TELEPHONE ENCOUNTER
Patient calling to report that she developed pain in her legs and arms after her last round of chemo  She believes this is from her Granix  Patient states her dose was increased  Pain lasted for about 24 hours  Patient had treatment 5/13/21 and pain started 5/22/21  Patient states it felt like she "had the flu"  Reviewed with patient that she can take Tylenol PRN for pain if it happens again next cycle    Patient asked about taking Claritin - I explained this was also ok to try     Patient will call back with any additional questions or concerns  Will send to RN to update

## 2021-05-24 ENCOUNTER — TELEPHONE (OUTPATIENT)
Dept: HEMATOLOGY ONCOLOGY | Facility: CLINIC | Age: 66
End: 2021-05-24

## 2021-05-24 NOTE — TELEPHONE ENCOUNTER
Returned telephone call spoke with pt  She wants to change appt as she wants to go to a harpreet event on 6/3  Per infusion Margarita Cooney they have availability on 6/7 at 0830 and then 6/8 for the granix  Pt is ok with the change

## 2021-05-24 NOTE — TELEPHONE ENCOUNTER
Appointment Cancellation Or Reschedule     Person calling in Patient    Provider Dr Cardona Sensing   Office Visit Date and Time 06/17/2021   Office Visit Location [address]   Did patient want to reschedule their office appointment? If so, when was it scheduled to? no   Is this patient calling to reschedule an infusion appointment? Yes 06/03/2021   Is this patient a Chemo patient? yes   When is their next infusion visit? 05/26/2021   Reason for Cancellation or Reschedule Patient requesting a sooner appt for infusion      If the patient is a treatment patient, please route this to the office nurse  If the patient is not on treatment, please route to the office MA

## 2021-05-25 ENCOUNTER — TELEPHONE (OUTPATIENT)
Dept: HEMATOLOGY ONCOLOGY | Facility: CLINIC | Age: 66
End: 2021-05-25

## 2021-05-25 ENCOUNTER — APPOINTMENT (OUTPATIENT)
Dept: LAB | Facility: CLINIC | Age: 66
End: 2021-05-25
Payer: COMMERCIAL

## 2021-05-25 RX ORDER — SODIUM CHLORIDE 9 MG/ML
20 INJECTION, SOLUTION INTRAVENOUS ONCE
Status: CANCELLED | OUTPATIENT
Start: 2021-06-07

## 2021-05-25 NOTE — TELEPHONE ENCOUNTER
Telephone call left voice message on pt's cell phone to remind her to get labs drawn today for tomorrow's tx  I also tried to leave a message on the house phone but it just kept ringing

## 2021-05-26 ENCOUNTER — HOSPITAL ENCOUNTER (OUTPATIENT)
Dept: INFUSION CENTER | Facility: CLINIC | Age: 66
Discharge: HOME | End: 2021-05-26
Payer: COMMERCIAL

## 2021-05-26 VITALS
HEART RATE: 76 BPM | WEIGHT: 134.5 LBS | DIASTOLIC BLOOD PRESSURE: 72 MMHG | TEMPERATURE: 97.6 F | BODY MASS INDEX: 29.02 KG/M2 | RESPIRATION RATE: 18 BRPM | OXYGEN SATURATION: 96 % | HEIGHT: 57 IN | SYSTOLIC BLOOD PRESSURE: 118 MMHG

## 2021-05-26 DIAGNOSIS — R11.0 CHEMOTHERAPY-INDUCED NAUSEA: ICD-10-CM

## 2021-05-26 DIAGNOSIS — E86.0 DEHYDRATION: Primary | ICD-10-CM

## 2021-05-26 DIAGNOSIS — C77.9 REGIONAL LYMPH NODE METASTASIS PRESENT (HCC): Primary | ICD-10-CM

## 2021-05-26 DIAGNOSIS — E86.0 DEHYDRATION: ICD-10-CM

## 2021-05-26 DIAGNOSIS — C34.91 SQUAMOUS CELL LUNG CANCER, RIGHT (HCC): ICD-10-CM

## 2021-05-26 DIAGNOSIS — T45.1X5A CHEMOTHERAPY-INDUCED NAUSEA: ICD-10-CM

## 2021-05-26 PROCEDURE — 96367 TX/PROPH/DG ADDL SEQ IV INF: CPT

## 2021-05-26 PROCEDURE — 96417 CHEMO IV INFUS EACH ADDL SEQ: CPT

## 2021-05-26 PROCEDURE — 96413 CHEMO IV INFUSION 1 HR: CPT

## 2021-05-26 RX ORDER — SODIUM CHLORIDE 9 MG/ML
20 INJECTION, SOLUTION INTRAVENOUS ONCE
Status: COMPLETED | OUTPATIENT
Start: 2021-05-26 | End: 2021-05-26

## 2021-05-26 RX ADMIN — SODIUM CHLORIDE 500 ML: 0.9 INJECTION, SOLUTION INTRAVENOUS at 08:49

## 2021-05-26 RX ADMIN — SODIUM CHLORIDE 20 ML/HR: 0.9 INJECTION, SOLUTION INTRAVENOUS at 08:49

## 2021-05-26 RX ADMIN — GEMCITABINE HYDROCHLORIDE 1520.2 MG: 1 INJECTION, SOLUTION INTRAVENOUS at 10:00

## 2021-05-26 RX ADMIN — FOSAPREPITANT 150 MG: 150 INJECTION, POWDER, LYOPHILIZED, FOR SOLUTION INTRAVENOUS at 09:18

## 2021-05-26 RX ADMIN — CARBOPLATIN 438.5 MG: 10 INJECTION, SOLUTION INTRAVENOUS at 10:39

## 2021-05-26 RX ADMIN — DEXAMETHASONE SODIUM PHOSPHATE: 10 INJECTION, SOLUTION INTRAMUSCULAR; INTRAVENOUS at 08:49

## 2021-05-26 NOTE — NURSING NOTE
Patient tolerated treatment today without issue, patient offers no complaints  Port remains with excellent blood return, flushed well  Port deaccessed without issue, bandaid in place  Patient aware to return to infusion center tomorrow at 1200 for Granix injection tomorrow  AVS printed for patient, patient ambulatory upon DC

## 2021-05-26 NOTE — NURSING NOTE
Patient arrives to infusion center for D1 cycle 4 of Gemzar and Carboplatin  Patient denies any acute complaints but questioned if she can have IV hydration today  Patient reports she would like to try to prevent muscle cramps/other symptoms post treatment  Spoke with Dante Owusu RN in Dr Ochoa's office, Jose Antonio Sorto per Dr Yaima Jerry for patient to have 500 CC bolus over 1 hour with treatment today and D8  Patient agreeable  Patient reports "fabian horses" after Granix and that last injection caused bone pain in legs and arms  Patient states she did not try any Claritin  Educated patient on taking Claritin to help with side effects  VSS, labs reviewed from 5/25/21 within parameters for treatment today  Port accessed without difficulty, excellent blood return noted  Flushed well  Patient resting on recliner chair, call bell within reach

## 2021-05-27 ENCOUNTER — HOSPITAL ENCOUNTER (OUTPATIENT)
Dept: INFUSION CENTER | Facility: CLINIC | Age: 66
Discharge: HOME | End: 2021-05-27
Payer: COMMERCIAL

## 2021-05-27 VITALS — TEMPERATURE: 97.8 F

## 2021-05-27 DIAGNOSIS — C77.9 REGIONAL LYMPH NODE METASTASIS PRESENT (HCC): Primary | ICD-10-CM

## 2021-05-27 DIAGNOSIS — C34.91 SQUAMOUS CELL LUNG CANCER, RIGHT (HCC): ICD-10-CM

## 2021-05-27 DIAGNOSIS — R11.0 CHEMOTHERAPY-INDUCED NAUSEA: ICD-10-CM

## 2021-05-27 DIAGNOSIS — T45.1X5A CHEMOTHERAPY-INDUCED NAUSEA: ICD-10-CM

## 2021-05-27 PROCEDURE — 96372 THER/PROPH/DIAG INJ SC/IM: CPT

## 2021-05-27 RX ADMIN — TBO-FILGRASTIM 480 MCG: 480 INJECTION, SOLUTION SUBCUTANEOUS at 12:15

## 2021-05-27 NOTE — NURSING NOTE
Patient arrives to infusion center for Granix infusion  Patient offers no complaints at this time  Patient reports she did take Claritin yesterday to help prevent any bone pain  Patient tolerated injection to RIGHT arm without issue  Patient aware of next appointment, declines AVS  Patient AAOx4 and ambulatory upon DC

## 2021-06-06 ENCOUNTER — APPOINTMENT (OUTPATIENT)
Dept: LAB | Age: 66
End: 2021-06-06
Payer: COMMERCIAL

## 2021-06-06 DIAGNOSIS — C77.9 REGIONAL LYMPH NODE METASTASIS PRESENT (HCC): ICD-10-CM

## 2021-06-06 DIAGNOSIS — C34.91 SQUAMOUS CELL LUNG CANCER, RIGHT (HCC): ICD-10-CM

## 2021-06-07 ENCOUNTER — HOSPITAL ENCOUNTER (OUTPATIENT)
Dept: INFUSION CENTER | Facility: CLINIC | Age: 66
Discharge: HOME | End: 2021-06-07
Payer: COMMERCIAL

## 2021-06-07 VITALS
DIASTOLIC BLOOD PRESSURE: 70 MMHG | BODY MASS INDEX: 29.12 KG/M2 | WEIGHT: 135 LBS | HEART RATE: 67 BPM | OXYGEN SATURATION: 97 % | RESPIRATION RATE: 16 BRPM | SYSTOLIC BLOOD PRESSURE: 152 MMHG | HEIGHT: 57 IN

## 2021-06-07 DIAGNOSIS — C77.9 REGIONAL LYMPH NODE METASTASIS PRESENT (HCC): Primary | ICD-10-CM

## 2021-06-07 DIAGNOSIS — T45.1X5A CHEMOTHERAPY-INDUCED NAUSEA: ICD-10-CM

## 2021-06-07 DIAGNOSIS — R11.0 CHEMOTHERAPY-INDUCED NAUSEA: ICD-10-CM

## 2021-06-07 DIAGNOSIS — E86.0 DEHYDRATION: ICD-10-CM

## 2021-06-07 DIAGNOSIS — C34.91 SQUAMOUS CELL LUNG CANCER, RIGHT (HCC): ICD-10-CM

## 2021-06-07 PROCEDURE — 96413 CHEMO IV INFUSION 1 HR: CPT

## 2021-06-07 PROCEDURE — 96367 TX/PROPH/DG ADDL SEQ IV INF: CPT

## 2021-06-07 RX ORDER — SODIUM CHLORIDE 9 MG/ML
20 INJECTION, SOLUTION INTRAVENOUS ONCE
Status: COMPLETED | OUTPATIENT
Start: 2021-06-07 | End: 2021-06-07

## 2021-06-07 RX ADMIN — SODIUM CHLORIDE 500 ML: 0.9 INJECTION, SOLUTION INTRAVENOUS at 09:15

## 2021-06-07 RX ADMIN — SODIUM CHLORIDE 20 ML/HR: 0.9 INJECTION, SOLUTION INTRAVENOUS at 09:24

## 2021-06-07 RX ADMIN — DEXAMETHASONE SODIUM PHOSPHATE: 10 INJECTION, SOLUTION INTRAMUSCULAR; INTRAVENOUS at 09:24

## 2021-06-07 RX ADMIN — GEMCITABINE HYDROCHLORIDE 1520.2 MG: 1 INJECTION, SOLUTION INTRAVENOUS at 10:03

## 2021-06-07 NOTE — PROGRESS NOTES
Pt here for D8, C4 chemotherapy and hydration  Pt offers no complaints, resting comfortably  Vitals stable upon admission  Labs reviewed from 6/6-ANC-0 95, below parameters per order  Notified provider's office-pending response at this time for plan of care    Call bell in reach, will continue to monitor

## 2021-06-07 NOTE — PROGRESS NOTES
Per Delgado Vu RN who s/w Dr Treasure Obando to proceed with treatment as ordered today-order is in [place]  Pt made aware

## 2021-06-07 NOTE — PROGRESS NOTES
Pt tolerated chemotherapy and hydration without complications  Pt is aware of Granix appointment tomorrow at [phone]  AVS declined  Advised patient to schedule port flush appointments every 4-6 weeks as her chemotherapy is now completed, pt reports she would like to do this at a later date

## 2021-06-08 ENCOUNTER — HOSPITAL ENCOUNTER (OUTPATIENT)
Dept: INFUSION CENTER | Facility: CLINIC | Age: 66
Discharge: HOME | End: 2021-06-08
Payer: COMMERCIAL

## 2021-06-08 VITALS — TEMPERATURE: 98.7 F

## 2021-06-08 DIAGNOSIS — C34.91 SQUAMOUS CELL LUNG CANCER, RIGHT (HCC): ICD-10-CM

## 2021-06-08 DIAGNOSIS — C77.9 REGIONAL LYMPH NODE METASTASIS PRESENT (HCC): Primary | ICD-10-CM

## 2021-06-08 DIAGNOSIS — R11.0 CHEMOTHERAPY-INDUCED NAUSEA: ICD-10-CM

## 2021-06-08 DIAGNOSIS — T45.1X5A CHEMOTHERAPY-INDUCED NAUSEA: ICD-10-CM

## 2021-06-08 PROCEDURE — 96372 THER/PROPH/DIAG INJ SC/IM: CPT

## 2021-06-08 RX ADMIN — TBO-FILGRASTIM 480 MCG: 480 INJECTION, SOLUTION SUBCUTANEOUS at 10:39

## 2021-06-08 NOTE — PROGRESS NOTES
Pt here for Granix  Injection given in  [place]  Pt tolerated well  Pt made aware to schedule port flush appt for every 4-6 weeks   Declines AVS

## 2021-06-17 ENCOUNTER — OFFICE VISIT (OUTPATIENT)
Dept: HEMATOLOGY ONCOLOGY | Facility: CLINIC | Age: 66
End: 2021-06-17
Payer: COMMERCIAL

## 2021-06-17 VITALS
SYSTOLIC BLOOD PRESSURE: 138 MMHG | HEART RATE: 89 BPM | OXYGEN SATURATION: 97 % | BODY MASS INDEX: 29.21 KG/M2 | HEIGHT: 57 IN | TEMPERATURE: 98 F | DIASTOLIC BLOOD PRESSURE: 76 MMHG | RESPIRATION RATE: 14 BRPM | WEIGHT: 135.4 LBS

## 2021-06-17 DIAGNOSIS — C34.91 SQUAMOUS CELL LUNG CANCER, RIGHT (HCC): Primary | ICD-10-CM

## 2021-06-17 DIAGNOSIS — R11.0 CHEMOTHERAPY-INDUCED NAUSEA: ICD-10-CM

## 2021-06-17 DIAGNOSIS — T45.1X5A CHEMOTHERAPY INDUCED NEUTROPENIA (HCC): ICD-10-CM

## 2021-06-17 DIAGNOSIS — T45.1X5A CHEMOTHERAPY-INDUCED NAUSEA: ICD-10-CM

## 2021-06-17 DIAGNOSIS — G35 MULTIPLE SCLEROSIS (HCC): ICD-10-CM

## 2021-06-17 DIAGNOSIS — C77.9 REGIONAL LYMPH NODE METASTASIS PRESENT (HCC): ICD-10-CM

## 2021-06-17 DIAGNOSIS — D70.1 CHEMOTHERAPY INDUCED NEUTROPENIA (HCC): ICD-10-CM

## 2021-06-17 DIAGNOSIS — I10 HYPERTENSION, ESSENTIAL: ICD-10-CM

## 2021-06-17 DIAGNOSIS — J44.9 CHRONIC OBSTRUCTIVE PULMONARY DISEASE, UNSPECIFIED COPD TYPE (HCC): ICD-10-CM

## 2021-06-17 PROCEDURE — 3078F DIAST BP <80 MM HG: CPT | Performed by: INTERNAL MEDICINE

## 2021-06-17 PROCEDURE — 3008F BODY MASS INDEX DOCD: CPT | Performed by: INTERNAL MEDICINE

## 2021-06-17 PROCEDURE — 99214 OFFICE O/P EST MOD 30 MIN: CPT | Performed by: INTERNAL MEDICINE

## 2021-06-17 PROCEDURE — 1036F TOBACCO NON-USER: CPT | Performed by: INTERNAL MEDICINE

## 2021-06-17 PROCEDURE — 3075F SYST BP GE 130 - 139MM HG: CPT | Performed by: INTERNAL MEDICINE

## 2021-06-17 NOTE — PROGRESS NOTES
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  with invasion of lymphatic channel and metastatic carcinoma in 1 of 8 peribronchial lymph nodes  Other lymph nodes at level 10  Posterior and level 10  Superior, level 7, level 4 and level 2 and at azygos were negative 4R and 2R were negative for malignancy  1 of 14 positive peribronchial lymph node, T2 N1, stage IIB  [place] [facility] She tested negative for EGFR      She has some discomfort at surgical site  Has minimal cough and exertional dyspnea  Has some tiredness  Now she  has thinning of hair    She sleeps poorly  She had PET-CT scan in November 2020   She PET scan had also shown positive activity in right parotid gland that was biopsied and was negative for malignancy and she will be followed by ENT specialist   Remote past history of suspected MS in 1989  She had some tingling  CSF came back suspicious and MRI brain came back suspicious  Patient was treated for MS at that time  No active treatment for the last several years  No more tingling in her hands  Patient gave me all this information  Because of history of MS she was given carboplatin plus Gemzar and not carboplatin plus Taxol  She also received Granix because of neutropenia  ROS:  06/17/21 Reviewed 12 systems: See symptoms in HPI  Presently no other neurological, cardiac, pulmonary, GI and  symptoms other than listed in HPI  Other symptoms are in HPI  No  fever, chills, bleeding, bone pains, skin rash, weight loss, night sweats, arthritic symptoms,   weakness, numbness, claudication and gait problem  No frequent infections  Not unusually sensitive to heat or cold  No swelling of the ankles   No swollen glands  Patient is not anxious  Irwin Guerrero         Historical Information   Past Medical History:   Diagnosis Date    Anxiety     Cancer ([facility] [address] [facility] )     lung    [facility]     GERD (gastroesophageal reflux disease)     Hyperlipidemia     Hypertension     Pulmonary emphysema ([facility] )     Restless leg syndrome      Past Surgical History:   Procedure Laterality Date    AUGMENTATION MAMMAPLASTY Bilateral 11/2014    retro-pectoral silicone    BREAST BIOPSY Left 03/23/2015    u/s core bx    BREAST BIOPSY Left 08/02/2010    high-risk lesion    BREAST EXCISIONAL BIOPSY Left 08/26/2010    high-risk lesion    BREAST EXCISIONAL BIOPSY Left 1994    BREAST EXCISIONAL BIOPSY Right 1984    FL GUIDED CENTRAL VENOUS ACCESS DEVICE INSERTION  3/9/2021    MI [address] N/A 1/28/2021    Procedure: BRONCHOSCOPY FLEXIBLE;  Surgeon: Scotty Benoit MD;  [facility];  Service: Thoracic    MI INSJ TUNNELED CTR VAD W/SUBQ PORT AGE 5 YR/> N/A 3/9/2021    Procedure: INSERTION VENOUS PORT ( PORT-A-CATH) IR;  Surgeon: Alexis Lin DO;  [facility];  Service: Interventional Radiology    MI THORACOSCOPY SURG LOBECTOMY Right 1/28/2021    Procedure: RIGHT VATS UPPER LOBECTOMY ; MEDIASTINAL LYMPH NODE DISSECTION; right lower lobe bleb resection  ;  Surgeon: Scotty Benoit MD;  Location[facility];  Service: Thoracic    TONSILLECTOMY      TOTAL ABDOMINAL HYSTERECTOMY  2010    age 60    TOTAL ABDOMINAL HYSTERECTOMY W/ BILATERAL SALPINGOOPHORECTOMY Bilateral 2010    age 60     Social History   Social History     Substance and Sexual Activity   Alcohol Use Yes    Alcohol/week: 7 0 standard drinks    Types: 7 Glasses of wine per week    Comment: wine @       Social History     Substance and Sexual Activity   Drug Use Never     Social History     Tobacco Use   Smoking Status Former Smoker    Packs/day: 0 25    Years: 30 00    Pack years: 7 50    Types: Cigarettes    Start date: 5    Quit date: 1/15/2021    Years since quittin 4   Smokeless Tobacco Never Used   Tobacco Comment    quit 2021  NICORETTE GUM SUBSTITUE CURRENTLY     Family History:   Family History   Problem Relation Age of Onset    No Known Problems Mother     COPD Father     Hypertension Father     No Known Problems Sister     No Known Problems Daughter     No Known Problems Maternal Grandmother     No Known Problems Maternal Grandfather     No Known Problems Paternal Grandmother     Rectal cancer Paternal Grandfather         unknown age   Mavis Cousin No Known Problems Sister     No Known Problems Maternal Aunt     Breast cancer Paternal Aunt         unknown age   Mavis Cousin No Known Problems Paternal Aunt     No Known Problems Paternal Aunt     No Known Problems Paternal Aunt     No Known Problems Paternal Aunt     No Known Problems Paternal Aunt     No Known Problems Paternal Aunt     COPD Brother          Current Outpatient Medications:     Biotin 5 MG CAPS, Take by mouth daily , Disp: , Rfl:     buPROPion (WELLBUTRIN XL) 300 mg 24 hr tablet, Take 300 mg by mouth every morning , Disp: , Rfl:     Cholecalciferol (Vitamin D3) 50 MCG (2000 UT) capsule, Take 5,000 Units by mouth daily , Disp: , Rfl:     famotidine (PEPCID) 40 MG tablet, Take 40 mg by mouth every morning , Disp: , Rfl:     pramipexole (MIRAPEX) 0 5 mg tablet, Take 0 5 mg by mouth daily at bedtime , Disp: , Rfl:     simvastatin (ZOCOR) 40 mg tablet, Take 40 mg by mouth every other day, Disp: , Rfl:     CRANIAL PROSTHESIS, RX,, One wig, as needed, Disp: 1 Piece, Rfl: 0    diphenhydrAMINE (BENADRYL) 25 mg tablet, Take 1 tablet (25 mg total) by mouth every 8 (eight) hours as needed (Nausea) (Patient not taking: Reported on 2021), Disp: 30 tablet, Rfl: 0    lidocaine-prilocaine (EMLA) cream, Apply topically as needed for mild pain Apply to port site before accessing   (Patient not taking: Reported on 2021), Disp: 30 g, Rfl: 0    prochlorperazine (COMPAZINE) 10 mg tablet, Take 1 tablet (10 mg total) by mouth every 8 (eight) hours as needed for nausea or vomiting (Patient not taking: Reported on 6/17/2021), Disp: 30 tablet, Rfl: 0    triamterene-hydrochlorothiazide (DYAZIDE) 37 5-25 mg per capsule, Take 1 capsule by mouth every morning (Patient not taking: Reported on 6/17/2021), Disp: , Rfl:     umeclidinium-vilanterol (Anoro Ellipta) 62 5-25 MCG/INH inhaler, Inhale 1 puff daily  (Patient not taking: Reported on 6/17/2021), Disp: , Rfl:     Allergies   Allergen Reactions    Sulfa Antibiotics Hives     @ ROS@  Physical Exam:  Vitals:    06/17/21 1441   BP: 138/76   BP Location: Left arm   Patient Position: Sitting   Cuff Size: Adult   Pulse: 89   Resp: 14   Temp: 98 °F (36 7 °C)   TempSrc: Temporal   SpO2: 97%   Weight: 61 4 kg (135 lb 6 4 oz)   Height: 4' 9" (1 448 m)     Alert, oriented, not in distress, stable vitals, no icterus, no oral thrush, no palpable neck mass, clear lung fields, regular heart rate, abdomen  soft and non tender, no palpable abdominal mass, no ascites, no edema of ankles, no calf tenderness, no focal neurological deficit, no skin rash, no palpable lymphadenopathy in the neck and axillary areas,  no clubbing  Patient is not anxious  Performance status 1  Lab Results: I have reviewed all pertinent labs    LABS:    Results for orders placed or performed in visit on 05/21/21   CBC and differential   Result Value Ref Range    WBC 2 28 (L) 4 31 - 10 16 Thousand/uL    RBC 2 54 (L) 3 81 - 5 12 Million/uL    Hemoglobin 8 8 (L) 11 5 - 15 4 g/dL    Hematocrit 27 2 (L) 34 8 - 46 1 %     (H) 82 - 98 fL    MCH 34 6 (H) 26 8 - 34 3 pg    MCHC 32 4 31 4 - 37 4 g/dL    RDW 16 6 (H) 11 6 - 15 1 %    MPV 9 8 8 9 - 12 7 fL    Platelets 015 (L) 556 - 390 Thousands/uL    nRBC 0 /100 WBCs    Neutrophils Relative 42 (L) 43 - 75 %    Immat GRANS % 0 0 - 2 %    Lymphocytes Relative 45 (H) 14 - 44 % Monocytes Relative 11 4 - 12 %    Eosinophils Relative 1 0 - 6 %    Basophils Relative 1 0 - 1 %    Neutrophils Absolute 0 95 (L) 1 85 - 7 62 Thousands/µL    Immature Grans Absolute 0 00 0 00 - 0 20 Thousand/uL    Lymphocytes Absolute 1 04 0 60 - 4 47 Thousands/µL    Monocytes Absolute 0 24 0 17 - 1 22 Thousand/µL    Eosinophils Absolute 0 02 0 00 - 0 61 Thousand/µL    Basophils Absolute 0 03 0 00 - 0 10 Thousands/µL   Comprehensive metabolic panel   Result Value Ref Range    Sodium 140 136 - 145 mmol/L    Potassium 4 2 3 5 - 5 3 mmol/L    Chloride 106 100 - 108 mmol/L    CO2 30 21 - 32 mmol/L    ANION GAP 4 4 - 13 mmol/L    BUN 15 5 - 25 mg/dL    Creatinine 0 69 0 60 - 1 30 mg/dL    Glucose 89 65 - 140 mg/dL    Calcium 8 9 8 3 - 10 1 mg/dL    AST 11 5 - 45 U/L    ALT 23 12 - 78 U/L    Alkaline Phosphatase 82 46 - 116 U/L    Total Protein 6 8 6 4 - 8 2 g/dL    Albumin 3 5 3 5 - 5 0 g/dL    Total Bilirubin 0 28 0 20 - 1 00 mg/dL    eGFR 90 ml/min/1 73sq m     SmartPhrase Guide    Comprehensive metabolic panel (Order #[id number]) on 4/9/21   Questions    Collection Question Answer   Has the patient been fasting for 8 hours or more? No      Comprehensive metabolic panel  Order: [id number]  Status:  Final result   Visible to patient:  Yes ([facility]) Next appt:  05/05/2021 at 08:00 AM in Infusion Therapy ([facility]) Dx:  Squamous cell lung cancer, right (HCC)   Ref Range & Units 4/26/21 6:44 PM   Sodium 136 - 145 mmol/L 138    Potassium 3 5 - 5 3 mmol/L 4 7    Chloride 100 - 108 mmol/L 106    CO2 21 - 32 mmol/L 28    ANION GAP 4 - 13 mmol/L 4    BUN 5 - 25 mg/dL 11    Creatinine 0 60 - 1 30 mg/dL 0 80    Comment: Standardized to IDMS reference method   Glucose 65 - 140 mg/dL 77    Comment: If the patient is fasting, the ADA then defines impaired fasting glucose as > 100 mg/dL and diabetes as > or equal to 123 mg/dL     Specimen collection should occur prior to Sulfasalazine administration due to the potential for falsely depressed results  Specimen collection should occur prior to Sulfapyridine administration due to the potential for falsely elevated results  Calcium 8 3 - 10 1 mg/dL 9 5    AST 5 - 45 U/L 14    Comment: Specimen collection should occur prior to Sulfasalazine administration due to the potential for falsely depressed results  ALT 12 - 78 U/L 24    Comment: Specimen collection should occur prior to Sulfasalazine and/or Sulfapyridine administration due to the potential for falsely depressed results  Alkaline Phosphatase 46 - 116 U/L 76    Total Protein 6 4 - 8 2 g/dL 7 3    Albumin 3 5 - 5 0 g/dL 4 0    Total Bilirubin 0 20 - 1 00 mg/dL 0 28    Comment: Use of this assay is not recommended for patients undergoing treatment with eltrombopag due to the potential for falsely elevated results  eGFR ml/min/1 73sq m 68       Narrative    National Kidney Disease Foundation guidelines for Chronic Kidney Disease (CKD):     Stage 1 with normal or high GFR (GFR > 90 mL/min/1 73 square meters)     Stage 2 Mild CKD (GFR = 60-89 mL/min/1 73 square meters)     Stage 3A Moderate CKD (GFR = 45-59 mL/min/1 73 square meters)     Stage 3B Moderate CKD (GFR = 30-44 mL/min/1 73 square meters)     Stage 4 Severe CKD (GFR = 15-29 mL/min/1 73 square meters)     Stage 5 End Stage CKD (GFR <15 mL/min/1 73 square meters)   Note: GFR calculation is accurate only with a steady state creatinine      Specimen Collected: 04/26/21  6:44 PM Last Resulted: 04/26/21  9:16 PM        Lab Flowsheet     Order Details     View Encounter     Lab and Collection Details     Routing     Result History                 Imaging Studies: I have personally reviewed pertinent reports  November  PET [facility]  Result Impression   IMPRESSION:  1  FDG avid stable right upper lobe lung mass, still suspicious for lung  malignancy despite negative biopsy    2  Smaller stable, mildly FDG avid nodules in the posterior right lung apex  could be inflammatory or metastatic  3  Intense uptake in or adjacent to the deep lobe of the right parotid gland  MRI recommended for further evaluation   MRI NECK SOFT TISSUE [address]  Result Impression   Impression:     Question parotid lesion is present on the right  This abuts the parotid gland  however the majority appears to be posterior to the sternocleidomastoid muscle  raising question of metastatic disease to a level 2 lymph node  Parotid  malignancy is not excluded  Right upper lobe lung nodule is poorly evaluated on this modality                  Workstation:[id number]         Pathology, and Other Studies: I have personally reviewed pertinent reports  Report  NON-GYNECOLOGICAL CYTOLOGYResulted: 2021 12:20 PM  [facility]  Component Name Value Ref Range   Non-Gynecological Cytology [facility]  [address] [place] [address]  [phone]    PATHOLOGY REPORT             MR#:             [id number]  Patient:         Johnathan Howard  /Sex:         1955  F  Provider:        SYLVIA LUO  Location:        IVR_Intervent  Radiol  OP ([facility])  Collect Date:    2021  N21-53    CYTOLOGIC DIAGNOSIS:  A  Fine Needle Aspiration with Cytotech Assistance and Cell Block,  Right Parotid Mass  Adequacy: Adequate for evaluation  General Category: Negative for Diagnostic Malignancy  Descriptive Interpretation: Cytologic features consistent with  pleomorphic adenoma     Reviewed CBC and CMP   discussed with patient and her  and explained  Assessment and Plan:  Patient is here with her      Patient has completed 4 cycles of adjuvant chemotherapy, a combination of carboplatin and Gemzar and last treatment was on 2021  Patient  had right upper lobe lobectomy and lymph node sampling on 2021    Pathology report was 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  with invasion of lymphatic channel and metastatic carcinoma in 1 of 8 peribronchial lymph nodes  Other lymph nodes at level 10  Posterior and level 10  Superior, level 7, level 4 and level 2 and at azygos were negative 4R and 2R were negative for malignancy  1 of 14 positive peribronchial lymph node, T2 N1, stage IIB  She tested negative for EGFR      She has some discomfort at surgical site  Has minimal cough and exertional dyspnea  Has some tiredness  Now she  has thinning of hair    She sleeps poorly  She had PET-CT scan in November 2020   She PET scan had also shown positive activity in right parotid gland that was biopsied and was negative for malignancy and she will be followed by ENT specialist   Remote past history of suspected MS in 1989  She had some tingling  CSF came back suspicious and MRI brain came back suspicious  Patient was treated for MS at that time  No active treatment for the last several years  No more tingling in her hands  Patient gave me all this information  Because of history of MS she was given carboplatin plus Gemzar and not carboplatin plus Taxol  She also received Granix because of neutropenia          Physical examination and test results are as recorded and discussed in detail  Patient has completed planned adjuvant systemic therapy and plan is surveillance  She is scheduled to have CT chest on 07/22/2021 and also she will have blood work  She has follow-up appointment with thoracic surgeon  She would like to come back to our office in August 2021  She will continue to get port flushed  All discussed in very much detail  Questions answered  Discussed the importance of self-breast examination, eating healthy foods, activities as tolerated and health screening tests  Patient has already stopped smoking cigarettes  She is capable of self-care  Goal will be cure if possible     Discussed precautions against coronavirus       Patient will continue to follow with her primary physician and other consultants  Discussed diagnoses, orders and instructions below    1  Squamous cell lung cancer, right (HCC)    - CBC and differential; Future  - Comprehensive metabolic panel; Future  - CEA; Future    2  Chronic obstructive pulmonary disease, unspecified COPD type ([facility] )      3  Regional lymph node metastasis  ([facility] )      4  Chemotherapy induced neutropenia (HCC)      5  Chemotherapy-induced nausea      6  Hypertension, essential      7  Multiple sclerosis ([facility] )    8  Anemia secondary to chemotherapy    Blood work prior to next visit in 2 months  Port flush every 6 weeks  She is already scheduled to have CT scan on 07/22/2021  Patient voiced understanding and agrees    Counseling / Coordination of Care    Brayan Herrera   Provided counseling and support

## 2021-06-17 NOTE — PATIENT INSTRUCTIONS
Blood work prior to next visit in 2 months  Port flush every 6 weeks  She is already scheduled to have CT scan on 07/22/2021

## 2021-06-21 ENCOUNTER — TELEPHONE (OUTPATIENT)
Dept: INTERNAL MEDICINE CLINIC | Facility: CLINIC | Age: 66
End: 2021-06-21

## 2021-06-21 DIAGNOSIS — F41.9 ANXIETY DISORDER, UNSPECIFIED TYPE: ICD-10-CM

## 2021-06-21 DIAGNOSIS — K21.00 GASTROESOPHAGEAL REFLUX DISEASE WITH ESOPHAGITIS, UNSPECIFIED WHETHER HEMORRHAGE: Primary | ICD-10-CM

## 2021-06-21 DIAGNOSIS — G25.81 RESTLESS LEGS SYNDROME (RLS): ICD-10-CM

## 2021-06-21 DIAGNOSIS — E78.2 MIXED HYPERLIPIDEMIA: ICD-10-CM

## 2021-06-21 RX ORDER — PRAMIPEXOLE DIHYDROCHLORIDE 0.5 MG/1
0.5 TABLET ORAL
Qty: 90 TABLET | Refills: 1 | Status: SHIPPED | OUTPATIENT
Start: 2021-06-21 | End: 2022-01-05 | Stop reason: SDUPTHER

## 2021-06-21 RX ORDER — FAMOTIDINE 40 MG/1
40 TABLET, FILM COATED ORAL EVERY MORNING
Qty: 90 TABLET | Refills: 1 | Status: SHIPPED | OUTPATIENT
Start: 2021-06-21 | End: 2022-01-05 | Stop reason: SDUPTHER

## 2021-06-21 RX ORDER — SIMVASTATIN 40 MG
40 TABLET ORAL
Qty: 90 TABLET | Refills: 1 | Status: SHIPPED | OUTPATIENT
Start: 2021-06-21 | End: 2022-01-05 | Stop reason: SDUPTHER

## 2021-06-21 RX ORDER — BUPROPION HYDROCHLORIDE 300 MG/1
300 TABLET ORAL EVERY MORNING
Qty: 90 TABLET | Refills: 1 | Status: SHIPPED | OUTPATIENT
Start: 2021-06-21 | End: 2022-01-05 | Stop reason: SDUPTHER

## 2021-06-21 NOTE — TELEPHONE ENCOUNTER
Patient called requesting a refill for pramipexole (MIRAPEX) 0 5 mg tablet Take 0 5 mg by mouth daily at bedtime  Patient is currently going to be out in a couple days  Patient also requesting to get refills for simvastatin (ZOCOR) 40 mg tablet Take 40 mg by mouth every other day, famotidine (PEPCID) 40 MG tablet Take 40 mg by mouth every morning , buPROPion (WELLBUTRIN XL) 300 mg 24 hr tablet Take 300 mg by mouth every morning  Patient stated that she uses [facility] through [employer]   Phone # [phone]

## 2021-06-30 ENCOUNTER — TELEPHONE (OUTPATIENT)
Dept: HEMATOLOGY ONCOLOGY | Facility: CLINIC | Age: 66
End: 2021-06-30

## 2021-06-30 NOTE — TELEPHONE ENCOUNTER
Patient stated that she has been having pain on her right rib for a while now and wants to know if the CT of the chest that shes scheduled for can include her rib as well? She stated that Dr Tiki Jean is aware of her pain but wants to know if anything can be done  Pain level is 5 or 6  Req that we call her back and let her know

## 2021-07-08 ENCOUNTER — TELEPHONE (OUTPATIENT)
Dept: HEMATOLOGY ONCOLOGY | Facility: MEDICAL CENTER | Age: 66
End: 2021-07-08

## 2021-07-08 NOTE — TELEPHONE ENCOUNTER
Spoke with patient advising her that Dr Alex Sigala will no longer be in the [place] office on Thursday I was calling to r/s her appt on 8/19/21 at 9:20 in the [place] office  I asked if she would like to see his PA Shreya Coello, patient declined  I was not able to find an opening until October I asked patient if she would like to see Dr Alex Sigala in [facility], patient declined  New appt is with June Lara on 10/4/21 at 2pm in the [place] office, patient voiced understanding

## 2021-07-14 ENCOUNTER — TELEPHONE (OUTPATIENT)
Dept: SURGICAL ONCOLOGY | Facility: CLINIC | Age: 66
End: 2021-07-14

## 2021-07-14 NOTE — TELEPHONE ENCOUNTER
Patient called  Is having a lot of pain in the lower abdomen right side  Can the CT be changed to CT chest and abdomen? Please call patient at [phone]

## 2021-07-14 NOTE — TELEPHONE ENCOUNTER
I called Fili Christos back to tell her if she's having lower right sided abdominal pain she needs to call PCP  She said the pain is under ribs radiating down into abdomen  She would like us to order CT of abdomen to be done with CT of chest on 7/22/2021  I told her we don't routinely order CT of Abdomen for abdominal pain  Advised her to call PCP

## 2021-07-22 ENCOUNTER — HOSPITAL ENCOUNTER (OUTPATIENT)
Dept: RADIOLOGY | Age: 66
Discharge: HOME | End: 2021-07-22
Payer: COMMERCIAL

## 2021-07-22 ENCOUNTER — APPOINTMENT (OUTPATIENT)
Dept: LAB | Age: 66
End: 2021-07-22
Payer: COMMERCIAL

## 2021-07-22 DIAGNOSIS — C34.91 SQUAMOUS CELL LUNG CANCER, RIGHT (HCC): ICD-10-CM

## 2021-07-22 LAB
ALBUMIN SERPL BCP-MCNC: 3.8 G/DL (ref 3.5–5)
ALP SERPL-CCNC: 75 U/L (ref 46–116)
ALT SERPL W P-5'-P-CCNC: 30 U/L (ref 12–78)
ANION GAP SERPL CALCULATED.3IONS-SCNC: 7 MMOL/L (ref 4–13)
AST SERPL W P-5'-P-CCNC: 21 U/L (ref 5–45)
BASOPHILS # BLD AUTO: 0.06 THOUSANDS/ΜL (ref 0–0.1)
BASOPHILS NFR BLD AUTO: 1 % (ref 0–1)
BILIRUB SERPL-MCNC: 0.45 MG/DL (ref 0.2–1)
BUN SERPL-MCNC: 12 MG/DL (ref 5–25)
CALCIUM SERPL-MCNC: 9.8 MG/DL (ref 8.3–10.1)
CEA SERPL-MCNC: 1.8 NG/ML (ref 0–3)
CHLORIDE SERPL-SCNC: 102 MMOL/L (ref 100–108)
CO2 SERPL-SCNC: 26 MMOL/L (ref 21–32)
CREAT SERPL-MCNC: 0.72 MG/DL (ref 0.6–1.3)
EOSINOPHIL # BLD AUTO: 0.16 THOUSAND/ΜL (ref 0–0.61)
EOSINOPHIL NFR BLD AUTO: 3 % (ref 0–6)
ERYTHROCYTE [DISTWIDTH] IN BLOOD BY AUTOMATED COUNT: 13.2 % (ref 11.6–15.1)
GFR SERPL CREATININE-BSD FRML MDRD: 86 ML/MIN/1.73SQ M
GLUCOSE P FAST SERPL-MCNC: 81 MG/DL (ref 65–99)
HCT VFR BLD AUTO: 42.3 % (ref 34.8–46.1)
HGB BLD-MCNC: 13.4 G/DL (ref 11.5–15.4)
IMM GRANULOCYTES # BLD AUTO: 0.01 THOUSAND/UL (ref 0–0.2)
IMM GRANULOCYTES NFR BLD AUTO: 0 % (ref 0–2)
LYMPHOCYTES # BLD AUTO: 1.26 THOUSANDS/ΜL (ref 0.6–4.47)
LYMPHOCYTES NFR BLD AUTO: 23 % (ref 14–44)
MCH RBC QN AUTO: 33.3 PG (ref 26.8–34.3)
MCHC RBC AUTO-ENTMCNC: 31.7 G/DL (ref 31.4–37.4)
MCV RBC AUTO: 105 FL (ref 82–98)
MONOCYTES # BLD AUTO: 0.44 THOUSAND/ΜL (ref 0.17–1.22)
MONOCYTES NFR BLD AUTO: 8 % (ref 4–12)
NEUTROPHILS # BLD AUTO: 3.47 THOUSANDS/ΜL (ref 1.85–7.62)
NEUTS SEG NFR BLD AUTO: 65 % (ref 43–75)
NRBC BLD AUTO-RTO: 0 /100 WBCS
PLATELET # BLD AUTO: 323 THOUSANDS/UL (ref 149–390)
PMV BLD AUTO: 10.4 FL (ref 8.9–12.7)
POTASSIUM SERPL-SCNC: 3.9 MMOL/L (ref 3.5–5.3)
PROT SERPL-MCNC: 7.7 G/DL (ref 6.4–8.2)
RBC # BLD AUTO: 4.03 MILLION/UL (ref 3.81–5.12)
SODIUM SERPL-SCNC: 135 MMOL/L (ref 136–145)
WBC # BLD AUTO: 5.4 THOUSAND/UL (ref 4.31–10.16)

## 2021-07-22 PROCEDURE — G1004 CDSM NDSC: HCPCS

## 2021-07-22 PROCEDURE — 71250 CT THORAX DX C-: CPT

## 2021-07-22 PROCEDURE — 36415 COLL VENOUS BLD VENIPUNCTURE: CPT

## 2021-07-22 PROCEDURE — 82378 CARCINOEMBRYONIC ANTIGEN: CPT

## 2021-07-22 PROCEDURE — 80053 COMPREHEN METABOLIC PANEL: CPT

## 2021-07-22 PROCEDURE — 85025 COMPLETE CBC W/AUTO DIFF WBC: CPT

## 2021-07-23 PROBLEM — Z45.2 ENCOUNTER FOR CARE RELATED TO VASCULAR ACCESS PORT: Status: ACTIVE | Noted: 2021-07-23

## 2021-07-27 ENCOUNTER — HOSPITAL ENCOUNTER (OUTPATIENT)
Dept: INFUSION CENTER | Facility: HOSPITAL | Age: 66
Discharge: HOME | End: 2021-07-27
Payer: COMMERCIAL

## 2021-07-27 ENCOUNTER — OFFICE VISIT (OUTPATIENT)
Dept: CARDIAC SURGERY | Facility: CLINIC | Age: 66
End: 2021-07-27
Payer: COMMERCIAL

## 2021-07-27 VITALS
DIASTOLIC BLOOD PRESSURE: 80 MMHG | SYSTOLIC BLOOD PRESSURE: 140 MMHG | HEART RATE: 63 BPM | OXYGEN SATURATION: 98 % | BODY MASS INDEX: 29.25 KG/M2 | HEIGHT: 57 IN | WEIGHT: 135.58 LBS | RESPIRATION RATE: 16 BRPM | TEMPERATURE: 97.6 F

## 2021-07-27 VITALS — TEMPERATURE: 98.8 F

## 2021-07-27 DIAGNOSIS — C34.91 SQUAMOUS CELL LUNG CANCER, RIGHT (HCC): Primary | ICD-10-CM

## 2021-07-27 DIAGNOSIS — Z45.2 ENCOUNTER FOR CARE RELATED TO VASCULAR ACCESS PORT: Primary | ICD-10-CM

## 2021-07-27 PROCEDURE — 1036F TOBACCO NON-USER: CPT | Performed by: PHYSICIAN ASSISTANT

## 2021-07-27 PROCEDURE — 3079F DIAST BP 80-89 MM HG: CPT | Performed by: PHYSICIAN ASSISTANT

## 2021-07-27 PROCEDURE — 96523 IRRIG DRUG DELIVERY DEVICE: CPT

## 2021-07-27 PROCEDURE — 1160F RVW MEDS BY RX/DR IN RCRD: CPT | Performed by: PHYSICIAN ASSISTANT

## 2021-07-27 PROCEDURE — 99213 OFFICE O/P EST LOW 20 MIN: CPT | Performed by: PHYSICIAN ASSISTANT

## 2021-07-27 PROCEDURE — 3008F BODY MASS INDEX DOCD: CPT | Performed by: PHYSICIAN ASSISTANT

## 2021-07-27 PROCEDURE — 3077F SYST BP >= 140 MM HG: CPT | Performed by: PHYSICIAN ASSISTANT

## 2021-07-27 NOTE — PROGRESS NOTES
Thoracic Follow-Up  Assessment/Plan:    Squamous cell lung cancer, right ([address] )  Ms Charissa Hairston is about 6 months out from resection and has no evidence of recurrent or metastatic disease  With regards to her pain, it is likely nerve irritation from her port sites  It could have also been irritated by her chemotherapy  There was no abnormality on her CT scan  Options for this pain could be another course of Neurontin or nerve ablation by pain management  She was more worried that it was associated with metastatic disease  We will see her back in 6 months with a new CT scan for continued surveillance  She can call us if she wants to pursue treatment for her right sided pain  Diagnoses and all orders for this visit:    Squamous cell lung cancer, right ([address] )  -     CT chest wo contrast; Future             Thoracic History       Diagnosis: 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  Procedure: right thoracoscopic upper lobectomy and lower lobe wedge resection 1/28/21  Pathology:  Single focus 3 1x3  1x3 0cm G3 invasive, keratinizing squamous cell carcinoma right upper lobe  No visceral pleura invasion, there is lymphatic invasion present   All margins uninvolved by tumor  1/14 lymph nodes involved, +right peribronchial  Negative 2R, 4R, 10R, azygous and 7      AJCC Prognostic Stage Group (8th Ed ):  Stage IIB - pT2a, pN1, MX, G3    Adjuvant therapy: carboplatin plus Gemzar, completed 6/7/21          Patient ID: Haritha Alvarez is a 74 y o  female  ECOG 0    HPI      Ms Charissa Hairston is a 75 yo female who underwent a right thoracoscopic upper lobectomy and lower lobe wedge resection on 1/28/21 for a stage IIB squamous cell carcinoma  She completed her adjuvant therapy on 6/7/21  She returns today for her 6 mo follow up visit with a CT scan from 7/22/21, which did not reveal any evidence of new pulmonary nodules or lymphadenopathy   On discussion, she denies fever, chills, cough, shortness of breath, new bone pains or headaches  She is having right leg muscle pain and is seeing a chiropractor for that  She is not having any paresthesias  She is also having some discomfort just inferior to her incisions, which seems to be improving  She is not overly sensitive in the area  The following portions of the patient's history were reviewed and updated as appropriate: allergies, current medications, past family history, past medical history, past social history, past surgical history and problem list     Review of Systems      Objective:   Physical Exam  Vitals reviewed  Constitutional:       Appearance: Normal appearance  She is well-developed  She is not diaphoretic  HENT:      Head: Normocephalic and atraumatic  Mouth/Throat:      Comments: Masked   Eyes:      Pupils: Pupils are equal, round, and reactive to light  Neck:      Trachea: No tracheal deviation  Cardiovascular:      Rate and Rhythm: Normal rate and regular rhythm  Heart sounds: Normal heart sounds  No murmur heard  Pulmonary:      Effort: Pulmonary effort is normal  No respiratory distress  Breath sounds: Normal breath sounds  No wheezing  Abdominal:      General: Bowel sounds are normal  There is no distension  Palpations: Abdomen is soft  Tenderness: There is no abdominal tenderness  Musculoskeletal:         General: Normal range of motion  Cervical back: Normal range of motion and neck supple  Lymphadenopathy:      Cervical: No cervical adenopathy  Skin:     General: Skin is warm and dry  Findings: No erythema  Neurological:      Mental Status: She is alert and oriented to person, place, and time  Cranial Nerves: No cranial nerve deficit  Psychiatric:         Behavior: Behavior normal          Thought Content:  Thought content normal      /80   Pulse 63   Temp 97 6 °F (36 4 °C) (Temporal)   Resp 16   Ht 4' 9" (1 448 m)   Wt 61 5 kg (135 lb 9 3 oz)   SpO2 98%   BMI 29 34 kg/m² CT chest wo contrast    Result Date: 7/26/2021  Narrative CT CHEST WITHOUT IV CONTRAST INDICATION:   C34 91: Malignant neoplasm of unspecified part of right bronchus or lung  Right upper lobectomy and right lower lobe wedge resection for squamous cell carcinoma on 1/28/2021  Adjuvant chemotherapy  COMPARISON: PET CT from 11/18/2020  Chest radiograph from 3/14/2021  TECHNIQUE: Chest CT without intravenous contrast   Axial, sagittal, coronal 2D reformats and coronal MIPS from source data  Radiation dose length product (DLP):  180 mGy-cm   Radiation dose exposure minimized using iterative reconstruction and automated exposure control  FINDINGS: LUNGS:  Nothing to indicate recurrent tumor  Right upper lobectomy and right lower lobe wedge resection  Mild juxtapleural scarring in the lateral basal right lower lobe  Benign calcified granulomas  AIRWAYS: No significant filling defects  PLEURA:  Unremarkable  HEART/GREAT VESSELS:  Normal heart size  Moderate coronary artery calcification indicating atherosclerotic heart disease  MEDIASTINUM AND CARMINA:  Right port at cavoatrial junction  CHEST WALL AND LOWER NECK: Bilateral breast implants  UPPER ABDOMEN:  Unremarkable  OSSEOUS STRUCTURES: Moderate degenerative disease in the spine  Impression Nothing to indicate recurrent tumor  Moderate coronary artery calcification indicating atherosclerotic heart disease    Workstation performed: [id number]

## 2021-07-27 NOTE — ASSESSMENT & PLAN NOTE
Ms Harshad Sainz is about 6 months out from resection and has no evidence of recurrent or metastatic disease  With regards to her pain, it is likely nerve irritation from her port sites  It could have also been irritated by her chemotherapy  There was no abnormality on her CT scan  Options for this pain could be another course of Neurontin or nerve ablation by pain management  She was more worried that it was associated with metastatic disease  We will see her back in 6 months with a new CT scan for continued surveillance  She can call us if she wants to pursue treatment for her right sided pain

## 2021-08-12 ENCOUNTER — VBI (OUTPATIENT)
Dept: ADMINISTRATIVE | Facility: OTHER | Age: 66
End: 2021-08-12

## 2021-08-12 NOTE — TELEPHONE ENCOUNTER
Upon review of the In Basket request we were able to locate, review, and update the patient chart as requested for Immunization(s)   Sima Aguilar Any additional questions or concerns should be emailed to the Practice Liaisons via [email or web address] email, please do not reply via In Basket      Thank you  Cora Runner

## 2021-08-13 ENCOUNTER — TELEPHONE (OUTPATIENT)
Dept: INTERNAL MEDICINE CLINIC | Facility: CLINIC | Age: 66
End: 2021-08-13

## 2021-08-13 NOTE — TELEPHONE ENCOUNTER
Patient called to get referral for pain management  Chiropractor relayed to patient that it is a herniated disc or pinched nerve in the back that is causing the discomfort going down into the leg  She spoke with pain management who need a referral in order for an appt to be made  Fax: [phone]   Dr Deana Corona [facility])

## 2021-08-23 ENCOUNTER — RA CDI HCC (OUTPATIENT)
Dept: OTHER | Facility: HOSPITAL | Age: 66
End: 2021-08-23

## 2021-08-23 NOTE — PROGRESS NOTES
[facility]  coding opportunities       Chart reviewed, no opportunity found: CHART REVIEWED, NO OPPORTUNITY FOUND                        Patients insurance company: [employer] (Medicare Advantage and Commercial)

## 2021-08-24 ENCOUNTER — IMMUNIZATIONS (OUTPATIENT)
Dept: FAMILY MEDICINE CLINIC | Facility: HOSPITAL | Age: 66
End: 2021-08-24

## 2021-08-24 DIAGNOSIS — Z23 ENCOUNTER FOR IMMUNIZATION: Primary | ICD-10-CM

## 2021-08-24 PROCEDURE — 0001A SARS-COV-2 / COVID-19 MRNA VACCINE (PFIZER-BIONTECH) 30 MCG: CPT

## 2021-08-24 PROCEDURE — 91300 SARS-COV-2 / COVID-19 MRNA VACCINE (PFIZER-BIONTECH) 30 MCG: CPT

## 2021-08-31 ENCOUNTER — PATIENT OUTREACH (OUTPATIENT)
Dept: HEMATOLOGY ONCOLOGY | Facility: CLINIC | Age: 66
End: 2021-08-31

## 2021-09-03 ENCOUNTER — TELEPHONE (OUTPATIENT)
Dept: INFUSION CENTER | Facility: CLINIC | Age: 66
End: 2021-09-03

## 2021-09-03 NOTE — TELEPHONE ENCOUNTER
Patient contacted at this time and informed effective 08/16/2021 there is a no visitor policy at the infusion center  Pt verbalized understanding

## 2021-09-07 ENCOUNTER — HOSPITAL ENCOUNTER (OUTPATIENT)
Dept: INFUSION CENTER | Facility: CLINIC | Age: 66
Discharge: HOME | End: 2021-09-07
Payer: COMMERCIAL

## 2021-09-07 DIAGNOSIS — Z45.2 ENCOUNTER FOR CARE RELATED TO VASCULAR ACCESS PORT: Primary | ICD-10-CM

## 2021-09-07 PROCEDURE — 96523 IRRIG DRUG DELIVERY DEVICE: CPT

## 2021-09-07 NOTE — PROGRESS NOTES
Pt to clinic for port flush   Pt offers no complaints at this time, aware of next appointment, declined AVS

## 2021-09-24 ENCOUNTER — TELEPHONE (OUTPATIENT)
Dept: HEMATOLOGY ONCOLOGY | Facility: CLINIC | Age: 66
End: 2021-09-24

## 2021-09-28 ENCOUNTER — TELEPHONE (OUTPATIENT)
Dept: INTERNAL MEDICINE CLINIC | Facility: CLINIC | Age: 66
End: 2021-09-28

## 2021-09-28 DIAGNOSIS — I10 HYPERTENSION, ESSENTIAL: Primary | ICD-10-CM

## 2021-09-28 RX ORDER — TRIAMTERENE AND HYDROCHLOROTHIAZIDE 37.5; 25 MG/1; MG/1
1 CAPSULE ORAL EVERY MORNING
Qty: 90 CAPSULE | Refills: 1 | Status: SHIPPED | OUTPATIENT
Start: 2021-09-28 | End: 2022-01-05 | Stop reason: SDUPTHER

## 2021-09-28 NOTE — TELEPHONE ENCOUNTER
Called patient to set up appointment  Was originally scheduled with Everett Severance in August   Patient stated Dr Brittany Kingston told her she did not need a 3 or 6 month follow up as long as she was getting her blood pressure checked  Patient stated she wants to do an annual follow up  Patient confirmed she was aware that Dr Brittnay Kingston is not seeing primary care patients in the office and that [address] will probably be leaving at the end of December  Patient stated instead of setting up appointment she would like to start seeing Dr Gabbie Hernandez instead  Looked up physician information for patient and gave her the address and phone number for that office so patient can set up appointment  Dr Charissa Carlson is part of [facility] on [facility]

## 2021-09-28 NOTE — TELEPHONE ENCOUNTER
Patient to call back to schedule follow up  Checked RX in [facility]  Triamterene-HCTZ 37 5-25mg oral capsule  1 capsule by mouth every day  Last Prescribed:5/17/2021 Qty: 90 Refill: 0    LOV:  5/17/2021  Patient due for follow up in November, will call back to schedule

## 2021-09-29 ENCOUNTER — APPOINTMENT (OUTPATIENT)
Dept: LAB | Facility: CLINIC | Age: 66
End: 2021-09-29
Payer: COMMERCIAL

## 2021-09-29 ENCOUNTER — TELEPHONE (OUTPATIENT)
Dept: HEMATOLOGY ONCOLOGY | Facility: CLINIC | Age: 66
End: 2021-09-29

## 2021-09-29 DIAGNOSIS — C34.91 SQUAMOUS CELL LUNG CANCER, RIGHT (HCC): ICD-10-CM

## 2021-09-29 DIAGNOSIS — C34.91 SQUAMOUS CELL LUNG CANCER, RIGHT (HCC): Primary | ICD-10-CM

## 2021-09-29 LAB
ALBUMIN SERPL BCP-MCNC: 4 G/DL (ref 3.5–5)
ALP SERPL-CCNC: 80 U/L (ref 46–116)
ALT SERPL W P-5'-P-CCNC: 40 U/L (ref 12–78)
ANION GAP SERPL CALCULATED.3IONS-SCNC: 3 MMOL/L (ref 4–13)
AST SERPL W P-5'-P-CCNC: 20 U/L (ref 5–45)
BASOPHILS # BLD AUTO: 0.03 THOUSANDS/ΜL (ref 0–0.1)
BASOPHILS NFR BLD AUTO: 1 % (ref 0–1)
BILIRUB SERPL-MCNC: 0.38 MG/DL (ref 0.2–1)
BUN SERPL-MCNC: 14 MG/DL (ref 5–25)
CALCIUM SERPL-MCNC: 9.4 MG/DL (ref 8.3–10.1)
CEA SERPL-MCNC: 1.6 NG/ML (ref 0–3)
CHLORIDE SERPL-SCNC: 104 MMOL/L (ref 100–108)
CO2 SERPL-SCNC: 29 MMOL/L (ref 21–32)
CREAT SERPL-MCNC: 0.77 MG/DL (ref 0.6–1.3)
EOSINOPHIL # BLD AUTO: 0.15 THOUSAND/ΜL (ref 0–0.61)
EOSINOPHIL NFR BLD AUTO: 3 % (ref 0–6)
ERYTHROCYTE [DISTWIDTH] IN BLOOD BY AUTOMATED COUNT: 13 % (ref 11.6–15.1)
GFR SERPL CREATININE-BSD FRML MDRD: 80 ML/MIN/1.73SQ M
GLUCOSE SERPL-MCNC: 111 MG/DL (ref 65–140)
HCT VFR BLD AUTO: 45.3 % (ref 34.8–46.1)
HGB BLD-MCNC: 14.8 G/DL (ref 11.5–15.4)
IMM GRANULOCYTES # BLD AUTO: 0.01 THOUSAND/UL (ref 0–0.2)
IMM GRANULOCYTES NFR BLD AUTO: 0 % (ref 0–2)
LYMPHOCYTES # BLD AUTO: 1.41 THOUSANDS/ΜL (ref 0.6–4.47)
LYMPHOCYTES NFR BLD AUTO: 25 % (ref 14–44)
MCH RBC QN AUTO: 31.4 PG (ref 26.8–34.3)
MCHC RBC AUTO-ENTMCNC: 32.7 G/DL (ref 31.4–37.4)
MCV RBC AUTO: 96 FL (ref 82–98)
MONOCYTES # BLD AUTO: 0.39 THOUSAND/ΜL (ref 0.17–1.22)
MONOCYTES NFR BLD AUTO: 7 % (ref 4–12)
NEUTROPHILS # BLD AUTO: 3.63 THOUSANDS/ΜL (ref 1.85–7.62)
NEUTS SEG NFR BLD AUTO: 64 % (ref 43–75)
NRBC BLD AUTO-RTO: 0 /100 WBCS
PLATELET # BLD AUTO: 292 THOUSANDS/UL (ref 149–390)
PMV BLD AUTO: 10.1 FL (ref 8.9–12.7)
POTASSIUM SERPL-SCNC: 4.3 MMOL/L (ref 3.5–5.3)
PROT SERPL-MCNC: 8 G/DL (ref 6.4–8.2)
RBC # BLD AUTO: 4.72 MILLION/UL (ref 3.81–5.12)
SODIUM SERPL-SCNC: 136 MMOL/L (ref 136–145)
WBC # BLD AUTO: 5.62 THOUSAND/UL (ref 4.31–10.16)

## 2021-09-29 PROCEDURE — 80053 COMPREHEN METABOLIC PANEL: CPT

## 2021-09-29 PROCEDURE — 36415 COLL VENOUS BLD VENIPUNCTURE: CPT

## 2021-09-29 PROCEDURE — 85025 COMPLETE CBC W/AUTO DIFF WBC: CPT

## 2021-09-29 PROCEDURE — 82378 CARCINOEMBRYONIC ANTIGEN: CPT

## 2021-09-29 NOTE — TELEPHONE ENCOUNTER
Patient is at the lab now    CBCD, CMP, and CEA lab orders for upcoming appointment entered into epic per CSRs request

## 2021-09-29 NOTE — TELEPHONE ENCOUNTER
Yaquelin ramos from the lab who has patient in the chair needs lab orders to be placed in Epic    spoke to [facility] who will be placing orders in system

## 2021-10-04 ENCOUNTER — OFFICE VISIT (OUTPATIENT)
Dept: HEMATOLOGY ONCOLOGY | Facility: CLINIC | Age: 66
End: 2021-10-04
Payer: COMMERCIAL

## 2021-10-04 VITALS
WEIGHT: 137 LBS | TEMPERATURE: 98.2 F | BODY MASS INDEX: 29.56 KG/M2 | OXYGEN SATURATION: 95 % | DIASTOLIC BLOOD PRESSURE: 78 MMHG | SYSTOLIC BLOOD PRESSURE: 132 MMHG | RESPIRATION RATE: 18 BRPM | HEIGHT: 57 IN | HEART RATE: 83 BPM

## 2021-10-04 DIAGNOSIS — J44.9 CHRONIC OBSTRUCTIVE PULMONARY DISEASE, UNSPECIFIED COPD TYPE (HCC): ICD-10-CM

## 2021-10-04 DIAGNOSIS — C77.9 REGIONAL LYMPH NODE METASTASIS PRESENT (HCC): ICD-10-CM

## 2021-10-04 DIAGNOSIS — R11.0 NAUSEA: ICD-10-CM

## 2021-10-04 DIAGNOSIS — M48.061 SPINAL STENOSIS OF LUMBAR REGION, UNSPECIFIED WHETHER NEUROGENIC CLAUDICATION PRESENT: ICD-10-CM

## 2021-10-04 DIAGNOSIS — C34.91 SQUAMOUS CELL LUNG CANCER, RIGHT (HCC): Primary | ICD-10-CM

## 2021-10-04 DIAGNOSIS — R42 DIZZINESS: ICD-10-CM

## 2021-10-04 DIAGNOSIS — G35 MULTIPLE SCLEROSIS (HCC): ICD-10-CM

## 2021-10-04 PROCEDURE — 99214 OFFICE O/P EST MOD 30 MIN: CPT | Performed by: INTERNAL MEDICINE

## 2021-10-05 ENCOUNTER — TELEPHONE (OUTPATIENT)
Dept: HEMATOLOGY ONCOLOGY | Facility: CLINIC | Age: 66
End: 2021-10-05

## 2021-10-07 ENCOUNTER — TELEPHONE (OUTPATIENT)
Dept: INTERNAL MEDICINE CLINIC | Facility: CLINIC | Age: 66
End: 2021-10-07

## 2021-10-11 ENCOUNTER — TELEPHONE (OUTPATIENT)
Dept: HEMATOLOGY ONCOLOGY | Facility: CLINIC | Age: 66
End: 2021-10-11

## 2021-10-18 ENCOUNTER — TELEPHONE (OUTPATIENT)
Dept: HEMATOLOGY ONCOLOGY | Facility: CLINIC | Age: 66
End: 2021-10-18

## 2021-10-22 ENCOUNTER — TELEPHONE (OUTPATIENT)
Dept: CARDIAC SURGERY | Facility: CLINIC | Age: 66
End: 2021-10-22

## 2021-10-22 DIAGNOSIS — D49.0 PAROTID TUMOR: Primary | ICD-10-CM

## 2021-10-29 ENCOUNTER — HOSPITAL ENCOUNTER (OUTPATIENT)
Dept: RADIOLOGY | Facility: HOSPITAL | Age: 66
Discharge: HOME | End: 2021-10-29
Attending: INTERNAL MEDICINE
Payer: COMMERCIAL

## 2021-10-29 DIAGNOSIS — C34.91 SQUAMOUS CELL LUNG CANCER, RIGHT (HCC): ICD-10-CM

## 2021-10-29 PROCEDURE — A9585 GADOBUTROL INJECTION: HCPCS | Performed by: INTERNAL MEDICINE

## 2021-10-29 PROCEDURE — 70553 MRI BRAIN STEM W/O & W/DYE: CPT

## 2021-10-29 PROCEDURE — G1004 CDSM NDSC: HCPCS

## 2021-10-29 RX ADMIN — GADOBUTROL 6 ML: 604.72 INJECTION INTRAVENOUS at 13:13

## 2021-11-02 ENCOUNTER — HOSPITAL ENCOUNTER (OUTPATIENT)
Dept: INFUSION CENTER | Facility: CLINIC | Age: 66
Discharge: HOME | End: 2021-11-02
Payer: COMMERCIAL

## 2021-11-02 DIAGNOSIS — Z45.2 ENCOUNTER FOR CARE RELATED TO VASCULAR ACCESS PORT: Primary | ICD-10-CM

## 2021-11-02 PROCEDURE — 96523 IRRIG DRUG DELIVERY DEVICE: CPT

## 2021-11-22 ENCOUNTER — HOSPITAL ENCOUNTER (OUTPATIENT)
Dept: RADIOLOGY | Age: 66
Discharge: HOME | End: 2021-11-22
Payer: COMMERCIAL

## 2021-11-22 DIAGNOSIS — D49.0 PAROTID TUMOR: ICD-10-CM

## 2021-11-22 PROCEDURE — 70543 MRI ORBT/FAC/NCK W/O &W/DYE: CPT

## 2021-11-22 PROCEDURE — G1004 CDSM NDSC: HCPCS

## 2021-11-22 PROCEDURE — A9585 GADOBUTROL INJECTION: HCPCS | Performed by: INTERNAL MEDICINE

## 2021-11-22 RX ADMIN — GADOBUTROL 6 ML: 604.72 INJECTION INTRAVENOUS at 11:11

## 2021-11-30 DIAGNOSIS — C34.91 SQUAMOUS CELL LUNG CANCER, RIGHT (HCC): Primary | ICD-10-CM

## 2021-11-30 DIAGNOSIS — M89.9 LESION OF BONE OF THORACIC SPINE: ICD-10-CM

## 2021-12-08 ENCOUNTER — TELEPHONE (OUTPATIENT)
Dept: CARDIAC SURGERY | Facility: CLINIC | Age: 66
End: 2021-12-08

## 2021-12-08 ENCOUNTER — APPOINTMENT (OUTPATIENT)
Dept: LAB | Facility: CLINIC | Age: 66
End: 2021-12-08
Payer: COMMERCIAL

## 2021-12-08 DIAGNOSIS — C34.91 SQUAMOUS CELL LUNG CANCER, RIGHT (HCC): ICD-10-CM

## 2021-12-08 DIAGNOSIS — M89.9 LESION OF BONE OF THORACIC SPINE: ICD-10-CM

## 2021-12-08 DIAGNOSIS — C77.9 REGIONAL LYMPH NODE METASTASIS PRESENT (HCC): ICD-10-CM

## 2021-12-08 LAB
ALBUMIN SERPL BCP-MCNC: 4 G/DL (ref 3.5–5)
ALP SERPL-CCNC: 64 U/L (ref 46–116)
ALT SERPL W P-5'-P-CCNC: 30 U/L (ref 12–78)
ANION GAP SERPL CALCULATED.3IONS-SCNC: 6 MMOL/L (ref 4–13)
AST SERPL W P-5'-P-CCNC: 19 U/L (ref 5–45)
BASOPHILS # BLD AUTO: 0.06 THOUSANDS/ΜL (ref 0–0.1)
BASOPHILS NFR BLD AUTO: 1 % (ref 0–1)
BILIRUB SERPL-MCNC: 0.63 MG/DL (ref 0.2–1)
BUN SERPL-MCNC: 12 MG/DL (ref 5–25)
CALCIUM SERPL-MCNC: 9.3 MG/DL (ref 8.3–10.1)
CHLORIDE SERPL-SCNC: 102 MMOL/L (ref 100–108)
CO2 SERPL-SCNC: 28 MMOL/L (ref 21–32)
CREAT SERPL-MCNC: 0.83 MG/DL (ref 0.6–1.3)
EOSINOPHIL # BLD AUTO: 0.16 THOUSAND/ΜL (ref 0–0.61)
EOSINOPHIL NFR BLD AUTO: 3 % (ref 0–6)
ERYTHROCYTE [DISTWIDTH] IN BLOOD BY AUTOMATED COUNT: 13.1 % (ref 11.6–15.1)
GFR SERPL CREATININE-BSD FRML MDRD: 73 ML/MIN/1.73SQ M
GLUCOSE P FAST SERPL-MCNC: 101 MG/DL (ref 65–99)
HCT VFR BLD AUTO: 44.7 % (ref 34.8–46.1)
HGB BLD-MCNC: 14.5 G/DL (ref 11.5–15.4)
IMM GRANULOCYTES # BLD AUTO: 0.01 THOUSAND/UL (ref 0–0.2)
IMM GRANULOCYTES NFR BLD AUTO: 0 % (ref 0–2)
LYMPHOCYTES # BLD AUTO: 1.43 THOUSANDS/ΜL (ref 0.6–4.47)
LYMPHOCYTES NFR BLD AUTO: 23 % (ref 14–44)
MCH RBC QN AUTO: 32.2 PG (ref 26.8–34.3)
MCHC RBC AUTO-ENTMCNC: 32.4 G/DL (ref 31.4–37.4)
MCV RBC AUTO: 99 FL (ref 82–98)
MONOCYTES # BLD AUTO: 0.48 THOUSAND/ΜL (ref 0.17–1.22)
MONOCYTES NFR BLD AUTO: 8 % (ref 4–12)
NEUTROPHILS # BLD AUTO: 3.96 THOUSANDS/ΜL (ref 1.85–7.62)
NEUTS SEG NFR BLD AUTO: 65 % (ref 43–75)
NRBC BLD AUTO-RTO: 0 /100 WBCS
PLATELET # BLD AUTO: 292 THOUSANDS/UL (ref 149–390)
PMV BLD AUTO: 9.5 FL (ref 8.9–12.7)
POTASSIUM SERPL-SCNC: 4 MMOL/L (ref 3.5–5.3)
PROT SERPL-MCNC: 7.6 G/DL (ref 6.4–8.2)
RBC # BLD AUTO: 4.5 MILLION/UL (ref 3.81–5.12)
SODIUM SERPL-SCNC: 136 MMOL/L (ref 136–145)
WBC # BLD AUTO: 6.1 THOUSAND/UL (ref 4.31–10.16)

## 2021-12-08 PROCEDURE — 80053 COMPREHEN METABOLIC PANEL: CPT

## 2021-12-08 PROCEDURE — 85025 COMPLETE CBC W/AUTO DIFF WBC: CPT

## 2021-12-08 PROCEDURE — 36415 COLL VENOUS BLD VENIPUNCTURE: CPT

## 2021-12-09 ENCOUNTER — OFFICE VISIT (OUTPATIENT)
Dept: PALLIATIVE MEDICINE | Facility: CLINIC | Age: 66
End: 2021-12-09
Payer: COMMERCIAL

## 2021-12-09 ENCOUNTER — TELEPHONE (OUTPATIENT)
Dept: HEMATOLOGY ONCOLOGY | Facility: CLINIC | Age: 66
End: 2021-12-09

## 2021-12-09 VITALS
TEMPERATURE: 97.8 F | DIASTOLIC BLOOD PRESSURE: 80 MMHG | SYSTOLIC BLOOD PRESSURE: 150 MMHG | WEIGHT: 135.5 LBS | RESPIRATION RATE: 18 BRPM | OXYGEN SATURATION: 99 % | HEART RATE: 76 BPM | HEIGHT: 57 IN | BODY MASS INDEX: 29.23 KG/M2

## 2021-12-09 DIAGNOSIS — F32.A DEPRESSION, UNSPECIFIED DEPRESSION TYPE: ICD-10-CM

## 2021-12-09 DIAGNOSIS — G89.3 CANCER RELATED PAIN: ICD-10-CM

## 2021-12-09 DIAGNOSIS — K21.9 GASTROESOPHAGEAL REFLUX DISEASE, UNSPECIFIED WHETHER ESOPHAGITIS PRESENT: ICD-10-CM

## 2021-12-09 DIAGNOSIS — G25.81 RESTLESS LEGS SYNDROME (RLS): ICD-10-CM

## 2021-12-09 DIAGNOSIS — M48.061 SPINAL STENOSIS OF LUMBAR REGION, UNSPECIFIED WHETHER NEUROGENIC CLAUDICATION PRESENT: ICD-10-CM

## 2021-12-09 DIAGNOSIS — J44.9 CHRONIC OBSTRUCTIVE PULMONARY DISEASE, UNSPECIFIED COPD TYPE (HCC): ICD-10-CM

## 2021-12-09 DIAGNOSIS — C34.91 SQUAMOUS CELL LUNG CANCER, RIGHT (HCC): Primary | ICD-10-CM

## 2021-12-09 DIAGNOSIS — Z79.899 MEDICAL MARIJUANA USE: ICD-10-CM

## 2021-12-09 DIAGNOSIS — F41.9 ANXIETY DISORDER, UNSPECIFIED TYPE: ICD-10-CM

## 2021-12-09 DIAGNOSIS — C77.9 REGIONAL LYMPH NODE METASTASIS PRESENT (HCC): ICD-10-CM

## 2021-12-09 DIAGNOSIS — G35 MULTIPLE SCLEROSIS (HCC): ICD-10-CM

## 2021-12-09 DIAGNOSIS — Z51.5 PALLIATIVE CARE PATIENT: ICD-10-CM

## 2021-12-09 DIAGNOSIS — R26.2 AMBULATORY DYSFUNCTION: ICD-10-CM

## 2021-12-09 DIAGNOSIS — G47.01 INSOMNIA DUE TO MEDICAL CONDITION: ICD-10-CM

## 2021-12-09 PROCEDURE — 99214 OFFICE O/P EST MOD 30 MIN: CPT | Performed by: INTERNAL MEDICINE

## 2021-12-10 ENCOUNTER — HOSPITAL ENCOUNTER (OUTPATIENT)
Dept: RADIOLOGY | Age: 66
Discharge: HOME | End: 2021-12-10
Attending: INTERNAL MEDICINE
Payer: COMMERCIAL

## 2021-12-10 DIAGNOSIS — M89.9 LESION OF BONE OF THORACIC SPINE: ICD-10-CM

## 2021-12-10 DIAGNOSIS — C34.91 SQUAMOUS CELL LUNG CANCER, RIGHT (HCC): ICD-10-CM

## 2021-12-10 PROCEDURE — 72157 MRI CHEST SPINE W/O & W/DYE: CPT

## 2021-12-10 PROCEDURE — A9585 GADOBUTROL INJECTION: HCPCS | Performed by: INTERNAL MEDICINE

## 2021-12-10 PROCEDURE — G1004 CDSM NDSC: HCPCS

## 2021-12-10 RX ADMIN — GADOBUTROL 6 ML: 604.72 INJECTION INTRAVENOUS at 14:31

## 2021-12-13 ENCOUNTER — OFFICE VISIT (OUTPATIENT)
Dept: HEMATOLOGY ONCOLOGY | Facility: CLINIC | Age: 66
End: 2021-12-13
Payer: COMMERCIAL

## 2021-12-13 VITALS
HEART RATE: 74 BPM | BODY MASS INDEX: 29.34 KG/M2 | OXYGEN SATURATION: 99 % | DIASTOLIC BLOOD PRESSURE: 80 MMHG | TEMPERATURE: 98.2 F | RESPIRATION RATE: 18 BRPM | HEIGHT: 57 IN | WEIGHT: 136 LBS | SYSTOLIC BLOOD PRESSURE: 134 MMHG

## 2021-12-13 DIAGNOSIS — C79.51 SPINE METASTASIS (HCC): ICD-10-CM

## 2021-12-13 DIAGNOSIS — C34.91 SQUAMOUS CELL LUNG CANCER, RIGHT (HCC): Primary | ICD-10-CM

## 2021-12-13 DIAGNOSIS — G35 MULTIPLE SCLEROSIS (HCC): ICD-10-CM

## 2021-12-13 DIAGNOSIS — C77.9 REGIONAL LYMPH NODE METASTASIS PRESENT (HCC): ICD-10-CM

## 2021-12-13 DIAGNOSIS — M48.061 SPINAL STENOSIS OF LUMBAR REGION, UNSPECIFIED WHETHER NEUROGENIC CLAUDICATION PRESENT: ICD-10-CM

## 2021-12-13 PROCEDURE — 99214 OFFICE O/P EST MOD 30 MIN: CPT | Performed by: INTERNAL MEDICINE

## 2021-12-28 ENCOUNTER — HOSPITAL ENCOUNTER (OUTPATIENT)
Dept: RADIOLOGY | Age: 66
Discharge: HOME | End: 2021-12-28
Payer: COMMERCIAL

## 2021-12-28 DIAGNOSIS — C77.9 REGIONAL LYMPH NODE METASTASIS PRESENT (HCC): ICD-10-CM

## 2021-12-28 DIAGNOSIS — C34.91 SQUAMOUS CELL LUNG CANCER, RIGHT (HCC): ICD-10-CM

## 2021-12-28 DIAGNOSIS — C79.51 SPINE METASTASIS (HCC): ICD-10-CM

## 2021-12-28 PROCEDURE — G1004 CDSM NDSC: HCPCS

## 2021-12-28 PROCEDURE — A9552 F18 FDG: HCPCS

## 2021-12-28 PROCEDURE — 78815 PET IMAGE W/CT SKULL-THIGH: CPT

## 2022-01-04 ENCOUNTER — HOSPITAL ENCOUNTER (OUTPATIENT)
Dept: INFUSION CENTER | Facility: CLINIC | Age: 67
Discharge: HOME | End: 2022-01-04
Payer: COMMERCIAL

## 2022-01-04 DIAGNOSIS — Z45.2 ENCOUNTER FOR CARE RELATED TO VASCULAR ACCESS PORT: Primary | ICD-10-CM

## 2022-01-04 PROCEDURE — 96523 IRRIG DRUG DELIVERY DEVICE: CPT

## 2022-01-04 NOTE — PROGRESS NOTES
Patient to [place] for [facility] Maintenance: Offers no complaints at present time: Right PAC accessed without difficulty: Good blood return noted

## 2022-01-05 DIAGNOSIS — K21.00 GASTROESOPHAGEAL REFLUX DISEASE WITH ESOPHAGITIS, UNSPECIFIED WHETHER HEMORRHAGE: ICD-10-CM

## 2022-01-05 DIAGNOSIS — G25.81 RESTLESS LEGS SYNDROME (RLS): ICD-10-CM

## 2022-01-05 DIAGNOSIS — E78.2 MIXED HYPERLIPIDEMIA: ICD-10-CM

## 2022-01-05 DIAGNOSIS — I10 HYPERTENSION, ESSENTIAL: ICD-10-CM

## 2022-01-05 DIAGNOSIS — F41.9 ANXIETY DISORDER, UNSPECIFIED TYPE: ICD-10-CM

## 2022-01-05 RX ORDER — PRAMIPEXOLE DIHYDROCHLORIDE 0.5 MG/1
0.5 TABLET ORAL
Qty: 90 TABLET | Refills: 1 | Status: SHIPPED | OUTPATIENT
Start: 2022-01-05 | End: 2022-06-28 | Stop reason: SDUPTHER

## 2022-01-05 RX ORDER — SIMVASTATIN 40 MG
40 TABLET ORAL
Qty: 90 TABLET | Refills: 1 | Status: SHIPPED | OUTPATIENT
Start: 2022-01-05 | End: 2022-06-17 | Stop reason: SDUPTHER

## 2022-01-05 RX ORDER — GABAPENTIN 300 MG/1
CAPSULE ORAL
COMMUNITY
Start: 2021-12-15 | End: 2022-04-04 | Stop reason: SDUPTHER

## 2022-01-05 RX ORDER — TRIAMTERENE AND HYDROCHLOROTHIAZIDE 37.5; 25 MG/1; MG/1
1 CAPSULE ORAL EVERY MORNING
Qty: 90 CAPSULE | Refills: 1 | Status: SHIPPED | OUTPATIENT
Start: 2022-01-05 | End: 2022-06-13

## 2022-01-05 RX ORDER — BUPROPION HYDROCHLORIDE 300 MG/1
300 TABLET ORAL EVERY MORNING
Qty: 90 TABLET | Refills: 1 | Status: SHIPPED | OUTPATIENT
Start: 2022-01-05 | End: 2022-06-17 | Stop reason: SDUPTHER

## 2022-01-05 RX ORDER — FAMOTIDINE 40 MG/1
40 TABLET, FILM COATED ORAL EVERY MORNING
Qty: 90 TABLET | Refills: 1 | Status: SHIPPED | OUTPATIENT
Start: 2022-01-05 | End: 2022-07-10 | Stop reason: SDUPTHER

## 2022-01-09 ENCOUNTER — DOCUMENTATION (OUTPATIENT)
Dept: HEMATOLOGY ONCOLOGY | Facility: CLINIC | Age: 67
End: 2022-01-09

## 2022-01-10 NOTE — PROGRESS NOTES
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

## 2022-01-25 ENCOUNTER — OFFICE VISIT (OUTPATIENT)
Dept: CARDIAC SURGERY | Facility: CLINIC | Age: 67
End: 2022-01-25
Payer: COMMERCIAL

## 2022-01-25 VITALS
WEIGHT: 138.45 LBS | DIASTOLIC BLOOD PRESSURE: 65 MMHG | HEART RATE: 98 BPM | TEMPERATURE: 98.7 F | HEIGHT: 57 IN | SYSTOLIC BLOOD PRESSURE: 137 MMHG | RESPIRATION RATE: 16 BRPM | BODY MASS INDEX: 29.87 KG/M2 | OXYGEN SATURATION: 77 %

## 2022-01-25 DIAGNOSIS — C34.91 SQUAMOUS CELL LUNG CANCER, RIGHT (HCC): Primary | ICD-10-CM

## 2022-01-25 PROCEDURE — 99213 OFFICE O/P EST LOW 20 MIN: CPT | Performed by: PHYSICIAN ASSISTANT

## 2022-01-25 RX ORDER — GABAPENTIN 300 MG/1
300 CAPSULE ORAL
COMMUNITY
Start: 2021-12-15 | End: 2022-04-04 | Stop reason: SDUPTHER

## 2022-01-25 NOTE — PROGRESS NOTES
Thoracic Follow-Up  Assessment/Plan:    Squamous cell lung cancer, right ([facility] [address] )  Adelene Hammans is 1 year out from resection and her most recent PET scan does not reveal any evidence of recurrent or metastatic disease within the chest  The right parotid gland has increased activity when compared to last PET scan, despite it being biopsied at [facility] last year  Dr Torie Medel may be recommending a repeat biopsy  We will have her return to our office in 6 mo with a new CT scan of the chest  She is in agreement with the plan  Diagnoses and all orders for this visit:    Squamous cell lung cancer, right ([address] )  -     CT chest wo contrast; Future    Other orders  -     gabapentin (NEURONTIN) 300 mg capsule; Take 300 mg by mouth             Thoracic History       Diagnosis: 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  Procedure: right thoracoscopic upper lobectomy and lower lobe wedge resection 1/28/21  Pathology:  Single focus 3 1x3  1x3 0cm G3 invasive, keratinizing squamous cell carcinoma right upper lobe  No visceral pleura invasion, there is lymphatic invasion present   All margins uninvolved by tumor  1/14 lymph nodes involved, +right peribronchial  Negative 2R, 4R, 10R, azygous and 7      AJCC Prognostic Stage Group (8th Ed ):  Stage IIB - pT2a, pN1, MX, G3    Adjuvant therapy: carboplatin plus Gemzar, completed 6/7/21          Patient ID: Karma Carmichael is a 74 y o  female  ECOG 0    HPI    Ms Remy Garvin is a 75 yo female who underwent a right thoracoscopic upper lobectomy and lower lobe wedge resection on 1/28/21 for stage IIB squamous cell carcinoma  She completed adjuvant chemotherapy on 6/7/21  She was last seen on 7/27/21 at which point her CT scan did not reveal any new findings  She returns today, with a PET from 12/28/21, ordered by Dr Torie Medel  This did not reveal any pulmonary nodules  There was some activity in the right parotid gland, which was negative for malignancy on biopsy one year ago   However, the activity is higher on the most recent PET scan and Dr Franck Jones discussed a repeat biopsy  On discussion, she has dyspnea on exertion  She denies fever, chills, cough, weight loss, shortness of breath at rest  She has low back pain, secondary to shoveling snow  She also has herniated discs, and some leg pain, secondary to sciatica  She was seen by a pain specialist for injections  She did have COVID in the beginning of January of this year  The following portions of the patient's history were reviewed and updated as appropriate: allergies, current medications, past family history, past medical history, past social history, past surgical history and problem list     Review of Systems      Objective:   Physical Exam  Vitals reviewed  Constitutional:       Appearance: Normal appearance  She is well-developed  She is not diaphoretic  HENT:      Head: Normocephalic and atraumatic  Mouth/Throat:      Comments: Masked   Eyes:      Pupils: Pupils are equal, round, and reactive to light  Neck:      Trachea: No tracheal deviation  Cardiovascular:      Rate and Rhythm: Normal rate and regular rhythm  Heart sounds: Normal heart sounds  No murmur heard  Pulmonary:      Effort: Pulmonary effort is normal  No respiratory distress  Breath sounds: Normal breath sounds  No wheezing  Abdominal:      General: Bowel sounds are normal  There is no distension  Palpations: Abdomen is soft  Tenderness: There is no abdominal tenderness  Musculoskeletal:         General: Normal range of motion  Cervical back: Normal range of motion and neck supple  Lymphadenopathy:      Cervical: No cervical adenopathy  Skin:     General: Skin is warm and dry  Findings: No erythema  Neurological:      Mental Status: She is alert and oriented to person, place, and time  Cranial Nerves: No cranial nerve deficit     Psychiatric:         Behavior: Behavior normal          Thought Content: Thought content normal      /65 (BP Location: Left arm, Patient Position: Sitting, Cuff Size: Standard)   Pulse 98   Temp 98 7 °F (37 1 °C) (Temporal)   Resp 16   Ht 4' 9" (1 448 m)   Wt 62 8 kg (138 lb 7 2 oz)   LMP  (LMP Unknown)   SpO2 (!) 77%   BMI 29 96 kg/m²

## 2022-01-25 NOTE — ASSESSMENT & PLAN NOTE
Cl Lara is 1 year out from resection and her most recent PET scan does not reveal any evidence of recurrent or metastatic disease within the chest  The right parotid gland has increased activity when compared to last PET scan, despite it being biopsied at [facility] last year  Dr Ana Maria Galo may be recommending a repeat biopsy  We will have her return to our office in 6 mo with a new CT scan of the chest  She is in agreement with the plan

## 2022-02-05 ENCOUNTER — APPOINTMENT (OUTPATIENT)
Dept: LAB | Age: 67
End: 2022-02-05
Payer: COMMERCIAL

## 2022-02-05 DIAGNOSIS — M89.9 LESION OF BONE OF THORACIC SPINE: ICD-10-CM

## 2022-02-05 DIAGNOSIS — C34.91 SQUAMOUS CELL LUNG CANCER, RIGHT (HCC): ICD-10-CM

## 2022-02-05 LAB
ALBUMIN SERPL BCP-MCNC: 4.2 G/DL (ref 3.5–5)
ALP SERPL-CCNC: 62 U/L (ref 46–116)
ALT SERPL W P-5'-P-CCNC: 44 U/L (ref 12–78)
ANION GAP SERPL CALCULATED.3IONS-SCNC: 2 MMOL/L (ref 4–13)
AST SERPL W P-5'-P-CCNC: 23 U/L (ref 5–45)
BASOPHILS # BLD AUTO: 0.04 THOUSANDS/ΜL (ref 0–0.1)
BASOPHILS NFR BLD AUTO: 1 % (ref 0–1)
BILIRUB SERPL-MCNC: 0.59 MG/DL (ref 0.2–1)
BUN SERPL-MCNC: 12 MG/DL (ref 5–25)
CALCIUM SERPL-MCNC: 9.8 MG/DL (ref 8.3–10.1)
CHLORIDE SERPL-SCNC: 100 MMOL/L (ref 100–108)
CO2 SERPL-SCNC: 32 MMOL/L (ref 21–32)
CREAT SERPL-MCNC: 0.82 MG/DL (ref 0.6–1.3)
EOSINOPHIL # BLD AUTO: 0.07 THOUSAND/ΜL (ref 0–0.61)
EOSINOPHIL NFR BLD AUTO: 2 % (ref 0–6)
ERYTHROCYTE [DISTWIDTH] IN BLOOD BY AUTOMATED COUNT: 12.3 % (ref 11.6–15.1)
GFR SERPL CREATININE-BSD FRML MDRD: 73 ML/MIN/1.73SQ M
GLUCOSE SERPL-MCNC: 82 MG/DL (ref 65–140)
HCT VFR BLD AUTO: 43 % (ref 34.8–46.1)
HGB BLD-MCNC: 14.1 G/DL (ref 11.5–15.4)
IMM GRANULOCYTES # BLD AUTO: 0 THOUSAND/UL (ref 0–0.2)
IMM GRANULOCYTES NFR BLD AUTO: 0 % (ref 0–2)
LYMPHOCYTES # BLD AUTO: 1.19 THOUSANDS/ΜL (ref 0.6–4.47)
LYMPHOCYTES NFR BLD AUTO: 26 % (ref 14–44)
MCH RBC QN AUTO: 32.2 PG (ref 26.8–34.3)
MCHC RBC AUTO-ENTMCNC: 32.8 G/DL (ref 31.4–37.4)
MCV RBC AUTO: 98 FL (ref 82–98)
MONOCYTES # BLD AUTO: 0.49 THOUSAND/ΜL (ref 0.17–1.22)
MONOCYTES NFR BLD AUTO: 11 % (ref 4–12)
NEUTROPHILS # BLD AUTO: 2.83 THOUSANDS/ΜL (ref 1.85–7.62)
NEUTS SEG NFR BLD AUTO: 60 % (ref 43–75)
NRBC BLD AUTO-RTO: 0 /100 WBCS
PLATELET # BLD AUTO: 300 THOUSANDS/UL (ref 149–390)
PMV BLD AUTO: 9.5 FL (ref 8.9–12.7)
POTASSIUM SERPL-SCNC: 4.1 MMOL/L (ref 3.5–5.3)
PROT SERPL-MCNC: 8.1 G/DL (ref 6.4–8.2)
RBC # BLD AUTO: 4.38 MILLION/UL (ref 3.81–5.12)
SODIUM SERPL-SCNC: 134 MMOL/L (ref 136–145)
WBC # BLD AUTO: 4.62 THOUSAND/UL (ref 4.31–10.16)

## 2022-02-05 PROCEDURE — 85025 COMPLETE CBC W/AUTO DIFF WBC: CPT

## 2022-02-05 PROCEDURE — 36415 COLL VENOUS BLD VENIPUNCTURE: CPT

## 2022-02-05 PROCEDURE — 80053 COMPREHEN METABOLIC PANEL: CPT

## 2022-02-11 ENCOUNTER — OFFICE VISIT (OUTPATIENT)
Dept: HEMATOLOGY ONCOLOGY | Facility: CLINIC | Age: 67
End: 2022-02-11
Payer: COMMERCIAL

## 2022-02-11 VITALS
BODY MASS INDEX: 29.51 KG/M2 | SYSTOLIC BLOOD PRESSURE: 130 MMHG | RESPIRATION RATE: 16 BRPM | DIASTOLIC BLOOD PRESSURE: 70 MMHG | WEIGHT: 136.8 LBS | HEART RATE: 81 BPM | OXYGEN SATURATION: 97 % | HEIGHT: 57 IN | TEMPERATURE: 97.7 F

## 2022-02-11 DIAGNOSIS — D49.0 PAROTID TUMOR: ICD-10-CM

## 2022-02-11 DIAGNOSIS — C34.91 SQUAMOUS CELL LUNG CANCER, RIGHT (HCC): Primary | ICD-10-CM

## 2022-02-11 DIAGNOSIS — Z95.828 PORT-A-CATH IN PLACE: ICD-10-CM

## 2022-02-11 DIAGNOSIS — G35 MULTIPLE SCLEROSIS (HCC): ICD-10-CM

## 2022-02-11 DIAGNOSIS — M48.061 SPINAL STENOSIS OF LUMBAR REGION, UNSPECIFIED WHETHER NEUROGENIC CLAUDICATION PRESENT: ICD-10-CM

## 2022-02-11 DIAGNOSIS — G25.81 RESTLESS LEGS SYNDROME (RLS): ICD-10-CM

## 2022-02-11 DIAGNOSIS — J44.9 CHRONIC OBSTRUCTIVE PULMONARY DISEASE, UNSPECIFIED COPD TYPE (HCC): ICD-10-CM

## 2022-02-11 DIAGNOSIS — C77.9 REGIONAL LYMPH NODE METASTASIS PRESENT (HCC): ICD-10-CM

## 2022-02-11 PROCEDURE — 99214 OFFICE O/P EST MOD 30 MIN: CPT | Performed by: INTERNAL MEDICINE

## 2022-02-11 NOTE — PROGRESS NOTES
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 specialist   History of MS  Presently patient is under surveillance for lung cancer     Patient had right upper lobe lobectomy and lymph node sampling in January 2021   She was found to have  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  with invasion of lymphatic channel and metastatic carcinoma in 1 of 8 peribronchial lymph nodes  Other lymph nodes at level 10  , level 7, level 4 and level 2 and at azygos were negative 4R and 2R were negative for malignancy  1 of 14 positive peribronchial lymph node, T2 N1, stage IIB       Tumor tested negative for EGFR  Patient  Received 4 cycles of adjuvant chemotherapy, a combination of carboplatin and Gemzar and last treatment was on 06/07/2021     She did not receive taxane because of history of MS  She also received Granix because of neutropenia  She has minimal cough and exertional dyspnea  Has some tiredness  She sleeps poorly  She has chronic low back discomfort and goes for epidural shots  No metastatic disease in lumbar spine area on MRI scan  She had PET-CT scan in November 2020   PET  also showed positive activity in right parotid gland  And that was biopsied and was negative for malignancy and she was seen by ENT specialist   Follow-up PET scan again showed more avid right parotid gland tumor and she was seen by ENT and patient tells me they decided  surveillance and it will be monitored by her ENT specialist  Remote past history of suspected MS in [facility]  She had some tingling  CSF came back suspicious and MRI brain came back suspicious  Patient was treated for MS at that time  No active treatment for  MS for the last several years  No more tingling in her hands  Patient gave me all this information     Recently she had MRI scan of brain, neck and dorsal spine and there are lesions at T3 and T7  Patient will had PET scan  Patient is not symptomatic in that area  See report below  ROS:  02/11/22 Reviewed 12 systems: See symptoms in HPI  Presently no other neurological, cardiac, pulmonary, GI and  symptoms other than listed in HPI  Other symptoms are in HPI  No symptoms of  fever, chills, bleeding, bone pains  skin rash, weight loss, night sweats,    weakness, numbness, claudication and gait problem  No frequent infections  Not unusually sensitive to heat or cold  No swelling of the ankles  No swollen glands  Patient is  anxious         Physical Exam:  Vitals:    12/13/21 1114   BP: 134/80   BP Location: Left arm   Patient Position: Sitting   Cuff Size: Adult   Pulse: 74   Resp: 18   Temp: 98 2 °F (36 8 °C)   SpO2: 99%   Weight: 61 7 kg (136 lb)   Height: 4' 9" (1 448 m)   02/11/2022:  Temperature 97 7 degrees  Pulse 81  Respirations 16  Blood pressure 130/70 and oxygen saturation 97%   Medical assistant Richar Adhikari was in the room with me during examination     Patient is alert and oriented  Patient is not in distress  Vital signs are above  No icterus  , no oral thrush, no palpable neck mass, clear lung fields to percussion and auscultation, regular  heart rate  , no palpable abdominal mass, abdomen  soft and non tender,  no ascites, no edema of ankles, no calf tenderness, no focal neurological deficit, no skin rash, no palpable lymphadenopathy in the neck and axillary areas,  no clubbing  Patient is  anxious  Performance status 1      Historical Information   Past Medical History:   Diagnosis Date    Anxiety     Bronchial malignant neoplasm, right ([address] )     Cancer (HCC)     lung    Chronic major depressive disorder, single episode     COPD (chronic obstructive pulmonary disease) (HCC)     Full incontinence of feces     GERD (gastroesophageal reflux disease)     Hyperlipidemia     Hypertension     Malignant neoplasm of bronchus and lung (HCC)     Multiple sclerosis ([address] )     [facility]monary emph[facility]sema [facility]     Restless leg syndrome     Tobacco use      Past Surgical History:   Procedure Laterality Date    AUGMENTATION MAMMAPLASTY Bilateral 2014    retro-pectoral silicone    BREAST BIOPSY Left 2015    u/s core bx    BREAST BIOPSY Left 2010    high-risk lesion    BREAST EXCISIONAL BIOPSY Left 2010    high-risk lesion    BREAST EXCISIONAL BIOPSY Left     BREAST EXCISIONAL BIOPSY Right     FL GUIDED CENTRAL VENOUS ACCESS DEVICE INSERTION  2021    MAMMO (HISTORICAL)      AZ BRONCHOSCOPY,DIAGNOSTIC N/A 2021    Procedure: BRONCHOSCOPY FLEXIBLE;  Surgeon: Summer Huffman MD;  [facility];  Service: Thoracic    AZ INSJ TUNNELED CTR VAD W/SUBQ PORT AGE 5 YR/> N/A 2021    Procedure: INSERTION VENOUS PORT ( PORT-A-CATH) IR;  Surgeon: Loreta Jim DO;  [facility];  Service: Interventional Radiology    AZ THORACOSCOPY SURG LOBECTOMY Right 2021    Procedure: RIGHT VATS UPPER LOBECTOMY ; MEDIASTINAL LYMPH NODE DISSECTION; right lower lobe bleb resection  ;  Surgeon: Summer Huffman MD;  [facility];  Service: Thoracic    TONSILLECTOMY      TOTAL ABDOMINAL HYSTERECTOMY  2010    age 60    TOTAL ABDOMINAL HYSTERECTOMY W/ BILATERAL SALPINGOOPHORECTOMY Bilateral 2010    age 60     Social History   Social History     Substance and Sexual Activity   Alcohol Use Yes    Alcohol/week: 7 0 standard drinks    Types: 7 Glasses of wine per week    Comment: wine @       Social History     Substance and Sexual Activity   Drug Use Never     Social History     Tobacco Use   Smoking Status Former Smoker    Packs/day: 0 25    Years: 30 00    Pack years: 7 50    Types: Cigarettes    Start date: 5    Quit date: 1/15/2021    Years since quittin 0   Smokeless Tobacco Never Used   Tobacco Comment    quit 1/2021   NICORETTE GUM SUBSTITUE CURRENTLY     Family History:   Family History   Problem Relation Age of Onset    No Known Problems Mother     COPD Father     Hypertension Father     No Known Problems Sister     No Known Problems Daughter     No Known Problems Maternal Grandmother     No Known Problems Maternal Grandfather     No Known Problems Paternal Grandmother     Rectal cancer Paternal Grandfather         unknown age   Anita Courser No Known Problems Sister     No Known Problems Maternal Aunt     Breast cancer Paternal Aunt         unknown age   Anita Courser No Known Problems Paternal Aunt     No Known Problems Paternal Aunt     No Known Problems Paternal Aunt     No Known Problems Paternal Aunt     No Known Problems Paternal Aunt     No Known Problems Paternal Aunt     COPD Brother          Current Outpatient Medications:     Biotin 5 MG CAPS, Take by mouth daily , Disp: , Rfl:     buPROPion (WELLBUTRIN XL) 300 mg 24 hr tablet, Take 1 tablet (300 mg total) by mouth every morning, Disp: 90 tablet, Rfl: 1    Cholecalciferol (Vitamin D3) 50 MCG (2000 UT) capsule, Take 5,000 Units by mouth daily , Disp: , Rfl:     CRANIAL PROSTHESIS, RX,, One wig, as needed, Disp: 1 Piece, Rfl: 0    famotidine (PEPCID) 40 MG tablet, Take 1 tablet (40 mg total) by mouth every morning, Disp: 90 tablet, Rfl: 1    gabapentin (NEURONTIN) 300 mg capsule, , Disp: , Rfl:     gabapentin (NEURONTIN) 300 mg capsule, Take 300 mg by mouth, Disp: , Rfl:     pramipexole (MIRAPEX) 0 5 mg tablet, Take 1 tablet (0 5 mg total) by mouth daily at bedtime, Disp: 90 tablet, Rfl: 1    simvastatin (ZOCOR) 40 mg tablet, Take 1 tablet (40 mg total) by mouth every other day, Disp: 90 tablet, Rfl: 1    triamterene-hydrochlorothiazide (DYAZIDE) 37 5-25 mg per capsule, Take 1 capsule by mouth every morning, Disp: 90 capsule, Rfl: 1    umeclidinium-vilanterol (Anoro Ellipta) 62 5-25 MCG/INH inhaler, Inhale 1 puff daily , Disp: , Rfl:     diphenhydrAMINE (BENADRYL) 25 mg tablet, Take 1 tablet (25 mg total) by mouth every 8 (eight) hours as needed (Nausea) (Patient not taking: Reported on 12/9/2021 ), Disp: 30 tablet, Rfl: 0    lidocaine-prilocaine (EMLA) cream, Apply topically as needed for mild pain Apply to port site before accessing  (Patient not taking: Reported on 12/9/2021 ), Disp: 30 g, Rfl: 0    prochlorperazine (COMPAZINE) 10 mg tablet, Take 1 tablet (10 mg total) by mouth every 8 (eight) hours as needed for nausea or vomiting (Patient not taking: Reported on 12/9/2021 ), Disp: 30 tablet, Rfl: 0    Allergies   Allergen Reactions    Sulfa Antibiotics Hives             Lab Results: I have reviewed all pertinent labs    LABS:    Results for orders placed or performed in visit on 02/05/22   CBC and differential   Result Value Ref Range    WBC 4 62 4 31 - 10 16 Thousand/uL    RBC 4 38 3 81 - 5 12 Million/uL    Hemoglobin 14 1 11 5 - 15 4 g/dL    Hematocrit 43 0 34 8 - 46 1 %    MCV 98 82 - 98 fL    MCH 32 2 26 8 - 34 3 pg    MCHC 32 8 31 4 - 37 4 g/dL    RDW 12 3 11 6 - 15 1 %    MPV 9 5 8 9 - 12 7 fL    Platelets 985 058 - 158 Thousands/uL    nRBC 0 /100 WBCs    Neutrophils Relative 60 43 - 75 %    Immat GRANS % 0 0 - 2 %    Lymphocytes Relative 26 14 - 44 %    Monocytes Relative 11 4 - 12 %    Eosinophils Relative 2 0 - 6 %    Basophils Relative 1 0 - 1 %    Neutrophils Absolute 2 83 1 85 - 7 62 Thousands/µL    Immature Grans Absolute 0 00 0 00 - 0 20 Thousand/uL    Lymphocytes Absolute 1 19 0 60 - 4 47 Thousands/µL    Monocytes Absolute 0 49 0 17 - 1 22 Thousand/µL    Eosinophils Absolute 0 07 0 00 - 0 61 Thousand/µL    Basophils Absolute 0 04 0 00 - 0 10 Thousands/µL   Comprehensive metabolic panel   Result Value Ref Range    Sodium 134 (L) 136 - 145 mmol/L    Potassium 4 1 3 5 - 5 3 mmol/L    Chloride 100 100 - 108 mmol/L    CO2 32 21 - 32 mmol/L    ANION GAP 2 (L) 4 - 13 mmol/L    BUN 12 5 - 25 mg/dL    Creatinine 0 82 0 60 - 1 30 mg/dL    Glucose 82 65 - 140 mg/dL    Calcium 9 8 8 3 - 10 1 mg/dL    AST 23 5 - 45 U/L    ALT 44 12 - 78 U/L    Alkaline Phosphatase 62 46 - 116 U/L    Total Protein 8 1 6 4 - 8 2 g/dL    Albumin 4 2 3 5 - 5 0 g/dL    Total Bilirubin 0 59 0 20 - 1 00 mg/dL    eGFR 73 ml/min/1 73sq m     SmartPhrase Guide    Comprehensive metabolic panel (Order #[id number]) on 4/9/21   Questions    Collection Question Answer   Has the patient been fasting for 8 hours or more? No      Comprehensive metabolic panel  Order: [id number]  Status:  Final result   Visible to patient:  Yes ([facility] MyChart) Next appt:  05/05/2021 at 08:00 AM in Infusion Therapy ([facility] INF BED 6) Dx:  Squamous cell lung cancer, right (HCC)   Ref Range & Units 4/26/21 6:44 PM   Sodium 136 - 145 mmol/L 138    Potassium 3 5 - 5 3 mmol/L 4 7    Chloride 100 - 108 mmol/L 106    CO2 21 - 32 mmol/L 28    ANION GAP 4 - 13 mmol/L 4    BUN 5 - 25 mg/dL 11    Creatinine 0 60 - 1 30 mg/dL 0 80    Comment: Standardized to IDMS reference method   Glucose 65 - 140 mg/dL 77    Comment: If the patient is fasting, the ADA then defines impaired fasting glucose as > 100 mg/dL and diabetes as > or equal to 123 mg/dL  Specimen collection should occur prior to Sulfasalazine administration due to the potential for falsely depressed results  Specimen collection should occur prior to Sulfapyridine administration due to the potential for falsely elevated results  Calcium 8 3 - 10 1 mg/dL 9 5    AST 5 - 45 U/L 14    Comment: Specimen collection should occur prior to Sulfasalazine administration due to the potential for falsely depressed results  ALT 12 - 78 U/L 24    Comment: Specimen collection should occur prior to Sulfasalazine and/or Sulfapyridine administration due to the potential for falsely depressed results      Alkaline Phosphatase 46 - 116 U/L 76    Total Protein 6 4 - 8 2 g/dL 7 3    Albumin 3 5 - 5 0 g/dL 4 0    Total Bilirubin 0 20 - 1 00 mg/dL 0 28 Comment: Use of this assay is not recommended for patients undergoing treatment with eltrombopag due to the potential for falsely elevated results  eGFR ml/min/1 73sq m 68       Narrative    National Kidney Disease Foundation guidelines for Chronic Kidney Disease (CKD):     Stage 1 with normal or high GFR (GFR > 90 mL/min/1 73 square meters)     Stage 2 Mild CKD (GFR = 60-89 mL/min/1 73 square meters)     Stage 3A Moderate CKD (GFR = 45-59 mL/min/1 73 square meters)     Stage 3B Moderate CKD (GFR = 30-44 mL/min/1 73 square meters)     Stage 4 Severe CKD (GFR = 15-29 mL/min/1 73 square meters)     Stage 5 End Stage CKD (GFR <15 mL/min/1 73 square meters)   Note: GFR calculation is accurate only with a steady state creatinine      Specimen Collected: 04/26/21  6:44 PM Last Resulted: 04/26/21  9:16 PM        Lab Flowsheet     Order Details     View Encounter     Lab and Collection Details     Routing     Result History                 Imaging Studies: I have personally reviewed pertinent reports  November  PET [facility]  Result Impression   IMPRESSION:  1  FDG avid stable right upper lobe lung mass, still suspicious for lung  malignancy despite negative biopsy  2  Smaller stable, mildly FDG avid nodules in the posterior right lung apex  could be inflammatory or metastatic  3  Intense uptake in or adjacent to the deep lobe of the right parotid gland  MRI recommended for further evaluation   MRI NECK SOFT TISSUE [address]  Result Impression   Impression:     Question parotid lesion is present on the right  This abuts the parotid gland  however the majority appears to be posterior to the sternocleidomastoid muscle  raising question of metastatic disease to a level 2 lymph node  Parotid  malignancy is not excluded      Right upper lobe lung nodule is poorly evaluated on this modality                  Workstation:[id number] IMPRESSION:     No intracranial metastasis      No acute acute intracranial abnormality  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 and moderate chronic microangiopathy      Workstation performed: [id number]          Imaging    MRI brain w wo contrast (Order: [id number]) - 10/29/2021  IMPRESSION:     1  Stable 2 2 cm posterosuperior right parotid/juxtaparotid enhancing nodule which is previously biopsied as benign (per chart)      2  Indeterminate T1 hypointense lesion within T3 vertebral body  This level is not fully imaged in prior MRI  Metastasis should be excluded given history of malignancy                           The study was marked in EPIC for significant notification       Workstation performed: [id number]          Imaging    MRI soft tissue neck wo and w contrast (Order: [id number]) - 11/22/2021  IMPRESSION:     Osseous lesions involve T3 and T7 vertebral bodies (larger lesion in T3 vertebral body), indeterminate  These may represent treated osseous metastatic lesions  No abnormal enhancement in thoracic spine      Mild multilevel degenerative changes of thoracic spine            Workstation performed: [id number]          Imaging    MRI thoracic spine w wo contrast (Order: [id number]) - 12/10/2021    IMPRESSION:     1  Stable in size but increasing in FDG avidity lesion in the region of the deep right parotid gland which by report has had a negative biopsy  Correlation is recommended      2  There is otherwise no evidence for hypermetabolic malignancy  Specifically, there is no focal FDG activity in the region of indeterminate thoracic spine lesions seen on recent MRI which remain nonspecific      Workstation performed: [id number]          Imaging    NM PET CT skull base to mid thigh (Order: [id number]) - 12/28/2021      Pathology, and Other Studies: I have personally reviewed pertinent reports        Report  NON-GYNECOLOGICAL CYTOLOGYResulted: 1/7/2021 12:20 PM  [facility]  Component Name Value Ref Range   Non-Gynecological Cytology [facility]  [address] [place] [address], [address]  [phone]    PATHOLOGY REPORT             MR#:             [id number]  Patient:         Alfred Reynolds  /Sex:         1955  F  Provider:        SYLVIA LUO  Location:        [facility]  [place]  Collect Date:    2021  N21-53    CYTOLOGIC DIAGNOSIS:  A  Fine Needle Aspiration with Cytotech Assistance and Cell Block,  Right Parotid Mass  Adequacy: Adequate for evaluation  General Category: Negative for Diagnostic Malignancy  Descriptive Interpretation: Cytologic features consistent with  pleomorphic adenoma     Reviewed  above results and   discussed with patient    Assessment and Plan:  Follow-up visit for 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 status post surgery in 2021 and adjuvant chemotherapy   Also history of right parotid gland tumor that is being managed by her ENT specialist   History of MS  Presently patient is under surveillance for lung cancer     Patient had right upper lobe lobectomy and lymph node sampling in 2021   She was found to have  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  with invasion of lymphatic channel and metastatic carcinoma in 1 of 8 peribronchial lymph nodes  Other lymph nodes at level 10  , level 7, level 4 and level 2 and at azygos were negative 4R and 2R were negative for malignancy  1 of 14 positive peribronchial lymph node, T2 N1, stage IIB       Tumor tested negative for EGFR  Patient  Received 4 cycles of adjuvant chemotherapy, a combination of carboplatin and Gemzar and last treatment was on 2021     She did not receive taxane because of history of MS  She also received Granix because of neutropenia  She has minimal cough and exertional dyspnea  Has some tiredness  She sleeps poorly    She has chronic low back discomfort and goes for epidural shots  No metastatic disease in lumbar spine area on MRI scan  She had PET-CT scan in November 2020   PET  also showed positive activity in right parotid gland  And that was biopsied and was negative for malignancy and she was seen by ENT specialist   Follow-up PET scan again showed more avid right parotid gland tumor and she was seen by ENT and patient tells me they decided  surveillance and it will be monitored by her ENT specialist  Remote past history of suspected MS in [facility]  She had some tingling  CSF came back suspicious and MRI brain came back suspicious  Patient was treated for MS at that time  No active treatment for  MS for the last several years  No more tingling in her hands  Patient gave me all this information     Recently she had MRI scan of brain, neck and dorsal spine and there are lesions at T3 and T7  Patient will had PET scan  Patient is not symptomatic in that area  See report below              Physical examination and test results are as recorded and discussed in detail     Plan is as above  See diagnoses, orders and instructions below  She is already scheduled to have CT chest by thoracic surgery  ENT follow-up for right parotid tumor  Patient wants Port-A-Cath removed  All discussed in very much detail  Questions answered  Discussed the importance of self-breast examination, eating healthy foods, activities as tolerated and health screening tests  Patient has already stopped smoking cigarettes  She is capable of self-care  Goal and plan:  surveillance and prolongation of survival and possible cure from lung cancer  Discussed precautions against coronavirus   Patient will continue to follow with her primary physician and other consultants  Discussed diagnoses, orders and instructions below    1  Squamous cell lung cancer, right (HCC)    - IR port removal; Future    2   Regional lymph node metastasis present ([facility] )      3  Multiple sclerosis ([facility] )      4  Spinal stenosis of lumbar region, unspecified whether neurogenic claudication present      5  Chronic obstructive pulmonary disease, unspecified COPD type ([facility] )      6  Restless legs syndrome (RLS)      7  Parotid tumor      8  Port-A-Cath in place    - IR port removal; Future    Patient has requested port removal   Referral made to IR  Blood work prior to next visit in 6 months  Patient has requested follow-up in 6 months  She will be getting CT scan before that, already ordered by thoracic surgery          Patient voiced understanding and agrees    Counseling / Coordination of Care    Karrie Nissen   Provided counseling and support

## 2022-02-11 NOTE — PATIENT INSTRUCTIONS
Patient has requested port removal   Referral made to IR  Blood work prior to next visit in 6 months  Patient has requested follow-up in 6 months    She will be getting CT scan before that, already ordered by thoracic surgery

## 2022-02-23 ENCOUNTER — HOSPITAL ENCOUNTER (OUTPATIENT)
Dept: RADIOLOGY | Age: 67
Discharge: HOME | End: 2022-02-23
Payer: COMMERCIAL

## 2022-02-23 VITALS — BODY MASS INDEX: 29.34 KG/M2 | HEIGHT: 57 IN | WEIGHT: 136 LBS

## 2022-02-23 DIAGNOSIS — Z12.31 ENCOUNTER FOR SCREENING MAMMOGRAM FOR MALIGNANT NEOPLASM OF BREAST: ICD-10-CM

## 2022-02-23 PROCEDURE — 77067 SCR MAMMO BI INCL CAD: CPT

## 2022-02-23 PROCEDURE — 77063 BREAST TOMOSYNTHESIS BI: CPT

## 2022-03-03 ENCOUNTER — TELEPHONE (OUTPATIENT)
Dept: INTERVENTIONAL RADIOLOGY/VASCULAR | Facility: HOSPITAL | Age: 67
End: 2022-03-03

## 2022-03-07 ENCOUNTER — HOSPITAL ENCOUNTER (OUTPATIENT)
Dept: INTERVENTIONAL RADIOLOGY/VASCULAR | Facility: HOSPITAL | Age: 67
Discharge: HOME | End: 2022-03-07
Attending: INTERNAL MEDICINE
Payer: COMMERCIAL

## 2022-03-07 VITALS
DIASTOLIC BLOOD PRESSURE: 66 MMHG | TEMPERATURE: 97.8 F | SYSTOLIC BLOOD PRESSURE: 146 MMHG | OXYGEN SATURATION: 96 % | BODY MASS INDEX: 29.12 KG/M2 | RESPIRATION RATE: 18 BRPM | HEART RATE: 64 BPM | HEIGHT: 57 IN | WEIGHT: 135 LBS

## 2022-03-07 DIAGNOSIS — C34.91 SQUAMOUS CELL LUNG CANCER, RIGHT (HCC): ICD-10-CM

## 2022-03-07 DIAGNOSIS — Z95.828 PORT-A-CATH IN PLACE: ICD-10-CM

## 2022-03-07 PROCEDURE — 99152 MOD SED SAME PHYS/QHP 5/>YRS: CPT

## 2022-03-07 PROCEDURE — 99153 MOD SED SAME PHYS/QHP EA: CPT

## 2022-03-07 PROCEDURE — 99152 MOD SED SAME PHYS/QHP 5/>YRS: CPT | Performed by: RADIOLOGY

## 2022-03-07 PROCEDURE — 36590 REMOVAL TUNNELED CV CATH: CPT

## 2022-03-07 PROCEDURE — 36590 REMOVAL TUNNELED CV CATH: CPT | Performed by: RADIOLOGY

## 2022-03-07 RX ORDER — FENTANYL CITRATE 50 UG/ML
INJECTION, SOLUTION INTRAMUSCULAR; INTRAVENOUS CODE/TRAUMA/SEDATION MEDICATION
Status: COMPLETED | OUTPATIENT
Start: 2022-03-07 | End: 2022-03-07

## 2022-03-07 RX ORDER — ACETAMINOPHEN 325 MG/1
650 TABLET ORAL EVERY 4 HOURS PRN
Status: DISCONTINUED | OUTPATIENT
Start: 2022-03-07 | End: 2022-03-08 | Stop reason: HOSPADM

## 2022-03-07 RX ORDER — MIDAZOLAM HYDROCHLORIDE 2 MG/2ML
INJECTION, SOLUTION INTRAMUSCULAR; INTRAVENOUS CODE/TRAUMA/SEDATION MEDICATION
Status: COMPLETED | OUTPATIENT
Start: 2022-03-07 | End: 2022-03-07

## 2022-03-07 RX ORDER — SODIUM CHLORIDE 9 MG/ML
75 INJECTION, SOLUTION INTRAVENOUS CONTINUOUS
Status: DISCONTINUED | OUTPATIENT
Start: 2022-03-07 | End: 2022-03-08 | Stop reason: HOSPADM

## 2022-03-07 RX ADMIN — FENTANYL CITRATE 25 MCG: 50 INJECTION, SOLUTION INTRAMUSCULAR; INTRAVENOUS at 10:10

## 2022-03-07 RX ADMIN — FENTANYL CITRATE 25 MCG: 50 INJECTION, SOLUTION INTRAMUSCULAR; INTRAVENOUS at 10:18

## 2022-03-07 RX ADMIN — MIDAZOLAM 0.5 MG: 1 INJECTION INTRAMUSCULAR; INTRAVENOUS at 10:10

## 2022-03-07 RX ADMIN — MIDAZOLAM 1 MG: 1 INJECTION INTRAMUSCULAR; INTRAVENOUS at 10:10

## 2022-03-07 RX ADMIN — SODIUM CHLORIDE 75 ML/HR: 9 INJECTION, SOLUTION INTRAVENOUS at 09:14

## 2022-03-07 RX ADMIN — MIDAZOLAM 0.5 MG: 1 INJECTION INTRAMUSCULAR; INTRAVENOUS at 10:18

## 2022-03-07 RX ADMIN — FENTANYL CITRATE 50 MCG: 50 INJECTION, SOLUTION INTRAMUSCULAR; INTRAVENOUS at 10:10

## 2022-03-07 NOTE — DISCHARGE INSTRUCTIONS
Implanted Venous Access Port Removal    WHAT YOU NEED TO KNOW:   An implanted venous access port is a device used to give treatments and take blood  It may also be called a central venous access device (CVAD)  The port is a small container that is placed under your skin, usually in your upper chest  A port can also be placed in your arm or abdomen  The port is attached to a catheter that enters a large vein  DISCHARGE INSTRUCTIONS:   Resume your normal diet  Small sips of flat soda will help with mild nausea  Prevent an infection:     Wash your hands often  Use soap and water  Clean your hands before and  after you care for your incision  Check your skin for infection every day  Look for redness, swelling, or fluid oozing from the incision site  Dressing may come off in 24 hours  Medical glue will peel off on its own in 5 to 10 days  You may shower 24 hours after procedure  Follow up with your healthcare provider as directed  Write down your questions so you remember to ask them during your visits  Activity:  You may return to your daily activities when the area heals  Contact Interventional Radiology at [phone] Mary PATIENTS: Contact Interventional Radiology at [phone]) Gloria Nuñez PATIENTS: Contact Interventional Radiology at [phone]) if:     You have a fever  You have persistent nausea  Your inciscion site is red, swollen, or draining pus  You have questions or concerns about your condition or care  Seek care immediately or call 911 if:  Blood soaks through your bandage  The skin over or around your incision breaks open  Your heart is jumping or fluttering  You have a headache, blurred vision, and feel confused  You have pain in your arm, neck, shoulder, or chest     You have trouble breathing that is getting worse over time

## 2022-03-07 NOTE — BRIEF OP NOTE (RAD/CATH)
INTERVENTIONAL RADIOLOGY PROCEDURE NOTE    Date: 3/7/2022    Procedure: IR PORT REMOVAL    Preoperative diagnosis:   1  Squamous cell lung cancer, right ([address] )    2  Port-A-Cath in place         Postoperative diagnosis: Same  Surgeon: Eitan Thompson MD     Assistant: None  No qualified resident was available  Blood loss:  Minimal    Specimens:  None     Findings:  Right chest port removed without incident    Complications: None immediate      Anesthesia: conscious sedation

## 2022-03-07 NOTE — PROGRESS NOTES
H&P reviewed  There have been no interval changes since the time the H&P was written  /67   Pulse 64   Temp 98 2 °F (36 8 °C) (Oral)   Resp 18   Ht 4' 9" (1 448 m)   Wt 61 2 kg (135 lb)   LMP  (LMP Unknown)   SpO2 97%   BMI 29 21 kg/m²     69year-old woman here for port removal   Completed therapy for lung cancer  Procedure discussed and questions answered  Informed written consent was obtained      Kenny Polanco MD

## 2022-03-16 ENCOUNTER — CONSULT (OUTPATIENT)
Dept: PAIN MEDICINE | Facility: CLINIC | Age: 67
End: 2022-03-16
Payer: COMMERCIAL

## 2022-03-16 VITALS
WEIGHT: 136 LBS | BODY MASS INDEX: 29.43 KG/M2 | DIASTOLIC BLOOD PRESSURE: 86 MMHG | HEART RATE: 69 BPM | SYSTOLIC BLOOD PRESSURE: 152 MMHG

## 2022-03-16 DIAGNOSIS — M51.16 INTERVERTEBRAL DISC DISORDER WITH RADICULOPATHY OF LUMBAR REGION: Primary | ICD-10-CM

## 2022-03-16 DIAGNOSIS — M48.062 LUMBAR STENOSIS WITH NEUROGENIC CLAUDICATION: ICD-10-CM

## 2022-03-16 DIAGNOSIS — M53.86 SCIATICA ASSOCIATED WITH DISORDER OF LUMBAR SPINE: ICD-10-CM

## 2022-03-16 PROCEDURE — 99204 OFFICE O/P NEW MOD 45 MIN: CPT | Performed by: ANESTHESIOLOGY

## 2022-03-16 PROCEDURE — 1160F RVW MEDS BY RX/DR IN RCRD: CPT | Performed by: ANESTHESIOLOGY

## 2022-03-16 NOTE — PATIENT INSTRUCTIONS
Lumbar Spinal Stenosis   WHAT YOU NEED TO KNOW:   What is lumbar spinal stenosis? Lumbar spinal stenosis is narrowing of the spinal canal in your lower back  Your spinal canal is the opening in your spine that contains your spinal cord  When your spinal canal narrows, it may put pressure on your spinal cord and nerves  What causes lumbar spinal stenosis? Narrowing of your spinal canal may be caused by changes that happen as you age  These changes include bone spurs (growths), herniated discs, and thickened ligaments  Bone growths can be caused by osteoarthritis  A herniated disc bulges out between the vertebrae (bones) and into your spinal canal  Discs are spongy cushions between the vertebrae in your spine  Herniated discs may be caused by activities that increase stress on the spine  An example is heavy lifting  Ligaments that connect the vertebrae may thicken and harden as you get older  Other conditions, such as spinal injuries and Paget's disease, can also cause spinal stenosis  What are the signs and symptoms of lumbar spinal stenosis? Signs and symptoms may start or get worse when you stand or walk  They are often relieved when you sit or lean forward  · Low back pain    · Pain, numbness, tingling, or weakness in one or both legs    · Pain in your buttocks that extends to your thighs or legs    How is lumbar spinal stenosis diagnosed? Your healthcare provider will ask about your symptoms and when they started  He or she will ask if you have any medical conditions  Your provider may ask you to lift, bend, walk, sit, or reach  An x-ray, MRI or a CT may show problems in your spine that are causing spinal stenosis  You may be given contrast liquid to help the spine show up better in the pictures  Tell the healthcare provider if you have ever had an allergic reaction to contrast liquid  Do not enter the MRI room with anything metal  Metal can cause serious injury   Tell the healthcare provider if you have any metal in or on your body  How is lumbar spinal stenosis treated? · NSAIDs , such as ibuprofen, help decrease swelling, pain, and fever  NSAIDs can cause stomach bleeding or kidney problems in certain people  If you take blood thinner medicine, always ask your healthcare provider if NSAIDs are safe for you  Always read the medicine label and follow directions  · Acetaminophen  decreases pain and fever  It is available without a doctor's order  Ask how much to take and how often to take it  Follow directions  Read the labels of all other medicines you are using to see if they also contain acetaminophen, or ask your doctor or pharmacist  Acetaminophen can cause liver damage if not taken correctly  Do not use more than 4 grams (4,000 milligrams) total of acetaminophen in one day  · Prescription pain medicine  may be given  Ask how to take this medicine safely  · Muscle relaxers  help decrease pain and muscle spasms  · A steroid injection  may be given to reduce inflammation  Steroid medicine is injected into the epidural space  The epidural space is between your spinal cord and vertebrae  · A nerve block  is an injection of numbing medicine  You may need a nerve block if your pain is not going away, or is getting worse  · Surgery  may be needed to widen your spinal canal or decrease pressure on your spinal cord  Surgery may also be done to fix damaged or injured vertebrae in your back  How can I manage my symptoms? · Go to physical and occupational therapy  as directed  A physical therapist teaches you exercises to help improve movement and strength, and to decrease pain  An occupational therapist teaches you skills to help with your daily activities  · Rest  when you feel it is needed  Slowly start to do more each day  Return to your daily activities as directed  · Apply heat on your back for 20 to 30 minutes every 2 hours for as many days as directed   Heat helps decrease pain and muscle spasms  · Apply ice  on your back for 15 to 20 minutes every hour or as directed  Use an ice pack, or put crushed ice in a plastic bag  Cover it with a towel before you apply it to your skin  Ice helps prevent tissue damage and decreases swelling and pain  When should I seek immediate care? · You have pain in your leg that does not go away or gets worse  · You have trouble moving your legs  · You cannot control when you urinate or have a bowel movement  When should I contact my healthcare provider? · You have new or worsening symptoms  · Your symptoms keep you from doing your daily activities  · You have questions or concerns about your condition or care  CARE AGREEMENT:   You have the right to help plan your care  Learn about your health condition and how it may be treated  Discuss treatment options with your healthcare providers to decide what care you want to receive  You always have the right to refuse treatment  The above information is an  only  It is not intended as medical advice for individual conditions or treatments  Talk to your doctor, nurse or pharmacist before following any medical regimen to see if it is safe and effective for you  © Copyright [employer] [facility] 2022 Information is for End User's use only and may not be sold, redistributed or otherwise used for commercial purposes   All illustrations and images included in CareNotes® are the copyrighted property of [employer]  or [address] [place] [address] [employer]

## 2022-03-16 NOTE — PROGRESS NOTES
Assessment  1  Intervertebral disc disorder with radiculopathy of lumbar region    2  Sciatica associated with disorder of lumbar spine    3  Lumbar stenosis with neurogenic claudication        Plan  The patient's symptoms, history/physical are consistent with pain that is multifactorial in origin but predominantly result of her stenosis at L3-4 and L4-5 which is leading to right-sided radicular symptoms  At this time, I discussed performing a right L3 and right L4 transforaminal epidural steroid injection to help reduce swelling and inflammation which is leading to pain symptoms  She would like to proceed and will be scheduled for tomorrow under fluoroscopic guidance  Complete risks and benefits including bleeding, infection, tissue reaction, nerve injury and allergic reaction were discussed  The approach was demonstrated using models and literature was provided  Verbal and written consent was obtained  My impressions and treatment recommendations were discussed in detail with the patient who verbalized understanding and had no further questions  Discharge instructions were provided  I personally saw and examined the patient and I agree with the above discussed plan of care  Orders Placed This Encounter   Procedures    FL spine and pain procedure     Standing Status:   Future     Standing Expiration Date:   3/16/2026     Order Specific Question:   Reason for Exam:     Answer:   Right L3-4 TF LENI     Order Specific Question:   Anticoagulant hold needed? Answer:   No     No orders of the defined types were placed in this encounter  History of Present Illness    Mary Aguirre is a 69 y o  female with a history of lung cancer who presents for consultation in regards to severe pain in the lower back traveling down the right leg rated 5-10/10 on numeric rating scale that is felt nearly constantly  Symptoms are worse at night described to be dull aching sharp cramping and burning    Pain symptoms are aggravated with lying down, bending and relieved with sitting  Treatment history has included prior injections which have been helpful  Chiropractor treatment has not been helpful  She underwent interlaminar epidural steroid injections in October and December at [facility] with only temporary relief  I have personally reviewed and/or updated the patient's past medical history, past surgical history, family history, social history, current medications, allergies, and vital signs today  Review of Systems   Constitutional: Positive for unexpected weight change  Negative for fever  HENT: Negative for trouble swallowing  Eyes: Negative for visual disturbance  Respiratory: Negative for shortness of breath and wheezing  Cardiovascular: Negative for chest pain and palpitations  Gastrointestinal: Negative for constipation, diarrhea, nausea and vomiting  Endocrine: Negative for cold intolerance, heat intolerance and polydipsia  Genitourinary: Negative for difficulty urinating and frequency  Musculoskeletal: Positive for gait problem and joint swelling  Negative for arthralgias and myalgias  Skin: Negative for rash  Neurological: Positive for dizziness  Negative for seizures, syncope, weakness and headaches  Hematological: Does not bruise/bleed easily  Psychiatric/Behavioral: Negative for dysphoric mood  All other systems reviewed and are negative        Patient Active Problem List   Diagnosis    Squamous cell lung cancer, right ([address] )    Atypical lobular hyperplasia of breast    Chronic obstructive pulmonary disease ([facility] )    Cystocele    Hyperlipidemia    Hypertension, essential    Multiple sclerosis (HCC)    Tinnitus of both ears    Spinal stenosis of lumbar region    Restless legs syndrome (RLS)    Rectocele    Prolapse of vaginal vault after hysterectomy    Depression    History of colonic polyps    GERD (gastroesophageal reflux disease)    Regional lymph node metastasis present ([place] [address] )    Chemotherapy induced neutropenia (HCC)    Dehydration    Anxiety disorder    Palliative care patient    Medical marijuana use    Insomnia due to medical condition    Cancer related pain    Chemotherapy-induced nausea    Encounter for care related to vascular access port    Dizziness    Parotid tumor       Past Medical History:   Diagnosis Date    Anxiety     Bronchial malignant neoplasm, right ([place] [address] )     Cancer ([place] [address] )     lung    Chronic major depressive disorder, single episode     COPD (chronic obstructive pulmonary disease) ([place] [address] )     Full incontinence of feces     GERD (gastroesophageal reflux disease)     Hyperlipidemia     Hypertension     Malignant neoplasm of bronchus and lung (HCC)     Multiple sclerosis (HCC)     Pulmonary emphysema ([place] [address] )     Restless leg syndrome     Tobacco use        Past Surgical History:   Procedure Laterality Date    AUGMENTATION MAMMAPLASTY Bilateral 11/2014    retro-pectoral silicone    BREAST BIOPSY Left 03/23/2015    u/s core bx    BREAST BIOPSY Left 08/02/2010    high-risk lesion    BREAST EXCISIONAL BIOPSY Left 08/26/2010    high-risk lesion    BREAST EXCISIONAL BIOPSY Left 1994    BREAST EXCISIONAL BIOPSY Right 1984    FL GUIDED CENTRAL VENOUS ACCESS DEVICE INSERTION  03/09/2021    IR PORT REMOVAL  3/7/2022    MAMMO (HISTORICAL)  2020    IL BRONCHOSCOPY,DIAGNOSTIC N/A 01/28/2021    Procedure: BRONCHOSCOPY FLEXIBLE;  Surgeon: Sam Montiel MD;  [facility];  Service: Thoracic    IL INSJ TUNNELED CTR VAD W/SUBQ PORT AGE 5 YR/> N/A 03/09/2021    Procedure: INSERTION VENOUS PORT ( PORT-A-CATH) IR;  Surgeon: Sancho Silva DO;  [facility];  Service: Interventional Radiology    IL THORACOSCOPY SURG LOBECTOMY Right 01/28/2021    Procedure: RIGHT VATS UPPER LOBECTOMY ; MEDIASTINAL LYMPH NODE DISSECTION; right lower lobe bleb resection  ;  Surgeon: Sam Montiel MD;  [facility]; Service: Thoracic    TONSILLECTOMY      TOTAL ABDOMINAL HYSTERECTOMY  2010    age 60    TOTAL ABDOMINAL HYSTERECTOMY W/ BILATERAL SALPINGOOPHORECTOMY Bilateral 2010    age 60       Family History   Problem Relation Age of Onset    No Known Problems Mother     COPD Father     Hypertension Father     No Known Problems Sister     No Known Problems Daughter     No Known Problems Maternal Grandmother     No Known Problems Maternal Grandfather     No Known Problems Paternal Grandmother     Rectal cancer Paternal Grandfather         unknown age   Barry Daubs No Known Problems Sister     No Known Problems Maternal Aunt     Breast cancer Paternal Aunt         unknown age   Barry Daubs No Known Problems Paternal Aunt     No Known Problems Paternal Aunt     No Known Problems Paternal Aunt     No Known Problems Paternal Aunt     No Known Problems Paternal Aunt     No Known Problems Paternal [de-identified]     COPD Brother     No Known Problems Brother        Social History     Occupational History    Not on file   Tobacco Use    Smoking status: Former Smoker     Packs/day: 0 25     Years: 30 00     Pack years: 7 50     Types: Cigarettes     Start date: 5     Quit date: 1/15/2021     Years since quittin 1    Smokeless tobacco: Never Used    Tobacco comment: quit 2021  NICORETTE GUM SUBSTITUE CURRENTLY   Vaping Use    Vaping Use: Never used   Substance and Sexual Activity    Alcohol use:  Yes     Alcohol/week: 7 0 standard drinks     Types: 7 Glasses of wine per week     Comment: wine @      Drug use: Never    Sexual activity: Not on file       Current Outpatient Medications on File Prior to Visit   Medication Sig    Biotin 5 MG CAPS Take by mouth daily     buPROPion (WELLBUTRIN XL) 300 mg 24 hr tablet Take 1 tablet (300 mg total) by mouth every morning    Cholecalciferol (Vitamin D3) 50 MCG (2000 UT) capsule Take 5,000 Units by mouth daily     CRANIAL PROSTHESIS, RX, One wig, as needed    famotidine (PEPCID) 40 MG tablet Take 1 tablet (40 mg total) by mouth every morning    gabapentin (NEURONTIN) 300 mg capsule     gabapentin (NEURONTIN) 300 mg capsule Take 300 mg by mouth    pramipexole (MIRAPEX) 0 5 mg tablet Take 1 tablet (0 5 mg total) by mouth daily at bedtime    simvastatin (ZOCOR) 40 mg tablet Take 1 tablet (40 mg total) by mouth every other day    triamterene-hydrochlorothiazide (DYAZIDE) 37 5-25 mg per capsule Take 1 capsule by mouth every morning    umeclidinium-vilanterol (Anoro Ellipta) 62 5-25 MCG/INH inhaler Inhale 1 puff daily     diphenhydrAMINE (BENADRYL) 25 mg tablet Take 1 tablet (25 mg total) by mouth every 8 (eight) hours as needed (Nausea) (Patient not taking: Reported on 12/9/2021 )    lidocaine-prilocaine (EMLA) cream Apply topically as needed for mild pain Apply to port site before accessing  (Patient not taking: Reported on 12/9/2021 )    prochlorperazine (COMPAZINE) 10 mg tablet Take 1 tablet (10 mg total) by mouth every 8 (eight) hours as needed for nausea or vomiting (Patient not taking: Reported on 12/9/2021 )     No current facility-administered medications on file prior to visit  Allergies   Allergen Reactions    Sulfa Antibiotics Hives       Physical Exam    /86   Pulse 69   Wt 61 7 kg (136 lb)   LMP  (LMP Unknown)   BMI 29 43 kg/m²     Constitutional: normal, well developed, well nourished, alert, in no distress and non-toxic and no overt pain behavior    Eyes: anicteric  HEENT: grossly intact  Neck: supple, symmetric, trachea midline and no masses   Pulmonary:even and unlabored  Cardiovascular:No edema or pitting edema present  Skin:Normal without rashes or lesions and well hydrated  Psychiatric:Mood and affect appropriate  Neurologic:Cranial Nerves II-XII grossly intact  Musculoskeletal:normal     Lumbar Spine Exam  Appearance:  Normal lordosis  Palpation/Tenderness:  no tenderness or spasm  Range of Motion:  Flexion:  No limitation  without pain  Extension: Minimally limited  with pain  Motor Strength:  Left hip flexion:  5/5  Left hip extension:  5/5  Right hip flexion:  5/5  Right hip extension:  5/5  Left knee flexion:  5/5  Left knee extension:  5/5  Right knee flexion:  5/5  Right knee extension:  5/5  Left foot dorsiflexion:  5/5  Left foot plantar flexion:  5/5  Right foot dorsiflexion:  5/5  Right foot plantar flexion:  5/5  Reflexes:  Left Patellar:  2+   Right Patellar:  2+   Left Achilles:  2+   Right Achilles:  2+     Imaging    Procedure: Noncontrast MRI of the lumbar spine  (9/22/2021)  Sagittal T1 and STIR, sagittal   and axial T2  Comparison: None  Findings: For the purposes of this dictation, the most inferior disc will be   designated L5-S1  Conus and lower thoracic cord: Both normal  Conus medullaris located at T11-12  Marrow and Alignment: No marrow replacing process  Alteration of the marrow   signal intensity at the L4-5 endplates secondary to degenerative change  Normal   alignment  T12-L1: Tiny left paramedian disc protrusion which does not encroach upon   central canal      L1-L2: Normal      L2-L3 :  Annular bulge  Superimposed right foraminal/extraforaminal disc   protrusion associated with an annular fissure  Hypertrophy ligamenta flava  Degenerated facet joints  Mild to moderate central canal stenosis  Moderate   right greater than left neural foraminal stenosis  L3-L4: Annular bulge  Left anterior osteophytes  Left greater than right facet   degeneration  Hypertrophied ligamenta flava  Abundant posterior epidural fat  Moderate to severe central canal stenosis  Moderate bilateral neural foraminal   stenosis  L4-L5: Loss of disc height  Annular bulge  Osteophytes  Degenerated facet   joints  Severe right and mild left neural foraminal stenosis  Mild central canal   stenosis  L5-S1: Annular bulge  Minimal degeneration of the facet joints  Paraspinal soft tissues: Unremarkable       IMPRESSION: Impression:   1  Central canal stenosis at L3-4 (moderate to severe), L2-3 (mild to moderate),   and L4-5 (mild)  2  Multilevel right foraminal/extraforaminal disc protrusion at L2-3    3  neural foraminal stenosis as detailed above

## 2022-03-17 ENCOUNTER — HOSPITAL ENCOUNTER (OUTPATIENT)
Dept: RADIOLOGY | Facility: CLINIC | Age: 67
Discharge: HOME | End: 2022-03-17
Attending: ANESTHESIOLOGY
Payer: COMMERCIAL

## 2022-03-17 VITALS
RESPIRATION RATE: 18 BRPM | HEART RATE: 70 BPM | SYSTOLIC BLOOD PRESSURE: 125 MMHG | DIASTOLIC BLOOD PRESSURE: 76 MMHG | TEMPERATURE: 98.2 F | OXYGEN SATURATION: 95 %

## 2022-03-17 DIAGNOSIS — M48.062 LUMBAR STENOSIS WITH NEUROGENIC CLAUDICATION: ICD-10-CM

## 2022-03-17 DIAGNOSIS — M51.16 INTERVERTEBRAL DISC DISORDER WITH RADICULOPATHY OF LUMBAR REGION: ICD-10-CM

## 2022-03-17 PROCEDURE — 64483 NJX AA&/STRD TFRM EPI L/S 1: CPT | Performed by: ANESTHESIOLOGY

## 2022-03-17 PROCEDURE — 64484 NJX AA&/STRD TFRM EPI L/S EA: CPT | Performed by: ANESTHESIOLOGY

## 2022-03-17 RX ORDER — METHYLPREDNISOLONE ACETATE 80 MG/ML
80 INJECTION, SUSPENSION INTRA-ARTICULAR; INTRALESIONAL; INTRAMUSCULAR; PARENTERAL; SOFT TISSUE ONCE
Status: COMPLETED | OUTPATIENT
Start: 2022-03-17 | End: 2022-03-17

## 2022-03-17 RX ORDER — BUPIVACAINE HCL/PF 2.5 MG/ML
2 VIAL (ML) INJECTION ONCE
Status: COMPLETED | OUTPATIENT
Start: 2022-03-17 | End: 2022-03-17

## 2022-03-17 RX ORDER — 0.9 % SODIUM CHLORIDE 0.9 %
4 VIAL (ML) INJECTION ONCE
Status: COMPLETED | OUTPATIENT
Start: 2022-03-17 | End: 2022-03-17

## 2022-03-17 RX ADMIN — Medication 4 ML: at 10:51

## 2022-03-17 RX ADMIN — IOHEXOL 1 ML: 300 INJECTION, SOLUTION INTRAVENOUS at 10:55

## 2022-03-17 RX ADMIN — METHYLPREDNISOLONE ACETATE 80 MG: 80 INJECTION, SUSPENSION INTRA-ARTICULAR; INTRALESIONAL; INTRAMUSCULAR; PARENTERAL; SOFT TISSUE at 10:55

## 2022-03-17 RX ADMIN — BUPIVACAINE HYDROCHLORIDE 2 ML: 2.5 INJECTION, SOLUTION EPIDURAL; INFILTRATION; INTRACAUDAL at 10:55

## 2022-03-17 NOTE — DISCHARGE INSTR - LAB
Epidural Steroid Injection   WHAT YOU NEED TO KNOW:   An epidural steroid injection (LENI) is a procedure to inject steroid medicine into the epidural space  The epidural space is between your spinal cord and vertebrae  Steroids reduce inflammation and fluid buildup in your spine that may be causing pain  You may be given pain medicine along with the steroids  ACTIVITY  Do not drive or operate machinery today  No strenuous activity today - bending, lifting, etc   You may resume normal activites starting tomorrow - start slowly and as tolerated  You may shower today, but no tub baths or hot tubs  You may have numbness for several hours from the local anesthetic  Please use caution and common sense, especially with weight-bearing activities  CARE OF THE INJECTION SITE  If you have soreness or pain, apply ice to the area today (20 minutes on/20 minutes off)  Starting tomorrow, you may use warm, moist heat or ice if needed  You may have an increase or change in your discomfort for 36-48 hours after your treatment  Apply ice and continue with any pain medication you have been prescribed  Notify the [facility] if you have any of the following: redness, drainage, swelling, headache, stiff neck or fever above 100°F     SPECIAL INSTRUCTIONS  Our office will contact you in approximately 7 days for a progress report  MEDICATIONS  Continue to take all routine medications  Our office may have instructed you to hold some medications  As no general anesthesia was used in today's procedure, you should not experience any side effects related to anesthesia  If you have a problem specifically related to your procedure, please call our office at [phone]  Problems not related to your procedure should be directed to your primary care physician

## 2022-03-17 NOTE — H&P
History of Present Illness: The patient is a 69 y o  female who presents with complaints of right lower back and leg pain is here today for right L3-4 transforaminal epidural steroid injection      Patient Active Problem List   Diagnosis    Squamous cell lung cancer, right ([facility] )    Atypical lobular hyperplasia of breast    [facility] disease ([facility]    Cystocele    Hyperlipidemia    Hypertension, essential    Multiple sclerosis (HCC)    Tinnitus of both ears    Spinal stenosis of lumbar region    Restless legs syndrome (RLS)    Rectocele    Prolapse of vaginal vault after hysterectomy    Depression    History of colonic polyps    GERD (gastroesophageal reflux disease)    Regional lymph node metastasis present (HCC)    Chemotherapy induced neutropenia (HCC)    Dehydration    Anxiety disorder    Palliative care patient    Medical marijuana use    Insomnia due to medical condition    Cancer related pain    Chemotherapy-induced nausea    Encounter for care related to vascular access port    Dizziness    Parotid tumor       Past Medical History:   Diagnosis Date    Anxiety     Bronchial malignant neoplasm, right ([facility] )     Cancer (HCC)     lung    Chronic major depressive disorder, single episode     COPD [facility]chronic obstructive [facility] disease) [facility]     Full incontinence of feces     GERD (gastroesophageal reflux disease)     Hyperlipidemia     Hypertension     Malignant neoplasm of bronchus and lung (HCC)     Multiple sclerosis (HCC)     Pulmonary emphysema (HCC)     Restless leg syndrome     Tobacco use        Past Surgical History:   Procedure Laterality Date    AUGMENTATION MAMMAPLASTY Bilateral 11/2014    retro-pectoral silicone    BREAST BIOPSY Left 03/23/2015    u/s core bx    BREAST BIOPSY Left 08/02/2010    high-risk lesion    BREAST EXCISIONAL BIOPSY Left 08/26/2010    high-risk lesion    BREAST EXCISIONAL BIOPSY Left 1994    BREAST EXCISIONAL BIOPSY Right 1984    FL GUIDED CENTRAL VENOUS ACCESS DEVICE INSERTION  03/09/2021    IR PORT REMOVAL  3/7/2022    MAMMO (HISTORICAL)  2020    MN BRONCHOSCOPY,DIAGNOSTIC N/A 01/28/2021    Procedure: BRONCHOSCOPY FLEXIBLE;  Surgeon: Barbara Kong MD;  [facility];  Service: Thoracic    MN INSJ TUNNELED CTR VAD W/SUBQ PORT AGE 5 YR/> N/A 03/09/2021    Procedure: INSERTION VENOUS PORT ( PORT-A-CATH) IR;  Surgeon: Ben Alvarez DO;  [facility];  Service: Interventional Radiology    MN THORACOSCOPY SURG LOBECTOMY Right 01/28/2021    Procedure: RIGHT VATS UPPER LOBECTOMY ; MEDIASTINAL LYMPH NODE DISSECTION; right lower lobe bleb resection  ;  Surgeon: Barbara Kong MD;  Location[facility];  Service: Thoracic    TONSILLECTOMY      TOTAL ABDOMINAL HYSTERECTOMY  2010    age 60    TOTAL ABDOMINAL HYSTERECTOMY W/ BILATERAL SALPINGOOPHORECTOMY Bilateral 2010    age 60         Current Outpatient Medications:     Biotin 5 MG CAPS, Take by mouth daily , Disp: , Rfl:     buPROPion (WELLBUTRIN XL) 300 mg 24 hr tablet, Take 1 tablet (300 mg total) by mouth every morning, Disp: 90 tablet, Rfl: 1    Cholecalciferol (Vitamin D3) 50 MCG (2000 UT) capsule, Take 5,000 Units by mouth daily , Disp: , Rfl:     CRANIAL PROSTHESIS, RX,, One wig, as needed, Disp: 1 Piece, Rfl: 0    diphenhydrAMINE (BENADRYL) 25 mg tablet, Take 1 tablet (25 mg total) by mouth every 8 (eight) hours as needed (Nausea) (Patient not taking: Reported on 12/9/2021 ), Disp: 30 tablet, Rfl: 0    famotidine (PEPCID) 40 MG tablet, Take 1 tablet (40 mg total) by mouth every morning, Disp: 90 tablet, Rfl: 1    gabapentin (NEURONTIN) 300 mg capsule, , Disp: , Rfl:     gabapentin (NEURONTIN) 300 mg capsule, Take 300 mg by mouth, Disp: , Rfl:     lidocaine-prilocaine (EMLA) cream, Apply topically as needed for mild pain Apply to port site before accessing   (Patient not taking: Reported on 12/9/2021 ), Disp: 30 g, Rfl: 0   pramipexole (MIRAPEX) 0 5 mg tablet, Take 1 tablet (0 5 mg total) by mouth daily at bedtime, Disp: 90 tablet, Rfl: 1    prochlorperazine (COMPAZINE) 10 mg tablet, Take 1 tablet (10 mg total) by mouth every 8 (eight) hours as needed for nausea or vomiting (Patient not taking: Reported on 12/9/2021 ), Disp: 30 tablet, Rfl: 0    simvastatin (ZOCOR) 40 mg tablet, Take 1 tablet (40 mg total) by mouth every other day, Disp: 90 tablet, Rfl: 1    triamterene-hydrochlorothiazide (DYAZIDE) 37 5-25 mg per capsule, Take 1 capsule by mouth every morning, Disp: 90 capsule, Rfl: 1    umeclidinium-vilanterol (Anoro Ellipta) 62 5-25 MCG/INH inhaler, Inhale 1 puff daily , Disp: , Rfl:     Current Facility-Administered Medications:     bupivacaine (PF) (MARCAINE) 0 25 % injection 2 mL, 2 mL, Epidural, Once, Natalie Handley MD    iohexol (OMNIPAQUE) 300 mg/mL injection 1 mL, 1 mL, Epidural, Once, Natalie Handley MD    lidocaine (PF) (XYLOCAINE-MPF) 2 % injection 4 mL, 4 mL, Infiltration, Once, Natalie Handley MD    methylPREDNISolone acetate (DEPO-MEDROL) injection 80 mg, 80 mg, Epidural, Once, Natalie Handley MD    sodium chloride (PF) 0 9 % injection 4 mL, 4 mL, Infiltration, Once, Natalie Handley MD    Allergies   Allergen Reactions    Sulfa Antibiotics Hives       Physical Exam:   Vitals:    03/17/22 1037   BP: 125/80   Pulse: 73   Resp: 20   Temp: 98 2 °F (36 8 °C)   SpO2: 95%     General: Awake, Alert, Oriented x 3, Mood and affect appropriate  Respiratory: Respirations even and unlabored  Cardiovascular: Peripheral pulses intact; no edema  Musculoskeletal Exam:  Right lower back pain    ASA Score: 3    Patient/Chart Verification  Patient ID Verified: Verbal  Consents Confirmed: To be obtained in the Pre-Procedure area  Interval H&P(within 24 hr) Complete (required for Outpatients and Surgery Admit only): To be obtained in the Pre-Procedure area  Allergies Reviewed:  Yes  Anticoag/NSAID held?: NA  Currently on antibiotics?: No    Assessment:   1  Intervertebral disc disorder with radiculopathy of lumbar region    2   Lumbar stenosis with neurogenic claudication        Plan: Right L3-4 TF LENI

## 2022-03-24 ENCOUNTER — TELEPHONE (OUTPATIENT)
Dept: PAIN MEDICINE | Facility: CLINIC | Age: 67
End: 2022-03-24

## 2022-03-24 DIAGNOSIS — M51.16 INTERVERTEBRAL DISC DISORDER WITH RADICULOPATHY OF LUMBAR REGION: Primary | ICD-10-CM

## 2022-03-24 DIAGNOSIS — M48.062 LUMBAR STENOSIS WITH NEUROGENIC CLAUDICATION: ICD-10-CM

## 2022-04-04 ENCOUNTER — OFFICE VISIT (OUTPATIENT)
Dept: PALLIATIVE MEDICINE | Facility: CLINIC | Age: 67
End: 2022-04-04
Payer: COMMERCIAL

## 2022-04-04 VITALS
SYSTOLIC BLOOD PRESSURE: 152 MMHG | RESPIRATION RATE: 18 BRPM | HEIGHT: 57 IN | DIASTOLIC BLOOD PRESSURE: 86 MMHG | HEART RATE: 75 BPM | OXYGEN SATURATION: 97 % | BODY MASS INDEX: 29.99 KG/M2 | WEIGHT: 139 LBS | TEMPERATURE: 97.5 F

## 2022-04-04 DIAGNOSIS — G89.3 CANCER RELATED PAIN: ICD-10-CM

## 2022-04-04 DIAGNOSIS — Z79.899 MEDICAL MARIJUANA USE: ICD-10-CM

## 2022-04-04 DIAGNOSIS — J44.9 CHRONIC OBSTRUCTIVE PULMONARY DISEASE, UNSPECIFIED COPD TYPE (HCC): ICD-10-CM

## 2022-04-04 DIAGNOSIS — K21.9 GASTROESOPHAGEAL REFLUX DISEASE, UNSPECIFIED WHETHER ESOPHAGITIS PRESENT: ICD-10-CM

## 2022-04-04 DIAGNOSIS — G25.81 RESTLESS LEGS SYNDROME (RLS): ICD-10-CM

## 2022-04-04 DIAGNOSIS — N81.10 FEMALE CYSTOCELE: ICD-10-CM

## 2022-04-04 DIAGNOSIS — G35 MULTIPLE SCLEROSIS (HCC): ICD-10-CM

## 2022-04-04 DIAGNOSIS — N81.4 CYSTOCELE WITH PROLAPSE: ICD-10-CM

## 2022-04-04 DIAGNOSIS — Z51.5 PALLIATIVE CARE PATIENT: ICD-10-CM

## 2022-04-04 DIAGNOSIS — F41.9 ANXIETY DISORDER, UNSPECIFIED TYPE: ICD-10-CM

## 2022-04-04 DIAGNOSIS — C34.91 SQUAMOUS CELL LUNG CANCER, RIGHT (HCC): Primary | ICD-10-CM

## 2022-04-04 DIAGNOSIS — C77.9 REGIONAL LYMPH NODE METASTASIS PRESENT (HCC): ICD-10-CM

## 2022-04-04 DIAGNOSIS — M48.061 SPINAL STENOSIS OF LUMBAR REGION, UNSPECIFIED WHETHER NEUROGENIC CLAUDICATION PRESENT: ICD-10-CM

## 2022-04-04 DIAGNOSIS — G47.01 INSOMNIA DUE TO MEDICAL CONDITION: ICD-10-CM

## 2022-04-04 PROCEDURE — 99214 OFFICE O/P EST MOD 30 MIN: CPT | Performed by: INTERNAL MEDICINE

## 2022-04-04 RX ORDER — GABAPENTIN 300 MG/1
600 CAPSULE ORAL
Qty: 180 CAPSULE | Refills: 3 | Status: SHIPPED | OUTPATIENT
Start: 2022-04-04

## 2022-04-04 RX ORDER — GABAPENTIN 300 MG/1
300 CAPSULE ORAL 2 TIMES DAILY
Qty: 60 CAPSULE | Refills: 0 | Status: SHIPPED | OUTPATIENT
Start: 2022-04-04 | End: 2022-04-04

## 2022-04-04 NOTE — TELEPHONE ENCOUNTER
S/w pt, advised of the same  Pt verbalized understanding and agreeable to referral  Please place order, thank you

## 2022-04-04 NOTE — PATIENT INSTRUCTIONS
It was good to see you today  Thank you for coming in  · Referring to Urogynecology as we discussed  · Since the dizziness did not improve when you lowered the gabapentin, let's try increasing that again  · Take 300mg or 600mgh at bedtime as you were before  · You have asked to not increase this medication and not follow at recommended intervals with my practiceso I won't be able to make adjustments as I would prefer, but I respect your position  · Ask the medical marijuana [facility]) dispensary about transdermal lotions that might help with lower back pain that radiates down your leg  They will likely have a product from Dr Cass Ramon, Sanjiv Cole, or other line which could help  · We discussed medical marijuana today and I have issued a re-certification for its use  You should expect an e-mail from the [place] Dept of Health verifying this  · You and I reviewed the [facility] informed consent document today  We are providing you with a copy of the signed document  · For constipation:  · Drink PLENTY of water  This is important to keep the gut moving  · Some people have success w/ using prunes, prune juice, certain fruits, wheat germ, or fiber gummies  · Try a probiotic  This could be yogurt or kefir, or fermented beverages such as kombucha, but probiotics are also available in capsule form  Aim for 10-15 billion colony-forming-units, w/ bacteria such as Lactobacillus / Saccharomyces / Actinomyces  · Take Benefiber or Miralax (or Citrucel or Metamucil or Bananatrol, etc) daily  · Colace is good for softening hard stools, but does not stimulate the bowel to move things along  · You can use senna, 1 to 2 tabs, once or twice daily as needed for constipation  Use as directed on the box/bottle  · Should that not be enough for your constipation, you can try Dulcolax, Milk of Magnesia, and/or magnesium citrate  · Should that not be enough, consider an enema    · All of these medications are available over-the-counter  · Return in about 1 year or as needed  · Call us for refills on medications that we supply, as needed  · If something changes and you need to come in sooner, please call our office  PRESCRIPTION REFILL REMINDER:  All medication refills should be requested prior to [facility] on Friday  Any refill requests after noon on Friday would be addressed the following Monday

## 2022-04-04 NOTE — PROGRESS NOTES
Follow-up with Palliative and [address]  69 y o  female [id number]    ASSESSMENT & PLAN:  1  Squamous cell lung cancer, right ([facility] )    2  Regional lymph node metastasis present ([facility] )    3  Multiple sclerosis ([facility] )    4  Chronic obstructive pulmonary disease, unspecified COPD type ([facility] )    5  Anxiety disorder, unspecified type    6  Gastroesophageal reflux disease, unspecified whether esophagitis present    7  Restless legs syndrome (RLS)    8  Spinal stenosis of lumbar region, unspecified whether neurogenic claudication present    9  Cancer related pain    10  Insomnia due to medical condition    11  Medical marijuana use    12  Palliative care patient    13  Female cystocele    14  Cystocele with prolapse           Patient c/o significant non-malignant lumbar spinal pain w/ radiculopathy down her RLE  Interventions by [facility] have unfortunately not provided relief  o Patient c/o dizziness and thought a recent increase in gabapentin to 600mg QHS may have been the cause; decreasing to 300mg QHS did not improve dizziness  o I had a long conversation about managing this pain with this patient  She does not wish to take opioids for this pain   o We had agreed to increase to 300mg BID to assess tolerance/efficacy then further ramp up if indicated, but when I discussed recommended follow-up with my office in 1 month to assess and update the plan, she declined  She stated she wished to return to 300mg QHS dosing and only follow up w/ [facility]  (or perhaps in a year) as she sees too many providers  o Rx returned to patient's preference of 300mg or 600mg QHS, 1 year supply prescribed  While this is not a regimen I feel is appropriate for her needs I assured the patient that we will do this to respect her autonomy   Patient requests referral to Urogynecology to discuss options for bladder outlet obstruction possibly s/t cystocele; she has a h/o rectocele   She wishes to explore options including surgery   The patient qualifies for use of MMJ in the [facility] by having the following medical condition: lung cancer  o From a palliative care standpoint the patient is suffering with anxiousness, insomnia, nausea, pain  These symptoms and side effects might be alleviated with use of MMJ products  The patient renewed their registration online  A medical re-certification was completed on this date (certification ID #[id number])  The patient was given a copy of the medical certification renewal   o Recommended patient consider transdermal MMJ lotions for her non-malignant back pain w/ sciatica   Counseled on bowel regimen   Patient has received [address]  vaccinations   Reviewed notes (Medical Oncology, [facility], CT Surgery), labs (2/5/22: Cr 0 82, alb 4 2, Hb 14 1; COVID19 positive on 1/4/22), imaging + procedures (12/28/21 PET CT, 12/10/21 MRI thoracic spine, 2/23/22 mammogram)   Return in about 1 year (around 4/4/2023) or PRN   Emotional support provided   Medication safety issues addressed - no driving under the influence of narcotics, watch for adverse effects including AMS and respiratory depression, keep medications stored in a safe/locked environment  Requested Prescriptions     Signed Prescriptions Disp Refills    gabapentin (NEURONTIN) 300 mg capsule 180 capsule 3     Sig: Take 2 capsules (600 mg total) by mouth daily at bedtime       Medications Discontinued During This Encounter   Medication Reason    gabapentin (NEURONTIN) 296 mg capsule Duplicate order    gabapentin (NEURONTIN) 300 mg capsule Reorder    gabapentin (NEURONTIN) 300 mg capsule        Representatives have queried the patient's controlled substance dispensing history in the Prescription Drug Monitoring Program in compliance with regulations before I have prescribed any controlled substances  The prescription history is consistent with prescribed therapy and our practice policies        61 minutes were spent in this ambulatory visit with greater than 50% of the time spent face to face with patient in counseling or coordination of care including discussions of symptom assessment and management, medication review and adjustment, psychosocial support, chart review, imaging review, lab review, supportive listening and anticipatory guidance  All of the patient's questions were answered during this discussion  SUBJECTIVE:  Chief Complaint   Patient presents with    Follow-up    Cancer    Cancer Pain    Anxiety    Insomnia    Nausea    Counseling        HPI    Robe Rodriguez is a 69 y o  female w/ SCC of the right lung metastatic to lymph node s/p wedge resection 1/28/21 + s/p chemotherapy; multiple sclerosis, COPD, tinnitus, spinal stenosis, RLS, depression + anxiety, cancer-related pain, nausea  She follows w/ Dr Jennifer Herrera (Medical Oncology), Dr Mariaa Toro [facility] Medicine, TESI done 3/17/22), [facility] Pulmonology, [facility] Neurology  Last gemcitabine + carboplatin infusion on 6/8/21  Initially certified for [facility] in the [facility] on 4/5/21 for insomnia, pain, nausea, anxiousness in the setting of malignancy  Patient is known to [facility] clinic; last seen 12/9/21 for symptom management, psychosocial support  Patient is frustrated that recent interventions from [facility] have not relieved her chronic non-malignant back pain, w/ sciatica down her RLE  She had an LENI from [address] [place] [address] on 12/30/21 and an LENI from [address] [place] [address] on 3/17/22  Prior to switching to [facility], [facility] had told her there was "nothing that they could do for me"  She has been prescribed gabapentin (stating that she was recommended to increase from 300mg QHS to 600mg QHS) but was concerned that this medication was contributing to her dizziness  Returning to 300mg QHS dosing did not alleviate dizziness  She states dizziness has been worked up extensively but no etiology was found      Patient is loathe to start opioids  Patient c/o insomnia, anxiousness, dysphoric mood  She is concerned about her 27-year-old dog whom she feels may not have much time remaining  She c/o constipation which can quickly resolve and progress to bowel urgency  Patient c/o difficulty urinating which she feels is related to her bladder prolapsing into her vaginal canal  She states she had discussed surgical options w/ a provider in the past but had not moved forward, and she would like to discuss with a specialist again  Patient has not been using MMJ products recently but is amenable to recertification today  She complains that the cost of MMJ can be high; unfortunately is it not covered by insurance  She states that she had taken melatonin + "Dream" PO MMJ  + gabapentin one evening to help w/ insomnia, which resulted in vomiting; she feels the [facility] may have caused this  She has not tried transdermal MMJ products for pain relief  PDMP shows no concerns  The following portions of the medical history were reviewed: past medical history, surgical history, problem list, medication list, family history, and social history        Current Outpatient Medications:     Biotin 5 MG CAPS, Take by mouth daily , Disp: , Rfl:     buPROPion (WELLBUTRIN XL) 300 mg 24 hr tablet, Take 1 tablet (300 mg total) by mouth every morning, Disp: 90 tablet, Rfl: 1    Cholecalciferol (Vitamin D3) 50 MCG (2000 UT) capsule, Take 5,000 Units by mouth daily , Disp: , Rfl:     CRANIAL PROSTHESIS, RX,, One wig, as needed, Disp: 1 Piece, Rfl: 0    famotidine (PEPCID) 40 MG tablet, Take 1 tablet (40 mg total) by mouth every morning, Disp: 90 tablet, Rfl: 1    pramipexole (MIRAPEX) 0 5 mg tablet, Take 1 tablet (0 5 mg total) by mouth daily at bedtime, Disp: 90 tablet, Rfl: 1    simvastatin (ZOCOR) 40 mg tablet, Take 1 tablet (40 mg total) by mouth every other day, Disp: 90 tablet, Rfl: 1    triamterene-hydrochlorothiazide (DYAZIDE) 37 5-25 mg per capsule, Take 1 capsule by mouth every morning, Disp: 90 capsule, Rfl: 1    umeclidinium-vilanterol (Anoro Ellipta) 62 5-25 MCG/INH inhaler, Inhale 1 puff daily , Disp: , Rfl:     diphenhydrAMINE (BENADRYL) 25 mg tablet, Take 1 tablet (25 mg total) by mouth every 8 (eight) hours as needed (Nausea) (Patient not taking: Reported on 12/9/2021 ), Disp: 30 tablet, Rfl: 0    gabapentin (NEURONTIN) 300 mg capsule, Take 2 capsules (600 mg total) by mouth daily at bedtime, Disp: 180 capsule, Rfl: 3    lidocaine-prilocaine (EMLA) cream, Apply topically as needed for mild pain Apply to port site before accessing  (Patient not taking: Reported on 12/9/2021 ), Disp: 30 g, Rfl: 0    prochlorperazine (COMPAZINE) 10 mg tablet, Take 1 tablet (10 mg total) by mouth every 8 (eight) hours as needed for nausea or vomiting (Patient not taking: Reported on 12/9/2021 ), Disp: 30 tablet, Rfl: 0    Review of Systems   Constitutional: Positive for activity change, appetite change and fatigue  Negative for unexpected weight change  Eyes: Negative for pain and redness  Respiratory: Negative for shortness of breath  Gastrointestinal: Positive for constipation, nausea and vomiting  Occasional bowel urgency  Endocrine: Negative for polydipsia and polyphagia  Genitourinary: Positive for difficulty urinating (possible bladder outlet obstruction from cystocele)  Musculoskeletal: Positive for back pain (w/ RLE sciatica)  Skin: Negative for pallor  Allergic/Immunologic: Positive for immunocompromised state  Neurological: Positive for dizziness  Negative for facial asymmetry and speech difficulty  Psychiatric/Behavioral: Positive for dysphoric mood and sleep disturbance  The patient is nervous/anxious            OBJECTIVE:  /86 (BP Location: Left arm, Patient Position: Sitting, Cuff Size: Standard)   Pulse 75   Temp 97 5 °F (36 4 °C) (Temporal)   Resp 18   Ht 4' 9" (1 448 m)   Wt 63 kg (139 lb)   LMP  (LMP Unknown) SpO2 97%   BMI 30 08 kg/m²   Physical Exam  Vitals reviewed  Constitutional:       General: She is not in acute distress  Appearance: She is well-developed, well-groomed and overweight  She is not toxic-appearing  HENT:      Head: Normocephalic and atraumatic  Right Ear: External ear normal       Left Ear: External ear normal    Eyes:      General: No scleral icterus  Right eye: No discharge  Left eye: No discharge  Extraocular Movements: Extraocular movements intact  Conjunctiva/sclera: Conjunctivae normal       Pupils: Pupils are equal, round, and reactive to light  Cardiovascular:      Rate and Rhythm: Normal rate  Pulmonary:      Effort: Pulmonary effort is normal  No tachypnea, bradypnea, accessory muscle usage or respiratory distress  Abdominal:      General: There is no distension  Tenderness: There is no guarding  Musculoskeletal:      Cervical back: Normal range of motion  Right lower leg: No edema  Left lower leg: No edema  Skin:     General: Skin is dry  Coloration: Skin is not pale  Neurological:      Mental Status: She is alert and oriented to person, place, and time  Cranial Nerves: No dysarthria or facial asymmetry  Gait: Gait is intact  Psychiatric:         Attention and Perception: Attention normal          Mood and Affect: Mood is anxious and depressed  Affect is flat  Speech: Speech normal          Behavior: Behavior normal  Behavior is cooperative  Thought Content: Thought content normal          Cognition and Memory: Cognition and memory normal           Gladis Dior MD  [facility] Palliative and Supportive Care      Portions of this document may have been created using dictation software and as such some "sound alike" terms may have been generated by the system  Do not hesitate to contact me with any questions or clarifications       This note was not shared with the patient due to reasonable likelihood of causing patient harm

## 2022-04-15 ENCOUNTER — TELEPHONE (OUTPATIENT)
Dept: HEMATOLOGY ONCOLOGY | Facility: CLINIC | Age: 67
End: 2022-04-15

## 2022-04-15 NOTE — TELEPHONE ENCOUNTER
Returned telephone call spoke with pt  Dr ISMA Perry [phone] recommending Dr Terri Lopes [phone] at [address] [facility] [address]  [address]  [facility] hospital

## 2022-04-15 NOTE — TELEPHONE ENCOUNTER
CALL RETURN FORM   Reason for patient call? Patient is calling to see is Dr Deanna Huerta can send her the name and number of uro gynecologist   Patient's primary oncologist? Irma [phone]   Name of person the patient was calling for? Kami Suarez   Any additional information to add, if applicable? Best call back number is [phone]   Informed patient that the message will be forwarded to the team and someone will get back to them as soon as possible    Did you relay this information to the patient?  yes

## 2022-04-20 ENCOUNTER — CONSULT (OUTPATIENT)
Dept: NEUROSURGERY | Facility: CLINIC | Age: 67
End: 2022-04-20
Payer: COMMERCIAL

## 2022-04-20 VITALS
TEMPERATURE: 99 F | DIASTOLIC BLOOD PRESSURE: 80 MMHG | RESPIRATION RATE: 16 BRPM | HEART RATE: 63 BPM | BODY MASS INDEX: 30.2 KG/M2 | WEIGHT: 140 LBS | HEIGHT: 57 IN | SYSTOLIC BLOOD PRESSURE: 130 MMHG

## 2022-04-20 DIAGNOSIS — M54.16 RIGHT LUMBAR RADICULOPATHY: Primary | ICD-10-CM

## 2022-04-20 DIAGNOSIS — M51.16 INTERVERTEBRAL DISC DISORDER WITH RADICULOPATHY OF LUMBAR REGION: ICD-10-CM

## 2022-04-20 DIAGNOSIS — M48.062 LUMBAR STENOSIS WITH NEUROGENIC CLAUDICATION: ICD-10-CM

## 2022-04-20 PROCEDURE — 99203 OFFICE O/P NEW LOW 30 MIN: CPT | Performed by: PHYSICIAN ASSISTANT

## 2022-04-20 PROCEDURE — 1160F RVW MEDS BY RX/DR IN RCRD: CPT | Performed by: PHYSICIAN ASSISTANT

## 2022-04-20 PROCEDURE — 3008F BODY MASS INDEX DOCD: CPT | Performed by: PHYSICIAN ASSISTANT

## 2022-04-20 NOTE — PROGRESS NOTES
Office Note - Neurosurgery   Nicolás Oden 69 y o  female MRN: [id number]      Assessment:    Patient is 69years old pleasant lady with past medical history of right lung squamous cell cancer status post lobectomy 2020 and completed chemotherapy, now in remission  History of hypertension, multiple sclerosis cystocele, restless legs syndrome, depression, dizziness referred by Dr Josephine Cordova for evaluation of right leg radiculopathy  Patient reports progressive R posterolateral thigh pain that radiates down to her distal leg loading the feet, associated with cramps and spasm in her feet   No radicular pain in her left leg  Denies any associated numbness, paresthesia or weakness in the lower extremities  She reports she can walk more than a mi without any problem  Left leg issue is only cramps in her foot  Denies any back pain  patient reports occasional difficulty voiding urine in the setting of cystocele, following up with gynecology  Otherwise denies any saddle anesthesia  She got to lumbar LENI with 2 weeks of relief  Denies any physical therapy  Currently taking gabapentin  Patient had also left index finger and swelling with sign of infection/inflammation-early Felon  Exam-A&OX3, EOMI 2 mm conjugate bilaterally  Xochitl  Under to nose tests normal and without drift bilaterally  Strength is 5/5 both proximal and distal muscle groups in all extremities  Sensation to light touch intact throughout  DTR 2+ without clonus bilaterally  Nontender on palpation of lumbar spine  MRI of lumbar spine without on    Hx, PEx and images reviewed with the patient  Management plan discussed  Patient does not have sign of neurogenic claudication, congestive features of right leg radicular symptoms/sciatica  History of multiple sclerosis  Patient's MRI was done in September 2021 which is more than 6 months, to consider MRI if her symptoms worsen over time  For now  recommend EMG test of both legs    Continue gabapentin and also recommends muscle relaxer  Offered ambulatory referral to PT but patient declines due to financial issues  Will send a note to [facility] 70 for muscle relaxer script  Patient can follow-up after EMG test results  Questions and concerns were answered  Patient expressed their understandings and agreed with the plan  Plan:  1  EMG test of both lower extremities  2  Continue walking exercise, yoga, avoid lifting heavy objects, excessive bending or twisting  3  Consider MRI of lumbar spine if symptoms worsen and EMG tests shows radiculopathy  4  Follow-up after EMG test results  5  Call with question or concern      Subjective/Objective     C/C: " Left lower extremity sciatic"/1 yr        HPI     Patient is 69years old pleasant lady with past medical history of right lung squamous cell cancer status post lobectomy 2020 and completed chemotherapy, now in remission  History of hypertension, multiple sclerosis cystocele, restless legs syndrome, depression, dizziness referred by Dr Rosa Isela Frank for evaluation of right leg radiculopathy  Patient reports progressive R posterolateral thigh pain that radiates down to her distal leg loading the feet, associated with cramps and spasm in her feet   No radicular pain in her left leg  Denies any associated numbness, paresthesia or weakness in the lower extremities  She reports she can walk more than a mi without any problem  Left leg issue is only cramps in her foot  Denies any back pain  patient reports occasional difficulty voiding urine in the setting of cystocele, following up with gynecology  Otherwise denies any saddle anesthesia  She got to lumbar LENI with 2 weeks of relief  Denies any physical therapy  Currently taking gabapentin  Patient had also left index finger and swelling with sign of infection/inflammation-early Felon  Patient denies history of fever, chills, rigors, cough or chest pain    Denies history of diabetes mellitus, congestive heart failure, stroke, seizures, bleeding disorder or taking anticoagulant medication  Denies history of smoking cigarettes but drinks alcohol  Image findings as described in the assessment section above  ROS    Review of Systems   Constitutional: Negative  HENT: Negative  Eyes: Negative  Respiratory: Negative for shortness of breath  HX: Right upper lobe lobectomy 01/2021   Cardiovascular: Negative  Endocrine: Negative  Musculoskeletal: Negative for back pain (right leg pain worse at night) and gait problem  1 year Right leg pain worse at night  No weakness or numbness    Takes gabapentin    PT: NONE recent    LENI: YES, with [facility] 03/17/2022-Right L3-4 TF LENI and Dr Dorothea Zarate ---LENI provided 1-2 week relief         Skin: Negative  Allergic/Immunologic:        Completed chemo for lung CA in July    Neurological: Positive for weakness (right leg)  Negative for numbness         Family History    Family History   Problem Relation Age of Onset    No Known Problems Mother     COPD Father     Hypertension Father     No Known Problems Sister     No Known Problems Daughter     No Known Problems Maternal Grandmother     No Known Problems Maternal Grandfather     No Known Problems Paternal Grandmother     Rectal cancer Paternal Grandfather         unknown age   Valaria Reil No Known Problems Sister     No Known Problems Maternal Aunt     Breast cancer Paternal Aunt         unknown age   Valaria Reil No Known Problems Paternal Aunt     No Known Problems Paternal Aunt     No Known Problems Paternal Aunt     No Known Problems Paternal Aunt     No Known Problems Paternal Aunt     No Known Problems Paternal Aunt     COPD Brother     No Known Problems Brother        Social History    Social History     Socioeconomic History    Marital status: /Civil Union     Spouse name: Not on file    Number of children: Not on file    Years of education: Not on file    Highest education level: Not on file   Occupational History    Not on file   Tobacco Use    Smoking status: Former Smoker     Packs/day: 0 25     Years: 30 00     Pack years: 7 50     Types: Cigarettes     Start date: 5     Quit date: 1/15/2021     Years since quittin 2    Smokeless tobacco: Never Used    Tobacco comment: quit 2021  NICORETTE GUM SUBSTITUE CURRENTLY   Vaping Use    Vaping Use: Never used   Substance and Sexual Activity    Alcohol use: Yes     Alcohol/week: 7 0 standard drinks     Types: 7 Glasses of wine per week     Comment: wine @      Drug use: Never    Sexual activity: Not on file   Other Topics Concern    Not on file   Social History Narrative    Patient is  from  [place]  Patient has one daughter from a previous union        Social Determinants of Health     Financial Resource Strain: Not on file   Food Insecurity: Not on file   Transportation Needs: Not on file   Physical Activity: Not on file   Stress: Not on file   Social Connections: Not on file   Intimate Partner Violence: Not on file   Housing Stability: Not on file       Past Medical History    Past Medical History:   Diagnosis Date    Anxiety     Bronchial malignant neoplasm, right ([place] [address] )     Cancer ([place] [address] )     lung    Chronic major depressive disorder, single episode     COPD (chronic obstructive pulmonary disease) ([place] [address] )     Full incontinence of feces     GERD (gastroesophageal reflux disease)     Hyperlipidemia     Hypertension     [facility] )     Multiple sclerosis ([place] [address] )     Pulmonary emphysema ([place] [address] )     Restless leg syndrome     Tobacco use        Surgical History    Past Surgical History:   Procedure Laterality Date    AUGMENTATION MAMMAPLASTY Bilateral 2014    retro-pectoral silicone    BREAST BIOPSY Left 2015    u/s core bx    BREAST BIOPSY Left 2010    high-risk lesion    BREAST EXCISIONAL BIOPSY Left 2010    high-risk lesion    BREAST EXCISIONAL BIOPSY Left 1994    BREAST EXCISIONAL BIOPSY Right 1984    FL GUIDED CENTRAL VENOUS ACCESS DEVICE INSERTION  03/09/2021    IR PORT REMOVAL  3/7/2022    MAMMO (HISTORICAL)  2020    MD BRONCHOSCOPY,DIAGNOSTIC N/A 01/28/2021    Procedure: BRONCHOSCOPY FLEXIBLE;  Surgeon: Mary Mederos MD;  [facility];  Service: Thoracic    MD INSJ TUNNELED CTR VAD W/SUBQ PORT AGE 5 YR/> N/A 03/09/2021    Procedure: INSERTION VENOUS PORT ( PORT-A-CATH) IR;  Surgeon: Dougie Joseph DO;  [facility];  Service: Interventional Radiology    MD THORACOSCOPY SURG LOBECTOMY Right 01/28/2021    Procedure: RIGHT VATS UPPER LOBECTOMY ; MEDIASTINAL LYMPH NODE DISSECTION; right lower lobe bleb resection  ;  Surgeon: Mary Mederos MD;  [facility];  Service: Thoracic    TONSILLECTOMY      TOTAL ABDOMINAL HYSTERECTOMY  2010    age 60    TOTAL ABDOMINAL HYSTERECTOMY W/ BILATERAL SALPINGOOPHORECTOMY Bilateral 2010    age 60       Medications      Current Outpatient Medications:     Biotin 5 MG CAPS, Take by mouth daily , Disp: , Rfl:     buPROPion (WELLBUTRIN XL) 300 mg 24 hr tablet, Take 1 tablet (300 mg total) by mouth every morning, Disp: 90 tablet, Rfl: 1    Cholecalciferol (Vitamin D3) 50 MCG (2000 UT) capsule, Take 5,000 Units by mouth daily , Disp: , Rfl:     CRANIAL PROSTHESIS, RX,, One wig, as needed, Disp: 1 Piece, Rfl: 0    diphenhydrAMINE (BENADRYL) 25 mg tablet, Take 1 tablet (25 mg total) by mouth every 8 (eight) hours as needed (Nausea) (Patient not taking: Reported on 12/9/2021 ), Disp: 30 tablet, Rfl: 0    famotidine (PEPCID) 40 MG tablet, Take 1 tablet (40 mg total) by mouth every morning, Disp: 90 tablet, Rfl: 1    gabapentin (NEURONTIN) 300 mg capsule, Take 2 capsules (600 mg total) by mouth daily at bedtime, Disp: 180 capsule, Rfl: 3    lidocaine-prilocaine (EMLA) cream, Apply topically as needed for mild pain Apply to port site before accessing   (Patient not taking: Reported on 12/9/2021 ), Disp: 30 g, Rfl: 0    pramipexole (MIRAPEX) 0 5 mg tablet, Take 1 tablet (0 5 mg total) by mouth daily at bedtime, Disp: 90 tablet, Rfl: 1    prochlorperazine (COMPAZINE) 10 mg tablet, Take 1 tablet (10 mg total) by mouth every 8 (eight) hours as needed for nausea or vomiting (Patient not taking: Reported on 12/9/2021 ), Disp: 30 tablet, Rfl: 0    simvastatin (ZOCOR) 40 mg tablet, Take 1 tablet (40 mg total) by mouth every other day, Disp: 90 tablet, Rfl: 1    triamterene-hydrochlorothiazide (DYAZIDE) 37 5-25 mg per capsule, Take 1 capsule by mouth every morning, Disp: 90 capsule, Rfl: 1    umeclidinium-vilanterol (Anoro Ellipta) 62 5-25 MCG/INH inhaler, Inhale 1 puff daily , Disp: , Rfl:     Allergies    Allergies   Allergen Reactions    Sulfa Antibiotics Hives       The following portions of the patient's history were reviewed and updated as appropriate: allergies, current medications, past family history, past medical history, past social history, past surgical history and problem list     Investigations    I personally reviewed the MRI results with the patient:        Physical Exam    There were no vitals filed for this visit  Physical Exam  Constitutional:       Appearance: Normal appearance  HENT:      Head: Normocephalic and atraumatic  Eyes:      Extraocular Movements: Extraocular movements intact  Cardiovascular:      Rate and Rhythm: Normal rate  Pulses: Normal pulses  Pulmonary:      Effort: Pulmonary effort is normal    Musculoskeletal:         General: Normal range of motion  Cervical back: Normal range of motion  Neurological:      General: No focal deficit present  Mental Status: She is alert and oriented to person, place, and time  GCS: GCS eye subscore is 4  GCS verbal subscore is 5  GCS motor subscore is 6  Cranial Nerves: Cranial nerves are intact  Sensory: Sensation is intact  Motor: Motor function is intact  Coordination: Finger-Nose-Finger Test normal       Deep Tendon Reflexes: Reflexes are normal and symmetric  Reflex Scores:       Tricep reflexes are 2+ on the right side and 2+ on the left side  Bicep reflexes are 2+ on the right side and 2+ on the left side  Brachioradialis reflexes are 2+ on the right side and 2+ on the left side  Patellar reflexes are 2+ on the right side and 2+ on the left side  Achilles reflexes are 2+ on the right side and 2+ on the left side  Psychiatric:         Speech: Speech normal        Neurologic Exam     Mental Status   Oriented to person, place, and time     Speech: speech is normal   Level of consciousness: alert    Cranial Nerves     CN XI   CN XI normal      Motor Exam   Muscle bulk: normal  Overall muscle tone: normal  Right arm tone: normal  Left arm tone: normal  Right arm pronator drift: absent  Left arm pronator drift: absent  Right leg tone: normal  Left leg tone: normal    Sensory Exam   Light touch normal      Gait, Coordination, and Reflexes     Coordination   Finger to nose coordination: normal    Reflexes   Right brachioradialis: 2+  Left brachioradialis: 2+  Right biceps: 2+  Left biceps: 2+  Right triceps: 2+  Left triceps: 2+  Right patellar: 2+  Left patellar: 2+  Right achilles: 2+  Left achilles: 2+  Right : 2+  Left : 2+  Right Lennon: absent  Left Lennon: absent  Right ankle clonus: absent  Left pendular knee jerk: absent

## 2022-05-16 ENCOUNTER — TELEPHONE (OUTPATIENT)
Dept: INTERNAL MEDICINE CLINIC | Facility: CLINIC | Age: 67
End: 2022-05-16

## 2022-05-16 NOTE — TELEPHONE ENCOUNTER
Dr Judi Pratt  Patient has a TCM appt with Dr Jim Ballard on Thursday 05/19/2022 from [facility] 04/29/2022-05/01/2022    Patient has a yearly physical appt with Dr Jacky Gonzalez in June already

## 2022-05-19 ENCOUNTER — OFFICE VISIT (OUTPATIENT)
Dept: INTERNAL MEDICINE CLINIC | Age: 67
End: 2022-05-19
Payer: COMMERCIAL

## 2022-05-19 ENCOUNTER — TELEPHONE (OUTPATIENT)
Dept: INTERNAL MEDICINE CLINIC | Age: 67
End: 2022-05-19

## 2022-05-19 VITALS
SYSTOLIC BLOOD PRESSURE: 150 MMHG | WEIGHT: 138.7 LBS | HEART RATE: 64 BPM | BODY MASS INDEX: 29.92 KG/M2 | OXYGEN SATURATION: 98 % | DIASTOLIC BLOOD PRESSURE: 82 MMHG | TEMPERATURE: 97.5 F | HEIGHT: 57 IN

## 2022-05-19 DIAGNOSIS — C34.91 SQUAMOUS CELL LUNG CANCER, RIGHT (HCC): ICD-10-CM

## 2022-05-19 DIAGNOSIS — I10 HYPERTENSION, ESSENTIAL: ICD-10-CM

## 2022-05-19 DIAGNOSIS — S06.5X9A SUBDURAL HEMATOMA (HCC): Primary | ICD-10-CM
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
## 2022-05-19 DIAGNOSIS — I63.9 CEREBELLAR INFARCT (HCC): ICD-10-CM

## 2022-05-19 DIAGNOSIS — R55 SYNCOPE, UNSPECIFIED SYNCOPE TYPE: ICD-10-CM

## 2022-05-19 DIAGNOSIS — F33.9 DEPRESSION, RECURRENT (HCC): ICD-10-CM

## 2022-05-19 DIAGNOSIS — S09.90XA TRAUMATIC INJURY OF HEAD, INITIAL ENCOUNTER: ICD-10-CM

## 2022-05-19 DIAGNOSIS — R42 VERTIGO: ICD-10-CM

## 2022-05-19 DIAGNOSIS — Z12.11 COLON CANCER SCREENING: ICD-10-CM

## 2022-05-19 DIAGNOSIS — N95.1 MENOPAUSAL STATE: ICD-10-CM

## 2022-05-19 DIAGNOSIS — R26.2 AMBULATORY DYSFUNCTION: ICD-10-CM

## 2022-05-19 PROBLEM — S06.5XAA SUBDURAL HEMATOMA: Status: ACTIVE | Noted: 2022-05-19

## 2022-05-19 PROCEDURE — 4010F ACE/ARB THERAPY RXD/TAKEN: CPT | Performed by: INTERNAL MEDICINE

## 2022-05-19 PROCEDURE — 3725F SCREEN DEPRESSION PERFORMED: CPT | Performed by: INTERNAL MEDICINE

## 2022-05-19 PROCEDURE — 3008F BODY MASS INDEX DOCD: CPT | Performed by: INTERNAL MEDICINE

## 2022-05-19 PROCEDURE — 99215 OFFICE O/P EST HI 40 MIN: CPT | Performed by: INTERNAL MEDICINE

## 2022-05-19 PROCEDURE — 1160F RVW MEDS BY RX/DR IN RCRD: CPT | Performed by: INTERNAL MEDICINE

## 2022-05-19 RX ORDER — LOSARTAN POTASSIUM 25 MG/1
25 TABLET ORAL DAILY
Qty: 60 TABLET | Refills: 2 | Status: SHIPPED | OUTPATIENT
Start: 2022-05-19 | End: 2022-06-13

## 2022-05-19 RX ORDER — CIPROFLOXACIN HYDROCHLORIDE 3.5 MG/ML
SOLUTION/ DROPS TOPICAL
COMMUNITY
Start: 2022-04-22 | End: 2022-06-13

## 2022-05-19 NOTE — PATIENT INSTRUCTIONS
PLEASE CHECK YOUR BLOOD PRESSURE TWICE A DAY, IN THE MORNING AND IN THE EVENING AND KEEP A BLOOD PRESSURE LOG  GOAL BLOOD PRESSURE /80 AND BELOW  PLEASE CALL THE OFFICE IF BLOOD PRESSURE IS CONSISTENTLY ABOVE 130/80  Chronic Hypertension   AMBULATORY CARE:   Hypertension  is high blood pressure  Your blood pressure is the force of your blood moving against the walls of your arteries  Hypertension causes your blood pressure to get so high that your heart has to work much harder than normal  This can damage your heart  Even if you have hypertension for years, lifestyle changes, medicines, or both can help bring your blood pressure to normal   Call your local emergency number (911 in the [address] [facility]) or have someone call if:   You have chest pain  You have any of the following signs of a heart attack:      Squeezing, pressure, or pain in your chest    You may  also have any of the following:     Discomfort or pain in your back, neck, jaw, stomach, or arm    Shortness of breath    Nausea or vomiting    Lightheadedness or a sudden cold sweat    You become confused or have difficulty speaking  You suddenly feel lightheaded or have trouble breathing  Seek care immediately if:   You have a severe headache or vision loss  You have weakness in an arm or leg  Call your doctor or cardiologist if:   You feel faint, dizzy, confused, or drowsy  You have been taking your blood pressure medicine but your pressure is higher than your provider says it should be  You have questions or concerns about your condition or care  Treatment for chronic hypertension  may include medicine to lower your blood pressure and cholesterol levels  A low cholesterol level helps prevent heart disease and makes it easier to control your blood pressure  Heart disease can make your blood pressure harder to control  You may also need to make lifestyle changes    What you need to know about the stages of hypertension:       Normal blood pressure is 119/79 or lower   Your healthcare provider may only check your blood pressure each year if it stays at a normal level  Elevated blood pressure is 120/79 to 129/79   This is sometimes called prehypertension  Your healthcare provider may suggest lifestyle changes to help lower your blood pressure to a normal level  He or she may then check it again in 3 to 6 months  Stage 1 hypertension is 130/80  to 139/89   Your provider may recommend lifestyle changes, medication, and checks every 3 to 6 months until your blood pressure is controlled  Stage 2 hypertension is 140/90 or higher   Your provider will recommend lifestyle changes and have you take 2 kinds of hypertension medicines  You will also need to have your blood pressure checked monthly until it is controlled  Manage chronic hypertension:   Check your blood pressure at home  Avoid smoking, caffeine, and exercise at least 30 minutes before checking your blood pressure  Sit and rest for 5 minutes before you take your blood pressure  Extend your arm and support it on a flat surface  Your arm should be at the same level as your heart  Follow the directions that came with your blood pressure monitor  Check your blood pressure 2 times, 1 minute apart, before you take your medicine in the morning  Also check your blood pressure before your evening meal  Keep a record of your readings and bring it to your follow-up visits  Ask your healthcare provider what your blood pressure should be  Manage any other health conditions you have  Health conditions such as diabetes can increase your risk for hypertension  Follow your healthcare provider's instructions and take all your medicines as directed  Talk to your healthcare provider about any new health conditions you have recently developed  Ask about all medicines  Certain medicines can increase your blood pressure   Examples include oral birth control pills, decongestants, herbal supplements, and NSAIDs, such as ibuprofen  Your healthcare provider can tell you which medicines are safe for you to take  This includes prescription and over-the-counter medicines  Lifestyle changes you can make to lower your blood pressure: Your provider may want you to make more lifestyle changes if you are having trouble controlling your blood pressure  This may feel difficult over time, especially if you think you are making good changes but your pressure is still high  It might help to focus on one new change at a time  For example, try to add 1 more day of exercise, or exercise for an extra 10 minutes on 2 days  Small changes can make a big difference  Your healthcare provider can also refer you to specialists such as a dietitian who can help you make small changes  Your family members may be included in helping you learn to create lifestyle changes, such as the following:     Limit sodium (salt) as directed  Too much sodium can affect your fluid balance  Check labels to find low-sodium or no-salt-added foods  Some low-sodium foods use potassium salts for flavor  Too much potassium can also cause health problems  Your healthcare provider will tell you how much sodium and potassium are safe for you to have in a day  He or she may recommend that you limit sodium to 2,300 mg a day  Follow the meal plan recommended by your healthcare provider  A dietitian or your provider can give you more information on low-sodium plans or the DASH (Dietary Approaches to Stop Hypertension) eating plan  The DASH plan is low in sodium, processed sugar, unhealthy fats, and total fat  It is high in potassium, calcium, and fiber  These can be found in vegetables, fruit, and whole-grain foods  Be physically active throughout the day  Physical activity, such as exercise, can help control your blood pressure and your weight  Be physically active for at least 30 minutes per day, on most days of the week   Include aerobic activity, such as walking or riding a bicycle  Also include strength training at least 2 times each week  Your healthcare providers can help you create a physical activity plan  Decrease stress  This may help lower your blood pressure  Learn ways to relax, such as deep breathing or listening to music  Limit alcohol as directed  Alcohol can increase your blood pressure  A drink of alcohol is 12 ounces of beer, 5 ounces of wine, or 1½ ounces of liquor  Do not smoke  Nicotine and other chemicals in cigarettes and cigars can increase your blood pressure and also cause lung damage  Ask your healthcare provider for information if you currently smoke and need help to quit  E-cigarettes or smokeless tobacco still contain nicotine  Talk to your healthcare provider before you use these products  Follow up with your doctor or cardiologist as directed: You will need to return to have your blood pressure checked and to have other lab tests done  Write down your questions so you remember to ask them during your visits  © [employer] 2022 Information is for End User's use only and may not be sold, redistributed or otherwise used for commercial purposes  All illustrations and images included in CareNotes® are the copyrighted property of A D A M , Inc  or [address] [place], [place] [address]betty oscar   The above information is an  only  It is not intended as medical advice for individual conditions or treatments  Talk to your doctor, nurse or pharmacist before following any medical regimen to see if it is safe and effective for you

## 2022-05-19 NOTE — TELEPHONE ENCOUNTER
Patient is calling back to find out who else she can go to for Neurology  Dr Bermudez January is booking out till next year  They can't get her in with anyone till later this year    They also were asking her why she needs to see a neurologist   Is it for the Concussion or the Cerebellar infarct      Please call her back at the home number first if not then cell and lmom if you can't reach her

## 2022-05-19 NOTE — PROGRESS NOTES
Assessment/Plan:    Subdural hematoma  - stable   -continue to follow with Neurosurgery  -follow-up with concussion program  - will follow up with the results of the repeat CT scan ordered by Neurosurgery     Syncope  -resolved  - folow-up with neurology  -continue to monitor patient closely clinically    Scalp hematoma secondary to head injury with ecchymosis   - much improved   - will continue to monitor her closely clinically    Osteoporosis screen   - patient has never had a DEXA scan   - will order a DEXA scan    Essential hypertension   - not optimally controlled   - will start patient on losartan at 50 mg daily and she was told that she should continue with Dyazide  -will recheck a BMP in 1-2 weeks to rule out acute kidney injury  or hyperkalemia on losartan   -she was counseled to cut down on caffeine and salty foods  - will prescribe sphygmomanometer for patient so that she can monitor her blood pressure    Cerebellar infarct   - may be responsible for her vertigo versus BPPV  -will refer patient to Neurology  - she was encouraged to follow up with the Comprehensive concussion program  for possible physical therapy    Depression    -Stable   -continue with Wellbutrin    Vertigo with occasional ambulatory dysfunction  - chronic  -will prescribe patient a cane and she was encouraged to use it     -she was encouraged to go for physical therapy     Diagnoses and all orders for this visit:    Subdural hematoma ([address] )  -     Ambulatory Referral to Comprehensive Concussion Program; Future    Colon cancer screening    Menopausal state  -     DXA bone density spine hip and pelvis; Future    Syncope, unspecified syncope type  -     Cane    Squamous cell lung cancer, right ([address] )    Hypertension, essential  -     Ambulatory Referral to Neurology; Future  -     losartan (COZAAR) 25 mg tablet; Take 1 tablet (25 mg total) by mouth in the morning   -     Basic metabolic panel;  Future  -     Durable Medical Equipment    Cerebellar infarct [facility])  -     Ambulatory Referral to Neurology; Future  -     losartan (COZAAR) 25 mg tablet; Take 1 tablet (25 mg total) by mouth in the morning   -     Cane    Depression, recurrent (HCC)    Traumatic injury of head, initial encounter  -     Ambulatory Referral to Comprehensive Concussion Program; Future  -     Cane    Ambulatory dysfunction  -     Cane    Vertigo  -     Cane    Fall, initial encounter  -     Cane    BMI 30 0-30 9,adult    Other orders  -     ciprofloxacin (CILOXAN) 0 3 % ophthalmic solution; APPLY TWICE DAILY TO BOTH EYES AS DIRECTED        BMI Counseling: Body mass index is 30 26 kg/m²  The BMI is above normal  Nutrition recommendations include consuming healthier snacks, limiting drinks that contain sugar, reducing intake of saturated and trans fat and reducing intake of cholesterol  Exercise recommendations include exercising 3-5 times per week  No pharmacotherapy was ordered  Patient referred to PCP  Rationale for BMI follow-up plan is due to patient being overweight or obese  Subjective:      Patient ID: Juaquin Martinez is a 69 y o  female  HPI    Patient presents for a hospital follow-up visit  She was admitted to [facility] from April 29th, 2022 to April 30th, 2022 status post a fall  Patient fell down the stairs and hit the right side of her head and passed out  She broke her fall with her left arm and had x-rays which were negative for fracture  She developed a large hematoma head and presented to the ED with headache  CT scan of the head without contrast showed stable right cerebral convexity subdural hematoma measuring approximately 5 mm in thickness  There was also a trace right peritentorial subdural hemorrhage and chronic left basilar ganglionic lacunar infarcts  Today, patient states she might have passed out for a second and is not sure if she passed out before she started falling or she fell before she passed out    She admits that she has been having dizziness and vertigo for over a year  She was referred to neurosx yesterday with [facility]  She would like to be referred to a neurosx with [facility]   She has been having persistent dizziness for about one year and states that when she gets out of bed she has vertigo   She also has vertigo when she turns her head sometimes  She states that the vertigo lasts a few seconds at a time  Her BP has been a bit high recently - as high as 407 systolic  One time  Does not check her BP at home, she is only on dyazide   She admits that she drinks about 4 cups of coffee and also likes salty foods  She quit smoking a year ago, she smoked for about 50 years on and off at the max of a pack a day  Does not want to go for PT cos of copay  She admits to chronic constipation but states that her headache has resolved as well as her nausea  She denies  Muscle weakness, paresthesia, change in speech, change in vision, difficulty swallowing, fever, chills, nasal congestion, rhinorrhea, chest pain, shortness of breath, palpitations, cough, nausea, vomiting, abdominal pain, diarrhea, arthralgias, myalgias  The following portions of the   patient's history were reviewed and updated as appropriate:   She  has a past medical history of Anxiety, Bronchial malignant neoplasm, right ([address] ), Cancer ([address] ), Chronic major depressive disorder, single episode, COPD (chronic obstructive pulmonary disease) ([address] ), Full incontinence of feces, GERD (gastroesophageal reflux disease), Hyperlipidemia, Hypertension, Malignant neoplasm of bronchus and lung ([address] ), Multiple sclerosis ([address] ), Pulmonary emphysema ([address] ), Restless leg syndrome, and Tobacco use    She   Patient Active Problem List    Diagnosis Date Noted    Subdural hematoma ([address] ) 05/19/2022    Syncope 05/19/2022    BMI 30 0-30 9,adult 05/19/2022    Intervertebral disc disorder with radiculopathy of lumbar region     Parotid tumor 02/11/2022    Dizziness 10/04/2021    Encounter for care related to vascular access port 07/23/2021    Chemotherapy-induced nausea 04/30/2021    Insomnia due to medical condition 04/05/2021    Cancer related pain 04/05/2021    Palliative care patient 04/04/2021    Medical marijuana use 04/04/2021    Anxiety disorder 04/01/2021    Chemotherapy induced neutropenia ([facility] ) 03/31/2021    Dehydration 03/31/2021    Regional lymph node metastasis present ([place] [address] ) 03/02/2021    GERD (gastroesophageal reflux disease) 02/26/2021    Squamous cell lung cancer, right ([place] [address] ) 01/12/2021    Chronic obstructive pulmonary disease ([place] [address] ) 10/07/2019    Tinnitus of both ears 11/06/2018    Hypertension, essential 11/07/2017    Restless legs syndrome (RLS) 11/07/2017    Cystocele 09/26/2016    Rectocele 09/26/2016    Cystocele with prolapse 09/26/2016    Depression 01/24/2016    Atypical lobular hyperplasia of breast 03/24/2015    Multiple sclerosis ([place] [address] ) 11/29/2013    Hyperlipidemia 09/15/2010    Spinal stenosis of lumbar region 09/15/2010    History of colonic polyps 09/15/2010     She  has a past surgical history that includes Breast biopsy (Left, 03/23/2015); Breast excisional biopsy (Left, 08/26/2010); Breast biopsy (Left, 08/02/2010); Breast excisional biopsy (Left, 1994); Breast excisional biopsy (Right, 1984); Total abdominal hysterectomy w/ bilateral salpingoophorectomy (Bilateral, 2010); Total abdominal hysterectomy (2010); Augmentation mammaplasty (Bilateral, 11/2014); pr thoracoscopy surg lobectomy (Right, 01/28/2021); pr bronchoscopy,diagnostic (N/A, 01/28/2021); Tonsillectomy; pr insj tunneled ctr vad w/subq port age 9 yr/> (N/A, 03/09/2021); FL guided central venous access device insertion (03/09/2021); Mammo (historical) (2020); and IR port removal (3/7/2022)    Her family history includes Breast cancer in her paternal aunt; COPD in her brother and father; Hypertension in her father; No Known Problems in her brother, daughter, maternal aunt, maternal grandfather, maternal grandmother, mother, paternal aunt, paternal aunt, paternal aunt, paternal aunt, paternal aunt, paternal aunt, paternal grandmother, sister, and sister; Rectal cancer in her paternal grandfather  She  reports that she quit smoking about 16 months ago  Her smoking use included cigarettes  She started smoking about 52 years ago  She has a 7 50 pack-year smoking history  She has never used smokeless tobacco  She reports current alcohol use of about 7 0 standard drinks of alcohol per week  She reports that she does not use drugs  Current Outpatient Medications   Medication Sig Dispense Refill    Biotin 5 MG CAPS Take by mouth daily       buPROPion (WELLBUTRIN XL) 300 mg 24 hr tablet Take 1 tablet (300 mg total) by mouth every morning 90 tablet 1    Cholecalciferol (Vitamin D3) 50 MCG (2000 UT) capsule Take 5,000 Units by mouth daily       famotidine (PEPCID) 40 MG tablet Take 1 tablet (40 mg total) by mouth every morning 90 tablet 1    gabapentin (NEURONTIN) 300 mg capsule Take 2 capsules (600 mg total) by mouth daily at bedtime 180 capsule 3    losartan (COZAAR) 25 mg tablet Take 1 tablet (25 mg total) by mouth in the morning  60 tablet 2    pramipexole (MIRAPEX) 0 5 mg tablet Take 1 tablet (0 5 mg total) by mouth daily at bedtime 90 tablet 1    simvastatin (ZOCOR) 40 mg tablet Take 1 tablet (40 mg total) by mouth every other day 90 tablet 1    triamterene-hydrochlorothiazide (DYAZIDE) 37 5-25 mg per capsule Take 1 capsule by mouth every morning 90 capsule 1    umeclidinium-vilanterol (Anoro Ellipta) 62 5-25 MCG/INH inhaler Inhale 1 puff daily       ciprofloxacin (CILOXAN) 0 3 % ophthalmic solution APPLY TWICE DAILY TO BOTH EYES AS DIRECTED       No current facility-administered medications for this visit       Current Outpatient Medications on File Prior to Visit   Medication Sig    Biotin 5 MG CAPS Take by mouth daily     buPROPion (WELLBUTRIN XL) 300 mg 24 hr tablet Take 1 tablet (300 mg total) by mouth every morning    Cholecalciferol (Vitamin D3) 50 MCG (2000 UT) capsule Take 5,000 Units by mouth daily     famotidine (PEPCID) 40 MG tablet Take 1 tablet (40 mg total) by mouth every morning    gabapentin (NEURONTIN) 300 mg capsule Take 2 capsules (600 mg total) by mouth daily at bedtime    pramipexole (MIRAPEX) 0 5 mg tablet Take 1 tablet (0 5 mg total) by mouth daily at bedtime    simvastatin (ZOCOR) 40 mg tablet Take 1 tablet (40 mg total) by mouth every other day    triamterene-hydrochlorothiazide (DYAZIDE) 37 5-25 mg per capsule Take 1 capsule by mouth every morning    umeclidinium-vilanterol (Anoro Ellipta) 62 5-25 MCG/INH inhaler Inhale 1 puff daily     ciprofloxacin (CILOXAN) 0 3 % ophthalmic solution APPLY TWICE DAILY TO BOTH EYES AS DIRECTED    [DISCONTINUED] CRANIAL PROSTHESIS, RX, One wig, as needed    [DISCONTINUED] diphenhydrAMINE (BENADRYL) 25 mg tablet Take 1 tablet (25 mg total) by mouth every 8 (eight) hours as needed (Nausea)    [DISCONTINUED] lidocaine-prilocaine (EMLA) cream Apply topically as needed for mild pain Apply to port site before accessing   [DISCONTINUED] prochlorperazine (COMPAZINE) 10 mg tablet Take 1 tablet (10 mg total) by mouth every 8 (eight) hours as needed for nausea or vomiting     No current facility-administered medications on file prior to visit  She is allergic to sulfa antibiotics       Review of Systems   Constitutional: Negative for activity change, chills, fatigue, fever and unexpected weight change  HENT: Negative for ear pain, postnasal drip, rhinorrhea, sinus pressure and sore throat  Eyes: Negative for pain  Respiratory: Negative for cough, choking, chest tightness, shortness of breath and wheezing  Cardiovascular: Negative for chest pain, palpitations and leg swelling  Gastrointestinal: Positive for constipation (chronic)   Negative for abdominal pain, diarrhea, nausea (resolved) and vomiting  Genitourinary: Negative for dysuria and hematuria  Urinary incontinence worsening and going to see the uro-gynecologist for a lift sx   Musculoskeletal: Negative for arthralgias, back pain, gait problem, joint swelling, myalgias and neck stiffness  Skin: Negative for pallor and rash  Neurological: Positive for dizziness (vertigo sonya when she sits up and sometimes when she stands up )  Negative for tremors, seizures, syncope, light-headedness and headaches (resolved)  Hematological: Negative for adenopathy  Psychiatric/Behavioral: Negative for behavioral problems  Objective:      /82 (BP Location: Left arm, Patient Position: Sitting, Cuff Size: Adult)   Pulse 64   Temp 97 5 °F (36 4 °C) (Temporal)   Ht 4' 8 77" (1 442 m)   Wt 62 9 kg (138 lb 11 2 oz)   LMP  (LMP Unknown)   SpO2 98% Comment: room air  BMI 30 26 kg/m²          Physical Exam  Constitutional:       General: She is not in acute distress  Appearance: She is well-developed  She is not diaphoretic  HENT:      Head: Normocephalic  Comments: Tenderness on the right forehead with a small swelling and mild erythema  Right Ear: External ear normal       Left Ear: External ear normal       Nose: Nose normal       Mouth/Throat:      Mouth: Mucous membranes are dry  Pharynx: No oropharyngeal exudate  Comments: Dry mucous mbs  Eyes:      General: No scleral icterus  Right eye: No discharge  Left eye: No discharge  Conjunctiva/sclera: Conjunctivae normal       Pupils: Pupils are equal, round, and reactive to light  Neck:      Thyroid: No thyromegaly  Vascular: No JVD  Trachea: No tracheal deviation  Cardiovascular:      Rate and Rhythm: Normal rate and regular rhythm  Heart sounds: Normal heart sounds  No murmur heard  No friction rub  No gallop  Pulmonary:      Effort: Pulmonary effort is normal  No respiratory distress        Breath sounds: Normal breath sounds  No wheezing or rales  Chest:      Chest wall: No tenderness  Abdominal:      General: Bowel sounds are normal  There is no distension  Palpations: Abdomen is soft  There is no mass  Tenderness: There is no abdominal tenderness  There is no guarding or rebound  Musculoskeletal:         General: No tenderness or deformity  Normal range of motion  Cervical back: Normal range of motion and neck supple  Lymphadenopathy:      Cervical: No cervical adenopathy  Skin:     General: Skin is warm and dry  Coloration: Skin is not pale  Findings: Bruising and ecchymosis (Resolving ecchymosis on the right forehead and right cheek) present  No erythema or rash  Neurological:      Mental Status: She is alert and oriented to person, place, and time  Cranial Nerves: No cranial nerve deficit  Motor: No abnormal muscle tone  Coordination: Coordination normal       Deep Tendon Reflexes: Reflexes are normal and symmetric     Psychiatric:         Behavior: Behavior normal            Appointment on 02/05/2022   Component Date Value Ref Range Status    WBC 02/05/2022 4 62  4 31 - 10 16 Thousand/uL Final    RBC 02/05/2022 4 38  3 81 - 5 12 Million/uL Final    Hemoglobin 02/05/2022 14 1  11 5 - 15 4 g/dL Final    Hematocrit 02/05/2022 43 0  34 8 - 46 1 % Final    MCV 02/05/2022 98  82 - 98 fL Final    MCH 02/05/2022 32 2  26 8 - 34 3 pg Final    MCHC 02/05/2022 32 8  31 4 - 37 4 g/dL Final    RDW 02/05/2022 12 3  11 6 - 15 1 % Final    MPV 02/05/2022 9 5  8 9 - 12 7 fL Final    Platelets 84/74/7378 300  149 - 390 Thousands/uL Final    nRBC 02/05/2022 0  /100 WBCs Final    Neutrophils Relative 02/05/2022 60  43 - 75 % Final    Immat GRANS % 02/05/2022 0  0 - 2 % Final    Lymphocytes Relative 02/05/2022 26  14 - 44 % Final    Monocytes Relative 02/05/2022 11  4 - 12 % Final    Eosinophils Relative 02/05/2022 2  0 - 6 % Final    Basophils Relative 02/05/2022 1  0 - 1 % Final    Neutrophils Absolute 02/05/2022 2 83  1 85 - 7 62 Thousands/µL Final    Immature Grans Absolute 02/05/2022 0 00  0 00 - 0 20 Thousand/uL Final    Lymphocytes Absolute 02/05/2022 1 19  0 60 - 4 47 Thousands/µL Final    Monocytes Absolute 02/05/2022 0 49  0 17 - 1 22 Thousand/µL Final    Eosinophils Absolute 02/05/2022 0 07  0 00 - 0 61 Thousand/µL Final    Basophils Absolute 02/05/2022 0 04  0 00 - 0 10 Thousands/µL Final    Sodium 02/05/2022 134 (A) 136 - 145 mmol/L Final    Potassium 02/05/2022 4 1  3 5 - 5 3 mmol/L Final    Chloride 02/05/2022 100  100 - 108 mmol/L Final    CO2 02/05/2022 32  21 - 32 mmol/L Final    ANION GAP 02/05/2022 2 (A) 4 - 13 mmol/L Final    BUN 02/05/2022 12  5 - 25 mg/dL Final    Creatinine 02/05/2022 0 82  0 60 - 1 30 mg/dL Final    Standardized to IDMS reference method    Glucose 02/05/2022 82  65 - 140 mg/dL Final    If the patient is fasting, the ADA then defines impaired fasting glucose as > 100 mg/dL and diabetes as > or equal to 123 mg/dL  Specimen collection should occur prior to Sulfasalazine administration due to the potential for falsely depressed results  Specimen collection should occur prior to Sulfapyridine administration due to the potential for falsely elevated results   Calcium 02/05/2022 9 8  8 3 - 10 1 mg/dL Final    AST 02/05/2022 23  5 - 45 U/L Final    Specimen collection should occur prior to Sulfasalazine administration due to the potential for falsely depressed results   ALT 02/05/2022 44  12 - 78 U/L Final    Specimen collection should occur prior to Sulfasalazine and/or Sulfapyridine administration due to the potential for falsely depressed results       Alkaline Phosphatase 02/05/2022 62  46 - 116 U/L Final    Total Protein 02/05/2022 8 1  6 4 - 8 2 g/dL Final    Albumin 02/05/2022 4 2  3 5 - 5 0 g/dL Final    Total Bilirubin 02/05/2022 0 59  0 20 - 1 00 mg/dL Final    Use of this assay is not recommended for patients undergoing treatment with eltrombopag due to the potential for falsely elevated results      eGFR 02/05/2022 73  ml/min/1 73sq m Final   Hospital Outpatient Visit on 12/28/2021   Component Date Value Ref Range Status    POC Glucose 12/28/2021 104  65 - 140 mg/dl Final   Appointment on 12/08/2021   Component Date Value Ref Range Status    WBC 12/08/2021 6 10  4 31 - 10 16 Thousand/uL Final    RBC 12/08/2021 4 50  3 81 - 5 12 Million/uL Final    Hemoglobin 12/08/2021 14 5  11 5 - 15 4 g/dL Final    Hematocrit 12/08/2021 44 7  34 8 - 46 1 % Final    MCV 12/08/2021 99 (A) 82 - 98 fL Final    MCH 12/08/2021 32 2  26 8 - 34 3 pg Final    MCHC 12/08/2021 32 4  31 4 - 37 4 g/dL Final    RDW 12/08/2021 13 1  11 6 - 15 1 % Final    MPV 12/08/2021 9 5  8 9 - 12 7 fL Final    Platelets 63/13/5638 292  149 - 390 Thousands/uL Final    nRBC 12/08/2021 0  /100 WBCs Final    Neutrophils Relative 12/08/2021 65  43 - 75 % Final    Immat GRANS % 12/08/2021 0  0 - 2 % Final    Lymphocytes Relative 12/08/2021 23  14 - 44 % Final    Monocytes Relative 12/08/2021 8  4 - 12 % Final    Eosinophils Relative 12/08/2021 3  0 - 6 % Final    Basophils Relative 12/08/2021 1  0 - 1 % Final    Neutrophils Absolute 12/08/2021 3 96  1 85 - 7 62 Thousands/µL Final    Immature Grans Absolute 12/08/2021 0 01  0 00 - 0 20 Thousand/uL Final    Lymphocytes Absolute 12/08/2021 1 43  0 60 - 4 47 Thousands/µL Final    Monocytes Absolute 12/08/2021 0 48  0 17 - 1 22 Thousand/µL Final    Eosinophils Absolute 12/08/2021 0 16  0 00 - 0 61 Thousand/µL Final    Basophils Absolute 12/08/2021 0 06  0 00 - 0 10 Thousands/µL Final    Sodium 12/08/2021 136  136 - 145 mmol/L Final    Potassium 12/08/2021 4 0  3 5 - 5 3 mmol/L Final    Chloride 12/08/2021 102  100 - 108 mmol/L Final    CO2 12/08/2021 28  21 - 32 mmol/L Final    ANION GAP 12/08/2021 6  4 - 13 mmol/L Final    BUN 12/08/2021 12  5 - 25 mg/dL Final    Creatinine 12/08/2021 0 83 0 60 - 1 30 mg/dL Final    Standardized to IDMS reference method    Glucose, Fasting 12/08/2021 101 (A) 65 - 99 mg/dL Final    Specimen collection should occur prior to Sulfasalazine administration due to the potential for falsely depressed results  Specimen collection should occur prior to Sulfapyridine administration due to the potential for falsely elevated results   Calcium 12/08/2021 9 3  8 3 - 10 1 mg/dL Final    AST 12/08/2021 19  5 - 45 U/L Final    Specimen collection should occur prior to Sulfasalazine administration due to the potential for falsely depressed results   ALT 12/08/2021 30  12 - 78 U/L Final    Specimen collection should occur prior to Sulfasalazine and/or Sulfapyridine administration due to the potential for falsely depressed results   Alkaline Phosphatase 12/08/2021 64  46 - 116 U/L Final    Total Protein 12/08/2021 7 6  6 4 - 8 2 g/dL Final    Albumin 12/08/2021 4 0  3 5 - 5 0 g/dL Final    Total Bilirubin 12/08/2021 0 63  0 20 - 1 00 mg/dL Final    Use of this assay is not recommended for patients undergoing treatment with eltrombopag due to the potential for falsely elevated results      eGFR 12/08/2021 73  ml/min/1 73sq m Final   Appointment on 09/29/2021   Component Date Value Ref Range Status    WBC 09/29/2021 5 62  4 31 - 10 16 Thousand/uL Final    RBC 09/29/2021 4 72  3 81 - 5 12 Million/uL Final    Hemoglobin 09/29/2021 14 8  11 5 - 15 4 g/dL Final    Hematocrit 09/29/2021 45 3  34 8 - 46 1 % Final    MCV 09/29/2021 96  82 - 98 fL Final    MCH 09/29/2021 31 4  26 8 - 34 3 pg Final    MCHC 09/29/2021 32 7  31 4 - 37 4 g/dL Final    RDW 09/29/2021 13 0  11 6 - 15 1 % Final    MPV 09/29/2021 10 1  8 9 - 12 7 fL Final    Platelets 38/15/3440 292  149 - 390 Thousands/uL Final    nRBC 09/29/2021 0  /100 WBCs Final    Neutrophils Relative 09/29/2021 64  43 - 75 % Final    Immat GRANS % 09/29/2021 0  0 - 2 % Final    Lymphocytes Relative 09/29/2021 25  14 - 44 % Final    Monocytes Relative 09/29/2021 7  4 - 12 % Final    Eosinophils Relative 09/29/2021 3  0 - 6 % Final    Basophils Relative 09/29/2021 1  0 - 1 % Final    Neutrophils Absolute 09/29/2021 3 63  1 85 - 7 62 Thousands/µL Final    Immature Grans Absolute 09/29/2021 0 01  0 00 - 0 20 Thousand/uL Final    Lymphocytes Absolute 09/29/2021 1 41  0 60 - 4 47 Thousands/µL Final    Monocytes Absolute 09/29/2021 0 39  0 17 - 1 22 Thousand/µL Final    Eosinophils Absolute 09/29/2021 0 15  0 00 - 0 61 Thousand/µL Final    Basophils Absolute 09/29/2021 0 03  0 00 - 0 10 Thousands/µL Final    Sodium 09/29/2021 136  136 - 145 mmol/L Final    Potassium 09/29/2021 4 3  3 5 - 5 3 mmol/L Final    Chloride 09/29/2021 104  100 - 108 mmol/L Final    CO2 09/29/2021 29  21 - 32 mmol/L Final    ANION GAP 09/29/2021 3 (A) 4 - 13 mmol/L Final    BUN 09/29/2021 14  5 - 25 mg/dL Final    Creatinine 09/29/2021 0 77  0 60 - 1 30 mg/dL Final    Standardized to IDMS reference method    Glucose 09/29/2021 111  65 - 140 mg/dL Final    If the patient is fasting, the ADA then defines impaired fasting glucose as > 100 mg/dL and diabetes as > or equal to 123 mg/dL  Specimen collection should occur prior to Sulfasalazine administration due to the potential for falsely depressed results  Specimen collection should occur prior to Sulfapyridine administration due to the potential for falsely elevated results   Calcium 09/29/2021 9 4  8 3 - 10 1 mg/dL Final    AST 09/29/2021 20  5 - 45 U/L Final    Specimen collection should occur prior to Sulfasalazine administration due to the potential for falsely depressed results   ALT 09/29/2021 40  12 - 78 U/L Final    Specimen collection should occur prior to Sulfasalazine and/or Sulfapyridine administration due to the potential for falsely depressed results       Alkaline Phosphatase 09/29/2021 80  46 - 116 U/L Final    Total Protein 09/29/2021 8 0  6 4 - 8 2 g/dL Final    Albumin 09/29/2021 4 0  3 5 - 5 0 g/dL Final    Total Bilirubin 09/29/2021 0 38  0 20 - 1 00 mg/dL Final    Use of this assay is not recommended for patients undergoing treatment with eltrombopag due to the potential for falsely elevated results      eGFR 09/29/2021 80  ml/min/1 73sq m Final    CEA 09/29/2021 1 6  0 0 - 3 0 ng/mL Final    Normal/Non-Smoker: 0 0-3 0 ng/mL   Normal/Smoker:     3 1-5 0 ng/mL     Appointment on 07/22/2021   Component Date Value Ref Range Status    WBC 07/22/2021 5 40  4 31 - 10 16 Thousand/uL Final    RBC 07/22/2021 4 03  3 81 - 5 12 Million/uL Final    Hemoglobin 07/22/2021 13 4  11 5 - 15 4 g/dL Final    Hematocrit 07/22/2021 42 3  34 8 - 46 1 % Final    MCV 07/22/2021 105 (A) 82 - 98 fL Final    MCH 07/22/2021 33 3  26 8 - 34 3 pg Final    MCHC 07/22/2021 31 7  31 4 - 37 4 g/dL Final    RDW 07/22/2021 13 2  11 6 - 15 1 % Final    MPV 07/22/2021 10 4  8 9 - 12 7 fL Final    Platelets 11/54/4815 323  149 - 390 Thousands/uL Final    nRBC 07/22/2021 0  /100 WBCs Final    Neutrophils Relative 07/22/2021 65  43 - 75 % Final    Immat GRANS % 07/22/2021 0  0 - 2 % Final    Lymphocytes Relative 07/22/2021 23  14 - 44 % Final    Monocytes Relative 07/22/2021 8  4 - 12 % Final    Eosinophils Relative 07/22/2021 3  0 - 6 % Final    Basophils Relative 07/22/2021 1  0 - 1 % Final    Neutrophils Absolute 07/22/2021 3 47  1 85 - 7 62 Thousands/µL Final    Immature Grans Absolute 07/22/2021 0 01  0 00 - 0 20 Thousand/uL Final    Lymphocytes Absolute 07/22/2021 1 26  0 60 - 4 47 Thousands/µL Final    Monocytes Absolute 07/22/2021 0 44  0 17 - 1 22 Thousand/µL Final    Eosinophils Absolute 07/22/2021 0 16  0 00 - 0 61 Thousand/µL Final    Basophils Absolute 07/22/2021 0 06  0 00 - 0 10 Thousands/µL Final    Sodium 07/22/2021 135 (A) 136 - 145 mmol/L Final    Potassium 07/22/2021 3 9  3 5 - 5 3 mmol/L Final    Chloride 07/22/2021 102  100 - 108 mmol/L Final    CO2 07/22/2021 26  21 - 32 mmol/L Final    ANION GAP 07/22/2021 7  4 - 13 mmol/L Final    BUN 07/22/2021 12  5 - 25 mg/dL Final    Creatinine 07/22/2021 0 72  0 60 - 1 30 mg/dL Final    Standardized to IDMS reference method    Glucose, Fasting 07/22/2021 81  65 - 99 mg/dL Final    Specimen collection should occur prior to Sulfasalazine administration due to the potential for falsely depressed results  Specimen collection should occur prior to Sulfapyridine administration due to the potential for falsely elevated results   Calcium 07/22/2021 9 8  8 3 - 10 1 mg/dL Final    AST 07/22/2021 21  5 - 45 U/L Final    Specimen collection should occur prior to Sulfasalazine administration due to the potential for falsely depressed results   ALT 07/22/2021 30  12 - 78 U/L Final    Specimen collection should occur prior to Sulfasalazine and/or Sulfapyridine administration due to the potential for falsely depressed results   Alkaline Phosphatase 07/22/2021 75  46 - 116 U/L Final    Total Protein 07/22/2021 7 7  6 4 - 8 2 g/dL Final    Albumin 07/22/2021 3 8  3 5 - 5 0 g/dL Final    Total Bilirubin 07/22/2021 0 45  0 20 - 1 00 mg/dL Final    Use of this assay is not recommended for patients undergoing treatment with eltrombopag due to the potential for falsely elevated results      eGFR 07/22/2021 86  ml/min/1 73sq m Final    CEA 07/22/2021 1 8  0 0 - 3 0 ng/mL Final    Normal/Non-Smoker: 0 0-3 0 ng/mL   Normal/Smoker:     3 1-5 0 ng/mL     Ancillary Orders on 05/21/2021   Component Date Value Ref Range Status    WBC 06/06/2021 2 28 (A) 4 31 - 10 16 Thousand/uL Final    RBC 06/06/2021 2 54 (A) 3 81 - 5 12 Million/uL Final    Hemoglobin 06/06/2021 8 8 (A) 11 5 - 15 4 g/dL Final    Hematocrit 06/06/2021 27 2 (A) 34 8 - 46 1 % Final    MCV 06/06/2021 107 (A) 82 - 98 fL Final    MCH 06/06/2021 34 6 (A) 26 8 - 34 3 pg Final    MCHC 06/06/2021 32 4  31 4 - 37 4 g/dL Final    RDW 06/06/2021 16 6 (A) 11 6 - 15 1 % Final    MPV 06/06/2021 9 8  8 9 - 12 7 fL Final    Platelets 99/51/8241 113 (A) 149 - 390 Thousands/uL Final    nRBC 06/06/2021 0  /100 WBCs Final    Neutrophils Relative 06/06/2021 42 (A) 43 - 75 % Final    Immat GRANS % 06/06/2021 0  0 - 2 % Final    Lymphocytes Relative 06/06/2021 45 (A) 14 - 44 % Final    Monocytes Relative 06/06/2021 11  4 - 12 % Final    Eosinophils Relative 06/06/2021 1  0 - 6 % Final    Basophils Relative 06/06/2021 1  0 - 1 % Final    Neutrophils Absolute 06/06/2021 0 95 (A) 1 85 - 7 62 Thousands/µL Final    Immature Grans Absolute 06/06/2021 0 00  0 00 - 0 20 Thousand/uL Final    Lymphocytes Absolute 06/06/2021 1 04  0 60 - 4 47 Thousands/µL Final    Monocytes Absolute 06/06/2021 0 24  0 17 - 1 22 Thousand/µL Final    Eosinophils Absolute 06/06/2021 0 02  0 00 - 0 61 Thousand/µL Final    Basophils Absolute 06/06/2021 0 03  0 00 - 0 10 Thousands/µL Final    Sodium 06/06/2021 140  136 - 145 mmol/L Final    Potassium 06/06/2021 4 2  3 5 - 5 3 mmol/L Final    Chloride 06/06/2021 106  100 - 108 mmol/L Final    CO2 06/06/2021 30  21 - 32 mmol/L Final    ANION GAP 06/06/2021 4  4 - 13 mmol/L Final    BUN 06/06/2021 15  5 - 25 mg/dL Final    Creatinine 06/06/2021 0 69  0 60 - 1 30 mg/dL Final    Standardized to IDMS reference method    Glucose 06/06/2021 89  65 - 140 mg/dL Final    If the patient is fasting, the ADA then defines impaired fasting glucose as > 100 mg/dL and diabetes as > or equal to 123 mg/dL  Specimen collection should occur prior to Sulfasalazine administration due to the potential for falsely depressed results  Specimen collection should occur prior to Sulfapyridine administration due to the potential for falsely elevated results   Calcium 06/06/2021 8 9  8 3 - 10 1 mg/dL Final    AST 06/06/2021 11  5 - 45 U/L Final    Specimen collection should occur prior to Sulfasalazine administration due to the potential for falsely depressed results       ALT 06/06/2021 23 12 - 78 U/L Final    Specimen collection should occur prior to Sulfasalazine and/or Sulfapyridine administration due to the potential for falsely depressed results   Alkaline Phosphatase 06/06/2021 82  46 - 116 U/L Final    Total Protein 06/06/2021 6 8  6 4 - 8 2 g/dL Final    Albumin 06/06/2021 3 5  3 5 - 5 0 g/dL Final    Total Bilirubin 06/06/2021 0 28  0 20 - 1 00 mg/dL Final    Use of this assay is not recommended for patients undergoing treatment with eltrombopag due to the potential for falsely elevated results      eGFR 06/06/2021 90  ml/min/1 73sq m Final

## 2022-05-21 NOTE — TELEPHONE ENCOUNTER
Pt was referred to Neurology at [address] [place] [address] and wanted a [address] Neurologist  If they are booking out too late, she can see the Neurologist she was referred to at [address] [place] [address]

## 2022-05-23 NOTE — TELEPHONE ENCOUNTER
I called the patient and she has an appointment scheduled with a neurologist at [facility] on Thursday

## 2022-06-07 ENCOUNTER — APPOINTMENT (OUTPATIENT)
Dept: LAB | Facility: CLINIC | Age: 67
End: 2022-06-07
Payer: COMMERCIAL

## 2022-06-07 DIAGNOSIS — I10 HYPERTENSION, ESSENTIAL: ICD-10-CM

## 2022-06-07 LAB
ANION GAP SERPL CALCULATED.3IONS-SCNC: 5 MMOL/L (ref 4–13)
BUN SERPL-MCNC: 12 MG/DL (ref 5–25)
CALCIUM SERPL-MCNC: 10 MG/DL (ref 8.3–10.1)
CHLORIDE SERPL-SCNC: 105 MMOL/L (ref 100–108)
CO2 SERPL-SCNC: 30 MMOL/L (ref 21–32)
CREAT SERPL-MCNC: 0.94 MG/DL (ref 0.6–1.3)
GFR SERPL CREATININE-BSD FRML MDRD: 62 ML/MIN/1.73SQ M
GLUCOSE SERPL-MCNC: 105 MG/DL (ref 65–140)
POTASSIUM SERPL-SCNC: 3.6 MMOL/L (ref 3.5–5.3)
SODIUM SERPL-SCNC: 140 MMOL/L (ref 136–145)

## 2022-06-07 PROCEDURE — 36415 COLL VENOUS BLD VENIPUNCTURE: CPT

## 2022-06-07 PROCEDURE — 80048 BASIC METABOLIC PNL TOTAL CA: CPT

## 2022-06-13 ENCOUNTER — OFFICE VISIT (OUTPATIENT)
Dept: INTERNAL MEDICINE CLINIC | Age: 67
End: 2022-06-13
Payer: COMMERCIAL

## 2022-06-13 VITALS
TEMPERATURE: 97.7 F | HEIGHT: 57 IN | BODY MASS INDEX: 30.34 KG/M2 | HEART RATE: 75 BPM | WEIGHT: 140.6 LBS | OXYGEN SATURATION: 97 % | DIASTOLIC BLOOD PRESSURE: 66 MMHG | SYSTOLIC BLOOD PRESSURE: 110 MMHG

## 2022-06-13 DIAGNOSIS — I10 HYPERTENSION, ESSENTIAL: ICD-10-CM

## 2022-06-13 DIAGNOSIS — C34.91 SQUAMOUS CELL LUNG CANCER, RIGHT (HCC): Primary | ICD-10-CM

## 2022-06-13 DIAGNOSIS — J44.9 CHRONIC OBSTRUCTIVE PULMONARY DISEASE, UNSPECIFIED COPD TYPE (HCC): ICD-10-CM

## 2022-06-13 DIAGNOSIS — D70.1 CHEMOTHERAPY INDUCED NEUTROPENIA (HCC): ICD-10-CM

## 2022-06-13 DIAGNOSIS — E78.01 FAMILIAL HYPERCHOLESTEROLEMIA: ICD-10-CM

## 2022-06-13 DIAGNOSIS — S06.5X9A SUBDURAL HEMATOMA (HCC): ICD-10-CM

## 2022-06-13 DIAGNOSIS — M48.061 SPINAL STENOSIS OF LUMBAR REGION, UNSPECIFIED WHETHER NEUROGENIC CLAUDICATION PRESENT: ICD-10-CM

## 2022-06-13 DIAGNOSIS — Z79.899 MEDICAL MARIJUANA USE: ICD-10-CM

## 2022-06-13 DIAGNOSIS — E55.9 VITAMIN D DEFICIENCY: ICD-10-CM

## 2022-06-13 DIAGNOSIS — T45.1X5A CHEMOTHERAPY INDUCED NEUTROPENIA (HCC): ICD-10-CM

## 2022-06-13 PROCEDURE — 1100F PTFALLS ASSESS-DOCD GE2>/YR: CPT | Performed by: INTERNAL MEDICINE

## 2022-06-13 PROCEDURE — 3288F FALL RISK ASSESSMENT DOCD: CPT | Performed by: INTERNAL MEDICINE

## 2022-06-13 PROCEDURE — 3008F BODY MASS INDEX DOCD: CPT | Performed by: INTERNAL MEDICINE

## 2022-06-13 PROCEDURE — 1160F RVW MEDS BY RX/DR IN RCRD: CPT | Performed by: INTERNAL MEDICINE

## 2022-06-13 PROCEDURE — 99215 OFFICE O/P EST HI 40 MIN: CPT | Performed by: INTERNAL MEDICINE

## 2022-06-13 RX ORDER — LOSARTAN POTASSIUM AND HYDROCHLOROTHIAZIDE 12.5; 5 MG/1; MG/1
1 TABLET ORAL DAILY
Qty: 90 TABLET | Refills: 3 | Status: SHIPPED | OUTPATIENT
Start: 2022-06-13

## 2022-06-13 NOTE — PROGRESS NOTES
Assessment/Plan:     Diagnoses and all orders for this visit:  Greater than 40 minutes was spent with this patient, with more than half of this time spent counseling and/or coordinating care  Squamous cell lung cancer, right (HCC)    Chronic obstructive pulmonary disease, unspecified COPD type ([facility] [address] )    Medical marijuana use    Hypertension, essential  -     TSH, 3rd generation with Free T4 reflex; Future  -     UA w Reflex to Microscopic w Reflex to Culture  -     losartan-hydrochlorothiazide (HYZAAR) 50-12 5 mg per tablet; Take 1 tablet by mouth daily    Familial hypercholesterolemia  -     Lipid panel; Future  The lipid panel and go from there  Chemotherapy induced neutropenia (HCC)  Followed up by the Hematology-Oncology  Spinal stenosis of lumbar region, unspecified whether neurogenic claudication present  History of spinal stenosis  Subdural hematoma (HCC)  subdural hematoma after the fall  I reviewed the notes and the workup she is doing better right now and last MRI shows normal without any hematoma some evidence of lacunar infarct and the signs of white matter multiple sclerosis         M*Modal software was used to dictate this note  It may contain errors with dictating incorrect words or incorrect spelling  Please contact the provider directly with any questions  Subjective:   Chief Complaint   Patient presents with    Annual Exam     Yearly physical-  Fall plan of care-         Patient ID: aMry Moran is a 69 y o  female  HPI  This is a very pleasant 69 years young lady who has a history of lung cancer status post surgery followed up by the chemotherapy and followed up by the Hematology-Oncology she is doing well a metastatic workup was done I reviewed that    Hypertension is very well controlled continue with the same medication no change she wanted to get 1 medication for blood pressure I change it to losartan 5012 5 which she can take it once a day and hopefully that will control her blood pressure very well time a train hydrochlorothiazide will be stopped  Discussed in detail regarding the lacunar infarcts MRI with contrast was done in 2020 which did not show any vascular problem she smoked for 30+ years  Diagnosed with COPD  She did not get any radiation to the lung only the chemotherapy  In the future she may need a carotid Doppler studies to rule out carotid artery disease  Continue with better blood pressure control better cholesterol control her is recent BMP was essentially unremarkable  Her previous records were reviewed in detail  Patient did get a stress test in the past and also pulmonary function test which was reviewed  As of right now she does not have any shortness of breath at rest I reviewed her medication  Concerned about the weight could be secondary to use of gabapentin also diet exercise and weight loss recommended  I will get the blood workup and then follow-up in 3 months unless any problem  The following portions of the patient's history were reviewed and updated as appropriate: allergies, current medications, past family history, past medical history, past social history, past surgical history and problem list     Review of Systems   Constitutional: Positive for fatigue  Negative for chills  HENT: Negative for congestion, ear pain, hearing loss, postnasal drip, sinus pressure, sore throat and voice change  Eyes: Negative for pain, discharge and visual disturbance  Respiratory: Negative for cough, chest tightness and shortness of breath  Cardiovascular: Negative for chest pain, palpitations and leg swelling  Gastrointestinal: Negative for abdominal pain, blood in stool, diarrhea, nausea and rectal pain  Genitourinary: Negative for difficulty urinating, dysuria and urgency  Musculoskeletal: Positive for arthralgias  Negative for joint swelling  Skin: Negative for rash  Allergic/Immunologic: Negative for environmental allergies and food allergies  Neurological: Negative for dizziness, tremors, weakness, numbness and headaches  Hematological: Negative for adenopathy  Psychiatric/Behavioral: Negative for behavioral problems and hallucinations  The patient is nervous/anxious  Past Medical History:   Diagnosis Date    Allergic ?     Anxiety     Bronchial malignant neoplasm, right (HCC)     Cancer (HCC)     lung    Chronic major depressive disorder, single episode     COPD (chronic obstructive pulmonary disease) (HCC)     Full incontinence of feces     GERD (gastroesophageal reflux disease)     Hyperlipidemia     Hypertension     Malignant neoplasm of bronchus and lung (HCC)     Multiple sclerosis (HCC)     Pulmonary emphysema (HCC)     Restless leg syndrome     Tobacco use          Current Outpatient Medications:     Biotin 5 MG CAPS, Take 5,000 mg by mouth daily, Disp: , Rfl:     buPROPion (WELLBUTRIN XL) 300 mg 24 hr tablet, Take 1 tablet (300 mg total) by mouth every morning, Disp: 90 tablet, Rfl: 1    Cholecalciferol (Vitamin D3) 50 MCG (2000 UT) capsule, Take 5,000 Units by mouth daily , Disp: , Rfl:     famotidine (PEPCID) 40 MG tablet, Take 1 tablet (40 mg total) by mouth every morning, Disp: 90 tablet, Rfl: 1    gabapentin (NEURONTIN) 300 mg capsule, Take 2 capsules (600 mg total) by mouth daily at bedtime, Disp: 180 capsule, Rfl: 3    losartan-hydrochlorothiazide (HYZAAR) 50-12 5 mg per tablet, Take 1 tablet by mouth daily, Disp: 90 tablet, Rfl: 3    pramipexole (MIRAPEX) 0 5 mg tablet, Take 1 tablet (0 5 mg total) by mouth daily at bedtime, Disp: 90 tablet, Rfl: 1    simvastatin (ZOCOR) 40 mg tablet, Take 1 tablet (40 mg total) by mouth every other day (Patient taking differently: Take 40 mg by mouth in the morning), Disp: 90 tablet, Rfl: 1    umeclidinium-vilanterol (Anoro Ellipta) 62 5-25 MCG/INH inhaler, Inhale 1 puff daily , Disp: , Rfl:     Allergies   Allergen Reactions    Sulfa Antibiotics Hives       Social History   Past Surgical History:   Procedure Laterality Date    AUGMENTATION MAMMAPLASTY Bilateral 11/2014    retro-pectoral silicone    BREAST BIOPSY Left 03/23/2015    u/s core bx    BREAST BIOPSY Left 08/02/2010    high-risk lesion    BREAST EXCISIONAL BIOPSY Left 08/26/2010    high-risk lesion    BREAST EXCISIONAL BIOPSY Left 1994    BREAST EXCISIONAL BIOPSY Right 1984    FL GUIDED CENTRAL VENOUS ACCESS DEVICE INSERTION  03/09/2021    IR PORT REMOVAL  03/07/2022    LYMPH NODE BIOPSY  2021    MAMMO (HISTORICAL)  2020    DE BRONCHOSCOPY,DIAGNOSTIC N/A 01/28/2021    Procedure: BRONCHOSCOPY FLEXIBLE;  Surgeon: Crissy Wells MD;  [facility];  Service: Thoracic    DE INSJ TUNNELED CTR VAD W/SUBQ PORT AGE 5 YR/> N/A 03/09/2021    Procedure: INSERTION VENOUS PORT ( PORT-A-CATH) IR;  Surgeon: Tamika Todd DO;  [facility];  Service: Interventional Radiology    DE THORACOSCOPY SURG LOBECTOMY Right 01/28/2021    Procedure: RIGHT VATS UPPER LOBECTOMY ; MEDIASTINAL LYMPH NODE DISSECTION; right lower lobe bleb resection  ;  Surgeon: Crissy Wells MD;  [facility];  Service: Thoracic    TONSILLECTOMY      TOTAL ABDOMINAL HYSTERECTOMY  2010    age 60    TOTAL ABDOMINAL HYSTERECTOMY W/ BILATERAL SALPINGOOPHORECTOMY Bilateral 2010    age 60    TUBAL LIGATION  1971     Family History   Problem Relation Age of Onset    No Known Problems Mother     COPD Father     Hypertension Father     No Known Problems Sister     No Known Problems Daughter     No Known Problems Maternal Grandmother     No Known Problems Maternal Grandfather     No Known Problems Paternal Grandmother     Rectal cancer Paternal Grandfather         unknown age   Homero Garcia No Known Problems Sister     No Known Problems Maternal Aunt     Breast cancer Paternal Aunt         unknown age   Homero Garcia No Known Problems Paternal Aunt     No Known Problems Paternal Aunt     No Known Problems Paternal Aunt     No Known Problems Paternal Aunt     No Known Problems Paternal Aunt     No Known Problems Paternal Aunt     COPD Brother     No Known Problems Brother        Objective:  /66 (BP Location: Left arm, Patient Position: Sitting, Cuff Size: Standard)   Pulse 75   Temp 97 7 °F (36 5 °C) (Temporal)   Ht 4' 8 5" (1 435 m)   Wt 63 8 kg (140 lb 9 6 oz)   LMP  (LMP Unknown)   SpO2 97% Comment: room air  BMI 30 97 kg/m²        Physical Exam  Constitutional:       Appearance: She is well-developed  HENT:      Right Ear: External ear normal    Eyes:      Conjunctiva/sclera: Conjunctivae normal       Pupils: Pupils are equal, round, and reactive to light  Neck:      Thyroid: No thyromegaly  Vascular: No JVD  Cardiovascular:      Rate and Rhythm: Normal rate and regular rhythm  Heart sounds: Normal heart sounds  Pulmonary:      Breath sounds: Normal breath sounds  Abdominal:      General: Bowel sounds are normal       Palpations: Abdomen is soft  Musculoskeletal:         General: Normal range of motion  Cervical back: Normal range of motion  Lymphadenopathy:      Cervical: No cervical adenopathy  Skin:     General: Skin is dry  Neurological:      Mental Status: She is alert and oriented to person, place, and time  Deep Tendon Reflexes: Reflexes are normal and symmetric     Psychiatric:         Behavior: Behavior normal

## 2022-06-14 ENCOUNTER — TELEPHONE (OUTPATIENT)
Dept: ADMINISTRATIVE | Facility: OTHER | Age: 67
End: 2022-06-14

## 2022-06-14 NOTE — LETTER
Procedure Request Form: Colonoscopy      Date Requested: 22  Patient: [place]ina [place]  Patient : 1955   Referring Provider: No primary care provider on file  Date of Procedure ______________________________       The above patient has informed us that they have completed their   most recent Colonoscopy at your facility  Please complete   this form and attach all corresponding procedure reports/results  Comments __________________________________________________________  ____________________________________________________________________  ____________________________________________________________________  ____________________________________________________________________    Facility Completing Procedure _________________________________________    Form Completed By (print name) _______________________________________      Signature __________________________________________________________      These reports are needed for  compliance  Please fax this completed form and a copy of the procedure report to our office located at [place] [address] as soon as possible to [phone] marely Friedman: Phone [phone]    We thank you for your assistance in treating our mutual patient

## 2022-06-14 NOTE — LETTER
Procedure Request Form: Colonoscopy      Date Requested: 22  Patient: Juaquin Martinez  Patient : 1955   Referring Provider: No primary care provider on file  Date of Procedure ______________________________       The above patient has informed us that they have completed their   most recent Colonoscopy at your facility  Please complete   this form and attach all corresponding procedure reports/results  Comments __________________________________________________________  ____________________________________________________________________  ____________________________________________________________________  ____________________________________________________________________    Facility Completing Procedure _________________________________________    Form Completed By (print name) _______________________________________      Signature __________________________________________________________      These reports are needed for  compliance  Please fax this completed form and a copy of the procedure report to our office located at [place] [address] as soon as possible to [phone] marely Peña: Phone [phone]    We thank you for your assistance in treating our mutual patient

## 2022-06-14 NOTE — TELEPHONE ENCOUNTER
----- Message from See Chambers sent at 6/13/2022  2:13 PM EDT -----  06/13/22 2:14 PM    Hello, our patient Ella Thomason has had CRC: Colonoscopy completed/performed  Please assist in updating the patient chart by making an External outreach to Dr Irais Vargas MD   facility located in [place] [address]  The date of service is 2020      Thank you,  Keke Leslie  [address] [facility]

## 2022-06-14 NOTE — TELEPHONE ENCOUNTER
Upon review of the In Basket request and the patient's chart, initial outreach has been made via fax, please see Contacts section for details       Thank you  Leslye Robertson

## 2022-06-17 DIAGNOSIS — F41.9 ANXIETY DISORDER, UNSPECIFIED TYPE: ICD-10-CM

## 2022-06-17 DIAGNOSIS — E78.2 MIXED HYPERLIPIDEMIA: ICD-10-CM

## 2022-06-17 RX ORDER — SIMVASTATIN 40 MG
40 TABLET ORAL
Qty: 90 TABLET | Refills: 0 | Status: SHIPPED | OUTPATIENT
Start: 2022-06-17

## 2022-06-17 RX ORDER — BUPROPION HYDROCHLORIDE 300 MG/1
300 TABLET ORAL EVERY MORNING
Qty: 90 TABLET | Refills: 1 | Status: SHIPPED | OUTPATIENT
Start: 2022-06-17

## 2022-06-20 NOTE — TELEPHONE ENCOUNTER
As a follow-up, a second attempt has been made for outreach via fax, please see Contacts section for details       [id number] [facility]   [phone]    Thank you  Elaine Khan

## 2022-06-21 NOTE — TELEPHONE ENCOUNTER
Upon review of the In Basket request we were able to locate, review, and update the patient chart as requested for CRC: Colonoscopy  Any additional questions or concerns should be emailed to the Practice Liaisons via [email or web address]  [email or web address] email, please do not reply via In Basket      Thank you  Shaheen [place]

## 2022-06-28 DIAGNOSIS — G25.81 RESTLESS LEGS SYNDROME (RLS): ICD-10-CM

## 2022-06-28 RX ORDER — PRAMIPEXOLE DIHYDROCHLORIDE 0.5 MG/1
0.5 TABLET ORAL
Qty: 90 TABLET | Refills: 1 | Status: SHIPPED | OUTPATIENT
Start: 2022-06-28

## 2022-07-05 ENCOUNTER — TELEPHONE (OUTPATIENT)
Dept: INTERNAL MEDICINE CLINIC | Age: 67
End: 2022-07-05

## 2022-07-05 NOTE — TELEPHONE ENCOUNTER
Patient is calling regarding her visit that she had with you on 06/13/2022  Patient was there for her yearly physical   Patient was asked by you that you were spending extra time with her for the yearly physical and bill her for the yearly physical   The visit that you billed wasn't for the yearly physical     Patient received a bill for the co pay for this date of service 06/13/2022  Please advise if we can change the visit to a physical for this patient on this day  Please call patient back on the home number and is ok to leave a message if she is not available

## 2022-07-10 ENCOUNTER — APPOINTMENT (OUTPATIENT)
Dept: LAB | Age: 67
End: 2022-07-10
Payer: COMMERCIAL

## 2022-07-10 DIAGNOSIS — I10 HYPERTENSION, ESSENTIAL: ICD-10-CM

## 2022-07-10 DIAGNOSIS — E78.01 FAMILIAL HYPERCHOLESTEROLEMIA: ICD-10-CM

## 2022-07-10 DIAGNOSIS — K21.00 GASTROESOPHAGEAL REFLUX DISEASE WITH ESOPHAGITIS, UNSPECIFIED WHETHER HEMORRHAGE: ICD-10-CM

## 2022-07-10 DIAGNOSIS — E55.9 VITAMIN D DEFICIENCY: ICD-10-CM

## 2022-07-10 LAB
25(OH)D3 SERPL-MCNC: 83.9 NG/ML (ref 30–100)
BACTERIA UR QL AUTO: NORMAL /HPF
BILIRUB UR QL STRIP: NEGATIVE
CHOLEST SERPL-MCNC: 164 MG/DL
CLARITY UR: CLEAR
COLOR UR: COLORLESS
GLUCOSE UR STRIP-MCNC: NEGATIVE MG/DL
HDLC SERPL-MCNC: 65 MG/DL
HGB UR QL STRIP.AUTO: NEGATIVE
KETONES UR STRIP-MCNC: NEGATIVE MG/DL
LDLC SERPL CALC-MCNC: 81 MG/DL (ref 0–100)
LEUKOCYTE ESTERASE UR QL STRIP: ABNORMAL
NITRITE UR QL STRIP: NEGATIVE
NON-SQ EPI CELLS URNS QL MICRO: NORMAL /HPF
NONHDLC SERPL-MCNC: 99 MG/DL
PH UR STRIP.AUTO: 7 [PH]
PROT UR STRIP-MCNC: NEGATIVE MG/DL
RBC #/AREA URNS AUTO: NORMAL /HPF
SP GR UR STRIP.AUTO: 1.01 (ref 1–1.03)
TRIGL SERPL-MCNC: 92 MG/DL
TSH SERPL DL<=0.05 MIU/L-ACNC: 1.36 UIU/ML (ref 0.45–4.5)
UROBILINOGEN UR STRIP-ACNC: <2 MG/DL
WBC #/AREA URNS AUTO: NORMAL /HPF

## 2022-07-10 PROCEDURE — 82306 VITAMIN D 25 HYDROXY: CPT

## 2022-07-10 PROCEDURE — 80061 LIPID PANEL: CPT

## 2022-07-10 PROCEDURE — 84443 ASSAY THYROID STIM HORMONE: CPT

## 2022-07-10 PROCEDURE — 81001 URINALYSIS AUTO W/SCOPE: CPT | Performed by: INTERNAL MEDICINE

## 2022-07-10 PROCEDURE — 36415 COLL VENOUS BLD VENIPUNCTURE: CPT

## 2022-07-11 RX ORDER — FAMOTIDINE 40 MG/1
40 TABLET, FILM COATED ORAL EVERY MORNING
Qty: 90 TABLET | Refills: 1 | Status: SHIPPED | OUTPATIENT
Start: 2022-07-11 | End: 2022-10-03 | Stop reason: SDUPTHER

## 2022-07-11 NOTE — TELEPHONE ENCOUNTER
Medication Refill Request     Namefamotidine (PEPCID) 40 MG tablet Dose/Frequency Take 1 tablet (40 mg total) by mouth every morning,  Quantity 90  Verified pharmacy   [x]  Verified ordering Provider   [x]  Verified enough for 3 days  [x]    Pt states that she has to call this in every month after they deny it  She would like to know if there is a way around this

## 2022-07-12 NOTE — TELEPHONE ENCOUNTER
The visit was not done as the Medicare wellness or yearly physical because she came up with the multiple problems so she have to make an appointment for the yearly physical I cannot change the visit now

## 2022-07-19 NOTE — TELEPHONE ENCOUNTER
Pt would like you to call her regarding this and wants you to explain why he didn't do a physical  I tried to exp that she talked about a lot of issues that were going on and he said he couldn't bill the physical  She asked that when she comes back in 3 mo if she can have that as a physical but I said its put in as a follow up  So she wants you to call her back  Said Dr Antoine Hinson never did that to her

## 2022-07-21 ENCOUNTER — HOSPITAL ENCOUNTER (OUTPATIENT)
Dept: RADIOLOGY | Age: 67
Discharge: HOME | End: 2022-07-21
Payer: COMMERCIAL

## 2022-07-21 DIAGNOSIS — C34.91 SQUAMOUS CELL LUNG CANCER, RIGHT (HCC): ICD-10-CM

## 2022-07-21 PROCEDURE — G1004 CDSM NDSC: HCPCS

## 2022-07-21 PROCEDURE — 71250 CT THORAX DX C-: CPT

## 2022-08-01 ENCOUNTER — OFFICE VISIT (OUTPATIENT)
Dept: HEMATOLOGY ONCOLOGY | Facility: CLINIC | Age: 67
End: 2022-08-01
Payer: COMMERCIAL

## 2022-08-01 VITALS
BODY MASS INDEX: 30.1 KG/M2 | TEMPERATURE: 97.4 F | SYSTOLIC BLOOD PRESSURE: 124 MMHG | HEART RATE: 67 BPM | WEIGHT: 139.5 LBS | OXYGEN SATURATION: 97 % | RESPIRATION RATE: 17 BRPM | HEIGHT: 57 IN | DIASTOLIC BLOOD PRESSURE: 80 MMHG

## 2022-08-01 DIAGNOSIS — D49.0 PAROTID TUMOR: ICD-10-CM

## 2022-08-01 DIAGNOSIS — M48.061 SPINAL STENOSIS OF LUMBAR REGION WITHOUT NEUROGENIC CLAUDICATION: ICD-10-CM

## 2022-08-01 DIAGNOSIS — G35 MULTIPLE SCLEROSIS (HCC): ICD-10-CM

## 2022-08-01 DIAGNOSIS — H93.13 TINNITUS OF BOTH EARS: ICD-10-CM

## 2022-08-01 DIAGNOSIS — S06.5XAA SUBDURAL HEMATOMA: ICD-10-CM

## 2022-08-01 DIAGNOSIS — C34.91 SQUAMOUS CELL LUNG CANCER, RIGHT (HCC): Primary | ICD-10-CM

## 2022-08-01 DIAGNOSIS — C77.9 REGIONAL LYMPH NODE METASTASIS PRESENT (HCC): ICD-10-CM

## 2022-08-01 DIAGNOSIS — J44.9 CHRONIC OBSTRUCTIVE PULMONARY DISEASE, UNSPECIFIED COPD TYPE (HCC): ICD-10-CM

## 2022-08-01 PROCEDURE — 99214 OFFICE O/P EST MOD 30 MIN: CPT | Performed by: INTERNAL MEDICINE

## 2022-08-01 NOTE — LETTER
August 1, 2022     Gurwinder Leon, [address]  [place] [address] [address]    Patient: Lukasz Cabezas   YOB: 1955   Date of Visit: 8/1/2022       Dear Dr Shane Claudio: Thank you for referring Tisha Lane to me for evaluation  Below are my notes for this consultation  If you have questions, please do not hesitate to call me  I look forward to following your patient along with you  Sincerely,        Candi Cook MD        CC: No Recipients  Candi Cook MD  8/1/2022 10:23 AM  Sign when Signing Visit      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  with invasion of lymphatic channel and metastatic carcinoma in 1 of 8 peribronchial lymph nodes  Other lymph nodes at level 10  , level 7, level 4 and level 2 and at azygos were negative 4R and 2R were negative for malignancy  1 of 14 positive peribronchial lymph node, T2 N1, stage IIB  Tumor tested negative for EGFR  Patient  Received 4 cycles of adjuvant chemotherapy, a combination of carboplatin and Gemzar and last treatment was on 06/07/2021     She did not receive taxane because of history of MS  She also received Granix because of neutropenia  She has done well and there has not been any documented recurrent or metastatic disease clinically  She gets CT chest from thoracic surgery  On 04/29/2022 she felt dizzy and fell and had subdural hematoma and was hospitalized for 2 days at [facility]   Subsequent CT of the had showed improvement  She also had MRI of the brain and that was compatible with MS  She has history of MS  She follows with her neurologist   No more dizziness    She has minimal  exertional dyspnea when going up on incline     Has some tiredness and arthritic symptoms  She sleeps poorly  She has chronic low back discomfort and had  epidural shots    No metastatic disease in lumbar spine area on MRI scan  She had PET-CT scan in December 2021   PET   showed positive activity in right parotid gland  And that was biopsied and was negative for malignancy and she follows with ENT specialist   Remote past history of suspected MS in 1989  She had some tingling  CSF came back suspicious and MRI brain came back suspicious  Patient was treated for MS at that time  No active treatment for  MS for the last several years  No more tingling in her hands  Patient gave me all this information     Patient had MRI scan of brain, neck and dorsal spine in 2021 and there were lesions at T3 and T7  Patient  had PET scan and spine lesions were not FDG avid  History of tinnitus off and on in the ears  She follows with ENT specialist    ROS:  08/01/22 Reviewed 12 systems: See symptoms in HPI  Presently no other neurological, cardiac, pulmonary, GI and  symptoms other than listed in HPI  Other symptoms are in HPI  No  fever, chills, bleeding, bone pains  skin rash, weight loss, night sweats,    weakness, numbness, claudication and gait problem  No frequent infections  Not unusually sensitive to heat or cold  No swelling of the ankles  No swollen glands  Patient is  anxious         Physical Exam:  Vitals:   Temperature 97 4 degrees  Pulse is 67  Respirations 17  Blood pressure 124/80 and oxygen saturation 97%  Alert and oriented and not in distress  Vital signs are stable     There is no icterus , no oral thrush, no palpable neck mass,  lung fields clear to percussion and auscultation, regular  heart rate  , soft and nontender abdomen, no palpable abdominal mass,   no ascites, no edema of ankles, no calf tenderness, no focal neurological deficit, no skin rash, no palpable lymphadenopathy in the neck and axillary areas,  no clubbing  Patient is  anxious  Performance status 1  Historical Information   Past Medical History:   Diagnosis Date    Allergic ?     Anxiety     Bronchial malignant neoplasm, right (HCC)     Cancer ([facility] )     lung    Chronic major depressive disorder, single episode     COPD (chronic obstructive pulmonary disease) (HCC)     Full incontinence of feces     GERD (gastroesophageal reflux disease)     Hyperlipidemia     Hypertension     Malignant neoplasm of bronchus and lung (HCC)     Multiple sclerosis (HCC)     Pulmonary emphysema ([facility] )     Restless leg syndrome     Tobacco use      Past Surgical History:   Procedure Laterality Date    AUGMENTATION MAMMAPLASTY Bilateral 11/2014    retro-pectoral silicone    BREAST BIOPSY Left 03/23/2015    u/s core bx    BREAST BIOPSY Left 08/02/2010    high-risk lesion    BREAST EXCISIONAL BIOPSY Left 08/26/2010    high-risk lesion    BREAST EXCISIONAL BIOPSY Left 1994    BREAST EXCISIONAL BIOPSY Right 1984    FL GUIDED CENTRAL VENOUS ACCESS DEVICE INSERTION  03/09/2021    IR PORT REMOVAL  03/07/2022    LYMPH NODE BIOPSY  2021    MAMMO (HISTORICAL)  2020    SC BRONCHOSCOPY,DIAGNOSTIC N/A 01/28/2021    Procedure: BRONCHOSCOPY FLEXIBLE;  Surgeon: Magda Bates MD;  Location: [facility] MAIN [facility];  Service: Thoracic    SC INSJ TUNNELED CTR VAD W/SUBQ PORT AGE 5 YR/> N/A 03/09/2021    Procedure: INSERTION VENOUS PORT ( PORT-A-CATH) IR;  Surgeon: Ki Other, DO;  [facility];  Service: Interventional Radiology    SC THORACOSCOPY SURG LOBECTOMY Right 01/28/2021    Procedure: RIGHT VATS UPPER LOBECTOMY ; MEDIASTINAL LYMPH NODE DISSECTION; right lower lobe bleb resection  ;  Surgeon: Magda Bates MD;  Location: [facility];  Service: Thoracic    TONSILLECTOMY      TOTAL ABDOMINAL HYSTERECTOMY  2010    age 60    TOTAL ABDOMINAL HYSTERECTOMY W/ BILATERAL SALPINGOOPHORECTOMY Bilateral 2010    age 60   [address] [place] [address] Ave     Social History   Social History     Substance and Sexual Activity   Alcohol Use Yes    Alcohol/week: 5 0 standard drinks    Types: 5 Glasses of wine per week    Comment: wine @  Social History     Substance and Sexual Activity   Drug Use Never     Social History     Tobacco Use   Smoking Status Former Smoker    Packs/day: 0 00    Years: 30 00    Pack years: 0 00    Types: Cigarettes    Start date: 1968   [facility] Quit date: 1/10/2021    Years since quittin 5   Smokeless Tobacco Never Used   Tobacco Comment    Wish i never started     Family History:   Family History   Problem Relation Age of Onset    No Known Problems Mother     COPD Father     Hypertension Father     No Known Problems Sister     No Known Problems Daughter     No Known Problems Maternal Grandmother     No Known Problems Maternal Grandfather     No Known Problems Paternal Grandmother     Rectal cancer Paternal Grandfather         unknown age   [facility] No Known Problems Sister     No Known Problems Maternal Aunt     Breast cancer Paternal Aunt         unknown age   [facility] No Known Problems Paternal Aunt     No Known Problems Paternal Aunt     No Known Problems Paternal Aunt     No Known Problems Paternal Aunt     No Known Problems Paternal Aunt     No Known Problems Paternal [de-identified]     COPD Brother     No Known Problems Brother          Current Outpatient Medications:     Biotin 5 MG CAPS, Take 5,000 mg by mouth daily, Disp: , Rfl:     buPROPion (WELLBUTRIN XL) 300 mg 24 hr tablet, Take 1 tablet (300 mg total) by mouth every morning, Disp: 90 tablet, Rfl: 1    Cholecalciferol (Vitamin D3) 50 MCG (2000 UT) capsule, Take 5,000 Units by mouth daily , Disp: , Rfl:     famotidine (PEPCID) 40 MG tablet, Take 1 tablet (40 mg total) by mouth every morning, Disp: 90 tablet, Rfl: 1    gabapentin (NEURONTIN) 300 mg capsule, Take 2 capsules (600 mg total) by mouth daily at bedtime, Disp: 180 capsule, Rfl: 3    losartan-hydrochlorothiazide (HYZAAR) 50-12 5 mg per tablet, Take 1 tablet by mouth daily, Disp: 90 tablet, Rfl: 3    pramipexole (MIRAPEX) 0 5 mg tablet, Take 1 tablet (0 5 mg total) by mouth daily at bedtime, Disp: 90 tablet, Rfl: 1    simvastatin (ZOCOR) 40 mg tablet, Take 1 tablet (40 mg total) by mouth every other day, Disp: 90 tablet, Rfl: 0    umeclidinium-vilanterol (Anoro Ellipta) 62 5-25 MCG/INH inhaler, Inhale 1 puff daily , Disp: , Rfl:     Allergies   Allergen Reactions    Sulfa Antibiotics Hives             Lab Results: I have reviewed all pertinent labs  LABS:    Results for orders placed or performed in visit on 07/10/22   Lipid panel   Result Value Ref Range    Cholesterol 164 See Comment mg/dL    Triglycerides 92 See Comment mg/dL    HDL, Direct 65 >=50 mg/dL    LDL Calculated 81 0 - 100 mg/dL    Non-HDL-Chol (CHOL-HDL) 99 mg/dl   TSH, 3rd generation with Free T4 reflex   Result Value Ref Range    TSH 3RD GENERATON 1 360 0 450 - 4 500 uIU/mL   Vitamin D 25 hydroxy   Result Value Ref Range    Vit D, 25-Hydroxy 83 9 30 0 - 100 0 ng/mL   Normal BMP and normal CBC D in April 2022  Normal CMP except AST 53 in April 2022  [employer] Guide    Comprehensive metabolic panel (Order #[id number]) on 4/9/21   Questions    Collection Question Answer   Has the patient been fasting for 8 hours or more? No      Comprehensive metabolic panel  Order: [id number]  Status:  Final result   Visible to patient:  Yes ([address]) Next appt:  05/05/2021 at 08:00 AM in Infusion Therapy ([facility] INF BED 6) Dx:  Squamous cell lung cancer, right [facility])        Imaging Studies: I have personally reviewed pertinent reports  November  PET [facility]  Result Impression   IMPRESSION:  1  FDG avid stable right upper lobe lung mass, still suspicious for lung  malignancy despite negative biopsy  2  Smaller stable, mildly FDG avid nodules in the posterior right lung apex  could be inflammatory or metastatic  3  Intense uptake in or adjacent to the deep lobe of the right parotid gland    MRI recommended for further evaluation   MRI NECK SOFT TISSUE [facility]  Result Impression   Impression:     Question parotid lesion is present on the right  This abuts the parotid gland  however the majority appears to be posterior to the sternocleidomastoid muscle  raising question of metastatic disease to a level 2 lymph node  Parotid  malignancy is not excluded  Right upper lobe lung nodule is poorly evaluated on this modality                  Workstation:[id number]     IMPRESSION:     No intracranial metastasis      No acute acute intracranial abnormality  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 and moderate chronic microangiopathy      Workstation performed: [id number]          Imaging    MRI brain w wo contrast (Order: [id number]) - 10/29/2021  IMPRESSION:     1  Stable 2 2 cm posterosuperior right parotid/juxtaparotid enhancing nodule which is previously biopsied as benign (per chart)      2  Indeterminate T1 hypointense lesion within T3 vertebral body  This level is not fully imaged in prior MRI  Metastasis should be excluded given history of malignancy                           The study was marked in EPIC for significant notification       Workstation performed: [id number]          Imaging    MRI soft tissue neck wo and w contrast (Order: [id number]) - 11/22/2021  IMPRESSION:     Osseous lesions involve T3 and T7 vertebral bodies (larger lesion in T3 vertebral body), indeterminate  These may represent treated osseous metastatic lesions  No abnormal enhancement in thoracic spine      Mild multilevel degenerative changes of thoracic spine            Workstation performed: [id number]          Imaging    MRI thoracic spine w wo contrast (Order: [id number]) - 12/10/2021    IMPRESSION:     1  Stable in size but increasing in FDG avidity lesion in the region of the deep right parotid gland which by report has had a negative biopsy  Correlation is recommended      2  There is otherwise no evidence for hypermetabolic malignancy    Specifically, there is no focal FDG activity in the region of indeterminate thoracic spine lesions seen on recent MRI which remain nonspecific      Workstation performed: [id number]          Imaging    NM PET CT skull base to mid thigh (Order: [id number]) - 12/28/2021    MRI BRAIN WO CONTRAST 06/6/2022    Anatomical Region Laterality Modality   Head -- Magnetic Resonance   MRHEAD -- --       Impression    IMPRESSION:     White matter lesions in keeping with provided diagnosis of multiple sclerosis   are again seen demonstrating no significant change  No mass effect, recent infarct, intracranial hemorrhage, or hydrocephalus  Chronic lacunar infarcts versus dilated perivascular spaces of the right   cerebellar hemisphere and left basal ganglia, unchanged  Workstation:[id number]    Narrative    History: Fall and concussion  Multiple sclerosis    CT HEAD WO CONTRAST 05/09/2022    Anatomical Region Laterality Modality   Head -- Computed Tomography       Impression    Impression:     Interval significant decrease of right frontal and temporal subdural hematoma   with minimal residual hemorrhage  Chronic lacunar infarcts in left basal ganglia  White matter chronic small vessel ischemic disease  Workstation:[id number]    Narrative    Clinical information: Subdural hematoma     OSSEOUS STRUCTURES:  No acute fracture or osseous destructive lesion identified    Degenerative changes of the spine      IMPRESSION:     No evidence of recurrent tumor or thoracic metastatic disease      No significant interval change               Workstation performed: [id number]          Imaging    CT chest wo contrast (Order: [id number]) - 7/21/2022    IMPRESSION:  No mammographic evidence of malignancy            ASSESSMENT/BI-RADS CATEGORY:  Left: 2 - Benign  Right: 2 - Benign  Overall: 2 - Benign     RECOMMENDATION:       - Routine screening mammogram in 1 year for both breasts      Workstation ID: [id number]    Imaging    Mammo screening bilateral w 3d & cad (Order: [id number]) - 2022    Pathology, and Other Studies: I have personally reviewed pertinent reports  Report  NON-GYNECOLOGICAL CYTOLOGYResulted: 2021 12:20 PM  [facility]  Component Name Value Ref Range   Non-Gynecological Cytology [facility]  [address] [place] [address], [address]  [phone]    PATHOLOGY REPORT             MR#:             [id number]  Patient:         Lara Garcia  /Sex:         1955  F  Provider:        SYLVIA LUO  Location:        [facility]  Radiol  OP ([facility] [place] [place])  Collect Date:    2021  N21-53    CYTOLOGIC DIAGNOSIS:  A  Fine Needle Aspiration with Cytotech Assistance and Cell Block,  Right Parotid Mass  Adequacy: Adequate for evaluation  General Category: Negative for Diagnostic Malignancy  Descriptive Interpretation: Cytologic features consistent with  pleomorphic adenoma     Reviewed  above results and   discussed with patient    Assessment and Plan:   In 2021 patient had right upper lobe lobectomy and lymph node sampling   for 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  with invasion of lymphatic channel and metastatic carcinoma in 1 of 8 peribronchial lymph nodes  Other lymph nodes at level 10  , level 7, level 4 and level 2 and at azygos were negative 4R and 2R were negative for malignancy  1 of 14 positive peribronchial lymph node, T2 N1, stage IIB  Tumor tested negative for EGFR  Patient  Received 4 cycles of adjuvant chemotherapy, a combination of carboplatin and Gemzar and last treatment was on 2021     She did not receive taxane because of history of MS  She also received Granix because of neutropenia  She has done well and there has not been any documented recurrent or metastatic disease clinically  She gets CT chest from thoracic surgery    On 2022 she felt dizzy and fell and had subdural hematoma and was hospitalized for 2 days at [facility]   Subsequent CT of the had showed improvement  She also had MRI of the brain and that was compatible with MS  She has history of MS  She follows with her neurologist   No more dizziness    She has minimal  exertional dyspnea when going up on incline     Has some tiredness and arthritic symptoms  She sleeps poorly  She has chronic low back discomfort and had  epidural shots  No metastatic disease in lumbar spine area on MRI scan  She had PET-CT scan in December 2021   PET   showed positive activity in right parotid gland  And that was biopsied and was negative for malignancy and she follows with ENT specialist   Remote past history of suspected MS in 1989  She had some tingling  CSF came back suspicious and MRI brain came back suspicious  Patient was treated for MS at that time  No active treatment for  MS for the last several years  No more tingling in her hands  Patient gave me all this information     Patient had MRI scan of brain, neck and dorsal spine in 2021 and there were lesions at T3 and T7  Patient  had PET scan and spine lesions were not FDG avid  History of tinnitus off and on in the ears  She follows with ENT specialist    Physical examination and test results are as recorded and discussed in detail     Plan is surveillance for lung cancer history  I noticed that the vitamin-D level is 83 9 and she has been taking 5000 units of vitamin-D daily and he will cut that to 4000 units vitamin D3 daily  All discussed in very much detail  Questions answered  Discussed the importance of self-breast examination, eating healthy foods, activities as tolerated and health screening tests  Patient has already stopped smoking cigarettes  She is capable of self-care  Goal and plan:  surveillance and prolongation of survival and possible cure from lung cancer     Patient states she loves to cook and eat and has gained weight and she will try to lose some weight  Discussed precautions against coronavirus   Patient will continue to follow with her primary physician and other consultants  Discussed diagnoses, orders and instructions below    1  Squamous cell lung cancer, right (HCC)    - CBC and differential; Future  - Comprehensive metabolic panel; Future  - CEA; Future    2  Regional lymph node metastasis present ([facility] )      3  Multiple sclerosis (HCC)      4  Subdural hematoma ([facility] )      5  Chronic obstructive pulmonary disease, unspecified COPD type ([facility] )      6  Parotid tumor      7  Spinal stenosis of lumbar region without neurogenic claudication      8  Tinnitus of both ears      Follow-up in 6 months with blood work  She gets CT chest from thoracic surgery  Patient voiced understanding and agrees    Counseling / Coordination of Care    Julia Souza   Provided counseling and support

## 2022-08-01 NOTE — PROGRESS NOTES
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  with invasion of lymphatic channel and metastatic carcinoma in 1 of 8 peribronchial lymph nodes  Other lymph nodes at level 10  , level 7, level 4 and level 2 and at azygos were negative 4R and 2R were negative for malignancy  1 of 14 positive peribronchial lymph node, T2 N1, stage IIB  Tumor tested negative for EGFR  Patient  Received 4 cycles of adjuvant chemotherapy, a combination of carboplatin and Gemzar and last treatment was on 06/07/2021     She did not receive taxane because of history of MS  She also received Granix because of neutropenia  She has done well and there has not been any documented recurrent or metastatic disease clinically  She gets CT chest from thoracic surgery  On 04/29/2022 she felt dizzy and fell and had subdural hematoma and was hospitalized for 2 days at [facility]   Subsequent CT of the had showed improvement  She also had MRI of the brain and that was compatible with MS  She has history of MS  She follows with her neurologist   No more dizziness    She has minimal  exertional dyspnea when going up on incline     Has some tiredness and arthritic symptoms  She sleeps poorly  She has chronic low back discomfort and had  epidural shots  No metastatic disease in lumbar spine area on MRI scan  She had PET-CT scan in December 2021   PET   showed positive activity in right parotid gland  And that was biopsied and was negative for malignancy and she follows with ENT specialist   Remote past history of suspected MS in 1989  She had some tingling  CSF came back suspicious and MRI brain came back suspicious  Patient was treated for MS at that time  No active treatment for  MS for the last several years  No more tingling in her hands  Patient gave me all this information      Patient had MRI scan of brain, neck and dorsal spine in 2021 and there were lesions at T3 and T7  Patient  had PET scan and spine lesions were not FDG avid  History of tinnitus off and on in the ears  She follows with ENT specialist    ROS:  08/01/22 Reviewed 12 systems: See symptoms in HPI  Presently no other neurological, cardiac, pulmonary, GI and  symptoms other than listed in HPI  Other symptoms are in HPI  No  fever, chills, bleeding, bone pains  skin rash, weight loss, night sweats,    weakness, numbness, claudication and gait problem  No frequent infections  Not unusually sensitive to heat or cold  No swelling of the ankles  No swollen glands  Patient is  anxious         Physical Exam:  Vitals:   Temperature 97 4 degrees  Pulse is 67  Respirations 17  Blood pressure 124/80 and oxygen saturation 97%  Alert and oriented and not in distress  Vital signs are stable     There is no icterus , no oral thrush, no palpable neck mass,  lung fields clear to percussion and auscultation, regular  heart rate  , soft and nontender abdomen, no palpable abdominal mass,   no ascites, no edema of ankles, no calf tenderness, no focal neurological deficit, no skin rash, no palpable lymphadenopathy in the neck and axillary areas,  no clubbing  Patient is  anxious  Performance status 1  Historical Information   Past Medical History:   Diagnosis Date    Allergic ?     Anxiety     Bronchial malignant neoplasm, right (HCC)     Cancer (HCC)     lung    Chronic major depressive disorder, single episode     COPD (chronic obstructive pulmonary disease) (HCC)     Full incontinence of feces     GERD (gastroesophageal reflux disease)     Hyperlipidemia     Hypertension     Malignant neoplasm of bronchus and lung (HCC)     Multiple sclerosis (HCC)     Pulmonary emphysema (HCC)     Restless leg syndrome     Tobacco use      Past Surgical History:   Procedure Laterality Date    AUGMENTATION MAMMAPLASTY Bilateral 11/2014    retro-pectoral silicone    BREAST BIOPSY Left 2015    u/s core bx    BREAST BIOPSY Left 2010    high-risk lesion    BREAST EXCISIONAL BIOPSY Left 2010    high-risk lesion    BREAST EXCISIONAL BIOPSY Left     BREAST EXCISIONAL BIOPSY Right     FL GUIDED CENTRAL VENOUS ACCESS DEVICE INSERTION  2021    IR PORT REMOVAL  2022    LYMPH NODE BIOPSY      MAMMO (HISTORICAL)      KS BRONCHOSCOPY,DIAGNOSTIC N/A 2021    Procedure: Kym Calle;  Surgeon: Rober Chowdhury MD;  Location[facility];  Service: Thoracic    KS INSJ TUNNELED CTR VAD W/SUBQ PORT AGE 5 YR/> N/A 2021    Procedure: INSERTION VENOUS PORT ( PORT-A-CATH) IR;  Surgeon: Yoli Smith DO;  [facility];  Service: Interventional Radiology    KS THORACOSCOPY SURG LOBECTOMY Right 2021    Procedure: RIGHT VATS UPPER LOBECTOMY ; MEDIASTINAL LYMPH NODE DISSECTION; right lower lobe bleb resection  ;  Surgeon: Rober Chowdhury MD;  Location: [facility];  Service: Thoracic    TONSILLECTOMY      TOTAL ABDOMINAL HYSTERECTOMY  2010    age 60    TOTAL ABDOMINAL HYSTERECTOMY W/ BILATERAL SALPINGOOPHORECTOMY Bilateral 2010    age 60   [address]     Social History   Social History     Substance and Sexual Activity   Alcohol Use Yes    Alcohol/week: 5 0 standard drinks    Types: 5 Glasses of wine per week    Comment: wine @       Social History     Substance and Sexual Activity   Drug Use Never     Social History     Tobacco Use   Smoking Status Former Smoker    Packs/day: 0 00    Years: 30 00    Pack years: 0 00    Types: Cigarettes    [facility]: 1968   [facility]/10/[facility]    Years since quittin 5   Smokeless Tobacco Never Used   Tobacco Comment    Wish i never started     Family History:   Family History   Problem Relation Age of Onset    No Known Problems Mother     COPD Father     Hypertension Father     No Known Problems Sister     No Known Problems Daughter     No Known Problems Maternal Grandmother     No Known Problems Maternal Grandfather     No Known Problems Paternal Grandmother     Rectal cancer Paternal Grandfather         unknown age   Briana Lopez No Known Problems Sister     No Known Problems Maternal Aunt     Breast cancer Paternal Aunt         unknown age   Briana Lopez No Known Problems Paternal Aunt     No Known Problems Paternal Aunt     No Known Problems Paternal Aunt     No Known Problems Paternal Aunt     No Known Problems Paternal Aunt     No Known Problems Paternal [de-identified]     COPD Brother     No Known Problems Brother          Current Outpatient Medications:     Biotin 5 MG CAPS, Take 5,000 mg by mouth daily, Disp: , Rfl:     buPROPion (WELLBUTRIN XL) 300 mg 24 hr tablet, Take 1 tablet (300 mg total) by mouth every morning, Disp: 90 tablet, Rfl: 1    Cholecalciferol (Vitamin D3) 50 MCG (2000 UT) capsule, Take 5,000 Units by mouth daily , Disp: , Rfl:     famotidine (PEPCID) 40 MG tablet, Take 1 tablet (40 mg total) by mouth every morning, Disp: 90 tablet, Rfl: 1    gabapentin (NEURONTIN) 300 mg capsule, Take 2 capsules (600 mg total) by mouth daily at bedtime, Disp: 180 capsule, Rfl: 3    losartan-hydrochlorothiazide (HYZAAR) 50-12 5 mg per tablet, Take 1 tablet by mouth daily, Disp: 90 tablet, Rfl: 3    pramipexole (MIRAPEX) 0 5 mg tablet, Take 1 tablet (0 5 mg total) by mouth daily at bedtime, Disp: 90 tablet, Rfl: 1    simvastatin (ZOCOR) 40 mg tablet, Take 1 tablet (40 mg total) by mouth every other day, Disp: 90 tablet, Rfl: 0    umeclidinium-vilanterol (Anoro Ellipta) 62 5-25 MCG/INH inhaler, Inhale 1 puff daily , Disp: , Rfl:     Allergies   Allergen Reactions    Sulfa Antibiotics Hives             Lab Results: I have reviewed all pertinent labs    LABS:    Results for orders placed or performed in visit on 07/10/22   Lipid panel   Result Value Ref Range    Cholesterol 164 See Comment mg/dL    Triglycerides 92 See Comment mg/dL    HDL, Direct 65 >=50 mg/dL    LDL Calculated 81 0 - 100 mg/dL    Non-HDL-Chol (CHOL-HDL) 99 mg/dl   TSH, 3rd generation with Free T4 reflex   Result Value Ref Range    TSH 3RD GENERATON 1 360 0 450 - 4 500 uIU/mL   Vitamin D 25 hydroxy   Result Value Ref Range    Vit D, 25-Hydroxy 83 9 30 0 - 100 0 ng/mL   Normal BMP and normal CBC D in April 2022  Normal CMP except AST 53 in April 2022  Smart[employer] Guide    Comprehensive metabolic panel (Order #[id number]) on 4/9/21   Questions    Collection Question Answer   Has the patient been fasting for 8 hours or more? No      Comprehensive metabolic panel  Order: [id number]  Status:  Final result   Visible to patient:  Yes ([address]) Next appt:  05/05/2021 at 08:00 AM in Infusion Therapy ([facility] INF BED 6) Dx:  Squamous cell lung cancer, right [facility])        Imaging Studies: I have personally reviewed pertinent reports  November  PET [facility]  Result Impression   IMPRESSION:  1  FDG avid stable right upper lobe lung mass, still suspicious for lung  malignancy despite negative biopsy  2  Smaller stable, mildly FDG avid nodules in the posterior right lung apex  could be inflammatory or metastatic  3  Intense uptake in or adjacent to the deep lobe of the right parotid gland  MRI recommended for further evaluation   MRI NECK SOFT TISSUE [address]  Result Impression   Impression:     Question parotid lesion is present on the right  This abuts the parotid gland  however the majority appears to be posterior to the sternocleidomastoid muscle  raising question of metastatic disease to a level 2 lymph node  Parotid  malignancy is not excluded  Right upper lobe lung nodule is poorly evaluated on this modality                  Workstation:[id number]     IMPRESSION:     No intracranial metastasis      No acute acute intracranial abnormality    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 and moderate chronic microangiopathy      Workstation performed: [id number]          Imaging    MRI brain w wo contrast (Order: [id number]) - 10/29/2021  IMPRESSION:     1  Stable 2 2 cm posterosuperior right parotid/juxtaparotid enhancing nodule which is previously biopsied as benign (per chart)      2  Indeterminate T1 hypointense lesion within T3 vertebral body  This level is not fully imaged in prior MRI  Metastasis should be excluded given history of malignancy                           The study was marked in EPIC for significant notification       Workstation performed: [id number]          Imaging    MRI soft tissue neck wo and w contrast (Order: [id number]) - 11/22/2021  IMPRESSION:     Osseous lesions involve T3 and T7 vertebral bodies (larger lesion in T3 vertebral body), indeterminate  These may represent treated osseous metastatic lesions  No abnormal enhancement in thoracic spine      Mild multilevel degenerative changes of thoracic spine            Workstation performed: [id number]          Imaging    MRI thoracic spine w wo contrast (Order: [id number]) - 12/10/2021    IMPRESSION:     1  Stable in size but increasing in FDG avidity lesion in the region of the deep right parotid gland which by report has had a negative biopsy  Correlation is recommended      2  There is otherwise no evidence for hypermetabolic malignancy  Specifically, there is no focal FDG activity in the region of indeterminate thoracic spine lesions seen on recent MRI which remain nonspecific      Workstation performed: [id number]          Imaging    NM PET CT skull base to mid thigh (Order: [id number]) - 12/28/2021    MRI BRAIN WO CONTRAST 06/6/2022    Anatomical Region Laterality Modality   Head -- Magnetic Resonance   MRHEAD -- --       Impression    IMPRESSION:     White matter lesions in keeping with provided diagnosis of multiple sclerosis   are again seen demonstrating no significant change       No mass effect, recent infarct, intracranial hemorrhage, or hydrocephalus  Chronic lacunar infarcts versus dilated perivascular spaces of the right   cerebellar hemisphere and left basal ganglia, unchanged  Workstation:[id number]    Narrative    History: Fall and concussion  Multiple sclerosis    CT HEAD WO CONTRAST 2022    Anatomical Region Laterality Modality   Head -- Computed Tomography       Impression    Impression:     Interval significant decrease of right frontal and temporal subdural hematoma   with minimal residual hemorrhage  Chronic lacunar infarcts in left basal ganglia  White matter chronic small vessel ischemic disease  Workstation:[id number]    Narrative    Clinical information: Subdural hematoma     OSSEOUS STRUCTURES:  No acute fracture or osseous destructive lesion identified  Degenerative changes of the spine      IMPRESSION:     No evidence of recurrent tumor or thoracic metastatic disease      No significant interval change               Workstation performed: [id number]          Imaging    CT chest wo contrast (Order: [id number]) - 2022    IMPRESSION:  No mammographic evidence of malignancy            ASSESSMENT/BI-RADS CATEGORY:  Left: 2 - Benign  Right: 2 - Benign  Overall: 2 - Benign     RECOMMENDATION:       - Routine screening mammogram in 1 year for both breasts      Workstation ID: [id number]          Imaging    Mammo screening bilateral w 3d & cad (Order: [id number]) - 2022    Pathology, and Other Studies: I have personally reviewed pertinent reports        Report  NON-GYNECOLOGICAL CYTOLOGYResulted: 2021 12:20 PM  [facility]  Component Name Value Ref Range   Non-Gynecological Cytology [facility]  [address] [place] [address]  [phone]    PATHOLOGY REPORT             MR#:             [id number]  Patient:         Segundo Quiroz  /Sex:         1955  F  Provider:        SYLVIA LUO  Location:        [facility]  Radiol  OP ([facility])  Collect Date:    1/5/2021  N21-53    CYTOLOGIC DIAGNOSIS:  A  Fine Needle Aspiration with Cytotech Assistance and Cell Block,  Right Parotid Mass  Adequacy: Adequate for evaluation  General Category: Negative for Diagnostic Malignancy  Descriptive Interpretation: Cytologic features consistent with  pleomorphic adenoma     Reviewed  above results and   discussed with patient    Assessment and Plan:   In January 2021 patient had right upper lobe lobectomy and lymph node sampling   for 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  with invasion of lymphatic channel and metastatic carcinoma in 1 of 8 peribronchial lymph nodes  Other lymph nodes at level 10  , level 7, level 4 and level 2 and at azygos were negative 4R and 2R were negative for malignancy  1 of 14 positive peribronchial lymph node, T2 N1, stage IIB  Tumor tested negative for EGFR  Patient  Received 4 cycles of adjuvant chemotherapy, a combination of carboplatin and Gemzar and last treatment was on 06/07/2021     She did not receive taxane because of history of MS  She also received Granix because of neutropenia  She has done well and there has not been any documented recurrent or metastatic disease clinically  She gets CT chest from thoracic surgery  On 04/29/2022 she felt dizzy and fell and had subdural hematoma and was hospitalized for 2 days at [facility]   Subsequent CT of the had showed improvement  She also had MRI of the brain and that was compatible with MS  She has history of MS  She follows with her neurologist   No more dizziness    She has minimal  exertional dyspnea when going up on incline     Has some tiredness and arthritic symptoms  She sleeps poorly  She has chronic low back discomfort and had  epidural shots  No metastatic disease in lumbar spine area on MRI scan  She had PET-CT scan in December 2021      PET   showed positive activity in right parotid gland  And that was biopsied and was negative for malignancy and she follows with ENT specialist   Remote past history of suspected MS in 1989  She had some tingling  CSF came back suspicious and MRI brain came back suspicious  Patient was treated for MS at that time  No active treatment for  MS for the last several years  No more tingling in her hands  Patient gave me all this information     Patient had MRI scan of brain, neck and dorsal spine in 2021 and there were lesions at T3 and T7  Patient  had PET scan and spine lesions were not FDG avid  History of tinnitus off and on in the ears  She follows with ENT specialist    Physical examination and test results are as recorded and discussed in detail     Plan is surveillance for lung cancer history  I noticed that the vitamin-D level is 83 9 and she has been taking 5000 units of vitamin-D daily and he will cut that to 4000 units vitamin D3 daily  All discussed in very much detail  Questions answered  Discussed the importance of self-breast examination, eating healthy foods, activities as tolerated and health screening tests  Patient has already stopped smoking cigarettes  She is capable of self-care  Goal and plan:  surveillance and prolongation of survival and possible cure from lung cancer  Patient states she loves to cook and eat and has gained weight and she will try to lose some weight  Discussed precautions against coronavirus   Patient will continue to follow with her primary physician and other consultants  Discussed diagnoses, orders and instructions below    1  Squamous cell lung cancer, right (HCC)    - CBC and differential; Future  - Comprehensive metabolic panel; Future  - CEA; Future    2  Regional lymph node metastasis present ([facility] [address] )      3  Multiple sclerosis (HCC)      4  Subdural hematoma ([facility] [address] [address] )      5  Chronic obstructive pulmonary disease, unspecified COPD type ([facility] )      6  Parotid tumor      7   Spinal stenosis of lumbar region without neurogenic claudication      8  Tinnitus of both ears      Follow-up in 6 months with blood work  She gets CT chest from thoracic surgery  Patient voiced understanding and agrees    Counseling / Coordination of Care    Brayan Herrera   Provided counseling and support

## 2022-08-05 ENCOUNTER — TELEPHONE (OUTPATIENT)
Dept: INTERNAL MEDICINE CLINIC | Age: 67
End: 2022-08-05

## 2022-08-05 NOTE — TELEPHONE ENCOUNTER
Received phone call from Ness Okeefe of [facility]yn[facility]  Patient is scheduled 8/22/22 for a reconstruction surgery of robotic assisted sacrocolpopexy, cystoscopy  Patient last seen in our office on 6/13/22 for Annual Physical   Surgeon's office asking if Dr Seda Reddy is willing to complete pre-operative clearance forms based on last appointment  Will be faxing to office today        Please call Ness Okeefe from office with any update ([phone] ext:  [phone])

## 2022-08-12 ENCOUNTER — TELEPHONE (OUTPATIENT)
Dept: CARDIAC SURGERY | Facility: CLINIC | Age: 67
End: 2022-08-12

## 2022-08-12 NOTE — TELEPHONE ENCOUNTER
Called patient and left VM to call office back to reschedule appt on 8/23 with Dr Mickael Cowden to 8/26 with [facility]

## 2022-08-16 NOTE — TELEPHONE ENCOUNTER
Reviewed forms with Dr Jeniffer Wagner  Per Dr Jeniffer Wagner, due to the complexity of the surgery and patient has not had a visit recent enough to review chronic conditions, patient needs an appointment for clearance

## 2022-08-17 NOTE — TELEPHONE ENCOUNTER
Paper signed and faxed to [facility], also made copy for pt, will send to her

## 2022-08-17 NOTE — TELEPHONE ENCOUNTER
Pt called the office and asked what is going on with her clearance  I exp to her that Dr Jose Solorio wants her seen to be cleared but the only thing I could offer was Friday 8am with Whitney Neves  Pt states she has to be cleared today or they will reschedule her surgery  She also stated that she went to pulmonology and was cleared  Had an EKG and was cleared and also UA and was cleared (all done through [facility])  She said all she needs is for Dr Jose Solorio to look over the results of all that and clear her  I again exp what he said  I asked her if we could please set up the appt for Friday just so we dont lose that slot, she agreed and then asked to speak to a manager bc she feels like this is an emergency and should be seen now/today  She said she has sat at Dr offices and had to wait due to emergencies and this is why she should be able to be seen  So she is asking that either Marlene or Carli reach out to her to see what can be done  Just spoke to surgery center and she is going to fax over all of pts labs, etc, in hopes that Dr Jose Solorio will clear her  She also apologized for being so short with me at the last phone call  She said that if Dr Jose Solorio still will not clear the pt after reviewing paperwork that Friday is okay for the appt as long as they receive all the paperwork for clearance by 3pm on Friday or they will have to cancel the surgery  She said that the pt told her she was going to come down to the office and throw a fit but hopefully talked her out of it and told her that coming here and doing that would not solve anything  She did apologize if pt does come to the office

## 2022-08-22 ENCOUNTER — PATIENT MESSAGE (OUTPATIENT)
Dept: CARDIAC SURGERY | Facility: CLINIC | Age: 67
End: 2022-08-22

## 2022-08-23 ENCOUNTER — TELEPHONE (OUTPATIENT)
Dept: CARDIAC SURGERY | Facility: CLINIC | Age: 67
End: 2022-08-23

## 2022-08-23 NOTE — TELEPHONE ENCOUNTER
Pt called in to confirm antonio today with Dr Gabriele Camacho, she then changed her mind saying she thinks maybe she should reschedule because she is not sure if she can drive since she had surgery at [facility] yesterday  Pt stated she was on pain medication and has a lot of sutures  I told the pt that we typically advised that our patients do not drive after surgery especially if she is on pain medication  Pt rescheduled to 8/30 to follow up with Dr Gabriele Camacho

## 2022-08-30 ENCOUNTER — OFFICE VISIT (OUTPATIENT)
Dept: CARDIAC SURGERY | Facility: CLINIC | Age: 67
End: 2022-08-30
Payer: COMMERCIAL

## 2022-08-30 VITALS
TEMPERATURE: 97.5 F | BODY MASS INDEX: 30.85 KG/M2 | SYSTOLIC BLOOD PRESSURE: 125 MMHG | HEIGHT: 56 IN | HEART RATE: 73 BPM | DIASTOLIC BLOOD PRESSURE: 82 MMHG | WEIGHT: 137.13 LBS | OXYGEN SATURATION: 97 % | RESPIRATION RATE: 16 BRPM

## 2022-08-30 DIAGNOSIS — C34.91 SQUAMOUS CELL LUNG CANCER, RIGHT (HCC): Primary | ICD-10-CM

## 2022-08-30 PROCEDURE — 99213 OFFICE O/P EST LOW 20 MIN: CPT | Performed by: THORACIC SURGERY (CARDIOTHORACIC VASCULAR SURGERY)

## 2022-08-30 PROCEDURE — 1160F RVW MEDS BY RX/DR IN RCRD: CPT | Performed by: THORACIC SURGERY (CARDIOTHORACIC VASCULAR SURGERY)

## 2022-08-30 NOTE — PROGRESS NOTES
Thoracic Follow-Up  Assessment/Plan:    Squamous cell lung cancer, right [facility])  We had a discussion with Adelene Hammans, after personally reviewing her imaging  She is one year out from surgery and there is no evidence of metastatic or recurrent disease  We will continue routine surveillance, which will be bi-annual CT scans for 3 years and then yearly after that, if everything remains stable  She is planning on moving to [place] in about one year, but she will keep us abreast of her plans  We will try to help her find a thoracic surgeon in her area of [place], if necessary  The closest largest city to her would be [address] [place] [address]  For now, we will have her return to our office in 6 months with a new CT scan  She is in agreement with the plan  Diagnoses and all orders for this visit:    Squamous cell lung cancer, right ([facility] [address] )  -     CT chest wo contrast; Future             Thoracic History       Diagnosis: 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  Procedure: right thoracoscopic upper lobectomy and lower lobe wedge resection 1/28/21  Pathology:  Single focus 3 1x3  1x3 0cm G3 invasive, keratiizing squamous cell carcinoma right upper lobe  No visceral pleura invasion, there is lymphatic invasion present   All margins uninvolved by tumor  1/14 lymph nodes involved, +right peribronchial  Negative 2R, 4R, 10R, azygous and 7     Right lower lobe wedge revealed a pulmonary bleb with inflammatory changes, negative for malignancy      AJCC Prognostic Stage Group (8th Ed ):  Stage IIB - pT2a, pN1, MX, G3    Adjuvant therapy: carboplatin plus Gemzar, completed 6/7/21    Cancer Staging  Squamous cell lung cancer, right ([facility] [address] )  Staging form: Lung, AJCC 8th Edition  - Clinical stage from 1/28/2021: Stage IIB (cT2a, cN1, cM0) - Signed by Claris Pallas, PA-C on 8/30/2022  Histopathologic type: Squamous cell carcinoma, keratinizing, NOS  Stage prefix: Initial diagnosis  Histologic grade (G): G3  Histologic grading system: 4 grade system  Laterality: Right  Tumor size (mm): 31  Lymph-vascular invasion (LVI): Lymphatic and small vessel invasion only (L)  Specimen type: Excision         Patient ID: Rolly Councilman is a 69 y o  female  ECOG 0    HPI     Ms Claudetta Field is a 70 yo female who underwent a right thoracoscopic upper lobectomy and lower lobe wedge resection on 1/28/21 for stage IIB squamous cell carcinoma of the right upper lobe  The lower lobe wedge resection was consistent with a bleb and inflammatory changes  He was last seen on 1/25/22 at which point his PET from 12/28/21 did not reveal any pulmonary nodules  She returns today with a CT scan from 7/21/22, which did not reveal any suspicious nodules, but some scattered punctate calcified granulomatas and stable scarring in the right lower lobe  On discussion, she had robotic vaginal prolapse surgery last week so she is a little sore  She denies fever, chills, cough, shortness of breath, headaches, or new bone pains  The following portions of the patient's history were reviewed and updated as appropriate: allergies, current medications, past family history, past medical history, past social history, past surgical history and problem list     Review of Systems      Objective:   Physical Exam  Vitals reviewed  Constitutional:       Appearance: Normal appearance  She is well-developed  She is not diaphoretic  HENT:      Head: Normocephalic and atraumatic  Eyes:      Extraocular Movements: Extraocular movements intact  Neck:      Trachea: No tracheal deviation  Cardiovascular:      Rate and Rhythm: Normal rate and regular rhythm  Heart sounds: Normal heart sounds  No murmur heard  Pulmonary:      Effort: Pulmonary effort is normal  No respiratory distress  Breath sounds: Normal breath sounds  No wheezing  Abdominal:      General: Bowel sounds are normal  There is no distension  Palpations: Abdomen is soft  Tenderness:  There is no abdominal tenderness  Musculoskeletal:         General: Normal range of motion  Cervical back: Normal range of motion and neck supple  Lymphadenopathy:      Cervical: No cervical adenopathy  Skin:     General: Skin is warm and dry  Findings: No erythema  Neurological:      Mental Status: She is alert and oriented to person, place, and time  Cranial Nerves: No cranial nerve deficit  Psychiatric:         Behavior: Behavior normal          Thought Content: Thought content normal      /82   Pulse 73   Temp 97 5 °F (36 4 °C)   Resp 16   Ht 4' 8" (1 422 m)   Wt 62 2 kg (137 lb 2 oz)   LMP  (LMP Unknown)   SpO2 97%   BMI 30 74 kg/m²        CT chest wo contrast    Result Date: 7/25/2022  Narrative CT CHEST WITHOUT IV CONTRAST INDICATION:   C34 91: Malignant neoplasm of unspecified part of right bronchus or lung  COMPARISON:  CT chest 7/22/2021 TECHNIQUE: CT examination of the chest was performed without intravenous contrast  Axial, sagittal, and coronal 2D reformatted images were created from the source data and submitted for interpretation  Radiation dose length product (DLP) for this visit:  213 mGy-cm   This examination, like all CT scans performed in the [facility], was performed utilizing techniques to minimize radiation dose exposure, including the use of iterative reconstruction and automated exposure control  FINDINGS: LUNGS:  Post right upper lobectomy and right lower lobe wedge resection  No new suspicious pulmonary nodule  Scattered punctate calcified granulomata  COPD  Stable scarring in the right lower lobe  No infiltrate  Central airways are clear  PLEURA:  Unremarkable  HEART/GREAT VESSELS: Heart is not enlarged  No pericardial effusion  Aortic and coronary artery calcification  No thoracic aortic aneurysm  MEDIASTINUM AND CARMINA:  Unremarkable  CHEST WALL AND LOWER NECK:  Bilateral breast implants in place   VISUALIZED STRUCTURES IN THE UPPER ABDOMEN: Unremarkable  OSSEOUS STRUCTURES:  No acute fracture or osseous destructive lesion identified  Degenerative changes of the spine  Impression No evidence of recurrent tumor or thoracic metastatic disease  No significant interval change   Workstation performed: [id number]

## 2022-08-30 NOTE — ASSESSMENT & PLAN NOTE
We had a discussion with Nelda Delgado, after personally reviewing her imaging  She is one year out from surgery and there is no evidence of metastatic or recurrent disease  We will continue routine surveillance, which will be bi-annual CT scans for 3 years and then yearly after that, if everything remains stable  She is planning on moving to [place] in about one year, but she will keep us abreast of her plans  We will try to help her find a thoracic surgeon in her area of [place], if necessary  The closest largest city to her would be [address] [place] [address]  For now, we will have her return to our office in 6 months with a new CT scan  She is in agreement with the plan

## 2022-10-03 ENCOUNTER — OFFICE VISIT (OUTPATIENT)
Dept: INTERNAL MEDICINE CLINIC | Age: 67
End: 2022-10-03
Payer: COMMERCIAL

## 2022-10-03 VITALS
TEMPERATURE: 98.3 F | DIASTOLIC BLOOD PRESSURE: 74 MMHG | SYSTOLIC BLOOD PRESSURE: 132 MMHG | WEIGHT: 131 LBS | HEART RATE: 61 BPM | HEIGHT: 57 IN | OXYGEN SATURATION: 99 % | BODY MASS INDEX: 28.26 KG/M2

## 2022-10-03 DIAGNOSIS — J44.9 CHRONIC OBSTRUCTIVE PULMONARY DISEASE, UNSPECIFIED COPD TYPE (HCC): ICD-10-CM

## 2022-10-03 DIAGNOSIS — K21.00 GASTROESOPHAGEAL REFLUX DISEASE WITH ESOPHAGITIS, UNSPECIFIED WHETHER HEMORRHAGE: ICD-10-CM

## 2022-10-03 DIAGNOSIS — E78.49 OTHER HYPERLIPIDEMIA: ICD-10-CM

## 2022-10-03 DIAGNOSIS — Z12.31 ENCOUNTER FOR SCREENING MAMMOGRAM FOR MALIGNANT NEOPLASM OF BREAST: ICD-10-CM

## 2022-10-03 DIAGNOSIS — Z00.00 ANNUAL PHYSICAL EXAM: Primary | ICD-10-CM

## 2022-10-03 DIAGNOSIS — Z12.11 SCREENING FOR MALIGNANT NEOPLASM OF COLON: ICD-10-CM

## 2022-10-03 PROCEDURE — 99397 PER PM REEVAL EST PAT 65+ YR: CPT | Performed by: INTERNAL MEDICINE

## 2022-10-03 PROCEDURE — 99214 OFFICE O/P EST MOD 30 MIN: CPT | Performed by: INTERNAL MEDICINE

## 2022-10-03 RX ORDER — FAMOTIDINE 40 MG/1
40 TABLET, FILM COATED ORAL EVERY MORNING
Qty: 90 TABLET | Refills: 1 | Status: SHIPPED | OUTPATIENT
Start: 2022-10-03

## 2022-10-03 RX ORDER — CICLOPIROX OLAMINE 7.7 MG/100ML
SUSPENSION TOPICAL
COMMUNITY
Start: 2022-08-11

## 2022-10-03 NOTE — PROGRESS NOTES
Assessment/Plan:    Essential hypertension  -well controlled  -continue with losartan-hydrochlorothiazide  -continue with a low-salt diet    Hyperlipidemia  -patient was on simvastatin but this was discontinued secondary to possible interaction with simvastatin  -will order repeat lipid panel  -I explained to patient that because of her history of CVA, she needs to be on a statin  -she insists that the urologist told order she does not have a history of a CVA but rather multiple sclerosis  -will follow up with the results of the lipid panel and I counseled her to reach out to Neurology    Restless legs syndrome  -stable  -continue with pramipexole    COPD  -stable without acute exacerbation  -continue with Anoro Ellipta and p r n  Albuterol inhaler    Allergies versus GERD  -patient states that she is on famotidine for her allergic reaction rather than GERD  - stable on famotidine  -continue famotidine    Anxiety disorder with depression  -stable on bupropion  -continue with bupropion     Diagnoses and all orders for this visit:    Annual physical exam    Gastroesophageal reflux disease with esophagitis, unspecified whether hemorrhage  -     famotidine (PEPCID) 40 MG tablet; Take 1 tablet (40 mg total) by mouth every morning    Screening for malignant neoplasm of colon  -     Ambulatory referral for colonoscopy; Future    Encounter for screening mammogram for malignant neoplasm of breast  -     Mammo screening bilateral w 3d & cad; Future    Chronic obstructive pulmonary disease, unspecified COPD type ([facility] [address] )    Other hyperlipidemia  -     Lipid panel; Future    Other orders  -     ciclopirox (LOPROX) 0 77 % SUSP; APPLY TOPICALLY TO [facility] Plan of Care: Medications that increase falls were reviewed  Urinary Incontinence Plan of Care: counseling topics discussed: limiting fluid intake 3-4 hours before bed and limiting fluid intake to 60 oz  per day           Subjective: Patient ID: Anneliese Newberry is a 69 y o  female  HPI  Patient presents for a follow-up visit regarding her anxiety disorder, allergies, essential hypertension, hyperlipidemia, restless leg syndrome, COPD  Today, patient states that she is having a hard time with fecal incontinence for the past few months  She admits to occasional fecal urgency with occasionally fecal incontence of diarrheal stool  She states that most of the time the incontinence is to diarrheal stool but she also has fecal smearing even after having solid stool  She states that she wipes and wipes and cannot get clean  Has an appointment with colorectal sx next month   She has a hx of rectocele sx and states that she was told that the anal mus is weak  Has been having incontinence since she had the rectocele sx  Patient has been off her simvastatin for couple of months and she states that her neurologist told her that she did not have a cva (even though her MRI of the brain shows a cerebellar infarct) but that she has multiple sclerosis which for some reason has not been very active with symptoms in her care  She has not been on simvastatin for the past 2 months  Her last lipid panel was done a couple of months ago  She states that she is scheduled to have an EMG for her right lower extremity pain  She states that she does not follow-up with neurology anymore  She states that she takes the famotidine because of her history of angioedema and not for GERD  She admits to heat intolerance, shortness of breath on exertion especially climbing stairs or heels, sinus pain and pressure occasionally, tinnitus but denies fever, chills, night sweats, headache, dizziness, Nasal congestion, rhinorrhea, cough, constipation, rectal bleeding, melena stools, myalgias but admits to occasional arthralgias      The following portions of the patient's history were reviewed and updated as appropriate:   She  has a past medical history of Allergic (?), Anxiety, Bronchial malignant neoplasm, right ([facility] [address] ), Cancer ([facility] [address] ), Chronic major depressive disorder, single episode, COPD (chronic obstructive pulmonary disease) ([facility] ), Full incontinence of feces, GERD (gastroesophageal reflux disease), Hyperlipidemia, Hypertension, Malignant neoplasm of bronchus and lung ([facility] ), Multiple sclerosis ([facility] ), Pulmonary emphysema ([facility] ), Restless leg syndrome, and Tobacco use  She   Patient Active Problem List    Diagnosis Date Noted    Annual physical exam 10/03/2022    Subdural hematoma 05/19/2022    Syncope 05/19/2022    BMI 30 0-30 9,adult 05/19/2022    Intervertebral disc disorder with radiculopathy of lumbar region     Parotid tumor 02/11/2022    Dizziness 10/04/2021    Encounter for care related to vascular access port 07/23/2021    Chemotherapy-induced nausea 04/30/2021    Insomnia due to medical condition 04/05/2021    Cancer related pain 04/05/2021    Palliative care patient 04/04/2021    Medical marijuana use 04/04/2021    Anxiety disorder 04/01/2021    Chemotherapy induced neutropenia ([facility] ) 03/31/2021    Dehydration 03/31/2021    Regional lymph node metastasis present ([facility] ) 03/02/2021    GERD (gastroesophageal reflux disease) 02/26/2021    Squamous cell lung cancer, right ([facility] ) 01/12/2021    Chronic obstructive pulmonary disease ([facility] ) 10/07/2019    Tinnitus of both ears 11/06/2018    Hypertension, essential 11/07/2017    Restless legs syndrome (RLS) 11/07/2017    Cystocele 09/26/2016    Rectocele 09/26/2016    Cystocele with prolapse 09/26/2016    Depression 01/24/2016    Atypical lobular hyperplasia of breast 03/24/2015    Multiple sclerosis ([facility] [address] ) 11/29/2013    Hyperlipidemia 09/15/2010    Spinal stenosis of lumbar region 09/15/2010    History of colonic polyps 09/15/2010     She  has a past surgical history that includes Breast biopsy (Left, 03/23/2015); Breast excisional biopsy (Left, 08/26/2010); Breast biopsy (Left, 08/02/2010);  Breast excisional biopsy (Left, 1994); Breast excisional biopsy (Right, 1984); Total abdominal hysterectomy w/ bilateral salpingoophorectomy (Bilateral, 2010); Total abdominal hysterectomy (2010); Augmentation mammaplasty (Bilateral, 11/2014); pr thoracoscopy surg lobectomy (Right, 01/28/2021); pr bronchoscopy,diagnostic (N/A, 01/28/2021); Tonsillectomy; pr insj tunneled ctr vad w/subq port age 9 yr/> (N/A, 03/09/2021); FL guided central venous access device insertion (03/09/2021); Mammo (historical) (2020); IR port removal (03/07/2022); Lymph node biopsy (2021); and Tubal ligation (1971)  Her family history includes Breast cancer in her paternal aunt; COPD in her brother and father; Hypertension in her father; No Known Problems in her brother, daughter, maternal aunt, maternal grandfather, maternal grandmother, mother, paternal aunt, paternal aunt, paternal aunt, paternal aunt, paternal aunt, paternal aunt, paternal grandmother, sister, and sister; Rectal cancer in her paternal grandfather  She  reports that she quit smoking about 20 months ago  Her smoking use included cigarettes  She started smoking about 54 years ago  She smoked 0 00 packs per day for 30 00 years  She has never used smokeless tobacco  She reports current alcohol use of about 5 0 standard drinks of alcohol per week  She reports that she does not use drugs    Current Outpatient Medications   Medication Sig Dispense Refill    Biotin 5 MG CAPS Take 5,000 mg by mouth daily      buPROPion (WELLBUTRIN XL) 300 mg 24 hr tablet Take 1 tablet (300 mg total) by mouth every morning 90 tablet 1    Cholecalciferol (Vitamin D3) 50 MCG (2000 UT) capsule Take 5,000 Units by mouth daily       famotidine (PEPCID) 40 MG tablet Take 1 tablet (40 mg total) by mouth every morning 90 tablet 1    losartan-hydrochlorothiazide (HYZAAR) 50-12 5 mg per tablet Take 1 tablet by mouth daily 90 tablet 3    pramipexole (MIRAPEX) 0 5 mg tablet Take 1 tablet (0 5 mg total) by mouth daily at bedtime 90 tablet 1    umeclidinium-vilanterol (Anoro Ellipta) 62 5-25 MCG/INH inhaler Inhale 1 puff daily       ciclopirox (LOPROX) 0 77 % SUSP APPLY TOPICALLY TO THE AFFECTED AREA TWICE DAILY      gabapentin (NEURONTIN) 300 mg capsule Take 2 capsules (600 mg total) by mouth daily at bedtime (Patient not taking: No sig reported) 180 capsule 3    simvastatin (ZOCOR) 40 mg tablet Take 1 tablet (40 mg total) by mouth every other day (Patient not taking: Reported on 10/3/2022) 90 tablet 0     No current facility-administered medications for this visit  Current Outpatient Medications on File Prior to Visit   Medication Sig    Biotin 5 MG CAPS Take 5,000 mg by mouth daily    buPROPion (WELLBUTRIN XL) 300 mg 24 hr tablet Take 1 tablet (300 mg total) by mouth every morning    Cholecalciferol (Vitamin D3) 50 MCG (2000 UT) capsule Take 5,000 Units by mouth daily     losartan-hydrochlorothiazide (HYZAAR) 50-12 5 mg per tablet Take 1 tablet by mouth daily    pramipexole (MIRAPEX) 0 5 mg tablet Take 1 tablet (0 5 mg total) by mouth daily at bedtime    umeclidinium-vilanterol (Anoro Ellipta) 62 5-25 MCG/INH inhaler Inhale 1 puff daily     [DISCONTINUED] famotidine (PEPCID) 40 MG tablet Take 1 tablet (40 mg total) by mouth every morning    ciclopirox (LOPROX) 0 77 % SUSP APPLY TOPICALLY TO THE AFFECTED AREA TWICE DAILY    gabapentin (NEURONTIN) 300 mg capsule Take 2 capsules (600 mg total) by mouth daily at bedtime (Patient not taking: No sig reported)    simvastatin (ZOCOR) 40 mg tablet Take 1 tablet (40 mg total) by mouth every other day (Patient not taking: Reported on 10/3/2022)     No current facility-administered medications on file prior to visit  She is allergic to sulfa antibiotics       Review of Systems   Constitutional: Negative for activity change, chills, fatigue, fever and unexpected weight change  HENT: Positive for congestion, sinus pressure and sinus pain   Negative for ear pain, postnasal drip, rhinorrhea and sore throat  Eyes: Negative for pain  Respiratory: Positive for shortness of breath (on climbing stairs or a hill)  Negative for cough, choking, chest tightness and wheezing  Cardiovascular: Negative for chest pain, palpitations and leg swelling  Gastrointestinal: Positive for diarrhea (Occasionally with occasional fecal incontinence and fecal smearing)  Negative for abdominal pain, constipation, nausea and vomiting  Genitourinary: Negative for dysuria and hematuria  Musculoskeletal: Positive for arthralgias  Negative for back pain, gait problem, joint swelling, myalgias and neck stiffness  Skin: Negative for pallor and rash  Neurological: Positive for dizziness ( improved)  Negative for tremors, seizures, syncope, light-headedness and headaches  Hematological: Negative for adenopathy  Psychiatric/Behavioral: Negative for behavioral problems  Objective:      /74 (BP Location: Left arm, Patient Position: Sitting, Cuff Size: Standard)   Pulse 61   Temp 98 3 °F (36 8 °C) (Temporal)   Ht 4' 8 69" (1 44 m) Comment: shoes off  Wt 59 4 kg (131 lb) Comment: shoes off  LMP  (LMP Unknown)   SpO2 99%   BMI 28 66 kg/m²          Physical Exam  Constitutional:       General: She is not in acute distress  Appearance: She is well-developed  She is not diaphoretic  HENT:      Head: Normocephalic and atraumatic  Right Ear: External ear normal       Left Ear: External ear normal       Nose: Nose normal       Mouth/Throat:      Pharynx: Posterior oropharyngeal erythema ( oropharyngeal erythema with dry mucous membranes and Mallampati class 4 oropharynx) present  No oropharyngeal exudate  Eyes:      General: No scleral icterus  Right eye: No discharge  Left eye: No discharge  Conjunctiva/sclera: Conjunctivae normal       Pupils: Pupils are equal, round, and reactive to light  Neck:      Thyroid: No thyromegaly        Vascular: No JVD       Trachea: No tracheal deviation  Cardiovascular:      Rate and Rhythm: Normal rate and regular rhythm  Heart sounds: Normal heart sounds  No murmur heard  No friction rub  No gallop  Pulmonary:      Effort: Pulmonary effort is normal  No respiratory distress  Breath sounds: Decreased breath sounds (Decreased breath sounds on the right especially) present  No wheezing or rales  Chest:      Chest wall: No tenderness  Abdominal:      General: Bowel sounds are normal  There is no distension  Palpations: Abdomen is soft  There is no mass  Tenderness: There is no abdominal tenderness  There is no guarding or rebound  Musculoskeletal:         General: No tenderness or deformity  Normal range of motion  Cervical back: Normal range of motion and neck supple  Lymphadenopathy:      Cervical: No cervical adenopathy  Skin:     General: Skin is warm and dry  Coloration: Skin is not pale  Findings: No erythema or rash  Neurological:      Mental Status: She is alert and oriented to person, place, and time  Cranial Nerves: No cranial nerve deficit  Motor: No abnormal muscle tone  Coordination: Coordination normal       Deep Tendon Reflexes: Reflexes are normal and symmetric     Psychiatric:         Behavior: Behavior normal            Appointment on 07/10/2022   Component Date Value Ref Range Status    Cholesterol 07/10/2022 164  See Comment mg/dL Final    Cholesterol:         Pediatric <18 Years        Desirable          <170 mg/dL      Borderline High    170-199 mg/dL      High               >=200 mg/dL        Adult >=18 Years            Desirable         <200 mg/dL      Borderline High   200-239 mg/dL      High              >239 mg/dL      Triglycerides 07/10/2022 92  See Comment mg/dL Final    Triglyceride:     0-9Y            <75mg/dL     10Y-17Y         <90 mg/dL       >=18Y     Normal          <150 mg/dL     Borderline High 150-199 mg/dL     High 200-499 mg/dL        Very High       >499 mg/dL    Specimen collection should occur prior to N-Acetylcysteine or Metamizole administration due to the potential for falsely depressed results   HDL, Direct 07/10/2022 65  >=50 mg/dL Final    Specimen collection should occur prior to Metamizole administration due to the potential for falsley depressed results   LDL Calculated 07/10/2022 81  0 - 100 mg/dL Final    LDL Cholesterol:     Optimal           <100 mg/dl     Near Optimal      100-129 mg/dl     Above Optimal       Borderline High 130-159 mg/dl       High            160-189 mg/dl       Very High       >189 mg/dl         This screening LDL is a calculated result  It does not have the accuracy of the Direct Measured LDL in the monitoring of patients with hyperlipidemia and/or statin therapy  Direct Measure LDL (YOS202) must be ordered separately in these patients   Non-HDL-Chol (CHOL-HDL) 07/10/2022 99  mg/dl Final    TSH 3RD GENERATON 07/10/2022 1 360  0 450 - 4 500 uIU/mL Final    The recommended reference ranges for TSH during pregnancy are as follows:   First trimester 0 1 to 2 5 uIU/mL   Second trimester  0 2 to 3 0 uIU/mL   Third trimester 0 3 to 3 0 uIU/m    Note: Normal ranges may not apply to patients who are transgender, non-binary, or whose legal sex, sex at birth, and gender identity differ  Adult TSH (3rd generation) reference range follows the recommended guidelines of the American Thyroid Association, January, 2020      Vit D, 25-Hydroxy 07/10/2022 83 9  30 0 - 100 0 ng/mL Final   Office Visit on 06/13/2022   Component Date Value Ref Range Status    Color, UA 07/10/2022 Colorless   Final    Clarity, UA 07/10/2022 Clear   Final    Specific Gravity, UA 07/10/2022 1 007  1 003 - 1 030 Final    pH, UA 07/10/2022 7 0  4 5, 5 0, 5 5, 6 0, 6 5, 7 0, 7 5, 8 0 Final    Leukocytes, UA 07/10/2022 Trace (A) Negative Final    Nitrite, UA 07/10/2022 Negative  Negative Final    Protein, UA 07/10/2022 Negative  Negative mg/dl Final    Glucose, UA 07/10/2022 Negative  Negative mg/dl Final    Ketones, UA 07/10/2022 Negative  Negative mg/dl Final    Urobilinogen, UA 07/10/2022 <2 0  <2 0 mg/dl mg/dl Final    Bilirubin, UA 07/10/2022 Negative  Negative Final    Occult Blood, UA 07/10/2022 Negative  Negative Final    RBC, UA 07/10/2022 None Seen  None Seen, 1-2 /hpf Final    WBC, UA 07/10/2022 None Seen  None Seen, 1-2 /hpf Final    Epithelial Cells 07/10/2022 Occasional  None Seen, Occasional /hpf Final    Bacteria, UA 07/10/2022 None Seen  None Seen, Occasional /hpf Final   Appointment on 06/07/2022   Component Date Value Ref Range Status    Sodium 06/07/2022 140  136 - 145 mmol/L Final    Potassium 06/07/2022 3 6  3 5 - 5 3 mmol/L Final    Chloride 06/07/2022 105  100 - 108 mmol/L Final    CO2 06/07/2022 30  21 - 32 mmol/L Final    ANION GAP 06/07/2022 5  4 - 13 mmol/L Final    BUN 06/07/2022 12  5 - 25 mg/dL Final    Creatinine 06/07/2022 0 94  0 60 - 1 30 mg/dL Final    Standardized to IDMS reference method    Glucose 06/07/2022 105  65 - 140 mg/dL Final    If the patient is fasting, the ADA then defines impaired fasting glucose as > 100 mg/dL and diabetes as > or equal to 123 mg/dL  Specimen collection should occur prior to Sulfasalazine administration due to the potential for falsely depressed results  Specimen collection should occur prior to Sulfapyridine administration due to the potential for falsely elevated results      Calcium 06/07/2022 10 0  8 3 - 10 1 mg/dL Final    eGFR 06/07/2022 62  ml/min/1 73sq m Final   Appointment on 02/05/2022   Component Date Value Ref Range Status    WBC 02/05/2022 4 62  4 31 - 10 16 Thousand/uL Final    RBC 02/05/2022 4 38  3 81 - 5 12 Million/uL Final    Hemoglobin 02/05/2022 14 1  11 5 - 15 4 g/dL Final    Hematocrit 02/05/2022 43 0  34 8 - 46 1 % Final    MCV 02/05/2022 98  82 - 98 fL Final    MCH 02/05/2022 32 2  26 8 - 34 3 pg Final    MCHC 02/05/2022 32 8  31 4 - 37 4 g/dL Final    RDW 02/05/2022 12 3  11 6 - 15 1 % Final    MPV 02/05/2022 9 5  8 9 - 12 7 fL Final    Platelets 76/33/9357 300  149 - 390 Thousands/uL Final    nRBC 02/05/2022 0  /100 WBCs Final    Neutrophils Relative 02/05/2022 60  43 - 75 % Final    Immat GRANS % 02/05/2022 0  0 - 2 % Final    Lymphocytes Relative 02/05/2022 26  14 - 44 % Final    Monocytes Relative 02/05/2022 11  4 - 12 % Final    Eosinophils Relative 02/05/2022 2  0 - 6 % Final    Basophils Relative 02/05/2022 1  0 - 1 % Final    Neutrophils Absolute 02/05/2022 2 83  1 85 - 7 62 Thousands/µL Final    Immature Grans Absolute 02/05/2022 0 00  0 00 - 0 20 Thousand/uL Final    Lymphocytes Absolute 02/05/2022 1 19  0 60 - 4 47 Thousands/µL Final    Monocytes Absolute 02/05/2022 0 49  0 17 - 1 22 Thousand/µL Final    Eosinophils Absolute 02/05/2022 0 07  0 00 - 0 61 Thousand/µL Final    Basophils Absolute 02/05/2022 0 04  0 00 - 0 10 Thousands/µL Final    Sodium 02/05/2022 134 (A) 136 - 145 mmol/L Final    Potassium 02/05/2022 4 1  3 5 - 5 3 mmol/L Final    Chloride 02/05/2022 100  100 - 108 mmol/L Final    CO2 02/05/2022 32  21 - 32 mmol/L Final    ANION GAP 02/05/2022 2 (A) 4 - 13 mmol/L Final    BUN 02/05/2022 12  5 - 25 mg/dL Final    Creatinine 02/05/2022 0 82  0 60 - 1 30 mg/dL Final    Standardized to IDMS reference method    Glucose 02/05/2022 82  65 - 140 mg/dL Final    If the patient is fasting, the ADA then defines impaired fasting glucose as > 100 mg/dL and diabetes as > or equal to 123 mg/dL  Specimen collection should occur prior to Sulfasalazine administration due to the potential for falsely depressed results  Specimen collection should occur prior to Sulfapyridine administration due to the potential for falsely elevated results      Calcium 02/05/2022 9 8  8 3 - 10 1 mg/dL Final    AST 02/05/2022 23  5 - 45 U/L Final    Specimen collection should occur prior to Sulfasalazine administration due to the potential for falsely depressed results   ALT 02/05/2022 44  12 - 78 U/L Final    Specimen collection should occur prior to Sulfasalazine and/or Sulfapyridine administration due to the potential for falsely depressed results   Alkaline Phosphatase 02/05/2022 62  46 - 116 U/L Final    Total Protein 02/05/2022 8 1  6 4 - 8 2 g/dL Final    Albumin 02/05/2022 4 2  3 5 - 5 0 g/dL Final    Total Bilirubin 02/05/2022 0 59  0 20 - 1 00 mg/dL Final    Use of this assay is not recommended for patients undergoing treatment with eltrombopag due to the potential for falsely elevated results      eGFR 02/05/2022 73  ml/min/1 73sq m Final   Hospital Outpatient Visit on 12/28/2021   Component Date Value Ref Range Status    POC Glucose 12/28/2021 104  65 - 140 mg/dl Final   Appointment on 12/08/2021   Component Date Value Ref Range Status    WBC 12/08/2021 6 10  4 31 - 10 16 Thousand/uL Final    RBC 12/08/2021 4 50  3 81 - 5 12 Million/uL Final    Hemoglobin 12/08/2021 14 5  11 5 - 15 4 g/dL Final    Hematocrit 12/08/2021 44 7  34 8 - 46 1 % Final    MCV 12/08/2021 99 (A) 82 - 98 fL Final    MCH 12/08/2021 32 2  26 8 - 34 3 pg Final    MCHC 12/08/2021 32 4  31 4 - 37 4 g/dL Final    RDW 12/08/2021 13 1  11 6 - 15 1 % Final    MPV 12/08/2021 9 5  8 9 - 12 7 fL Final    Platelets 00/02/1096 292  149 - 390 Thousands/uL Final    nRBC 12/08/2021 0  /100 WBCs Final    Neutrophils Relative 12/08/2021 65  43 - 75 % Final    Immat GRANS % 12/08/2021 0  0 - 2 % Final    Lymphocytes Relative 12/08/2021 23  14 - 44 % Final    Monocytes Relative 12/08/2021 8  4 - 12 % Final    Eosinophils Relative 12/08/2021 3  0 - 6 % Final    Basophils Relative 12/08/2021 1  0 - 1 % Final    Neutrophils Absolute 12/08/2021 3 96  1 85 - 7 62 Thousands/µL Final    Immature Grans Absolute 12/08/2021 0 01  0 00 - 0 20 Thousand/uL Final    Lymphocytes Absolute 12/08/2021 1 43  0 60 - 4 47 Thousands/µL Final    Monocytes Absolute 12/08/2021 0 48  0 17 - 1 22 Thousand/µL Final    Eosinophils Absolute 12/08/2021 0 16  0 00 - 0 61 Thousand/µL Final    Basophils Absolute 12/08/2021 0 06  0 00 - 0 10 Thousands/µL Final    Sodium 12/08/2021 136  136 - 145 mmol/L Final    Potassium 12/08/2021 4 0  3 5 - 5 3 mmol/L Final    Chloride 12/08/2021 102  100 - 108 mmol/L Final    CO2 12/08/2021 28  21 - 32 mmol/L Final    ANION GAP 12/08/2021 6  4 - 13 mmol/L Final    BUN 12/08/2021 12  5 - 25 mg/dL Final    Creatinine 12/08/2021 0 83  0 60 - 1 30 mg/dL Final    Standardized to IDMS reference method    Glucose, Fasting 12/08/2021 101 (A) 65 - 99 mg/dL Final    Specimen collection should occur prior to Sulfasalazine administration due to the potential for falsely depressed results  Specimen collection should occur prior to Sulfapyridine administration due to the potential for falsely elevated results   Calcium 12/08/2021 9 3  8 3 - 10 1 mg/dL Final    AST 12/08/2021 19  5 - 45 U/L Final    Specimen collection should occur prior to Sulfasalazine administration due to the potential for falsely depressed results   ALT 12/08/2021 30  12 - 78 U/L Final    Specimen collection should occur prior to Sulfasalazine and/or Sulfapyridine administration due to the potential for falsely depressed results   Alkaline Phosphatase 12/08/2021 64  46 - 116 U/L Final    Total Protein 12/08/2021 7 6  6 4 - 8 2 g/dL Final    Albumin 12/08/2021 4 0  3 5 - 5 0 g/dL Final    Total Bilirubin 12/08/2021 0 63  0 20 - 1 00 mg/dL Final    Use of this assay is not recommended for patients undergoing treatment with eltrombopag due to the potential for falsely elevated results      eGFR 12/08/2021 73  ml/min/1 73sq m Final

## 2022-10-03 NOTE — PROGRESS NOTES
Assessment/Plan:    Annual physical examination  - History and physical examination done  - Pt was counseled to eat a heart healthy diet, to drink at least 2 L of water daily, to take a daily multivitamin and to exercise for at least 30 minutes of cardio exercise daily, for at least 5 days a week  - CBC, CMP, TSH and lipid panel have all been done within the past 3 months and all results were reviewed with patient  Will reorder a lipid panel since she has not had a lipid panel done since she stopped taking the simvastatin  - she is up-to-date with her COVID vaccine, shingles vaccine, pneumococcal and flu shot but not a Tdap vaccine  - she is up-to-date with her mammogram and needs a colonoscopy and will follow up with blood per surgery for that  - follow up in 6 months        Diagnoses and all orders for this visit:    Annual physical exam    Gastroesophageal reflux disease with esophagitis, unspecified whether hemorrhage  -     famotidine (PEPCID) 40 MG tablet; Take 1 tablet (40 mg total) by mouth every morning    Screening for malignant neoplasm of colon  -     Ambulatory referral for colonoscopy; Future    Encounter for screening mammogram for malignant neoplasm of breast  -     Mammo screening bilateral w 3d & cad; Future    Chronic obstructive pulmonary disease, unspecified COPD type ([facility] )    Other hyperlipidemia  -     Lipid panel; Future    Other orders  -     ciclopirox (LOPROX) 0 77 % SUSP; APPLY TOPICALLY TO THE AFFECTED AREA TWICE DAILY          Subjective:      Patient ID: Carlos Wilson is a 69 y o  female  HPI      Patient presents for an annual physical exam as well as for follow-up visit regarding her anxiety disorder, allergies, essential hypertension, hyperlipidemia, restless leg syndrome, COPD      Last annual physical exam-over a year ago     Past medical history- gerd, sq cell lung ca, copd, nicotine dependence for about 50 years, with a max of 2 packs, htn, cystocele, rls, spinal stenosis, depression, hld, colonic polyps, sudural hematoma, uterine prolapse    Past surgical history- BTL, tonsillectomy, rectocele sx, reconstructive vag lift, bladder sling, lumpectomy, breast augmentation, lobectomy of the R upper and wedge resection of RLL for lung ca( s/p radioRx), hysterectomy, cataract sx    Medications- see list    Allergies-sulfa antibiotics - hives and angioedema    Diet-  Mixture of balanced diet and junk diet  And drinks not enough water - 3 glasses a day     Exercise- walks    Alcohol use- a glass of wine a night    Caffeine and soda use-5 cups of half and half coffee    Nicotine use-quit  Last year a nd smoked for about 50 years with a max of 2 packs a day but was down to 1 stick a day at the time she quit     Recreational drug use-medical marijuana card and she uses the liquid occasionally when she cannot sleep     Work-  for [employer], retired    Sexual history, STD history and HIV testing- not sexually active for 2, monogamous with male partner, std hx - never,     Gynecological history/Prostate health/testicular health history- LMP at 37, last pap smear - s/p hysterectomy at around age 45 for ? Bleeding( not sure), last mammogram - 2021    Colonoscopy- last colonoscopy 2019, a polyop and diverticular were found and pt was told to return in 3 years, and already has an appointment     Immunization history- up to date with the covid shot, and influenza, pneumococcal, shingles and last tdap was in 9/21/2012    Dental visit-every 6 months     Vision-reading glasses, s/p catarct sx with improvement in vision    Family history-  htn - dad  cva - dad    Today, patient states that she is having a hard time with fecal incontinence for the past few months  She admits to occasional fecal urgency with occasionally fecal incontence of diarrheal stool    She states that most of the time the incontinence is to diarrheal stool but she also has fecal smearing even after having solid stool  She states that she wipes and wipes and cannot get clean  Has an appointment with colorectal sx next month   She has a hx of rectocele sx and states that she was told that the anal mus is weak  Has been having incontinence since she had the rectocele sx  Patient has been off her simvastatin for couple of months and she states that her neurologist told her that she did not have a cva (even though her MRI of the brain shows a cerebellar infarct) but that she has multiple sclerosis which for some reason has not been very active with symptoms in her care  She has not been on simvastatin for the past 2 months  Her last lipid panel was done a couple of months ago  She states that she is scheduled to have an EMG for her right lower extremity pain  She states that she does not follow-up with neurology anymore  She states that she takes the famotidine because of her history of angioedema and not for GERD  She admits to heat intolerance, shortness of breath on exertion especially climbing stairs or heels, sinus pain and pressure occasionally, tinnitus but denies fever, chills, night sweats, headache, dizziness, Nasal congestion, rhinorrhea, cough, constipation, rectal bleeding, melena stools, myalgias but admits to occasional arthralgias  The following portions of the patient's history were reviewed and updated as appropriate:   She  has a past medical history of Allergic (?), Anxiety, Bronchial malignant neoplasm, right ([address] ), Cancer ([address] ), Chronic major depressive disorder, single episode, COPD (chronic obstructive pulmonary disease) ([address] ), Full incontinence of feces, GERD (gastroesophageal reflux disease), Hyperlipidemia, Hypertension, Malignant neoplasm of bronchus and lung ([address] ), Multiple sclerosis ([address] ), Pulmonary emphysema ([address] ), Restless leg syndrome, and Tobacco use    She   Patient Active Problem List    Diagnosis Date Noted    Annual physical exam 10/03/2022    Subdural hematoma 05/19/2022    Syncope 05/19/2022    BMI 30 0-30 9,adult 05/19/2022    Intervertebral disc disorder with radiculopathy of lumbar region     Parotid tumor 02/11/2022    Dizziness 10/04/2021    Encounter for care related to vascular access port 07/23/2021    Chemotherapy-induced nausea 04/30/2021    Insomnia due to medical condition 04/05/2021    Cancer related pain 04/05/2021    Palliative care patient 04/04/2021    Medical marijuana use 04/04/2021    Anxiety disorder 04/01/2021    Chemotherapy induced neutropenia ([facility] ) 03/31/2021    Dehydration 03/31/2021    [facility] metastasis [facility] ) 03/02/2021    GERD (gastroesophageal reflux disease) 02/26/2021    Squamous cell lung cancer, right ([facility] ) 01/12/2021    Chronic obstructive pulmonary disease ([facility] ) 10/07/2019    Tinnitus of both ears 11/06/2018    Hypertension, essential 11/07/2017    Restless legs syndrome (RLS) 11/07/2017    Cystocele 09/26/2016    Rectocele 09/26/2016    Cystocele with prolapse 09/26/2016    Depression 01/24/2016    Atypical lobular hyperplasia of breast 03/24/2015    Multiple sclerosis ([facility] ) 11/29/2013    Hyperlipidemia 09/15/2010    Spinal stenosis of lumbar region 09/15/2010    History of colonic polyps 09/15/2010     She  has a past surgical history that includes Breast biopsy (Left, 03/23/2015); Breast excisional biopsy (Left, 08/26/2010); Breast biopsy (Left, 08/02/2010); Breast excisional biopsy (Left, 1994); Breast excisional biopsy (Right, 1984); Total abdominal hysterectomy w/ bilateral salpingoophorectomy (Bilateral, 2010); Total abdominal hysterectomy (2010); Augmentation mammaplasty (Bilateral, 11/2014); pr thoracoscopy surg lobectomy (Right, 01/28/2021); pr bronchoscopy,diagnostic (N/A, 01/28/2021); Tonsillectomy; pr insj tunneled ctr vad w/subq port age 9 yr/> (N/A, 03/09/2021); FL guided central venous access device insertion (03/09/2021); Mammo (historical) (2020);  IR port removal (03/07/2022); Lymph node biopsy (2021); and Tubal ligation (1971)  Her family history includes Breast cancer in her paternal aunt; COPD in her brother and father; Hypertension in her father; No Known Problems in her brother, daughter, maternal aunt, maternal grandfather, maternal grandmother, mother, paternal aunt, paternal aunt, paternal aunt, paternal aunt, paternal aunt, paternal aunt, paternal grandmother, sister, and sister; Rectal cancer in her paternal grandfather  She  reports that she quit smoking about 20 months ago  Her smoking use included cigarettes  She started smoking about 54 years ago  She smoked 0 00 packs per day for 30 00 years  She has never used smokeless tobacco  She reports current alcohol use of about 5 0 standard drinks of alcohol per week  She reports that she does not use drugs    Current Outpatient Medications   Medication Sig Dispense Refill    Biotin 5 MG CAPS Take 5,000 mg by mouth daily      buPROPion (WELLBUTRIN XL) 300 mg 24 hr tablet Take 1 tablet (300 mg total) by mouth every morning 90 tablet 1    Cholecalciferol (Vitamin D3) 50 MCG (2000 UT) capsule Take 5,000 Units by mouth daily       famotidine (PEPCID) 40 MG tablet Take 1 tablet (40 mg total) by mouth every morning 90 tablet 1    losartan-hydrochlorothiazide (HYZAAR) 50-12 5 mg per tablet Take 1 tablet by mouth daily 90 tablet 3    pramipexole (MIRAPEX) 0 5 mg tablet Take 1 tablet (0 5 mg total) by mouth daily at bedtime 90 tablet 1    umeclidinium-vilanterol (Anoro Ellipta) 62 5-25 MCG/INH inhaler Inhale 1 puff daily       ciclopirox (LOPROX) 0 77 % SUSP APPLY TOPICALLY TO THE AFFECTED AREA TWICE DAILY      gabapentin (NEURONTIN) 300 mg capsule Take 2 capsules (600 mg total) by mouth daily at bedtime (Patient not taking: No sig reported) 180 capsule 3    simvastatin (ZOCOR) 40 mg tablet Take 1 tablet (40 mg total) by mouth every other day (Patient not taking: Reported on 10/3/2022) 90 tablet 0     No current facility-administered medications for this visit  Current Outpatient Medications on File Prior to Visit   Medication Sig    Biotin 5 MG CAPS Take 5,000 mg by mouth daily    buPROPion (WELLBUTRIN XL) 300 mg 24 hr tablet Take 1 tablet (300 mg total) by mouth every morning    Cholecalciferol (Vitamin D3) 50 MCG (2000 UT) capsule Take 5,000 Units by mouth daily     losartan-hydrochlorothiazide (HYZAAR) 50-12 5 mg per tablet Take 1 tablet by mouth daily    pramipexole (MIRAPEX) 0 5 mg tablet Take 1 tablet (0 5 mg total) by mouth daily at bedtime    umeclidinium-vilanterol (Anoro Ellipta) 62 5-25 MCG/INH inhaler Inhale 1 puff daily     [DISCONTINUED] famotidine (PEPCID) 40 MG tablet Take 1 tablet (40 mg total) by mouth every morning    ciclopirox (LOPROX) 0 77 % SUSP APPLY TOPICALLY TO THE AFFECTED AREA TWICE DAILY    gabapentin (NEURONTIN) 300 mg capsule Take 2 capsules (600 mg total) by mouth daily at bedtime (Patient not taking: No sig reported)    simvastatin (ZOCOR) 40 mg tablet Take 1 tablet (40 mg total) by mouth every other day (Patient not taking: Reported on 10/3/2022)     No current facility-administered medications on file prior to visit  She is allergic to sulfa antibiotics       Review of Systems   Constitutional: Negative for activity change, chills, fatigue, fever and unexpected weight change  HENT: Positive for sinus pressure, sinus pain (occasionally ) and tinnitus  Negative for congestion, ear pain, postnasal drip, rhinorrhea and sore throat  Eyes: Negative for pain  Respiratory: Positive for shortness of breath (on climbing a hill and stairs sometimes )  Negative for cough, choking, chest tightness and wheezing (improved )  Cardiovascular: Negative for chest pain, palpitations and leg swelling  Gastrointestinal: Positive for diarrhea (with fecal incontinence sonya with diarrhea and with fecal smearing )  Negative for abdominal pain, constipation, nausea and vomiting  Endocrine: Positive for heat intolerance  Genitourinary: Negative for dysuria and hematuria  Musculoskeletal: Positive for arthralgias (occasionally )  Negative for back pain, gait problem, joint swelling, myalgias and neck stiffness  Skin: Negative for pallor and rash  Neurological: Negative for dizziness (improved since she stopped gabapentin ), tremors, seizures, syncope, light-headedness and headaches  Hematological: Negative for adenopathy  Psychiatric/Behavioral: Negative for behavioral problems  Objective:      /74 (BP Location: Left arm, Patient Position: Sitting, Cuff Size: Standard)   Pulse 61   Temp 98 3 °F (36 8 °C) (Temporal)   Ht 4' 8 69" (1 44 m) Comment: shoes off  Wt 59 4 kg (131 lb) Comment: shoes off  LMP  (LMP Unknown)   SpO2 99%   BMI 28 66 kg/m²          Physical Exam  Constitutional:       General: She is not in acute distress  Appearance: She is well-developed  She is not diaphoretic  HENT:      Head: Normocephalic and atraumatic  Right Ear: External ear normal       Left Ear: External ear normal       Nose: Mucosal edema, congestion and rhinorrhea (Mild rhinorrhea On the left) present  Mouth/Throat:      Mouth: Mucous membranes are dry  Pharynx: No oropharyngeal exudate or posterior oropharyngeal erythema  Comments: Dry mucous membranes with Mallampati class 4 oropharynx  Eyes:      General: No scleral icterus  Right eye: No discharge  Left eye: No discharge  Conjunctiva/sclera: Conjunctivae normal       Pupils: Pupils are equal, round, and reactive to light  Neck:      Thyroid: No thyromegaly  Vascular: No JVD  Trachea: No tracheal deviation  Cardiovascular:      Rate and Rhythm: Normal rate and regular rhythm  Heart sounds: Normal heart sounds  No murmur heard  No friction rub  No gallop  Pulmonary:      Effort: Pulmonary effort is normal  No respiratory distress        Breath sounds: Decreased breath sounds ( decreased breath sounds in all lung fields, worse on the right) present  No wheezing or rales  Chest:      Chest wall: No tenderness  Abdominal:      General: Bowel sounds are normal  There is no distension  Palpations: Abdomen is soft  There is no mass  Tenderness: There is no abdominal tenderness  There is no guarding or rebound  Musculoskeletal:         General: No tenderness or deformity  Normal range of motion  Cervical back: Normal range of motion and neck supple  Lymphadenopathy:      Cervical: No cervical adenopathy  Skin:     General: Skin is warm and dry  Coloration: Skin is not pale  Findings: No erythema or rash  Neurological:      Mental Status: She is alert and oriented to person, place, and time  Cranial Nerves: No cranial nerve deficit  Motor: No abnormal muscle tone  Coordination: Coordination normal       Deep Tendon Reflexes: Reflexes are normal and symmetric        Comments: Cranial nerves 2-12 are intact bilaterally  Muscle strength is 5/5 in all extremities  Sensation is intact in bilateral face and extremities  Rapid alternating movement and finger-to-nose pointing test intact   Deep tendon reflexes are 2+ bilaterally  Gait is intact       Psychiatric:         Behavior: Behavior normal            Appointment on 07/10/2022   Component Date Value Ref Range Status    Cholesterol 07/10/2022 164  See Comment mg/dL Final    Cholesterol:         Pediatric <18 Years        Desirable          <170 mg/dL      Borderline High    170-199 mg/dL      High               >=200 mg/dL        Adult >=18 Years            Desirable         <200 mg/dL      Borderline High   200-239 mg/dL      High              >239 mg/dL      Triglycerides 07/10/2022 92  See Comment mg/dL Final    Triglyceride:     0-9Y            <75mg/dL     10Y-17Y         <90 mg/dL       >=18Y     Normal          <150 mg/dL     Borderline High 150-199 mg/dL     High 200-499 mg/dL        Very High       >499 mg/dL    Specimen collection should occur prior to N-Acetylcysteine or Metamizole administration due to the potential for falsely depressed results   HDL, Direct 07/10/2022 65  >=50 mg/dL Final    Specimen collection should occur prior to Metamizole administration due to the potential for falsley depressed results   LDL Calculated 07/10/2022 81  0 - 100 mg/dL Final    LDL Cholesterol:     Optimal           <100 mg/dl     Near Optimal      100-129 mg/dl     Above Optimal       Borderline High 130-159 mg/dl       High            160-189 mg/dl       Very High       >189 mg/dl         This screening LDL is a calculated result  It does not have the accuracy of the Direct Measured LDL in the monitoring of patients with hyperlipidemia and/or statin therapy  Direct Measure LDL (EVF389) must be ordered separately in these patients   Non-HDL-Chol (CHOL-HDL) 07/10/2022 99  mg/dl Final    TSH 3RD GENERATON 07/10/2022 1 360  0 450 - 4 500 uIU/mL Final    The recommended reference ranges for TSH during pregnancy are as follows:   First trimester 0 1 to 2 5 uIU/mL   Second trimester  0 2 to 3 0 uIU/mL   Third trimester 0 3 to 3 0 uIU/m    Note: Normal ranges may not apply to patients who are transgender, non-binary, or whose legal sex, sex at birth, and gender identity differ  Adult TSH (3rd generation) reference range follows the recommended guidelines of the American Thyroid Association, January, 2020      Vit D, 25-Hydroxy 07/10/2022 83 9  30 0 - 100 0 ng/mL Final   Office Visit on 06/13/2022   Component Date Value Ref Range Status    Color, UA 07/10/2022 Colorless   Final    Clarity, UA 07/10/2022 Clear   Final    Specific Gravity, UA 07/10/2022 1 007  1 003 - 1 030 Final    pH, UA 07/10/2022 7 0  4 5, 5 0, 5 5, 6 0, 6 5, 7 0, 7 5, 8 0 Final    Leukocytes, UA 07/10/2022 Trace (A) Negative Final    Nitrite, UA 07/10/2022 Negative  Negative Final    Protein, UA 07/10/2022 Negative  Negative mg/dl Final    Glucose, UA 07/10/2022 Negative  Negative mg/dl Final    Ketones, UA 07/10/2022 Negative  Negative mg/dl Final    Urobilinogen, UA 07/10/2022 <2 0  <2 0 mg/dl mg/dl Final    Bilirubin, UA 07/10/2022 Negative  Negative Final    Occult Blood, UA 07/10/2022 Negative  Negative Final    RBC, UA 07/10/2022 None Seen  None Seen, 1-2 /hpf Final    WBC, UA 07/10/2022 None Seen  None Seen, 1-2 /hpf Final    Epithelial Cells 07/10/2022 Occasional  None Seen, Occasional /hpf Final    Bacteria, UA 07/10/2022 None Seen  None Seen, Occasional /hpf Final   Appointment on 06/07/2022   Component Date Value Ref Range Status    Sodium 06/07/2022 140  136 - 145 mmol/L Final    Potassium 06/07/2022 3 6  3 5 - 5 3 mmol/L Final    Chloride 06/07/2022 105  100 - 108 mmol/L Final    CO2 06/07/2022 30  21 - 32 mmol/L Final    ANION GAP 06/07/2022 5  4 - 13 mmol/L Final    BUN 06/07/2022 12  5 - 25 mg/dL Final    Creatinine 06/07/2022 0 94  0 60 - 1 30 mg/dL Final    Standardized to IDMS reference method    Glucose 06/07/2022 105  65 - 140 mg/dL Final    If the patient is fasting, the ADA then defines impaired fasting glucose as > 100 mg/dL and diabetes as > or equal to 123 mg/dL  Specimen collection should occur prior to Sulfasalazine administration due to the potential for falsely depressed results  Specimen collection should occur prior to Sulfapyridine administration due to the potential for falsely elevated results      Calcium 06/07/2022 10 0  8 3 - 10 1 mg/dL Final    eGFR 06/07/2022 62  ml/min/1 73sq m Final   Appointment on 02/05/2022   Component Date Value Ref Range Status    WBC 02/05/2022 4 62  4 31 - 10 16 Thousand/uL Final    RBC 02/05/2022 4 38  3 81 - 5 12 Million/uL Final    Hemoglobin 02/05/2022 14 1  11 5 - 15 4 g/dL Final    Hematocrit 02/05/2022 43 0  34 8 - 46 1 % Final    MCV 02/05/2022 98  82 - 98 fL Final    MCH 02/05/2022 32 2  26 8 - 34 3 pg Final    MCHC 02/05/2022 32 8  31 4 - 37 4 g/dL Final    RDW 02/05/2022 12 3  11 6 - 15 1 % Final    MPV 02/05/2022 9 5  8 9 - 12 7 fL Final    Platelets 63/29/8237 300  149 - 390 Thousands/uL Final    nRBC 02/05/2022 0  /100 WBCs Final    Neutrophils Relative 02/05/2022 60  43 - 75 % Final    Immat GRANS % 02/05/2022 0  0 - 2 % Final    Lymphocytes Relative 02/05/2022 26  14 - 44 % Final    Monocytes Relative 02/05/2022 11  4 - 12 % Final    Eosinophils Relative 02/05/2022 2  0 - 6 % Final    Basophils Relative 02/05/2022 1  0 - 1 % Final    Neutrophils Absolute 02/05/2022 2 83  1 85 - 7 62 Thousands/µL Final    Immature Grans Absolute 02/05/2022 0 00  0 00 - 0 20 Thousand/uL Final    Lymphocytes Absolute 02/05/2022 1 19  0 60 - 4 47 Thousands/µL Final    Monocytes Absolute 02/05/2022 0 49  0 17 - 1 22 Thousand/µL Final    Eosinophils Absolute 02/05/2022 0 07  0 00 - 0 61 Thousand/µL Final    Basophils Absolute 02/05/2022 0 04  0 00 - 0 10 Thousands/µL Final    Sodium 02/05/2022 134 (A) 136 - 145 mmol/L Final    Potassium 02/05/2022 4 1  3 5 - 5 3 mmol/L Final    Chloride 02/05/2022 100  100 - 108 mmol/L Final    CO2 02/05/2022 32  21 - 32 mmol/L Final    ANION GAP 02/05/2022 2 (A) 4 - 13 mmol/L Final    BUN 02/05/2022 12  5 - 25 mg/dL Final    Creatinine 02/05/2022 0 82  0 60 - 1 30 mg/dL Final    Standardized to IDMS reference method    Glucose 02/05/2022 82  65 - 140 mg/dL Final    If the patient is fasting, the ADA then defines impaired fasting glucose as > 100 mg/dL and diabetes as > or equal to 123 mg/dL  Specimen collection should occur prior to Sulfasalazine administration due to the potential for falsely depressed results  Specimen collection should occur prior to Sulfapyridine administration due to the potential for falsely elevated results      Calcium 02/05/2022 9 8  8 3 - 10 1 mg/dL Final    AST 02/05/2022 23  5 - 45 U/L Final    Specimen collection should occur prior to Sulfasalazine administration due to the potential for falsely depressed results   ALT 02/05/2022 44  12 - 78 U/L Final    Specimen collection should occur prior to Sulfasalazine and/or Sulfapyridine administration due to the potential for falsely depressed results   Alkaline Phosphatase 02/05/2022 62  46 - 116 U/L Final    Total Protein 02/05/2022 8 1  6 4 - 8 2 g/dL Final    Albumin 02/05/2022 4 2  3 5 - 5 0 g/dL Final    Total Bilirubin 02/05/2022 0 59  0 20 - 1 00 mg/dL Final    Use of this assay is not recommended for patients undergoing treatment with eltrombopag due to the potential for falsely elevated results      eGFR 02/05/2022 73  ml/min/1 73sq m Final   Hospital Outpatient Visit on 12/28/2021   Component Date Value Ref Range Status    POC Glucose 12/28/2021 104  65 - 140 mg/dl Final   Appointment on 12/08/2021   Component Date Value Ref Range Status    WBC 12/08/2021 6 10  4 31 - 10 16 Thousand/uL Final    RBC 12/08/2021 4 50  3 81 - 5 12 Million/uL Final    Hemoglobin 12/08/2021 14 5  11 5 - 15 4 g/dL Final    Hematocrit 12/08/2021 44 7  34 8 - 46 1 % Final    MCV 12/08/2021 99 (A) 82 - 98 fL Final    MCH 12/08/2021 32 2  26 8 - 34 3 pg Final    MCHC 12/08/2021 32 4  31 4 - 37 4 g/dL Final    RDW 12/08/2021 13 1  11 6 - 15 1 % Final    MPV 12/08/2021 9 5  8 9 - 12 7 fL Final    Platelets 89/92/9512 292  149 - 390 Thousands/uL Final    nRBC 12/08/2021 0  /100 WBCs Final    Neutrophils Relative 12/08/2021 65  43 - 75 % Final    Immat GRANS % 12/08/2021 0  0 - 2 % Final    Lymphocytes Relative 12/08/2021 23  14 - 44 % Final    Monocytes Relative 12/08/2021 8  4 - 12 % Final    Eosinophils Relative 12/08/2021 3  0 - 6 % Final    Basophils Relative 12/08/2021 1  0 - 1 % Final    Neutrophils Absolute 12/08/2021 3 96  1 85 - 7 62 Thousands/µL Final    Immature Grans Absolute 12/08/2021 0 01  0 00 - 0 20 Thousand/uL Final    Lymphocytes Absolute 12/08/2021 1 43  0 60 - 4 47 Thousands/µL Final    Monocytes Absolute 12/08/2021 0 48  0 17 - 1 22 Thousand/µL Final    Eosinophils Absolute 12/08/2021 0 16  0 00 - 0 61 Thousand/µL Final    Basophils Absolute 12/08/2021 0 06  0 00 - 0 10 Thousands/µL Final    Sodium 12/08/2021 136  136 - 145 mmol/L Final    Potassium 12/08/2021 4 0  3 5 - 5 3 mmol/L Final    Chloride 12/08/2021 102  100 - 108 mmol/L Final    CO2 12/08/2021 28  21 - 32 mmol/L Final    ANION GAP 12/08/2021 6  4 - 13 mmol/L Final    BUN 12/08/2021 12  5 - 25 mg/dL Final    Creatinine 12/08/2021 0 83  0 60 - 1 30 mg/dL Final    Standardized to IDMS reference method    Glucose, Fasting 12/08/2021 101 (A) 65 - 99 mg/dL Final    Specimen collection should occur prior to Sulfasalazine administration due to the potential for falsely depressed results  Specimen collection should occur prior to Sulfapyridine administration due to the potential for falsely elevated results   Calcium 12/08/2021 9 3  8 3 - 10 1 mg/dL Final    AST 12/08/2021 19  5 - 45 U/L Final    Specimen collection should occur prior to Sulfasalazine administration due to the potential for falsely depressed results   ALT 12/08/2021 30  12 - 78 U/L Final    Specimen collection should occur prior to Sulfasalazine and/or Sulfapyridine administration due to the potential for falsely depressed results   Alkaline Phosphatase 12/08/2021 64  46 - 116 U/L Final    Total Protein 12/08/2021 7 6  6 4 - 8 2 g/dL Final    Albumin 12/08/2021 4 0  3 5 - 5 0 g/dL Final    Total Bilirubin 12/08/2021 0 63  0 20 - 1 00 mg/dL Final    Use of this assay is not recommended for patients undergoing treatment with eltrombopag due to the potential for falsely elevated results      eGFR 12/08/2021 73  ml/min/1 73sq m Final

## 2022-10-03 NOTE — PATIENT INSTRUCTIONS
Wellness Visit for Adults   AMBULATORY CARE:   A wellness visit  is when you see your healthcare provider to get screened for health problems  Your healthcare provider will also give you advice on how to stay healthy  Write down your questions so you remember to ask them  Ask your healthcare provider how often you should have a wellness visit  What happens at a wellness visit:  Your healthcare provider will ask about your health, and your family history of health problems  This includes high blood pressure, heart disease, and cancer  He or she will ask if you have symptoms that concern you, if you smoke, and about your mood  You may also be asked about your intake of medicines, supplements, food, and alcohol  Any of the following may be done: Your weight  will be checked  Your height may also be checked so your body mass index (BMI) can be calculated  Your BMI shows if you are at a healthy weight  Your blood pressure  and heart rate will be checked  Your temperature may also be checked  Blood and urine tests  may be done  Blood tests may be done to check your cholesterol levels  Abnormal cholesterol levels increase your risk for heart disease and stroke  You may also need a blood or urine test to check for diabetes if you are at increased risk  Urine tests may be done to look for signs of an infection or kidney disease  A physical exam  includes checking your heartbeat and lungs with a stethoscope  Your healthcare provider may also check your skin to look for sun damage  Screening tests  may be recommended  A screening test is done to check for diseases that may not cause symptoms  The screening tests you may need depend on your age, gender, family history, and lifestyle habits  For example, colorectal screening may be recommended if you are 48years old or older  Screening tests you need if you are a woman:   A Pap smear  is used to screen for cervical cancer   Pap smears are usually done every 3 to 5 years depending on your age  You may need them more often if you have had abnormal Pap smear test results in the past  Ask your healthcare provider how often you should have a Pap smear  A mammogram  is an x-ray of your breasts to screen for breast cancer  Experts recommend mammograms every 2 years starting at age 48 years  You may need a mammogram at age 52 years or younger if you have an increased risk for breast cancer  Talk to your healthcare provider about when you should start having mammograms and how often you need them  Vaccines you may need:   Get an influenza vaccine  every year  The influenza vaccine protects you from the flu  Several types of viruses cause the flu  The viruses change over time, so new vaccines are made each year  Get a tetanus-diphtheria (Td) booster vaccine  every 10 years  This vaccine protects you against tetanus and diphtheria  Tetanus is a severe infection that may cause painful muscle spasms and lockjaw  Diphtheria is a severe bacterial infection that causes a thick covering in the back of your mouth and throat  Get a human papillomavirus (HPV) vaccine  if you are female and aged 23 to 32 or male 23 to 24 and never received it  This vaccine protects you from HPV infection  HPV is the most common infection spread by sexual contact  HPV may also cause vaginal, penile, and anal cancers  Get a pneumococcal vaccine  if you are aged 72 years or older  The pneumococcal vaccine is an injection given to protect you from pneumococcal disease  Pneumococcal disease is an infection caused by pneumococcal bacteria  The infection may cause pneumonia, meningitis, or an ear infection  Get a shingles vaccine  if you are 60 or older, even if you have had shingles before  The shingles vaccine is an injection to protect you from the varicella-zoster virus  This is the same virus that causes chickenpox   Shingles is a painful rash that develops in people who had chickenpox or have been exposed to the virus  How to eat healthy:  My Plate is a model for planning healthy meals  It shows the types and amounts of foods that should go on your plate  Fruits and vegetables make up about half of your plate, and grains and protein make up the other half  A serving of dairy is included on the side of your plate  The amount of calories and serving sizes you need depends on your age, gender, weight, and height  Examples of healthy foods are listed below:  Eat a variety of vegetables  such as dark green, red, and orange vegetables  You can also include canned vegetables low in sodium (salt) and frozen vegetables without added butter or sauces  Eat a variety of fresh fruits , canned fruit in 100% juice, frozen fruit, and dried fruit  Include whole grains  At least half of the grains you eat should be whole grains  Examples include whole-wheat bread, wheat pasta, brown rice, and whole-grain cereals such as oatmeal     Eat a variety of protein foods such as seafood (fish and shellfish), lean meat, and poultry without skin (turkey and chicken)  Examples of lean meats include pork leg, shoulder, or tenderloin, and beef round, sirloin, tenderloin, and extra lean ground beef  Other protein foods include eggs and egg substitutes, beans, peas, soy products, nuts, and seeds  Choose low-fat dairy products such as skim or 1% milk or low-fat yogurt, cheese, and cottage cheese  Limit unhealthy fats  such as butter, hard margarine, and shortening  Exercise:  Exercise at least 30 minutes per day on most days of the week  Some examples of exercise include walking, biking, dancing, and swimming  You can also fit in more physical activity by taking the stairs instead of the elevator or parking farther away from stores  Include muscle strengthening activities 2 days each week  Regular exercise provides many health benefits   It helps you manage your weight, and decreases your risk for type 2 diabetes, heart disease, stroke, and high blood pressure  Exercise can also help improve your mood  Ask your healthcare provider about the best exercise plan for you  General health and safety guidelines:   Do not smoke  Nicotine and other chemicals in cigarettes and cigars can cause lung damage  Ask your healthcare provider for information if you currently smoke and need help to quit  E-cigarettes or smokeless tobacco still contain nicotine  Talk to your healthcare provider before you use these products  Limit alcohol  A drink of alcohol is 12 ounces of beer, 5 ounces of wine, or 1½ ounces of liquor  Lose weight, if needed  Being overweight increases your risk of certain health conditions  These include heart disease, high blood pressure, type 2 diabetes, and certain types of cancer  Protect your skin  Do not sunbathe or use tanning beds  Use sunscreen with a SPF 15 or higher  Apply sunscreen at least 15 minutes before you go outside  Reapply sunscreen every 2 hours  Wear protective clothing, hats, and sunglasses when you are outside  Drive safely  Always wear your seatbelt  Make sure everyone in your car wears a seatbelt  A seatbelt can save your life if you are in an accident  Do not use your cell phone when you are driving  This could distract you and cause an accident  Pull over if you need to make a call or send a text message  Practice safe sex  Use latex condoms if are sexually active and have more than one partner  Your healthcare provider may recommend screening tests for sexually transmitted infections (STIs)  Wear helmets, lifejackets, and protective gear  Always wear a helmet when you ride a bike or motorcycle, go skiing, or play sports that could cause a head injury  Wear protective equipment when you play sports  Wear a lifejacket when you are on a boat or doing water sports      © Copyright [facility] 2022 Information is for End User's use only and may not be sold, redistributed or otherwise used for commercial purposes  All illustrations and images included in CareNotes® are the copyrighted property of A D A M [employer] Inc  or [employer]  The above information is an  only  It is not intended as medical advice for individual conditions or treatments  Talk to your doctor, nurse or pharmacist before following any medical regimen to see if it is safe and effective for you

## 2022-10-12 PROBLEM — E86.0 DEHYDRATION: Status: RESOLVED | Noted: 2021-03-31 | Resolved: 2022-10-12

## 2022-11-07 ENCOUNTER — HOSPITAL ENCOUNTER (OUTPATIENT)
Dept: RADIOLOGY | Age: 67
Discharge: HOME | End: 2022-11-07

## 2022-11-07 DIAGNOSIS — N95.1 MENOPAUSAL STATE: ICD-10-CM

## 2022-11-15 ENCOUNTER — TELEPHONE (OUTPATIENT)
Dept: INTERNAL MEDICINE CLINIC | Age: 67
End: 2022-11-15

## 2022-11-15 NOTE — TELEPHONE ENCOUNTER
I called and discussed the result of patient's dexa scan with her  She has osteoporosis and we discussed bisphosphonates like fosamax and Zolendronic acid as well as prolia and I encouraged her to think and read about them and to let us know if she is willing to get the treatment  I will send a message to Rukhsana to find out if her insurance will cover for Prolia  I also encouraged her to take her vitamin D and calcium and to engage in wt bearing exercise and to be careful not to fall  She expressed her understanding and agreement with the plan

## 2022-11-17 ENCOUNTER — HOSPITAL ENCOUNTER (OUTPATIENT)
Dept: NEUROLOGY | Facility: CLINIC | Age: 67
End: 2022-11-17

## 2022-11-17 DIAGNOSIS — M51.16 INTERVERTEBRAL DISC DISORDER WITH RADICULOPATHY OF LUMBAR REGION: ICD-10-CM

## 2022-11-17 DIAGNOSIS — M48.062 LUMBAR STENOSIS WITH NEUROGENIC CLAUDICATION: ICD-10-CM

## 2022-11-17 DIAGNOSIS — M54.16 RIGHT LUMBAR RADICULOPATHY: ICD-10-CM

## 2022-11-22 ENCOUNTER — TELEPHONE (OUTPATIENT)
Dept: OTHER | Facility: OTHER | Age: 67
End: 2022-11-22

## 2022-11-22 NOTE — TELEPHONE ENCOUNTER
Per patient's phone call, she would like a call with her EMG results and she would like to have the results mailed to her as well

## 2023-01-05 DIAGNOSIS — G25.81 RESTLESS LEGS SYNDROME (RLS): ICD-10-CM

## 2023-01-05 DIAGNOSIS — F41.9 ANXIETY DISORDER, UNSPECIFIED TYPE: ICD-10-CM

## 2023-01-05 RX ORDER — BUPROPION HYDROCHLORIDE 300 MG/1
300 TABLET ORAL EVERY MORNING
Qty: 90 TABLET | Refills: 1 | Status: SHIPPED | OUTPATIENT
Start: 2023-01-05

## 2023-01-05 RX ORDER — PRAMIPEXOLE DIHYDROCHLORIDE 0.5 MG/1
0.5 TABLET ORAL
Qty: 90 TABLET | Refills: 1 | Status: SHIPPED | OUTPATIENT
Start: 2023-01-05

## 2023-01-26 ENCOUNTER — APPOINTMENT (OUTPATIENT)
Dept: LAB | Facility: CLINIC | Age: 68
End: 2023-01-26

## 2023-01-26 ENCOUNTER — TELEPHONE (OUTPATIENT)
Dept: HEMATOLOGY ONCOLOGY | Facility: CLINIC | Age: 68
End: 2023-01-26

## 2023-01-26 DIAGNOSIS — C34.91 SQUAMOUS CELL LUNG CANCER, RIGHT (HCC): ICD-10-CM

## 2023-01-26 LAB
ALBUMIN SERPL BCP-MCNC: 3.8 G/DL (ref 3.5–5)
ALP SERPL-CCNC: 78 U/L (ref 46–116)
ALT SERPL W P-5'-P-CCNC: 29 U/L (ref 12–78)
ANION GAP SERPL CALCULATED.3IONS-SCNC: 2 MMOL/L (ref 4–13)
AST SERPL W P-5'-P-CCNC: 16 U/L (ref 5–45)
BASOPHILS # BLD AUTO: 0.06 THOUSANDS/ÂΜL (ref 0–0.1)
BASOPHILS NFR BLD AUTO: 1 % (ref 0–1)
BILIRUB SERPL-MCNC: 0.56 MG/DL (ref 0.2–1)
BUN SERPL-MCNC: 14 MG/DL (ref 5–25)
CALCIUM SERPL-MCNC: 9.1 MG/DL (ref 8.3–10.1)
CEA SERPL-MCNC: 1.8 NG/ML (ref 0–3)
CHLORIDE SERPL-SCNC: 102 MMOL/L (ref 96–108)
CO2 SERPL-SCNC: 32 MMOL/L (ref 21–32)
CREAT SERPL-MCNC: 0.82 MG/DL (ref 0.6–1.3)
EOSINOPHIL # BLD AUTO: 0.07 THOUSAND/ÂΜL (ref 0–0.61)
EOSINOPHIL NFR BLD AUTO: 1 % (ref 0–6)
ERYTHROCYTE [DISTWIDTH] IN BLOOD BY AUTOMATED COUNT: 12.2 % (ref 11.6–15.1)
GFR SERPL CREATININE-BSD FRML MDRD: 73 ML/MIN/1.73SQ M
GLUCOSE SERPL-MCNC: 82 MG/DL (ref 65–140)
HCT VFR BLD AUTO: 42.9 % (ref 34.8–46.1)
HGB BLD-MCNC: 13.7 G/DL (ref 11.5–15.4)
IMM GRANULOCYTES # BLD AUTO: 0 THOUSAND/UL (ref 0–0.2)
IMM GRANULOCYTES NFR BLD AUTO: 0 % (ref 0–2)
LYMPHOCYTES # BLD AUTO: 1.56 THOUSANDS/ÂΜL (ref 0.6–4.47)
LYMPHOCYTES NFR BLD AUTO: 31 % (ref 14–44)
MCH RBC QN AUTO: 31.1 PG (ref 26.8–34.3)
MCHC RBC AUTO-ENTMCNC: 31.9 G/DL (ref 31.4–37.4)
MCV RBC AUTO: 97 FL (ref 82–98)
MONOCYTES # BLD AUTO: 0.43 THOUSAND/ÂΜL (ref 0.17–1.22)
MONOCYTES NFR BLD AUTO: 9 % (ref 4–12)
NEUTROPHILS # BLD AUTO: 2.95 THOUSANDS/ÂΜL (ref 1.85–7.62)
NEUTS SEG NFR BLD AUTO: 58 % (ref 43–75)
NRBC BLD AUTO-RTO: 0 /100 WBCS
PLATELET # BLD AUTO: 319 THOUSANDS/UL (ref 149–390)
PMV BLD AUTO: 10.1 FL (ref 8.9–12.7)
POTASSIUM SERPL-SCNC: 4 MMOL/L (ref 3.5–5.3)
PROT SERPL-MCNC: 7.2 G/DL (ref 6.4–8.4)
RBC # BLD AUTO: 4.41 MILLION/UL (ref 3.81–5.12)
SODIUM SERPL-SCNC: 136 MMOL/L (ref 135–147)
WBC # BLD AUTO: 5.07 THOUSAND/UL (ref 4.31–10.16)

## 2023-01-26 NOTE — TELEPHONE ENCOUNTER
Occupational Therapy Evaluation    Visit Count: 1   Plan of Care: 3/26/2019 Through: 5/21/2019  Insurance Information: Work Injury Information:   Current Employer:  [employer]  [address] [address]   Occupation:    : unsure   Restrictions: no lifting over 5#, no pulling, pushing   Full Duty Work Demands: light (10 - 20 lbs), standing >50% of the day, lifting overhead, chest height lifting, lifting from floor and rotational/twisting motions    Current Work Status: Working, restricted duty due to current condition    Referred by: Mario Brown MD; Next provider visit (if known/scheduled): 4-29-19  Medical Diagnosis (from order): M25.532 Left wrist pain    Treatment Diagnosis: left wrist symptoms with increased pain/symptoms, impaired strength, impaired range of motion, impaired activity tolerance    Date of onset/injury: 3-8-19  Diagnosis Precautions: none  Chart reviewed at time of initial evaluation (relevant co-morbidities, allergies, tests and medications listed):       SUBJECTIVE   Description of Problem and/or Mechanism of Injury: Patient was pulling rack of cookies out of cabinet and wrist hit a cabinet.     Pain:  Intensity: Now: 4/10; Best: 4/10; Worst: 8/10 (in the last 2 weeks)  Location: dorsal wrist and forearm and ulnar border of wrist and forearm  Quality/Description: Ache, Sharp  Relieving/Alleviating factors: over the counter topical cream    Function:  Limitations and exacerbating factors: pain, difficulty with dressing, grocery shopping, grooming/hygiene, house/yard work , lifting/carrying, meal prep, opening a jar, pushing/pulling, self care activities  Prior level: independent with all activities of daily living and instrumental activities of daily living  Personal Occupations Profile Affected: bathing/showering, upper body dressing, lower body dressing, personal hygiene/grooming, driving, meal preparation/cleanup, shopping, job  SPOKE WITH PATIENT IN REGARDS TO HAVING LABS DRAWN PRIOR TO APPT, PATIENT VERBALIZED UNDERSTANDING  performance  Patient Goal: to reduce pain    Prior Treatment: no therapies in the past year for current condition. Hospitalization, home health services or skilled nursing facility in the last 30 days: No, per patient.  Home Environment/Social Support: Patient lives with significant other; assistance as needed from family/friends available.    Safety:  Do you feel safe at home, work and/or school? yes, per patient  Patient denies 2 or more falls or an unexplained fall with injury in the last year, further testing not required     OBJECTIVE   Hand Dominance: right  Extremity Involved: left    Posture/Observation: wearing wrist brace on arrival    Range of Motion (degrees):    Left Right   Date Initial  Initial    Index Finger       MCP Ext/flex      PIP Ext/Flex      DIP Ext/Flex      DPC distance     Middle Finger      MCP Ext/Flex      PIP Ext/Flex      DIP Ext/Flex      DPC distance     Ring Finger      MCP Ext/Flex      PIP Ext/Flex      DIP Ext/Flex      DPC distance     Small Finger      MCP Ext/Flex      PIP Ext/Flex      DIP Ext/Flex      DPC distance     Thumb      MCP Ext/Flex      IP Ext/Flex      Radial abduction      Palmar abduction      Tip Opposition      Opposition to      Base of 5th digit     Key: Ext=extension, Flex=flexion, SF=small finger, MCP=metacarpophalangeal joint, PIP=proximal interphalangeal joint, DIP=distal interphalangeal joints; IP=interphalangeal joint; DPC =distal palmar crease, IP=interphalangeal joint ; standard testing positions unless otherwise noted; ranges are reported in active range of motion unless noted as AA=active assistive or P=passive range of motion; * denotes pain   Comments: Only those motions that were assessed are noted.; *denotes pain  Range of Motion (degrees):  Wrist Active Range of Motion   Norm Left Right   Date  Initial Initial   Wrist Flexion 80°  870 30 85   Wrist Extension 70°  70°  30 60   Radial Deviation 20°  20°  15 25   Ulnar Deviation 45°  45°  <5  40   Forearm Supination 90°  90°        Forearm Pronation 90°  90°  *Within normal limits     [standard testing positions unless otherwise noted]  Comments:  ; * denotes pain          strength: not tested due to pain    Edema:  Slight to dorsum of wrist    Orthosis:   Wrist brace    Palpation:   Tender to dorsum of wrist/forearm     Sensation:  Tingling: slight to hand       Outcome Measures: (Outcome Scoring)  Disabilities of the Arm, Shoulder and Hand (DASH): 56 (scored 0-100; a higher score indicates greater disability)     Initial Treatment   Initial evaluation completed.    Ultrasound (72032):  Location: let dorsal wrist  Position: sitting  Duration: 6 minutes Duty Cycle: 50% duty cycle  Frequency: 3 Mhz  Intensity: 1.0 W/cm2   Results: no change in symptoms immediately following modality; no adverse reaction to treatment    Skilled input: verbal instruction/cues    Initial Home Program:  * above=instructed home program    Continue with brace from MD- remove 3 times daily for performing active wrist flexion/extension  ice    Writer verbally educated the patient and received verbal consent from the patient on hand placement, positioning of patient, and techniques to be performed today including hand placement and palpation for techniques as described above and how they are pertinent to the patient's plan of care.      Suggestions for next session as indicated: progress per plan of care, modalities as appropriate    ASSESSMENT   44 year old female patient has signs and symptoms consistent with left wrist pain that has reported functional limitations listed above. Patient reported she hit wrist when pulling rack of cookies out of cabinet. Patient presents with pain, decreased range of motion/strength, and difficulty performing daily and work tasks.    Patient will benefit from skilled therapy and rehab potential is good based on assessment above   Clinical decision making: Moderate - Patient has several  limitations (3-5), comorbidities and/or complexities, as noted in detailed assessment above, that impact their occupational profile.  Resulting in several treatment options and minimal to moderate task modification consistent with moderate clinical decision making complexity.    PLAN   Goals: To be obtained by end of this plan of care:  1. Patient will be independent with progressed and modified home exercise program   2  Patient to report <3/10 pain 75% of time to ease ability to perform daily tasks  3. Patient to increase wrist range of motion to within functional limits to complete work and  daily tasks.  Patient to demonstrate within functional limits  strength to complete work and daily tasks      The following skilled interventions to be implemented to achieve above:  Manual Therapy (90952)  Therapeutic Activity (32043)  Therapeutic Exercise (16775)  Electrical Stimulation (87751//98633)   Fluidotherapy/Whirlpool (60058/42147)  Heat/Cold (18780)  Iontophoresis (37858)  dexamethasone sodium phosphate, 4mg/ml up to 8 applications  Ultrasound/Phonophoresis (18307)    Frequency/Duration: 2 times per week for 8 weeks with tapering as the patient progresses for an estimated total of 16 visits     patient involved in and agreed to plan of care and goals.   Attendance policy provided at time of evaluation.      Patient Education:   Who will be receiving education: patient  Are they ready to learn: yes  Preferred learning style: written, verbal, demonstration  Barriers to learning: no barriers apparent at this time   Result of initial outlined education: Verbalizes understanding and Demonstrates understanding    THERAPY DAILY BILLING   Insurance: 1. WC-PAYOR UNKNOWN 2.     Evaluation Procedures:  Occupational Therapy Evaluation: Moderate Complexity    Timed Procedures:  No timed codes were used on this date of service    Untimed Procedures:  No untimed codes were used on this date of service    Total Treatment  Time: 45 minutes    Electronically sent for referring provider signature

## 2023-02-02 ENCOUNTER — OFFICE VISIT (OUTPATIENT)
Dept: HEMATOLOGY ONCOLOGY | Facility: CLINIC | Age: 68
End: 2023-02-02

## 2023-02-02 VITALS
BODY MASS INDEX: 31.27 KG/M2 | WEIGHT: 139 LBS | HEIGHT: 56 IN | DIASTOLIC BLOOD PRESSURE: 72 MMHG | OXYGEN SATURATION: 97 % | HEART RATE: 58 BPM | TEMPERATURE: 98.5 F | RESPIRATION RATE: 16 BRPM | SYSTOLIC BLOOD PRESSURE: 124 MMHG

## 2023-02-02 DIAGNOSIS — C34.91 SQUAMOUS CELL LUNG CANCER, RIGHT (HCC): Primary | ICD-10-CM

## 2023-02-02 DIAGNOSIS — I10 HYPERTENSION, ESSENTIAL: ICD-10-CM

## 2023-02-02 DIAGNOSIS — K62.9 FECAL INCONTINENCE DUE TO ANORECTAL DISORDER: ICD-10-CM

## 2023-02-02 DIAGNOSIS — C77.9 REGIONAL LYMPH NODE METASTASIS PRESENT (HCC): ICD-10-CM

## 2023-02-02 DIAGNOSIS — J44.9 CHRONIC OBSTRUCTIVE PULMONARY DISEASE, UNSPECIFIED COPD TYPE (HCC): ICD-10-CM

## 2023-02-02 DIAGNOSIS — R15.9 FECAL INCONTINENCE DUE TO ANORECTAL DISORDER: ICD-10-CM

## 2023-02-02 DIAGNOSIS — D49.0 PAROTID TUMOR: ICD-10-CM

## 2023-02-02 DIAGNOSIS — G35 MULTIPLE SCLEROSIS (HCC): ICD-10-CM

## 2023-02-02 NOTE — PATIENT INSTRUCTIONS
Blood work and ultrasound of parotid gland prior to next visit in 6 months    Referral to rectal surgeon

## 2023-02-02 NOTE — PROGRESS NOTES
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 with invasion of the lymphatic channel and metastatic disease to 1 of 8 peribronchial lymph nodes  Patient had  right upper lobe lobectomy and lymph node sampling in the year May 2021   Other lymph nodes at level 10  , level 7, level 4 and level 2 and at azygos were negative 4R and 2R were negative for malignancy  1 of 14 positive  lymph node and that was peribronchial, T2 N1, stage IIB  Tumor tested negative for EGFR  Patient  Received 4 cycles of adjuvant chemotherapy, a combination of carboplatin and Gemzar and last treatment was on 2021     She did not receive taxane because of history of MS  She also received Granix because of neutropenia  She did well and there has not been any documented recurrent or metastatic disease clinically  She gets CT chest from thoracic surgery  She feels depressed because recently her dad   She gives history of fecal incontinence and her rectal surgeon suggested a device to make muscle stronger and she wants another opinion and she will be sent to [facility] colorectal surgeon  On 2022 she felt dizzy and fell and had subdural hematoma and was hospitalized for 2 days at [facility]   Subsequent CT of the had showed improvement  She also had MRI of the brain and that was compatible with MS  She has history of MS  She follows with her neurologist   No more dizziness    She has minimal  exertional dyspnea when going up on incline     Has some tiredness and arthritic symptoms  She sleeps poorly  She has chronic low back discomfort and had  epidural shots  No metastatic disease in lumbar spine area on MRI scan  She had PET-CT scan in 2021      PET   showed positive activity in right parotid gland  And that was biopsied and was negative for malignancy and she followed with ENT specialist   Remote past history of suspected MS in 1989  She had some tingling  CSF came back suspicious and MRI brain came back suspicious  Patient was treated for MS at that time  No active treatment for  MS for the last several years  No more tingling in her hands  Patient gave me all this information     Patient had MRI scan of brain, neck and dorsal spine in 2021 and there were lesions at T3 and T7  Patient  had PET scan and spine lesions were not FDG avid  History of tinnitus off and on in the ears  She follows with ENT specialist    ROS:  02/02/23 Reviewed 12 systems: See symptoms in HPI  Presently no other neurological, cardiac, pulmonary, GI and  symptoms other than listed in HPI  Other symptoms are in HPI  No symptoms like fever, chills, bleeding, bone pains  skin rash, weight loss, night sweats,    weakness, numbness, claudication and gait problem  No frequent infections  Not unusually sensitive to heat or cold  No swelling of the ankles  No swollen glands  Patient is  anxious and depressed  [facility] Physical Exam:  Vitals: Weight 63 kg  Temperature 98 5  Blood pressure 124/72  Pulse 58  Respirations 16  Oxygen saturation 97%   Patient is alert and oriented  Patient is not in distress  Vital signs are above  There is no icterus, no oral thrush, no palpable neck mass, clear lung fields  to percussion and auscultation, regular  heart rate, abdomen soft and nontender, no palpable abdominal mass,   no ascites, no edema of ankles, no calf tenderness, no focal neurological deficit, no skin rash, no palpable lymphadenopathy in the neck and axillary areas,  no clubbing  Patient is  anxious  Performance status 1  Historical Information   Past Medical History:   Diagnosis Date   • Allergic ?    • Anxiety    • Bronchial malignant neoplasm, right (HCC)    • Cancer (HCC)     lung   • Chronic major depressive disorder, single episode    • COPD (chronic obstructive pulmonary disease) (HCC)    • Full incontinence of feces    • GERD (gastroesophageal reflux disease)    • Hyperlipidemia    • Hypertension    • Malignant neoplasm of bronchus and lung (HCC)    • Multiple sclerosis (HCC)    • Pulmonary emphysema ([address] )    • Restless leg syndrome    • Tobacco use      Past Surgical History:   Procedure Laterality Date   • AUGMENTATION MAMMAPLASTY Bilateral 11/2014    retro-pectoral silicone   • BREAST BIOPSY Left 03/23/2015    u/s core bx   • BREAST BIOPSY Left 08/02/2010    high-risk lesion   • BREAST EXCISIONAL BIOPSY Left 08/26/2010    high-risk lesion   • BREAST EXCISIONAL BIOPSY Left 1994   • BREAST EXCISIONAL BIOPSY Right 1984   • FL GUIDED CENTRAL VENOUS ACCESS DEVICE INSERTION  03/09/2021   • IR PORT REMOVAL  03/07/2022   • LYMPH NODE BIOPSY  2021   • MAMMO (HISTORICAL)  2020   • NC [address] INCL FLUOR GDNCE DX W/CELL WASHG SPX N/A 01/28/2021    Procedure: BRONCHOSCOPY FLEXIBLE;  Surgeon: Abdirahman Kenyon MD;  Location[facility];  Service: Thoracic   • NC INSJ TUNNELED CTR VAD W/SUBQ PORT AGE 5 YR/> N/A 03/09/2021    Procedure: INSERTION VENOUS PORT ( PORT-A-CATH) IR;  Surgeon: Jose Luis Godoy DO;  [facility];  Service: Interventional Radiology   • NC THORACOSCOPY W/LOBECTOMY SINGLE LOBE Right 01/28/2021    Procedure: RIGHT VATS UPPER LOBECTOMY ; MEDIASTINAL LYMPH NODE DISSECTION; right lower lobe bleb resection  ;  Surgeon: Abdirahman Kenyon MD;  Location[facility];  Service: Thoracic   • TONSILLECTOMY     • TOTAL ABDOMINAL HYSTERECTOMY  2010    age 60   • TOTAL ABDOMINAL HYSTERECTOMY W/ BILATERAL SALPINGOOPHORECTOMY Bilateral 2010    age 60   • TUBAL LIGATION  1971     Social History   Social History     Substance and Sexual Activity   Alcohol Use Yes   • Alcohol/week: 5 0 standard drinks   • Types: 5 Glasses of wine per week    Comment: wine @       Social History     Substance and Sexual Activity   Drug Use Never     Social History     Tobacco Use   Smoking Status Former   • Packs/day: 0 00   • Years: 30 00   • Pack years: 0 00   • Types: Cigarettes   • Start date: 1968   • Quit date: 1/10/2021   • Years since quittin 0   Smokeless Tobacco Never   Tobacco Comments    Wish i never started     Family History:   Family History   Problem Relation Age of Onset   • No Known Problems Mother    • COPD Father    • Hypertension Father    • No Known Problems Sister    • No Known Problems Daughter    • No Known Problems Maternal Grandmother    • No Known Problems Maternal Grandfather    • No Known Problems Paternal Grandmother    • Rectal cancer Paternal Grandfather         unknown age   • No Known Problems Sister    • No Known Problems Maternal Aunt    • Breast cancer Paternal Aunt         unknown age   • No Known Problems Paternal Aunt    • No Known Problems Paternal Aunt    • No Known Problems Paternal Aunt    • No Known Problems Paternal Aunt    • No Known Problems Paternal Aunt    • No Known Problems Paternal Aunt    • COPD Brother    • No Known Problems Brother          Current Outpatient Medications:   •  Biotin 5 MG CAPS, Take 5,000 mg by mouth daily, Disp: , Rfl:   •  buPROPion (WELLBUTRIN XL) 300 mg 24 hr tablet, Take 1 tablet (300 mg total) by mouth every morning, Disp: 90 tablet, Rfl: 1  •  Cholecalciferol (Vitamin D3) 50 MCG (2000 UT) capsule, Take 5,000 Units by mouth daily , Disp: , Rfl:   •  ciclopirox (LOPROX) 0 77 % SUSP, APPLY TOPICALLY TO THE AFFECTED AREA TWICE DAILY, Disp: , Rfl:   •  famotidine (PEPCID) 40 MG tablet, Take 1 tablet (40 mg total) by mouth every morning, Disp: 90 tablet, Rfl: 1  •  losartan-hydrochlorothiazide (HYZAAR) 50-12 5 mg per tablet, Take 1 tablet by mouth daily, Disp: 90 tablet, Rfl: 3  •  pramipexole (MIRAPEX) 0 5 mg tablet, Take 1 tablet (0 5 mg total) by mouth daily at bedtime, Disp: 90 tablet, Rfl: 1  •  umeclidinium-vilanterol (Anoro Ellipta) 62 5-25 MCG/INH inhaler, Inhale 1 puff daily , Disp: , Rfl:   •  gabapentin (NEURONTIN) 300 mg capsule, Take 2 capsules (600 mg total) by mouth daily at bedtime (Patient not taking: Reported on 8/30/2022), Disp: 180 capsule, Rfl: 3  •  simvastatin (ZOCOR) 40 mg tablet, Take 1 tablet (40 mg total) by mouth every other day (Patient not taking: Reported on 10/3/2022), Disp: 90 tablet, Rfl: 0    Allergies   Allergen Reactions   • Sulfa Antibiotics Hives             Lab Results: I have reviewed all pertinent labs    LABS:    Results for orders placed or performed in visit on 01/26/23   CBC and differential   Result Value Ref Range    WBC 5 07 4 31 - 10 16 Thousand/uL    RBC 4 41 3 81 - 5 12 Million/uL    Hemoglobin 13 7 11 5 - 15 4 g/dL    Hematocrit 42 9 34 8 - 46 1 %    MCV 97 82 - 98 fL    MCH 31 1 26 8 - 34 3 pg    MCHC 31 9 31 4 - 37 4 g/dL    RDW 12 2 11 6 - 15 1 %    MPV 10 1 8 9 - 12 7 fL    Platelets 559 546 - 844 Thousands/uL    nRBC 0 /100 WBCs    Neutrophils Relative 58 43 - 75 %    Immat GRANS % 0 0 - 2 %    Lymphocytes Relative 31 14 - 44 %    Monocytes Relative 9 4 - 12 %    Eosinophils Relative 1 0 - 6 %    Basophils Relative 1 0 - 1 %    Neutrophils Absolute 2 95 1 85 - 7 62 Thousands/µL    Immature Grans Absolute 0 00 0 00 - 0 20 Thousand/uL    Lymphocytes Absolute 1 56 0 60 - 4 47 Thousands/µL    Monocytes Absolute 0 43 0 17 - 1 22 Thousand/µL    Eosinophils Absolute 0 07 0 00 - 0 61 Thousand/µL    Basophils Absolute 0 06 0 00 - 0 10 Thousands/µL   Comprehensive metabolic panel   Result Value Ref Range    Sodium 136 135 - 147 mmol/L    Potassium 4 0 3 5 - 5 3 mmol/L    Chloride 102 96 - 108 mmol/L    CO2 32 21 - 32 mmol/L    ANION GAP 2 (L) 4 - 13 mmol/L    BUN 14 5 - 25 mg/dL    Creatinine 0 82 0 60 - 1 30 mg/dL    Glucose 82 65 - 140 mg/dL    Calcium 9 1 8 3 - 10 1 mg/dL    AST 16 5 - 45 U/L    ALT 29 12 - 78 U/L    Alkaline Phosphatase 78 46 - 116 U/L    Total Protein 7 2 6 4 - 8 4 g/dL    Albumin 3 8 3 5 - 5 0 g/dL    Total Bilirubin 0 56 0 20 - 1 00 mg/dL    eGFR 73 ml/min/1 73sq m   CEA   Result Value Ref Range    CEA 1 8 0 0 - 3 0 ng/mL   Normal BMP and normal CBC D in April 2022  Normal CMP except AST 53 in April 2022  [employer] Guide    Comprehensive metabolic panel (Order #[id number]) on 4/9/21   Questions    Collection Question Answer   Has the patient been fasting for 8 hours or more? No      Comprehensive metabolic panel  Order: [id number]  Status:  Final result   Visible to patient:  Yes ([address]) Next appt:  05/05/2021 at 08:00 AM in Infusion Therapy ([facility] INF BED 6) Dx:  Squamous cell lung cancer, right [facility])        Imaging Studies: I have personally reviewed pertinent reports  November  PET [facility]  Result Impression   IMPRESSION:  1  FDG avid stable right upper lobe lung mass, still suspicious for lung  malignancy despite negative biopsy  2  Smaller stable, mildly FDG avid nodules in the posterior right lung apex  could be inflammatory or metastatic  3  Intense uptake in or adjacent to the deep lobe of the right parotid gland  MRI recommended for further evaluation   MRI NECK SOFT TISSUE [address]  Result Impression   Impression:     Question parotid lesion is present on the right  This abuts the parotid gland  however the majority appears to be posterior to the sternocleidomastoid muscle  raising question of metastatic disease to a level 2 lymph node  Parotid  malignancy is not excluded  Right upper lobe lung nodule is poorly evaluated on this modality                  Workstation:[id number]     IMPRESSION:     No intracranial metastasis      No acute acute intracranial abnormality  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 and moderate chronic microangiopathy      Workstation performed: [id number]          Imaging    MRI brain w wo contrast (Order: [id number]) - 10/29/2021  IMPRESSION:     1  Stable 2 2 cm posterosuperior right parotid/juxtaparotid enhancing nodule which is previously biopsied as benign (per chart)      2  Indeterminate T1 hypointense lesion within T3 vertebral body  This level is not fully imaged in prior MRI  Metastasis should be excluded given history of malignancy                           The study was marked in EPIC for significant notification       Workstation performed: [id number]          Imaging    MRI soft tissue neck wo and w contrast (Order: [id number]) - 11/22/2021  IMPRESSION:     Osseous lesions involve T3 and T7 vertebral bodies (larger lesion in T3 vertebral body), indeterminate  These may represent treated osseous metastatic lesions  No abnormal enhancement in thoracic spine      Mild multilevel degenerative changes of thoracic spine            Workstation performed: [id number]          Imaging    MRI thoracic spine w wo contrast (Order: [id number]) - 12/10/2021    IMPRESSION:     1  Stable in size but increasing in FDG avidity lesion in the region of the deep right parotid gland which by report has had a negative biopsy  Correlation is recommended      2  There is otherwise no evidence for hypermetabolic malignancy  Specifically, there is no focal FDG activity in the region of indeterminate thoracic spine lesions seen on recent MRI which remain nonspecific      Workstation performed: [id number]          Imaging    NM PET CT skull base to mid thigh (Order: [id number]) - 12/28/2021    MRI BRAIN WO CONTRAST 06/6/2022    Anatomical Region Laterality Modality   Head -- Magnetic Resonance   MRHEAD -- --       Impression    IMPRESSION:     White matter lesions in keeping with provided diagnosis of multiple sclerosis   are again seen demonstrating no significant change  No mass effect, recent infarct, intracranial hemorrhage, or hydrocephalus  Chronic lacunar infarcts versus dilated perivascular spaces of the right   cerebellar hemisphere and left basal ganglia, unchanged  Workstation:[id number]    Narrative    History: Fall and concussion   Multiple sclerosis    CT HEAD WO CONTRAST 2022    Anatomical Region Laterality Modality   Head -- Computed Tomography       Impression    Impression:     Interval significant decrease of right frontal and temporal subdural hematoma   with minimal residual hemorrhage  Chronic lacunar infarcts in left basal ganglia  White matter chronic small vessel ischemic disease  Workstation:[id number]    Narrative    Clinical information: Subdural hematoma     OSSEOUS STRUCTURES:  No acute fracture or osseous destructive lesion identified  Degenerative changes of the spine      IMPRESSION:     No evidence of recurrent tumor or thoracic metastatic disease      No significant interval change               Workstation performed: [id number]          Imaging    CT chest wo contrast (Order: [id number]) - 2022    IMPRESSION:  No mammographic evidence of malignancy            ASSESSMENT/BI-RADS CATEGORY:  Left: 2 - Benign  Right: 2 - Benign  Overall: 2 - Benign     RECOMMENDATION:       - Routine screening mammogram in 1 year for both breasts      Workstation ID: [id number]          Imaging    Mammo screening bilateral w 3d & cad (Order: [id number]) - 2022    Pathology, and Other Studies: I have personally reviewed pertinent reports  Report  NON-GYNECOLOGICAL CYTOLOGYResulted: 2021 12:20 PM  [facility]  Component Name Value Ref Range   Non-Gynecological Cytology [facility]  [address] [place] [address]  [phone]    PATHOLOGY REPORT             MR#:             [id number]  Patient:         Caitlin Zapata  /Sex:         1955  F  Provider:        SYLVIA LUO  Location:        [facility]  OP ([facility])  Collect Date:    2021  N21-53    CYTOLOGIC DIAGNOSIS:  A  Fine Needle Aspiration with Cytotech Assistance and Cell Block,  Right Parotid Mass  Adequacy: Adequate for evaluation  General Category: Negative for Diagnostic Malignancy    Descriptive Interpretation: Cytologic features consistent with  pleomorphic adenoma     Reviewed  above results and   discussed with patient    Assessment and Plan:   Follow-up visit for 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 with invasion of the lymphatic channel and metastatic disease to 1 of 8 peribronchial lymph nodes  Patient had  right upper lobe lobectomy and lymph node sampling in the year May 2021   Other lymph nodes at level 10  , level 7, level 4 and level 2 and at azygos were negative 4R and 2R were negative for malignancy  1 of 14 positive  lymph node and that was peribronchial, T2 N1, stage IIB  Tumor tested negative for EGFR  Patient  Received 4 cycles of adjuvant chemotherapy, a combination of carboplatin and Gemzar and last treatment was on 2021     She did not receive taxane because of history of MS  She also received Granix because of neutropenia  She did well and there has not been any documented recurrent or metastatic disease clinically  She gets CT chest from thoracic surgery  She feels depressed because recently her dad   She gives history of fecal incontinence and her rectal surgeon suggested a device to make muscle stronger and she wants another opinion and she will be sent to [facility] surgeon  On 2022 she felt dizzy and fell and had subdural hematoma and was hospitalized for 2 days at [facility]   Subsequent CT of the had showed improvement  She also had MRI of the brain and that was compatible with MS  She has history of MS  She follows with her neurologist   No more dizziness    She has minimal  exertional dyspnea when going up on incline     Has some tiredness and arthritic symptoms  She sleeps poorly  She has chronic low back discomfort and had  epidural shots  No metastatic disease in lumbar spine area on MRI scan  She had PET-CT scan in 2021      PET   showed positive activity in right parotid gland  And that was biopsied and was negative for malignancy and she followed with ENT specialist   Remote past history of suspected MS in 1989  She had some tingling  CSF came back suspicious and MRI brain came back suspicious  Patient was treated for MS at that time  No active treatment for  MS for the last several years  No more tingling in her hands  Patient gave me all this information     Patient had MRI scan of brain, neck and dorsal spine in 2021 and there were lesions at T3 and T7  Patient  had PET scan and spine lesions were not FDG avid  History of tinnitus off and on in the ears  She follows with ENT specialist    Physical examination and test results are as recorded and discussed in detail     Plan is surveillance for lung cancer history  She gets CT scan from thoracic surgeon  She will be referred to colorectal surgery for fecal incontinence as above  All discussed in very much detail  Questions answered  Discussed the importance of self-breast examination, eating healthy foods, activities as tolerated and health screening tests  Patient has already stopped smoking cigarettes  She is capable of self-care  Goal and plan:  surveillance and prolongation of survival and possible cure from lung cancer  Discussed precautions against coronavirus, flu and other infections  Patient will continue to follow with her primary physician and other consultants  Discussed diagnoses, orders and instructions below    1  Squamous cell lung cancer, right (HCC)    - CBC and differential; Future  - Comprehensive metabolic panel; Future    2  Regional lymph node metastasis present ([address] )      3  Parotid tumor    - US head neck soft tissue; Future    4  Chronic obstructive pulmonary disease, unspecified COPD type ([address] )      5  Multiple sclerosis ([address] )      6  Hypertension, essential      7  Fecal incontinence due to anorectal disorder    - Ambulatory referral to Colorectal Surgery;  Future    Blood work and ultrasound of parotid gland prior to next visit in 6 months  Referral to rectal surgeon    I used a dictation device to dictate this note and there could be mistakes in my note    Patient voiced understanding and agrees    Counseling / Coordination of Care    Cheryl Estrada   Provided counseling and support

## 2023-02-06 ENCOUNTER — HOSPITAL ENCOUNTER (OUTPATIENT)
Dept: RADIOLOGY | Age: 68
Discharge: HOME | End: 2023-02-06

## 2023-02-06 DIAGNOSIS — D49.0 PAROTID TUMOR: ICD-10-CM

## 2023-02-13 ENCOUNTER — HOSPITAL ENCOUNTER (OUTPATIENT)
Dept: RADIOLOGY | Age: 68
Discharge: HOME | End: 2023-02-13

## 2023-02-13 DIAGNOSIS — C34.91 SQUAMOUS CELL LUNG CANCER, RIGHT (HCC): ICD-10-CM

## 2023-02-15 ENCOUNTER — TELEPHONE (OUTPATIENT)
Dept: HEMATOLOGY ONCOLOGY | Facility: CLINIC | Age: 68
End: 2023-02-15

## 2023-02-15 NOTE — TELEPHONE ENCOUNTER
I spoke with patient about right parotid gland nodule on ultrasound that has not changed in size  Patient mentioned that that was biopsied in January 2021 at [facility]   I looked at the report and that did not show malignancy  Patient wants to leave the nodule the way it is  and not have another biopsy or excision  She mentioned about CT scan that she had today  The report is not ready yet

## 2023-02-27 ENCOUNTER — HOSPITAL ENCOUNTER (OUTPATIENT)
Dept: RADIOLOGY | Age: 68
Discharge: HOME | End: 2023-02-27

## 2023-02-27 VITALS — WEIGHT: 139 LBS | BODY MASS INDEX: 31.27 KG/M2 | HEIGHT: 56 IN

## 2023-02-27 DIAGNOSIS — Z12.31 ENCOUNTER FOR SCREENING MAMMOGRAM FOR MALIGNANT NEOPLASM OF BREAST: ICD-10-CM

## 2023-02-27 NOTE — PROGRESS NOTES
Colon and Rectal Surgery   Stevie Kumar 77 y o  female MRN: [id number]   Encounter: [id number]  02/28/23   3:24 PM          ASSESSMENT:  Stevie Kumar is a 77 y o  female who presents for history of fecal incontinence, leakage with stools after bowel movement as well as even soiling the underwear  She has history of vaginal delivery with 'tear to rectum ' Other pelvic surgeries include hysterectomy with bladder lift/sacrocolpopexy 8/2022,history of rectocele repair, vaginal vault reconstruction by her report  History of squamous cell lung cancer being treated by Dr Pedrito Mishra  She has colonoscopy from 11/2022, adenoma in the cecum, no random biopsies at that time  We discussed fecal incontinence reasons, vaginal delivery coupled with tear/repairs/other anorectal procedures, and fiber/caffeine intake, discussed workup and therapies beginning with conservative dietary measures up to interStim sacral nerve stimulator placement  Plan:  -High fiber diet 20-30g/day with increased fruits/vegetables/psyllium(metamucil or konsyl), increased hydration noncaffeinated beverages(1 cup coffee from her typical 4,handouts/samples provided)  -To keep bowel diary in the interval  -3months office followup, will consider scheduling sacral nerve stimulator at that time if no improvement  CC:  Dr Germaine Mishra      HPI  Stevie Kumar is a 77 y o  female referred for evaluation today by Dr Pedrito Mishra, for fecal incontinence  She complains of fecal smearing with occasional fecal incontinence  Patient has a personal history of 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    • Anxiety    • Bronchial malignant neoplasm, right (HCC)    • Cancer (HCC)     lung   • Chronic major depressive disorder, single episode    • COPD (chronic obstructive pulmonary disease) (HCC)    • Full incontinence of feces    • GERD (gastroesophageal reflux disease)    • Hyperlipidemia    • Hypertension    • Malignant neoplasm of bronchus and lung (HCC)    • Multiple sclerosis (HCC)    • Pulmonary emphysema (HCC)    • Restless leg syndrome    • Tobacco use      Past Surgical History:   Procedure Laterality Date   • AUGMENTATION MAMMAPLASTY Bilateral 11/2014    retro-pectoral silicone   • BREAST BIOPSY Left 03/23/2015    u/s core bx   • BREAST BIOPSY Left 08/02/2010    high-risk lesion   • BREAST EXCISIONAL BIOPSY Left 08/26/2010    high-risk lesion   • BREAST EXCISIONAL BIOPSY Left 1994   • BREAST EXCISIONAL BIOPSY Right 1984   • FL GUIDED CENTRAL VENOUS ACCESS DEVICE INSERTION  03/09/2021   • IR PORT REMOVAL  03/07/2022   • LYMPH NODE BIOPSY  2021   • MAMMO (HISTORICAL)  2020   • DC [address] INCL FLUOR GDNCE DX W/CELL WASHG SPX N/A 01/28/2021    Procedure: BRONCHOSCOPY FLEXIBLE;  Surgeon: Caty Meyers MD;  Location: [facility];  Service: Thoracic   • DC INSJ TUNNELED CTR VAD W/SUBQ PORT AGE 5 YR/> N/A 03/09/2021    Procedure: INSERTION VENOUS PORT ( PORT-A-CATH) IR;  Surgeon: Tripp Hernandez DO;  Location: [facility];  Service: Interventional Radiology   • DC THORACOSCOPY W/LOBECTOMY SINGLE LOBE Right 01/28/2021    Procedure: RIGHT VATS UPPER LOBECTOMY ; MEDIASTINAL LYMPH NODE DISSECTION; right lower lobe bleb resection  ;  Surgeon: Caty Meyers MD;  Location: [facility];  Service: Thoracic   • TONSILLECTOMY     • TOTAL ABDOMINAL HYSTERECTOMY  2010    age 60   • TOTAL ABDOMINAL HYSTERECTOMY W/ BILATERAL SALPINGOOPHORECTOMY Bilateral 2010    age 60   • TUBAL LIGATION  1971       Meds/Allergies       Current Outpatient Medications:   •  Biotin 5 MG CAPS, Take 5,000 mg by mouth daily, Disp: , Rfl:   •  buPROPion (WELLBUTRIN XL) 300 mg 24 hr tablet, Take 1 tablet (300 mg total) by mouth every morning, Disp: 90 tablet, Rfl: 1  •  Cholecalciferol (Vitamin D3) 50 MCG (2000 UT) capsule, Take 5,000 Units by mouth daily , Disp: , Rfl:   •  ciclopirox (LOPROX) 0 77 % SUSP, APPLY TOPICALLY TO THE AFFECTED AREA TWICE DAILY, Disp: , Rfl:   •  famotidine (PEPCID) 40 MG tablet, Take 1 tablet (40 mg total) by mouth every morning, Disp: 90 tablet, Rfl: 1  •  gabapentin (NEURONTIN) 300 mg capsule, Take 2 capsules (600 mg total) by mouth daily at bedtime (Patient not taking: Reported on 2022), Disp: 180 capsule, Rfl: 3  •  losartan-hydrochlorothiazide (HYZAAR) 50-12 5 mg per tablet, Take 1 tablet by mouth daily, Disp: 90 tablet, Rfl: 3  •  pramipexole (MIRAPEX) 0 5 mg tablet, Take 1 tablet (0 5 mg total) by mouth daily at bedtime, Disp: 90 tablet, Rfl: 1  •  simvastatin (ZOCOR) 40 mg tablet, Take 1 tablet (40 mg total) by mouth every other day (Patient not taking: Reported on 10/3/2022), Disp: 90 tablet, Rfl: 0  •  umeclidinium-vilanterol (Anoro Ellipta) 62 5-25 MCG/INH inhaler, Inhale 1 puff daily , Disp: , Rfl:       Allergies   Allergen Reactions   • Sulfa Antibiotics Hives         Social History   Social History     Substance and Sexual Activity   Alcohol Use Yes   • Alcohol/week: 5 0 standard drinks   • Types: 5 Glasses of wine per week    Comment: wine @ hs      Social History     Substance and Sexual Activity   Drug Use Never     Social History     Tobacco Use   Smoking Status Former   • Packs/day: 0 00   • Years: 30 00   • Pack years: 0 00   • Types: Cigarettes   • Start date: 1968   • Quit date: 1/10/2021   • Years since quittin 1   Smokeless Tobacco Never   Tobacco Comments    Wish i never started     Family History:   Family History   Problem Relation Age of Onset   • No Known Problems Mother    • COPD Father    • Hypertension Father    • No Known Problems Sister    • No Known Problems Daughter    • No Known Problems Maternal Grandmother    • No Known Problems Maternal Grandfather    • No Known Problems Paternal Grandmother    • Rectal cancer Paternal Grandfather         unknown age   • No Known Problems Sister    • No Known Problems Maternal Aunt    • Breast cancer Paternal Aunt         unknown age   • No Known Problems Paternal Aunt    • No Known Problems Paternal Aunt    • No Known Problems Paternal Aunt    • No Known Problems Paternal Aunt    • No Known Problems Paternal Aunt    • No Known Problems Paternal Aunt    • COPD Brother    • No Known Problems Brother          Review of Systems   Constitutional: Negative  HENT: Negative  Eyes: Negative  Respiratory: Negative  Cardiovascular: Negative  Gastrointestinal: Negative  Fecal incontinence   Endocrine: Negative  Genitourinary: Negative  Musculoskeletal: Negative  Skin: Negative  Allergic/Immunologic: Negative  Neurological: Negative  Hematological: Negative  Psychiatric/Behavioral: Negative  Objective   Current Vitals:   Vitals:    02/28/23 1455   Weight: 64 kg (141 lb)   Height: 4' 8" (1 422 m)     Physical Exam:  General:no distress  Eyes:perrla/eomi  HEENT:moist mucus membranes  Neck supple  Pulm:no increased work of breathing, clear bilateral  CV:sinus  Abdomen:soft,nontender  Extremities:no edema  Rectal:anterior attenuation and flattening likely due to prior rectocele and vaginal vault repair, sphincter defect, poor to fair push/squeeze, no masses palpated    Anoscopy    Date/Time: 2/28/2023 3:19 PM  Performed by: Pallavi Mitchell MD  Authorized by: Pallavi Mitcehll MD     Consent given by:  Patient  Patient identity confirmed:  Verbally with patient  Indications: change in bowel habit    Scope type:   Anoscope   Normal anorectal mucosa, generalized flattening of the hemorrhoidal columns consistent with prior surgery

## 2023-02-28 ENCOUNTER — OFFICE VISIT (OUTPATIENT)
Dept: CARDIAC SURGERY | Facility: CLINIC | Age: 68
End: 2023-02-28

## 2023-02-28 ENCOUNTER — OFFICE VISIT (OUTPATIENT)
Age: 68
End: 2023-02-28

## 2023-02-28 VITALS
RESPIRATION RATE: 16 BRPM | HEART RATE: 72 BPM | SYSTOLIC BLOOD PRESSURE: 125 MMHG | OXYGEN SATURATION: 98 % | HEIGHT: 56 IN | BODY MASS INDEX: 31.74 KG/M2 | DIASTOLIC BLOOD PRESSURE: 81 MMHG | WEIGHT: 141.09 LBS | TEMPERATURE: 99.7 F

## 2023-02-28 VITALS — HEIGHT: 56 IN | BODY MASS INDEX: 31.72 KG/M2 | WEIGHT: 141 LBS

## 2023-02-28 DIAGNOSIS — K62.9 FECAL INCONTINENCE DUE TO ANORECTAL DISORDER: ICD-10-CM

## 2023-02-28 DIAGNOSIS — R15.9 FECAL INCONTINENCE DUE TO ANORECTAL DISORDER: ICD-10-CM

## 2023-02-28 DIAGNOSIS — C34.91 SQUAMOUS CELL LUNG CANCER, RIGHT (HCC): Primary | ICD-10-CM

## 2023-02-28 DIAGNOSIS — R15.9 INCONTINENCE OF FECES, UNSPECIFIED FECAL INCONTINENCE TYPE: Primary | ICD-10-CM

## 2023-02-28 NOTE — ASSESSMENT & PLAN NOTE
Alanis Ramon is 2 years out from surgery and her CT scan does not reveal any evidence of recurrent or metastatic disease  Since she was stage IIB, we are recommending one more year of q6 months until her 3 year anniversary  She is moving to [place] in the near future and I will message her about thoracic surgeons known to be in [address], not sure if either of those are close to her house  If not, she can speak to her new PCP for a referral to a thoracic surgeon  With respects to her asking for a handicap parking pass, we advised her to discuss this with her PCP, as we don't typically handle these, especially not permanent placards  She is in agreement with the plan

## 2023-02-28 NOTE — PROGRESS NOTES
Assessment/Plan:    Squamous cell lung cancer, right ([facility] )  Marco Boyer is 2 years out from surgery and her CT scan does not reveal any evidence of recurrent or metastatic disease  Since she was stage IIB, we are recommending one more year of q6 months until her 3 year anniversary  She is moving to [place] in the near future and I will message her about thoracic surgeons known to be in [address], not sure if either of those are close to her house  If not, she can speak to her new PCP for a referral to a thoracic surgeon  With respects to her asking for a handicap parking pass, we advised her to discuss this with her PCP, as we don't typically handle these, especially not permanent placards  She is in agreement with the plan          Diagnoses and all orders for this visit:    Squamous cell lung cancer, right ([place] [address] )  -     CT chest wo contrast; Future          Thoracic History   Cancer Staging   Squamous cell lung cancer, right ([place] [address] )  Staging form: Lung, AJCC 8th Edition  - Clinical stage from 1/28/2021: Stage IIB (cT2a, cN1, cM0) - Signed by Samira Calvin PA-C on 8/30/2022  Histopathologic type: Squamous cell carcinoma, keratinizing, NOS  Stage prefix: Initial diagnosis  Histologic grade (G): G3  Histologic grading system: 4 grade system  Laterality: Right  Tumor size (mm): 31  Lymph-vascular invasion (LVI): Lymphatic and small vessel invasion only (L)  Specimen type: Excision    Oncology History   Squamous cell lung cancer, right ([place] [address] )   1/12/2021 Initial Diagnosis    Squamous cell lung cancer, right ([place] [address] )     1/28/2021 -  Cancer Staged    Staging form: Lung, AJCC 8th Edition  - Clinical stage from 1/28/2021: Stage IIB (cT2a, cN1, cM0) - Signed by Samira Calvin PA-C on 8/30/2022  Histopathologic type: Squamous cell carcinoma, keratinizing, NOS  Stage prefix: Initial diagnosis  Histologic grade (G): G3  Histologic grading system: 4 grade system  Laterality: Right  Tumor size (mm): 31  Lymph-vascular invasion (LVI): Lymphatic and small vessel invasion only (L)  Specimen type: Excision       3/10/2021 - 6/8/2021 Chemotherapy    fosaprepitant (EMEND) IVPB, 150 mg, Intravenous, Once, 4 of 4 cycles  Administration: 150 mg (3/10/2021), 150 mg (4/7/2021), 150 mg (4/28/2021), 150 mg (5/26/2021)  CARBOplatin (PARAPLATIN) IVPB (GOG AUC DOSING), 429 5 mg, Intravenous, Once, 4 of 4 cycles  Administration: 429 5 mg (3/10/2021), 374 mg (4/7/2021), 399 mg (4/28/2021), 438 5 mg (5/26/2021)  gemcitabine (GEMZAR) infusion, 1,000 mg/m2 = 1,520 2 mg, Intravenous, Once, 4 of 4 cycles  Administration: 1,520 2 mg (3/10/2021), 1,520 2 mg (3/17/2021), 1,520 2 mg (4/7/2021), 1,520 2 mg (4/14/2021), 1,520 2 mg (4/28/2021), 1,520 2 mg (5/13/2021), 1,520 2 mg (5/26/2021), 1,520 2 mg (6/7/2021)  tbo-filgrastim (GRANIX), 300 mcg (100 % of original dose 300 mcg), Subcutaneous, Once, 3 of 3 cycles  Dose modification: 300 mcg (original dose 300 mcg, Cycle 2), 480 mcg (original dose 300 mcg, Cycle 3)  Administration: 300 mcg (4/8/2021), 300 mcg (4/15/2021), 300 mcg (4/29/2021), 480 mcg (5/14/2021), 480 mcg (5/27/2021), 480 mcg (6/8/2021)           Patient ID: Cheryle Do is a 77 y o  female  ECOG 0   HPI     Franck Escobedo is a 78 yo female who underwent a right thoracoscopic upper lobectomy and lower lobe wedge resection on 1/28/21 for stage IIB squamous cell carcinoma of the upper lobe and lower lobe without any evidence of malignancy  She was last seen on 8/30/22 at which point her CT scan revealed some scattered punctate calcified granulomatas  She returns today with a scan from 2/13/23, which did not reveal any new nodules, but again demonstrated scattered benign granulomas  On discussion, she is feeling very fatigued and has a lot of congestion and shortness of breath, but no cough        The following portions of the patient's history were reviewed and updated as appropriate: allergies, current medications, past family history, past medical history, past social history, past surgical history and problem list     Review of Systems      Objective:   Physical Exam  Vitals reviewed  Constitutional:       Appearance: Normal appearance  She is well-developed  She is not diaphoretic  HENT:      Head: Normocephalic and atraumatic  Eyes:      Extraocular Movements: Extraocular movements intact  Neck:      Trachea: No tracheal deviation  Cardiovascular:      Rate and Rhythm: Normal rate and regular rhythm  Heart sounds: Normal heart sounds  No murmur heard  Pulmonary:      Effort: Pulmonary effort is normal  No respiratory distress  Breath sounds: Normal breath sounds  No wheezing  Abdominal:      General: Bowel sounds are normal  There is no distension  Palpations: Abdomen is soft  Tenderness: There is no abdominal tenderness  Musculoskeletal:         General: Normal range of motion  Cervical back: Normal range of motion and neck supple  Lymphadenopathy:      Cervical: No cervical adenopathy  Skin:     General: Skin is warm and dry  Findings: No erythema  Neurological:      Mental Status: She is alert and oriented to person, place, and time  Cranial Nerves: No cranial nerve deficit  Psychiatric:         Behavior: Behavior normal          Thought Content: Thought content normal      /81 (BP Location: Left arm, Patient Position: Sitting, Cuff Size: Standard)   Pulse 72   Temp 99 7 °F (37 6 °C) (Temporal)   Resp 16   Ht 4' 8" (1 422 m)   Wt 64 kg (141 lb 1 5 oz)   LMP  (LMP Unknown)   SpO2 98%   BMI 31 63 kg/m²       CT chest wo contrast    Result Date: 2/21/2023  Narrative CT CHEST WITHOUT IV CONTRAST INDICATION:   C34 91: Malignant neoplasm of unspecified part of right bronchus or lung  History of poorly differentiated squamous cell carcinoma of the right upper lung, status post right upper lobectomy  The patient underwent subsequent adjuvant chemotherapy  Surveillance   COMPARISON: CT, dated 7/21/2022  CT, dated 7/22/2021  TECHNIQUE: CT examination of the chest was performed without intravenous contrast  Axial, sagittal, and coronal 2D reformatted images were created from the source data and submitted for interpretation  Radiation dose length product (DLP) for this visit:  228 mGy-cm   This examination, like all CT scans performed in the [facility], was performed utilizing techniques to minimize radiation dose exposure, including the use of iterative reconstruction and automated exposure control  FINDINGS: LUNGS:  No new concerning pulmonary nodules  Redemonstrated are postsurgical changes related to right upper lobectomy  Surgical staple line is normal  Unchanged basilar scarring in the reoriented right lower lobe  Scattered benign granulomas  Mild apical predominant emphysema  Mild bronchial wall thickening  PLEURA:  Unremarkable  HEART/GREAT VESSELS: Mild cardiac enlargement  No pericardial effusion  Coronary arterial atherosclerotic calcifications  No thoracic aortic aneurysm  MEDIASTINUM AND CARMINA:  Unremarkable  CHEST WALL AND LOWER NECK:  Breast implants  VISUALIZED STRUCTURES IN THE UPPER ABDOMEN:  Unremarkable  OSSEOUS STRUCTURES:  No acute fracture or destructive osseous lesion  Impression Nothing to suggest recurrent squamous cell carcinoma   Workstation performed: [id number]

## 2023-02-28 NOTE — PATIENT INSTRUCTIONS
ASSESSMENT:  Bard Eldridge is a 77 y o  female who presents for history of fecal incontinence, leakage with stools after bowel movement as well as even soiling the underwear  She has history of vaginal delivery with 'tear to rectum ' Other pelvic surgeries include hysterectomy with bladder lift/sacrocolpopexy 8/2022,history of rectocele repair, vaginal vault reconstruction by her report  History of squamous cell lung cancer being treated by Dr Lexie Quinones  She has colonoscopy from 11/2022, adenoma in the cecum, no random biopsies at that time  We discussed fecal incontinence reasons, vaginal delivery coupled with tear/repairs/other anorectal procedures, and fiber/caffeine intake, discussed workup and therapies beginning with conservative dietary measures up to interStim sacral nerve stimulator placement      Plan:  -High fiber diet 20-30g/day with increased fruits/vegetables/psyllium(metamucil or konsyl), increased hydration noncaffeinated beverages(1 cup coffee from her typical 4,handouts/samples provided)  -To keep bowel diary in the interval  -3months office followup, will consider scheduling sacral nerve stimulator at that time if no improvement  CC:  Dr Dinah Quinones

## 2023-03-01 ENCOUNTER — APPOINTMENT (OUTPATIENT)
Dept: LAB | Facility: CLINIC | Age: 68
End: 2023-03-01

## 2023-03-01 DIAGNOSIS — E78.49 OTHER HYPERLIPIDEMIA: ICD-10-CM

## 2023-03-01 LAB
CHOLEST SERPL-MCNC: 227 MG/DL
HDLC SERPL-MCNC: 62 MG/DL
LDLC SERPL CALC-MCNC: 138 MG/DL (ref 0–100)
NONHDLC SERPL-MCNC: 165 MG/DL
TRIGL SERPL-MCNC: 137 MG/DL

## 2023-03-06 ENCOUNTER — TELEPHONE (OUTPATIENT)
Dept: INTERNAL MEDICINE CLINIC | Age: 68
End: 2023-03-06

## 2023-03-06 ENCOUNTER — OFFICE VISIT (OUTPATIENT)
Dept: INTERNAL MEDICINE CLINIC | Age: 68
End: 2023-03-06

## 2023-03-06 VITALS
DIASTOLIC BLOOD PRESSURE: 64 MMHG | SYSTOLIC BLOOD PRESSURE: 124 MMHG | TEMPERATURE: 98.8 F | OXYGEN SATURATION: 95 % | WEIGHT: 141 LBS | HEIGHT: 56 IN | BODY MASS INDEX: 31.72 KG/M2 | HEART RATE: 62 BPM

## 2023-03-06 DIAGNOSIS — G25.81 RESTLESS LEGS SYNDROME (RLS): ICD-10-CM

## 2023-03-06 DIAGNOSIS — M81.6 LOCALIZED OSTEOPOROSIS WITHOUT CURRENT PATHOLOGICAL FRACTURE: ICD-10-CM

## 2023-03-06 DIAGNOSIS — F32.89 OTHER DEPRESSION: ICD-10-CM

## 2023-03-06 DIAGNOSIS — F41.8 OTHER SPECIFIED ANXIETY DISORDERS: ICD-10-CM

## 2023-03-06 DIAGNOSIS — J44.9 CHRONIC OBSTRUCTIVE PULMONARY DISEASE, UNSPECIFIED COPD TYPE (HCC): ICD-10-CM

## 2023-03-06 DIAGNOSIS — D70.1 CHEMOTHERAPY INDUCED NEUTROPENIA (HCC): ICD-10-CM

## 2023-03-06 DIAGNOSIS — R15.1 FECAL SMEARING: ICD-10-CM

## 2023-03-06 DIAGNOSIS — E78.49 OTHER HYPERLIPIDEMIA: ICD-10-CM

## 2023-03-06 DIAGNOSIS — F33.9 DEPRESSION, RECURRENT (HCC): ICD-10-CM

## 2023-03-06 DIAGNOSIS — I10 HYPERTENSION, ESSENTIAL: ICD-10-CM

## 2023-03-06 DIAGNOSIS — T45.1X5A CHEMOTHERAPY INDUCED NEUTROPENIA (HCC): ICD-10-CM

## 2023-03-06 DIAGNOSIS — C34.91 SQUAMOUS CELL LUNG CANCER, RIGHT (HCC): Primary | ICD-10-CM

## 2023-03-06 DIAGNOSIS — K21.00 GASTROESOPHAGEAL REFLUX DISEASE WITH ESOPHAGITIS, UNSPECIFIED WHETHER HEMORRHAGE: ICD-10-CM

## 2023-03-06 PROBLEM — S06.5X9A TRAUMATIC SUBDURAL HEMORRHAGE WITH LOSS OF CONSCIOUSNESS OF UNSPECIFIED DURATION, INITIAL ENCOUNTER (HCC): Status: ACTIVE | Noted: 2023-03-06

## 2023-03-06 RX ORDER — ATORVASTATIN CALCIUM 10 MG/1
10 TABLET, FILM COATED ORAL DAILY
Qty: 90 TABLET | Refills: 1 | Status: SHIPPED | OUTPATIENT
Start: 2023-03-06

## 2023-03-06 RX ORDER — FAMOTIDINE 40 MG/1
40 TABLET, FILM COATED ORAL EVERY MORNING
Qty: 90 TABLET | Refills: 1 | Status: SHIPPED | OUTPATIENT
Start: 2023-03-06

## 2023-03-06 NOTE — TELEPHONE ENCOUNTER
Received a response from [address] [place] [address] on this patient and the Prolia shot  The insurance will cover it at 100% since she met her deductible for the year  I needed to due the prior authorization for the injection  [facility] and [facility] the start of the prior authorization for the Prolia    I let the patient know this before she left for her appointment today  I also let Dr Anastasiia Alanis know as well      Sent every to [facility] through [facility] for the Prolia prior authorization

## 2023-03-06 NOTE — PROGRESS NOTES
Assessment/Plan:    Squamous cell lung cancer, right-sided  -Status post right upper lobectomy and chemotherapy  -Continue to follow with hematology/oncology  -Patient is considering moving to [place] for good and was counseled to follow-up in 7 months for an annual physical if she does not move  COPD  -Stable without acute exacerbation  -Continue with Anoro Ellipta  -Continue to follow with pulmonology    Essential hypertension  -Well-controlled  -Continue with losartan-hydrochlorothiazide as well as a low-salt diet    Restless leg leg syndrome  -Stable  -Continue with pramipexole    Depression/grief  -Patient recently lost her long-term dog and   -Continue with Wellbutrin at current dose  -We will continue to monitor patient clinically and adjust her medication as needed    Anxiety  -Stable  -We will continue to monitor patient clinically    Hyperlipidemia  -Recent lipid panel done on 3/1/2023 showed a total cholesterol of 227, up from 164, triglyceride of 137, up from 92, HDL of 62, down from 65 and LDL of 138, up from 81 on 7/10/2022  -I counseled patient that she will benefit from being on a statin especially with her history of old infarct  -We will trial her on low-dose atorvastatin at 10 mg daily and discontinue simvastatin because of the increased tendency for drug interaction with simvastatin  -Continue with a heart healthy diet and with exercise    Fecal smearing  -Currently following with colorectal surgery  -Continue with Metamucil    Chemotherapy induced neutropenia   -Resolved, last CBC done on 1/26/2023 showed a WBC of 5 07  -We will continue to monitor patient clinically and with CBC    Osteoporosis  -Seen on DEXA scan done on 11/7/2022 with left femoral neck T score of -3 0   -We discussed the possibility of Prolia and patient was counseled to think about using Prolia or any bisphosphonate    We discussed the possible side effects and benefits of using Prolia   -We will follow-up with her insurance to see if they will approve Prolia for her   -She was counseled on taking daily vitamin D and calcium and on weightbearing exercise  GERD  -Stable  -We will refill famotidine as requested  -Continue to avoid diets and behaviors that trigger symptoms     Diagnoses and all orders for this visit:    Squamous cell lung cancer, right (HCC)    Chronic obstructive pulmonary disease, unspecified COPD type ([facility] [address] )    Hypertension, essential    Restless legs syndrome (RLS)    Other specified anxiety disorders    Other depression    Other hyperlipidemia  -     atorvastatin (LIPITOR) 10 mg tablet; Take 1 tablet (10 mg total) by mouth daily    Fecal smearing    Gastroesophageal reflux disease with esophagitis, unspecified whether hemorrhage  -     famotidine (PEPCID) 40 MG tablet; Take 1 tablet (40 mg total) by mouth every morning    Chemotherapy induced neutropenia (HCC)    Depression, recurrent (HCC)    Localized osteoporosis without current pathological fracture    BMI 31 0-31 9,adult      BMI Counseling: Body mass index is 31 61 kg/m²  The BMI is above normal  Nutrition recommendations include consuming healthier snacks, limiting drinks that contain sugar, reducing intake of saturated and trans fat and reducing intake of cholesterol  Exercise recommendations include moderate physical activity 150 minutes/week  No pharmacotherapy was ordered  Patient referred to PCP  Rationale for BMI follow-up plan is due to patient being overweight or obese  Falls Plan of Care: balance, strength, and gait training instructions were provided  Medications that increase falls were reviewed  Vitamin D supplementation was recommended  Subjective:      Patient ID: Stephani Crabtree is a 77 y o  female  HPI  Pt presents for a follow up visit regarding her sq cell lung ca, osteoporosis, copd, htn, RLS, anxiety, depression, GERD, fecal smearing and hld    She would like a refill of her famotidine  She states that she went off her cholesterol meds cos she had a nail infection and was on a medication prescribed by her dermatologist and had to go off the medication because of possible medication interaction  She states that when she finished the medication, she did not restart the simvastatin  Of note, she does not remember the name of the medication she took for her nail infection  She states that she saw the neurologist and was told that what she had was MS, rather than a CVA  Of note, her MRI of the brain done on 10/29/2021 showed an 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 with moderate chronic microangiopathy  She states that she does not have any symptoms of vertigo, imbalance or nausea  She states that she chose not to be treated for MS and does not have any symptoms at this time  States that she is feeling more depressed and cries everyday  Had to put her dog down 2 months ago   Had the dog for 14 years and dog was 16years old and had anal tumor and was incontinent and was not very mobile  She states that she feels a lot of guilt  She is currently following with colorectal for her fecal incontinece and he told her to keep a diary and she was started on metamucil and states that she has only been taking it for about 1 week  Plan is for possible battery transmitter placement  She was diagnosed with osteoporosis with her last DEXA scan and is yet to start her Prolia treatment  She has some questions about it         The following portions of the patient's history were reviewed and updated as appropriate:   She  has a past medical history of Allergic (?), Anxiety, Bronchial malignant neoplasm, right ([address] ), Cancer ([address] ), Chronic major depressive disorder, single episode, COPD (chronic obstructive pulmonary disease) ([address] ), Full incontinence of feces, GERD (gastroesophageal reflux disease), Hyperlipidemia, Hypertension, Malignant neoplasm of bronchus and lung ([address] ), Multiple sclerosis ([address] ), Pulmonary emphysema ([address] ), Restless leg syndrome, and Tobacco use  She   Patient Active Problem List    Diagnosis Date Noted   • Traumatic subdural hemorrhage with loss of consciousness of unspecified duration, initial encounter ([place] [address] ) 03/06/2023   • Localized osteoporosis without current pathological fracture 03/06/2023   • Incontinence of feces 02/02/2023   • Annual physical exam 10/03/2022   • Subdural hematoma 05/19/2022   • Syncope 05/19/2022   • BMI 31 0-31 9,adult 05/19/2022   • Intervertebral disc disorder with radiculopathy of lumbar region    • Parotid tumor 02/11/2022   • Dizziness 10/04/2021   • Encounter for care related to vascular access port 07/23/2021   • Chemotherapy-induced nausea 04/30/2021   • Insomnia due to medical condition 04/05/2021   • Cancer related pain 04/05/2021   • Palliative care patient 04/04/2021   • Medical marijuana use 04/04/2021   • Anxiety disorder 04/01/2021   • Chemotherapy induced neutropenia ([place] [address] ) 03/31/2021   • Regional lymph node metastasis present ([place] [address] ) 03/02/2021   • GERD (gastroesophageal reflux disease) 02/26/2021   • Depression, recurrent ([place] [address] )    • Squamous cell lung cancer, right ([place] [address] ) 01/12/2021   • Chronic obstructive pulmonary disease ([place] [address] ) 10/07/2019   • Tinnitus of both ears 11/06/2018   • Hypertension, essential 11/07/2017   • Restless legs syndrome (RLS) 11/07/2017   • Cystocele 09/26/2016   • Rectocele 09/26/2016   • Cystocele with prolapse 09/26/2016   • Atypical lobular hyperplasia of breast 03/24/2015   • Multiple sclerosis ([place] [address] ) 11/29/2013   • Hyperlipidemia 09/15/2010   • Spinal stenosis of lumbar region 09/15/2010   • History of colonic polyps 09/15/2010     She  has a past surgical history that includes Breast biopsy (Left, 03/23/2015); Breast excisional biopsy (Left, 08/26/2010); Breast biopsy (Left, 08/02/2010); Breast excisional biopsy (Left, 1994); Breast excisional biopsy (Right, 1984);  Total abdominal hysterectomy w/ bilateral salpingoophorectomy (Bilateral, 2010); Total abdominal hysterectomy (2010); Augmentation mammaplasty (Bilateral, 11/2014); pr thoracoscopy w/lobectomy single lobe (Right, 01/28/2021); pr [facility] incl fluor gdnce dx w/cell washg spx (N/A, 01/28/2021); Tonsillectomy; pr insj tunneled ctr vad w/subq port age 9 yr/> (N/A, 03/09/2021); FL guided central venous access device insertion (03/09/2021); Mammo (historical) (2020); IR port removal (03/07/2022); Lymph node biopsy (2021); and Tubal ligation (1971)  Her family history includes Breast cancer in her paternal aunt; COPD in her brother and father; Hypertension in her father; No Known Problems in her brother, daughter, maternal aunt, maternal grandfather, maternal grandmother, mother, paternal aunt, paternal aunt, paternal aunt, paternal aunt, paternal aunt, paternal aunt, paternal grandmother, sister, and sister; Rectal cancer in her paternal grandfather  She  reports that she quit smoking about 2 years ago  Her smoking use included cigarettes  She started smoking about 55 years ago  She has never used smokeless tobacco  She reports current alcohol use of about 5 0 standard drinks per week  She reports that she does not use drugs    Current Outpatient Medications   Medication Sig Dispense Refill   • atorvastatin (LIPITOR) 10 mg tablet Take 1 tablet (10 mg total) by mouth daily 90 tablet 1   • Biotin 5 MG CAPS Take 5,000 mg by mouth daily     • buPROPion (WELLBUTRIN XL) 300 mg 24 hr tablet Take 1 tablet (300 mg total) by mouth every morning 90 tablet 1   • Cholecalciferol (Vitamin D3) 50 MCG (2000 UT) capsule Take 5,000 Units by mouth daily      • famotidine (PEPCID) 40 MG tablet Take 1 tablet (40 mg total) by mouth every morning 90 tablet 1   • losartan-hydrochlorothiazide (HYZAAR) 50-12 5 mg per tablet Take 1 tablet by mouth daily 90 tablet 3   • pramipexole (MIRAPEX) 0 5 mg tablet Take 1 tablet (0 5 mg total) by mouth daily at bedtime 90 tablet 1   • umeclidinium-vilanterol (Anoro Ellipta) 62 5-25 MCG/INH inhaler Inhale 1 puff daily        No current facility-administered medications for this visit  Current Outpatient Medications on File Prior to Visit   Medication Sig   • Biotin 5 MG CAPS Take 5,000 mg by mouth daily   • buPROPion (WELLBUTRIN XL) 300 mg 24 hr tablet Take 1 tablet (300 mg total) by mouth every morning   • Cholecalciferol (Vitamin D3) 50 MCG (2000 UT) capsule Take 5,000 Units by mouth daily    • losartan-hydrochlorothiazide (HYZAAR) 50-12 5 mg per tablet Take 1 tablet by mouth daily   • pramipexole (MIRAPEX) 0 5 mg tablet Take 1 tablet (0 5 mg total) by mouth daily at bedtime   • umeclidinium-vilanterol (Anoro Ellipta) 62 5-25 MCG/INH inhaler Inhale 1 puff daily    • [DISCONTINUED] famotidine (PEPCID) 40 MG tablet Take 1 tablet (40 mg total) by mouth every morning   • [DISCONTINUED] ciclopirox (LOPROX) 0 77 % SUSP APPLY TOPICALLY TO THE AFFECTED AREA TWICE DAILY   • [DISCONTINUED] gabapentin (NEURONTIN) 300 mg capsule Take 2 capsules (600 mg total) by mouth daily at bedtime (Patient not taking: Reported on 8/30/2022)   • [DISCONTINUED] simvastatin (ZOCOR) 40 mg tablet Take 1 tablet (40 mg total) by mouth every other day (Patient not taking: Reported on 10/3/2022)     No current facility-administered medications on file prior to visit  She is allergic to sulfa antibiotics       Review of Systems   Constitutional: Negative for activity change, chills, fatigue, fever and unexpected weight change  HENT: Positive for congestion  Negative for ear pain, postnasal drip, rhinorrhea, sinus pressure and sore throat  Eyes: Negative for pain  Respiratory: Negative for cough, choking, chest tightness, shortness of breath and wheezing  Cardiovascular: Negative for chest pain, palpitations and leg swelling  Gastrointestinal: Negative for abdominal pain, constipation, diarrhea, nausea and vomiting          Fecal incontinence and she saw colorectal and was prescribed metamucil   Genitourinary: Negative for dysuria and hematuria  Musculoskeletal: Negative for arthralgias, back pain, gait problem, joint swelling, myalgias and neck stiffness  Skin: Negative for pallor and rash  Neurological: Negative for dizziness, tremors, seizures, syncope, light-headedness and headaches  Hematological: Negative for adenopathy  Psychiatric/Behavioral: Positive for dysphoric mood (with feeling of guilt since she lost her dog)  Negative for behavioral problems  Objective:      /64 (BP Location: Right arm, Patient Position: Sitting, Cuff Size: Standard)   Pulse 62   Temp 98 8 °F (37 1 °C) (Temporal)   Ht 4' 8" (1 422 m)   Wt 64 kg (141 lb)   LMP  (LMP Unknown)   SpO2 95%   BMI 31 61 kg/m²          Physical Exam  Constitutional:       General: She is not in acute distress  Appearance: She is well-developed  She is not diaphoretic  HENT:      Head: Normocephalic and atraumatic  Right Ear: External ear normal       Left Ear: External ear normal       Nose: Nose normal       Mouth/Throat:      Mouth: Mucous membranes are dry  Pharynx: No oropharyngeal exudate  Eyes:      General: No scleral icterus  Right eye: No discharge  Left eye: No discharge  Conjunctiva/sclera: Conjunctivae normal       Pupils: Pupils are equal, round, and reactive to light  Neck:      Thyroid: No thyromegaly  Vascular: No JVD  Trachea: No tracheal deviation  Cardiovascular:      Rate and Rhythm: Normal rate and regular rhythm  Heart sounds: Normal heart sounds  No murmur heard  No friction rub  No gallop  Pulmonary:      Effort: Pulmonary effort is normal  No respiratory distress  Breath sounds: Examination of the left-upper field reveals decreased breath sounds  Examination of the left-middle field reveals decreased breath sounds  Decreased breath sounds present  No wheezing or rales  Chest:      Chest wall: No tenderness     Abdominal:      General: Bowel sounds are normal  There is no distension  Palpations: Abdomen is soft  There is no mass  Tenderness: There is no abdominal tenderness  There is no guarding or rebound  Musculoskeletal:         General: No tenderness or deformity  Normal range of motion  Cervical back: Normal range of motion and neck supple  Lymphadenopathy:      Cervical: No cervical adenopathy  Skin:     General: Skin is warm and dry  Coloration: Skin is not pale  Findings: No erythema or rash  Neurological:      Mental Status: She is alert and oriented to person, place, and time  Cranial Nerves: No cranial nerve deficit  Motor: No abnormal muscle tone  Coordination: Coordination normal       Deep Tendon Reflexes: Reflexes are normal and symmetric  Psychiatric:         Mood and Affect: Mood is depressed  Mood is not anxious  Affect is tearful  Behavior: Behavior normal            Appointment on 03/01/2023   Component Date Value Ref Range Status   • Cholesterol 03/01/2023 227 (H)  See Comment mg/dL Final    Cholesterol:         Pediatric <18 Years        Desirable          <170 mg/dL      Borderline High    170-199 mg/dL      High               >=200 mg/dL        Adult >=18 Years            Desirable         <200 mg/dL      Borderline High   200-239 mg/dL      High              >239 mg/dL     • Triglycerides 03/01/2023 137  See Comment mg/dL Final    Triglyceride:     0-9Y            <75mg/dL     10Y-17Y         <90 mg/dL       >=18Y     Normal          <150 mg/dL     Borderline High 150-199 mg/dL     High            200-499 mg/dL        Very High       >499 mg/dL    Specimen collection should occur prior to N-Acetylcysteine or Metamizole administration due to the potential for falsely depressed results  • HDL, Direct 03/01/2023 62  >=50 mg/dL Final    Specimen collection should occur prior to Metamizole administration due to the potential for falsley depressed results     • LDL Calculated 03/01/2023 138 (H)  0 - 100 mg/dL Final    LDL Cholesterol:     Optimal           <100 mg/dl     Near Optimal      100-129 mg/dl     Above Optimal       Borderline High 130-159 mg/dl       High            160-189 mg/dl       Very High       >189 mg/dl         This screening LDL is a calculated result  It does not have the accuracy of the Direct Measured LDL in the monitoring of patients with hyperlipidemia and/or statin therapy  Direct Measure LDL (TEY774) must be ordered separately in these patients     • Non-HDL-Chol (CHOL-HDL) 03/01/2023 165  mg/dl Final   Appointment on 01/26/2023   Component Date Value Ref Range Status   • WBC 01/26/2023 5 07  4 31 - 10 16 Thousand/uL Final   • RBC 01/26/2023 4 41  3 81 - 5 12 Million/uL Final   • Hemoglobin 01/26/2023 13 7  11 5 - 15 4 g/dL Final   • Hematocrit 01/26/2023 42 9  34 8 - 46 1 % Final   • MCV 01/26/2023 97  82 - 98 fL Final   • MCH 01/26/2023 31 1  26 8 - 34 3 pg Final   • MCHC 01/26/2023 31 9  31 4 - 37 4 g/dL Final   • RDW 01/26/2023 12 2  11 6 - 15 1 % Final   • MPV 01/26/2023 10 1  8 9 - 12 7 fL Final   • Platelets 62/56/2733 319  149 - 390 Thousands/uL Final   • nRBC 01/26/2023 0  /100 WBCs Final   • Neutrophils Relative 01/26/2023 58  43 - 75 % Final   • Immat GRANS % 01/26/2023 0  0 - 2 % Final   • Lymphocytes Relative 01/26/2023 31  14 - 44 % Final   • Monocytes Relative 01/26/2023 9  4 - 12 % Final   • Eosinophils Relative 01/26/2023 1  0 - 6 % Final   • Basophils Relative 01/26/2023 1  0 - 1 % Final   • Neutrophils Absolute 01/26/2023 2 95  1 85 - 7 62 Thousands/µL Final   • Immature Grans Absolute 01/26/2023 0 00  0 00 - 0 20 Thousand/uL Final   • Lymphocytes Absolute 01/26/2023 1 56  0 60 - 4 47 Thousands/µL Final   • Monocytes Absolute 01/26/2023 0 43  0 17 - 1 22 Thousand/µL Final   • Eosinophils Absolute 01/26/2023 0 07  0 00 - 0 61 Thousand/µL Final   • Basophils Absolute 01/26/2023 0 06  0 00 - 0 10 Thousands/µL Final   • Sodium 01/26/2023 136 135 - 147 mmol/L Final   • Potassium 01/26/2023 4 0  3 5 - 5 3 mmol/L Final   • Chloride 01/26/2023 102  96 - 108 mmol/L Final   • CO2 01/26/2023 32  21 - 32 mmol/L Final   • ANION GAP 01/26/2023 2 (L)  4 - 13 mmol/L Final   • BUN 01/26/2023 14  5 - 25 mg/dL Final   • Creatinine 01/26/2023 0 82  0 60 - 1 30 mg/dL Final    Standardized to IDMS reference method   • Glucose 01/26/2023 82  65 - 140 mg/dL Final    If the patient is fasting, the ADA then defines impaired fasting glucose as > 100 mg/dL and diabetes as > or equal to 123 mg/dL  Specimen collection should occur prior to Sulfasalazine administration due to the potential for falsely depressed results  Specimen collection should occur prior to Sulfapyridine administration due to the potential for falsely elevated results  • Calcium 01/26/2023 9 1  8 3 - 10 1 mg/dL Final   • AST 01/26/2023 16  5 - 45 U/L Final    Specimen collection should occur prior to Sulfasalazine administration due to the potential for falsely depressed results  • ALT 01/26/2023 29  12 - 78 U/L Final    Specimen collection should occur prior to Sulfasalazine and/or Sulfapyridine administration due to the potential for falsely depressed results  • Alkaline Phosphatase 01/26/2023 78  46 - 116 U/L Final   • Total Protein 01/26/2023 7 2  6 4 - 8 4 g/dL Final   • Albumin 01/26/2023 3 8  3 5 - 5 0 g/dL Final   • Total Bilirubin 01/26/2023 0 56  0 20 - 1 00 mg/dL Final    Use of this assay is not recommended for patients undergoing treatment with eltrombopag due to the potential for falsely elevated results     • eGFR 01/26/2023 73  ml/min/1 73sq m Final   • CEA 01/26/2023 1 8  0 0 - 3 0 ng/mL Final    Normal/Non-Smoker: 0 0-3 0 ng/mL   Normal/Smoker:     3 1-5 0 ng/mL     Appointment on 07/10/2022   Component Date Value Ref Range Status   • Cholesterol 07/10/2022 164  See Comment mg/dL Final    Cholesterol:         Pediatric <18 Years        Desirable          <170 mg/dL      Borderline High    170-199 mg/dL      High               >=200 mg/dL        Adult >=18 Years            Desirable         <200 mg/dL      Borderline High   200-239 mg/dL      High              >239 mg/dL     • Triglycerides 07/10/2022 92  See Comment mg/dL Final    Triglyceride:     0-9Y            <75mg/dL     10Y-17Y         <90 mg/dL       >=18Y     Normal          <150 mg/dL     Borderline High 150-199 mg/dL     High            200-499 mg/dL        Very High       >499 mg/dL    Specimen collection should occur prior to N-Acetylcysteine or Metamizole administration due to the potential for falsely depressed results  • HDL, Direct 07/10/2022 65  >=50 mg/dL Final    Specimen collection should occur prior to Metamizole administration due to the potential for falsley depressed results  • LDL Calculated 07/10/2022 81  0 - 100 mg/dL Final    LDL Cholesterol:     Optimal           <100 mg/dl     Near Optimal      100-129 mg/dl     Above Optimal       Borderline High 130-159 mg/dl       High            160-189 mg/dl       Very High       >189 mg/dl         This screening LDL is a calculated result  It does not have the accuracy of the Direct Measured LDL in the monitoring of patients with hyperlipidemia and/or statin therapy  Direct Measure LDL (JGC783) must be ordered separately in these patients  • Non-HDL-Chol (CHOL-HDL) 07/10/2022 99  mg/dl Final   • TSH 3RD GENERATON 07/10/2022 1 360  0 450 - 4 500 uIU/mL Final    The recommended reference ranges for TSH during pregnancy are as follows:   First trimester 0 1 to 2 5 uIU/mL   Second trimester  0 2 to 3 0 uIU/mL   Third trimester 0 3 to 3 0 uIU/m    Note: Normal ranges may not apply to patients who are transgender, non-binary, or whose legal sex, sex at birth, and gender identity differ  Adult TSH (3rd generation) reference range follows the recommended guidelines of the American Thy[employer] Association, January, 2020     • Vit D, 25-Hydroxy 07/10/2022 83 9  30 0 - 100 0 ng/mL Final   Office Visit on 06/13/2022   Component Date Value Ref Range Status   • Color, UA 07/10/2022 Colorless   Final   • Clarity, UA 07/10/2022 Clear   Final   • Specific Gravity, UA 07/10/2022 1 007  1 003 - 1 030 Final   • pH, UA 07/10/2022 7 0  4 5, 5 0, 5 5, 6 0, 6 5, 7 0, 7 5, 8 0 Final   • Leukocytes, UA 07/10/2022 Trace (A)  Negative Final   • Nitrite, UA 07/10/2022 Negative  Negative Final   • Protein, UA 07/10/2022 Negative  Negative mg/dl Final   • Glucose, UA 07/10/2022 Negative  Negative mg/dl Final   • Ketones, UA 07/10/2022 Negative  Negative mg/dl Final   • Urobilinogen, UA 07/10/2022 <2 0  <2 0 mg/dl mg/dl Final   • Bilirubin, UA 07/10/2022 Negative  Negative Final   • Occult Blood, UA 07/10/2022 Negative  Negative Final   • RBC, UA 07/10/2022 None Seen  None Seen, 1-2 /hpf Final   • WBC, UA 07/10/2022 None Seen  None Seen, 1-2 /hpf Final   • Epithelial Cells 07/10/2022 Occasional  None Seen, Occasional /hpf Final   • Bacteria, UA 07/10/2022 None Seen  None Seen, Occasional /hpf Final   Appointment on 06/07/2022   Component Date Value Ref Range Status   • Sodium 06/07/2022 140  136 - 145 mmol/L Final   • Potassium 06/07/2022 3 6  3 5 - 5 3 mmol/L Final   • Chloride 06/07/2022 105  100 - 108 mmol/L Final   • CO2 06/07/2022 30  21 - 32 mmol/L Final   • ANION GAP 06/07/2022 5  4 - 13 mmol/L Final   • BUN 06/07/2022 12  5 - 25 mg/dL Final   • Creatinine 06/07/2022 0 94  0 60 - 1 30 mg/dL Final    Standardized to IDMS reference method   • Glucose 06/07/2022 105  65 - 140 mg/dL Final    If the patient is fasting, the ADA then defines impaired fasting glucose as > 100 mg/dL and diabetes as > or equal to 123 mg/dL  Specimen collection should occur prior to Sulfasalazine administration due to the potential for falsely depressed results  Specimen collection should occur prior to Sulfapyridine administration due to the potential for falsely elevated results     • Calcium 06/07/2022 10 0  8 3 - 10 1 mg/dL Final   • eGFR 06/07/2022 62  ml/min/1 73sq m Final

## 2023-03-07 ENCOUNTER — TELEPHONE (OUTPATIENT)
Dept: ADMINISTRATIVE | Facility: OTHER | Age: 68
End: 2023-03-07

## 2023-03-07 NOTE — TELEPHONE ENCOUNTER
Upon review of the In Basket request and the patient's chart, initial outreach has been made via fax to facility  Please see Contacts section for details       Thank you  Gonzalo Joyce

## 2023-03-07 NOTE — LETTER
Procedure Request Form: Colonoscopy      Date Requested: 03/15/23  Patient: Marlyse Dakins  Patient : 1955   Referring Provider: Madonna Sanchez, DO        Date of Procedure ______________________________       The above patient has informed us that they have completed their   most recent Colonoscopy at your facility  Please complete   this form and attach all corresponding procedure reports/results  Comments __________________________________________________________  ____________________________________________________________________  ____________________________________________________________________  ____________________________________________________________________    Facility Completing Procedure _________________________________________    Form Completed By (print name) _______________________________________      Signature __________________________________________________________      These reports are needed for  compliance  Please fax this completed form and a copy of the procedure report to our office located at [place] [address] as soon as possible to Fax [phone] mare[place] Fields: Phone [phone]    We thank you for your assistance in treating our mutual patient

## 2023-03-07 NOTE — LETTER
Procedure Request Form: Colonoscopy      Date Requested: 23  Patient: Breezy Sprawls  Patient : 1955   Referring Provider: Kiera Minder, DO        Date of Procedure ______________________________       The above patient has informed us that they have completed their   most recent Colonoscopy at your facility  Please complete   this form and attach all corresponding procedure reports/results  Comments __________________________________________________________  ____________________________________________________________________  ____________________________________________________________________  ____________________________________________________________________    Facility Completing Procedure _________________________________________    Form Completed By (print name) _______________________________________      Signature __________________________________________________________      These reports are needed for  compliance  Please fax this completed form and a copy of the procedure report to our office located at [place] [address] as soon as possible to Fax [phone] [place]: Phone [phone]    We thank you for your assistance in treating our mutual patient

## 2023-03-07 NOTE — TELEPHONE ENCOUNTER
----- Message from Barbie Shahid sent at 3/6/2023  2:43 PM EST -----  Regarding: colonoscopy  03/06/23 2:43 PM    Hello, our patient Brandon Subramanian has had CRC: Colonoscopy completed/performed  Please assist in updating the patient chart by Dr Patrick Barros The date of service is end of 2022      Thank you,  Barbie Shahid  [facility] [address]

## 2023-03-09 ENCOUNTER — TELEPHONE (OUTPATIENT)
Dept: HEMATOLOGY ONCOLOGY | Facility: CLINIC | Age: 68
End: 2023-03-09

## 2023-03-09 NOTE — TELEPHONE ENCOUNTER
Tali Acosta, calling from [facility]  They are requesting a copy of the US head neck soft tissue 2/6/23  It was ordered by Dr Renny Hernadez  Fax number [phone]    She can be reached at [phone] ext [phone]

## 2023-03-14 NOTE — TELEPHONE ENCOUNTER
Checked on Avail[employer] for [facility] to see if we got an approval or denial for the Prolia for this patient  Still stating pending the authorization for the Prolia    Will check in a couple of days and see if we got an answer yet or not

## 2023-03-15 NOTE — TELEPHONE ENCOUNTER
As a follow-up, a second attempt has been made for outreach via fax to facility  Please see Contacts section for details      Thank you  Zee Tillman

## 2023-03-24 NOTE — TELEPHONE ENCOUNTER
Received approval from [facility] AND [place] for the Prolia injection for this patient      Patient is scheduled for her Prolia in the [place] office with Dr Anderson Santacruz 03/30/2023    Prolia is covered at 100% since patient met her deductible    Auth# [id number] valid from 03/13/2023-03/13/2024

## 2023-03-27 NOTE — TELEPHONE ENCOUNTER
As a final attempt, a third outreach has been made via telephone call to facility  Please see Contacts section for details  This encounter will be closed and completed by end of day  Should we receive the requested information because of previous outreach attempts, the requested patient's chart will be updated appropriately       Thank you  Yaa Fuentes

## 2023-03-29 ENCOUNTER — OFFICE VISIT (OUTPATIENT)
Dept: INTERNAL MEDICINE CLINIC | Age: 68
End: 2023-03-29

## 2023-03-29 ENCOUNTER — TELEPHONE (OUTPATIENT)
Dept: ADMINISTRATIVE | Facility: OTHER | Age: 68
End: 2023-03-29

## 2023-03-29 VITALS
HEIGHT: 56 IN | OXYGEN SATURATION: 97 % | WEIGHT: 142.2 LBS | TEMPERATURE: 98.1 F | DIASTOLIC BLOOD PRESSURE: 74 MMHG | SYSTOLIC BLOOD PRESSURE: 142 MMHG | BODY MASS INDEX: 31.99 KG/M2 | HEART RATE: 62 BPM

## 2023-03-29 DIAGNOSIS — M81.6 LOCALIZED OSTEOPOROSIS WITHOUT CURRENT PATHOLOGICAL FRACTURE: Primary | ICD-10-CM

## 2023-03-29 DIAGNOSIS — S06.5X9A TRAUMATIC SUBDURAL HEMORRHAGE WITH LOSS OF CONSCIOUSNESS OF UNSPECIFIED DURATION, INITIAL ENCOUNTER (HCC): ICD-10-CM

## 2023-03-29 DIAGNOSIS — Z12.11 ENCOUNTER FOR SCREENING FOR MALIGNANT NEOPLASM OF COLON: ICD-10-CM

## 2023-03-29 DIAGNOSIS — Z86.010 HX OF COLONIC POLYPS: ICD-10-CM

## 2023-03-29 DIAGNOSIS — I10 HYPERTENSION, ESSENTIAL: ICD-10-CM

## 2023-03-29 RX ORDER — LOSARTAN POTASSIUM AND HYDROCHLOROTHIAZIDE 12.5; 5 MG/1; MG/1
1 TABLET ORAL DAILY
Qty: 90 TABLET | Refills: 1 | Status: SHIPPED | OUTPATIENT
Start: 2023-03-29

## 2023-03-29 RX ORDER — AMOXICILLIN 500 MG/1
1 TABLET, FILM COATED ORAL EVERY 8 HOURS
COMMUNITY
Start: 2023-03-28

## 2023-03-29 NOTE — TELEPHONE ENCOUNTER
Upon review of the In Basket request we were able to locate, review, and update the patient chart as requested for CRC: Colonoscopy  Any additional questions or concerns should be emailed to the Practice Liaisons via the appropriate education email address, please do not reply via In Basket      Thank you  Kelley To MA

## 2023-03-29 NOTE — PROGRESS NOTES
Assessment/Plan:    Osteoporosis  - Prolia shot was given on right arm by subcutaneous injection  - lot number [id number] and expiration date 07/31/2025  -patient tolerated injection without any adverse reactions  -continue with calcium and vitamin-D supplements  - follow-up in 6 months for repeat Prolia shot  -We discussed the side effects of Prolia including osteonecrosis of the jaw and patient was insistent that she wants a Prolia shot to be given today since her health insurance will run out soon  Essential hypertension  -Stable  -We will refill losartan HCT as requested  -Continue with a low-salt diet    History of colonic polyps  -Last colonoscopy was done on 11/14/2020 and showed colonic polyp and diverticulosis  -Patient was counseled to return for repeat colonoscopy in 3 years which will be in 2025    Traumatic subdural hemorrhage  -Resolved  -We will delete this diagnosis     Diagnoses and all orders for this visit:    Localized osteoporosis without current pathological fracture  -     denosumab (PROLIA) subcutaneous injection 60 mg    Encounter for screening for malignant neoplasm of colon    Hx of colonic polyps    Hypertension, essential  -     losartan-hydrochlorothiazide (HYZAAR) 50-12 5 mg per tablet; Take 1 tablet by mouth daily    Traumatic subdural hemorrhage with loss of consciousness of unspecified duration, initial encounter ([facility] [address] )    Other orders  -     amoxicillin (AMOXIL) 500 MG tablet; Take 1 tablet by mouth every 8 (eight) hours         Subjective:      Patient ID: Mireille Wilcox is a 77 y o  female  HPI  Patient presents for a Prolia injection  She has a history of osteoporosis    Last DEXA scan was 11/07/22  Patient is taking calcium and vitamin-D supplements   Last vitamin-D level was checked on 07/10/22 and was 83 9  Last calcium level was checked on 1/26/23 and was normal at 9 1   Weight-bearing exercise - she does not do that yet but will start soon     She denies any side effects of Prolia including jaw pain, infections,  Musculoskeletal pain, fractures, back pain, headaches, abdominal pain, constipation, acid reflux     She states that she got her teeth cleaned 6 days ago and she started having pain in her R upper molar and was placed on amoxicillin by her dentist   She states that she was told that she may need a root canal and the dentist thinks that they hit a nerve during the cleaning which is what caused her pain  I discussed the possible side effect of osteonecrosis of the jaw with patient and encouraged her to wait to have her dental work all done before getting Prolia but she declined to wait  She she states that she wants to have the prolia given because her  will stop working soon and she will lose her health insurance  She also complains that she is gaining wt even though she reportedly is not eating so much  She states that she is not counting calories  She had a fall about one year ago with a subdural hemorrhage but is fully recovered   She is concerned because she gained wt and her clothes are not fitting well any more and she also admits to occasional left sided tinnitus, no present at this time, sounds like an intermittent thumping sound as well as fecal incontinece for which she saw the colorectal surgeon and they told her that they will put an electrode which would help  She would like a refill of her losartan HCT          The following portions of the patient's history were reviewed and updated as appropriate:   She  has a past medical history of Allergic (?), Anxiety, Bronchial malignant neoplasm, right ([address] ), Cancer ([address] ), Chronic major depressive disorder, single episode, COPD (chronic obstructive pulmonary disease) ([address] ), Full incontinence of feces, GERD (gastroesophageal reflux disease), Hyperlipidemia, Hypertension, Malignant neoplasm of bronchus and lung ([address] ), Multiple sclerosis ([address] ), Pulmonary emphysema ([address] ), Restless leg syndrome, and Tobacco use  She   Patient Active Problem List    Diagnosis Date Noted   • Traumatic subdural hemorrhage with loss of consciousness of unspecified duration, initial encounter ([place] [address] ) 03/06/2023   • Localized osteoporosis without current pathological fracture 03/06/2023   • Incontinence of feces 02/02/2023   • Annual physical exam 10/03/2022   • Subdural hematoma 05/19/2022   • Syncope 05/19/2022   • BMI 31 0-31 9,adult 05/19/2022   • Intervertebral disc disorder with radiculopathy of lumbar region    • Parotid tumor 02/11/2022   • Dizziness 10/04/2021   • Encounter for care related to vascular access port 07/23/2021   • Chemotherapy-induced nausea 04/30/2021   • Insomnia due to medical condition 04/05/2021   • Cancer related pain 04/05/2021   • Palliative care patient 04/04/2021   • Medical marijuana use 04/04/2021   • Anxiety disorder 04/01/2021   • Chemotherapy induced neutropenia ([place] [address] ) 03/31/2021   • Regional lymph node metastasis present ([place] [address] ) 03/02/2021   • GERD (gastroesophageal reflux disease) 02/26/2021   • Depression, recurrent ([place] [address] )    • Squamous cell lung cancer, right ([place] [address] ) 01/12/2021   • Chronic obstructive pulmonary disease ([place] [address] ) 10/07/2019   • Tinnitus of both ears 11/06/2018   • Hypertension, essential 11/07/2017   • Restless legs syndrome (RLS) 11/07/2017   • Cystocele 09/26/2016   • Rectocele 09/26/2016   • Cystocele with prolapse 09/26/2016   • Atypical lobular hyperplasia of breast 03/24/2015   • Multiple sclerosis ([facility] ) 11/29/2013   • Hyperlipidemia 09/15/2010   • Spinal stenosis of lumbar region 09/15/2010   • History of colonic polyps 09/15/2010     She  has a past surgical history that includes Breast biopsy (Left, 03/23/2015); Breast excisional biopsy (Left, 08/26/2010); Breast biopsy (Left, 08/02/2010); Breast excisional biopsy (Left, 1994); Breast excisional biopsy (Right, 1984); Total abdominal hysterectomy w/ bilateral salpingoophorectomy (Bilateral, 2010);  Total abdominal hysterectomy (2010); Augmentation mammaplasty (Bilateral, 11/2014); pr thoracoscopy w/lobectomy single lobe (Right, 01/28/2021); pr [facility] incl fluor gdnce dx w/cell washg spx (N/A, 01/28/2021); Tonsillectomy; pr insj tunneled ctr vad w/subq port age 9 yr/> (N/A, 03/09/2021); FL guided central venous access device insertion (03/09/2021); Mammo (historical) (2020); IR port removal (03/07/2022); Lymph node biopsy (2021); and Tubal ligation (1971)  Her family history includes Breast cancer in her paternal aunt; COPD in her brother and father; Hypertension in her father; No Known Problems in her brother, daughter, maternal aunt, maternal grandfather, maternal grandmother, mother, paternal aunt, paternal aunt, paternal aunt, paternal aunt, paternal aunt, paternal aunt, paternal grandmother, sister, and sister; Rectal cancer in her paternal grandfather  She  reports that she quit smoking about 2 years ago  Her smoking use included cigarettes  She started smoking about 55 years ago  She has a 53 00 pack-year smoking history  She has never used smokeless tobacco  She reports current alcohol use of about 5 0 standard drinks per week  She reports that she does not use drugs    Current Outpatient Medications   Medication Sig Dispense Refill   • amoxicillin (AMOXIL) 500 MG tablet Take 1 tablet by mouth every 8 (eight) hours     • atorvastatin (LIPITOR) 10 mg tablet Take 1 tablet (10 mg total) by mouth daily 90 tablet 1   • Biotin 5 MG CAPS Take 5,000 mg by mouth daily     • buPROPion (WELLBUTRIN XL) 300 mg 24 hr tablet Take 1 tablet (300 mg total) by mouth every morning 90 tablet 1   • Cholecalciferol (Vitamin D3) 50 MCG (2000 UT) capsule Take 5,000 Units by mouth daily      • famotidine (PEPCID) 40 MG tablet Take 1 tablet (40 mg total) by mouth every morning 90 tablet 1   • losartan-hydrochlorothiazide (HYZAAR) 50-12 5 mg per tablet Take 1 tablet by mouth daily 90 tablet 1   • pramipexole (MIRAPEX) 0 5 mg tablet Take 1 tablet (0 5 mg total) by mouth daily at bedtime 90 tablet 1   • umeclidinium-vilanterol (Anoro Ellipta) 62 5-25 MCG/INH inhaler Inhale 1 puff daily        No current facility-administered medications for this visit  Current Outpatient Medications on File Prior to Visit   Medication Sig   • amoxicillin (AMOXIL) 500 MG tablet Take 1 tablet by mouth every 8 (eight) hours   • atorvastatin (LIPITOR) 10 mg tablet Take 1 tablet (10 mg total) by mouth daily   • Biotin 5 MG CAPS Take 5,000 mg by mouth daily   • buPROPion (WELLBUTRIN XL) 300 mg 24 hr tablet Take 1 tablet (300 mg total) by mouth every morning   • Cholecalciferol (Vitamin D3) 50 MCG (2000 UT) capsule Take 5,000 Units by mouth daily    • famotidine (PEPCID) 40 MG tablet Take 1 tablet (40 mg total) by mouth every morning   • pramipexole (MIRAPEX) 0 5 mg tablet Take 1 tablet (0 5 mg total) by mouth daily at bedtime   • umeclidinium-vilanterol (Anoro Ellipta) 62 5-25 MCG/INH inhaler Inhale 1 puff daily    • [DISCONTINUED] losartan-hydrochlorothiazide (HYZAAR) 50-12 5 mg per tablet Take 1 tablet by mouth daily     No current facility-administered medications on file prior to visit  She is allergic to sulfa antibiotics       Review of Systems   Constitutional: Positive for unexpected weight change (wt gain of 6 lbs in one year)  Negative for activity change, chills, fatigue and fever  HENT: Positive for dental problem (tooth ache ) and tinnitus  Negative for ear pain, postnasal drip, rhinorrhea, sinus pressure and sore throat  Eyes: Negative for pain  Respiratory: Negative for cough, choking, chest tightness, shortness of breath and wheezing  Cardiovascular: Negative for chest pain, palpitations and leg swelling  Gastrointestinal: Positive for diarrhea  Negative for abdominal pain, constipation, nausea and vomiting  Genitourinary: Negative for dysuria and hematuria     Musculoskeletal: Negative for arthralgias, back pain, gait problem, joint swelling, myalgias "and neck stiffness  Skin: Negative for pallor and rash  Neurological: Negative for dizziness, tremors, seizures, syncope, light-headedness and headaches  Hematological: Negative for adenopathy  Psychiatric/Behavioral: Negative for behavioral problems  Objective:      /74 (BP Location: Left arm, Patient Position: Sitting, Cuff Size: Standard)   Pulse 62   Temp 98 1 °F (36 7 °C) (Temporal)   Ht 4' 8\" (1 422 m)   Wt 64 5 kg (142 lb 3 2 oz)   LMP  (LMP Unknown)   SpO2 97%   BMI 31 88 kg/m²          Physical Exam  Constitutional:       General: She is not in acute distress  Appearance: She is well-developed  She is not diaphoretic  HENT:      Head: Normocephalic and atraumatic  Right Ear: External ear normal       Left Ear: External ear normal       Nose: Nose normal       Mouth/Throat:      Mouth: Mucous membranes are dry  Dentition: Gingival swelling (gingival swelling and redness on the right upper molar region) present  Pharynx: No oropharyngeal exudate  Eyes:      General: No scleral icterus  Right eye: No discharge  Left eye: No discharge  Conjunctiva/sclera: Conjunctivae normal       Pupils: Pupils are equal, round, and reactive to light  Neck:      Thyroid: No thyromegaly  Vascular: No JVD  Trachea: No tracheal deviation  Cardiovascular:      Rate and Rhythm: Normal rate and regular rhythm  Heart sounds: Normal heart sounds  No murmur heard  No friction rub  No gallop  Pulmonary:      Effort: Pulmonary effort is normal  No respiratory distress  Breath sounds: Examination of the left-middle field reveals rales  Examination of the left-lower field reveals rales  Rales present  No wheezing  Chest:      Chest wall: No tenderness  Abdominal:      General: Bowel sounds are normal  There is no distension  Palpations: Abdomen is soft  There is no mass  Tenderness: There is no abdominal tenderness   There is " no guarding or rebound  Musculoskeletal:         General: No tenderness or deformity  Normal range of motion  Cervical back: Normal range of motion and neck supple  Lymphadenopathy:      Cervical: No cervical adenopathy  Skin:     General: Skin is warm and dry  Coloration: Skin is not pale  Findings: No erythema or rash  Neurological:      Mental Status: She is alert and oriented to person, place, and time  Cranial Nerves: No cranial nerve deficit  Motor: No abnormal muscle tone  Coordination: Coordination normal       Deep Tendon Reflexes: Reflexes are normal and symmetric  Psychiatric:         Behavior: Behavior normal            Appointment on 03/01/2023   Component Date Value Ref Range Status   • Cholesterol 03/01/2023 227 (H)  See Comment mg/dL Final    Cholesterol:         Pediatric <18 Years        Desirable          <170 mg/dL      Borderline High    170-199 mg/dL      High               >=200 mg/dL        Adult >=18 Years            Desirable         <200 mg/dL      Borderline High   200-239 mg/dL      High              >239 mg/dL     • Triglycerides 03/01/2023 137  See Comment mg/dL Final    Triglyceride:     0-9Y            <75mg/dL     10Y-17Y         <90 mg/dL       >=18Y     Normal          <150 mg/dL     Borderline High 150-199 mg/dL     High            200-499 mg/dL        Very High       >499 mg/dL    Specimen collection should occur prior to N-Acetylcysteine or Metamizole administration due to the potential for falsely depressed results  • HDL, Direct 03/01/2023 62  >=50 mg/dL Final    Specimen collection should occur prior to Metamizole administration due to the potential for falsley depressed results     • LDL Calculated 03/01/2023 138 (H)  0 - 100 mg/dL Final    LDL Cholesterol:     Optimal           <100 mg/dl     Near Optimal      100-129 mg/dl     Above Optimal       Borderline High 130-159 mg/dl       High            160-189 mg/dl       Very High       >189 mg/dl         This screening LDL is a calculated result  It does not have the accuracy of the Direct Measured LDL in the monitoring of patients with hyperlipidemia and/or statin therapy  Direct Measure LDL (YZQ907) must be ordered separately in these patients     • Non-HDL-Chol (CHOL-HDL) 03/01/2023 165  mg/dl Final   Appointment on 01/26/2023   Component Date Value Ref Range Status   • WBC 01/26/2023 5 07  4 31 - 10 16 Thousand/uL Final   • RBC 01/26/2023 4 41  3 81 - 5 12 Million/uL Final   • Hemoglobin 01/26/2023 13 7  11 5 - 15 4 g/dL Final   • Hematocrit 01/26/2023 42 9  34 8 - 46 1 % Final   • MCV 01/26/2023 97  82 - 98 fL Final   • MCH 01/26/2023 31 1  26 8 - 34 3 pg Final   • MCHC 01/26/2023 31 9  31 4 - 37 4 g/dL Final   • RDW 01/26/2023 12 2  11 6 - 15 1 % Final   • MPV 01/26/2023 10 1  8 9 - 12 7 fL Final   • Platelets 16/29/2648 319  149 - 390 Thousands/uL Final   • nRBC 01/26/2023 0  /100 WBCs Final   • Neutrophils Relative 01/26/2023 58  43 - 75 % Final   • Immat GRANS % 01/26/2023 0  0 - 2 % Final   • Lymphocytes Relative 01/26/2023 31  14 - 44 % Final   • Monocytes Relative 01/26/2023 9  4 - 12 % Final   • Eosinophils Relative 01/26/2023 1  0 - 6 % Final   • Basophils Relative 01/26/2023 1  0 - 1 % Final   • Neutrophils Absolute 01/26/2023 2 95  1 85 - 7 62 Thousands/µL Final   • Immature Grans Absolute 01/26/2023 0 00  0 00 - 0 20 Thousand/uL Final   • Lymphocytes Absolute 01/26/2023 1 56  0 60 - 4 47 Thousands/µL Final   • Monocytes Absolute 01/26/2023 0 43  0 17 - 1 22 Thousand/µL Final   • Eosinophils Absolute 01/26/2023 0 07  0 00 - 0 61 Thousand/µL Final   • Basophils Absolute 01/26/2023 0 06  0 00 - 0 10 Thousands/µL Final   • Sodium 01/26/2023 136  135 - 147 mmol/L Final   • Potassium 01/26/2023 4 0  3 5 - 5 3 mmol/L Final   • Chloride 01/26/2023 102  96 - 108 mmol/L Final   • CO2 01/26/2023 32  21 - 32 mmol/L Final   • ANION GAP 01/26/2023 2 (L)  4 - 13 mmol/L Final   • BUN 01/26/2023 14  5 - 25 mg/dL Final   • Creatinine 01/26/2023 0 82  0 60 - 1 30 mg/dL Final    Standardized to IDMS reference method   • Glucose 01/26/2023 82  65 - 140 mg/dL Final    If the patient is fasting, the ADA then defines impaired fasting glucose as > 100 mg/dL and diabetes as > or equal to 123 mg/dL  Specimen collection should occur prior to Sulfasalazine administration due to the potential for falsely depressed results  Specimen collection should occur prior to Sulfapyridine administration due to the potential for falsely elevated results  • Calcium 01/26/2023 9 1  8 3 - 10 1 mg/dL Final   • AST 01/26/2023 16  5 - 45 U/L Final    Specimen collection should occur prior to Sulfasalazine administration due to the potential for falsely depressed results  • ALT 01/26/2023 29  12 - 78 U/L Final    Specimen collection should occur prior to Sulfasalazine and/or Sulfapyridine administration due to the potential for falsely depressed results  • Alkaline Phosphatase 01/26/2023 78  46 - 116 U/L Final   • Total Protein 01/26/2023 7 2  6 4 - 8 4 g/dL Final   • Albumin 01/26/2023 3 8  3 5 - 5 0 g/dL Final   • Total Bilirubin 01/26/2023 0 56  0 20 - 1 00 mg/dL Final    Use of this assay is not recommended for patients undergoing treatment with eltrombopag due to the potential for falsely elevated results     • eGFR 01/26/2023 73  ml/min/1 73sq m Final   • CEA 01/26/2023 1 8  0 0 - 3 0 ng/mL Final    Normal/Non-Smoker: 0 0-3 0 ng/mL   Normal/Smoker:     3 1-5 0 ng/mL     Appointment on 07/10/2022   Component Date Value Ref Range Status   • Cholesterol 07/10/2022 164  See Comment mg/dL Final    Cholesterol:         Pediatric <18 Years        Desirable          <170 mg/dL      Borderline High    170-199 mg/dL      High               >=200 mg/dL        Adult >=18 Years            Desirable         <200 mg/dL      Borderline High   200-239 mg/dL      High              >239 mg/dL     • Triglycerides 07/10/2022 92  See Comment mg/dL Final    Triglyceride:     0-9Y            <75mg/dL     10Y-17Y         <90 mg/dL       >=18Y     Normal          <150 mg/dL     Borderline High 150-199 mg/dL     High            200-499 mg/dL        Very High       >499 mg/dL    Specimen collection should occur prior to N-Acetylcysteine or Metamizole administration due to the potential for falsely depressed results  • HDL, Direct 07/10/2022 65  >=50 mg/dL Final    Specimen collection should occur prior to Metamizole administration due to the potential for falsley depressed results  • LDL Calculated 07/10/2022 81  0 - 100 mg/dL Final    LDL Cholesterol:     Optimal           <100 mg/dl     Near Optimal      100-129 mg/dl     Above Optimal       Borderline High 130-159 mg/dl       High            160-189 mg/dl       Very High       >189 mg/dl         This screening LDL is a calculated result  It does not have the accuracy of the Direct Measured LDL in the monitoring of patients with hyperlipidemia and/or statin therapy  Direct Measure LDL (TUB651) must be ordered separately in these patients  • Non-HDL-Chol (CHOL-HDL) 07/10/2022 99  mg/dl Final   • TSH 3RD GENERATON 07/10/2022 1 360  0 450 - 4 500 uIU/mL Final    The recommended reference ranges for TSH during pregnancy are as follows:   First trimester 0 1 to 2 5 uIU/mL   Second trimester  0 2 to 3 0 uIU/mL   Third trimester 0 3 to 3 0 uIU/m    Note: Normal ranges may not apply to patients who are transgender, non-binary, or whose legal sex, sex at birth, and gender identity differ  Adult TSH (3rd generation) reference range follows the recommended guidelines of the American Thy[employer] Association, January, 2020     • Vit D, 25-Hydroxy 07/10/2022 83 9  30 0 - 100 0 ng/mL Final   Office Visit on 06/13/2022   Component Date Value Ref Range Status   • Color, UA 07/10/2022 Colorless   Final   • Clarity, UA 07/10/2022 Clear   Final   • Specific Gravity, UA 07/10/2022 1 007  1 003 - 1 030 Final   • pH, UA 07/10/2022 7 0  4 5, 5 0, 5 5, 6 0, 6 5, 7 0, 7 5, 8 0 Final   • Leukocytes, UA 07/10/2022 Trace (A)  Negative Final   • Nitrite, UA 07/10/2022 Negative  Negative Final   • Protein, UA 07/10/2022 Negative  Negative mg/dl Final   • Glucose, UA 07/10/2022 Negative  Negative mg/dl Final   • Ketones, UA 07/10/2022 Negative  Negative mg/dl Final   • Urobilinogen, UA 07/10/2022 <2 0  <2 0 mg/dl mg/dl Final   • Bilirubin, UA 07/10/2022 Negative  Negative Final   • Occult Blood, UA 07/10/2022 Negative  Negative Final   • RBC, UA 07/10/2022 None Seen  None Seen, 1-2 /hpf Final   • WBC, UA 07/10/2022 None Seen  None Seen, 1-2 /hpf Final   • Epithelial Cells 07/10/2022 Occasional  None Seen, Occasional /hpf Final   • Bacteria, UA 07/10/2022 None Seen  None Seen, Occasional /hpf Final   Appointment on 06/07/2022   Component Date Value Ref Range Status   • Sodium 06/07/2022 140  136 - 145 mmol/L Final   • Potassium 06/07/2022 3 6  3 5 - 5 3 mmol/L Final   • Chloride 06/07/2022 105  100 - 108 mmol/L Final   • CO2 06/07/2022 30  21 - 32 mmol/L Final   • ANION GAP 06/07/2022 5  4 - 13 mmol/L Final   • BUN 06/07/2022 12  5 - 25 mg/dL Final   • Creatinine 06/07/2022 0 94  0 60 - 1 30 mg/dL Final    Standardized to IDMS reference method   • Glucose 06/07/2022 105  65 - 140 mg/dL Final    If the patient is fasting, the ADA then defines impaired fasting glucose as > 100 mg/dL and diabetes as > or equal to 123 mg/dL  Specimen collection should occur prior to Sulfasalazine administration due to the potential for falsely depressed results  Specimen collection should occur prior to Sulfapyridine administration due to the potential for falsely elevated results     • Calcium 06/07/2022 10 0  8 3 - 10 1 mg/dL Final   • eGFR 06/07/2022 62  ml/min/1 73sq m Final

## 2023-03-29 NOTE — TELEPHONE ENCOUNTER
----- Message from Babar Agosto MA sent at 3/29/2023 10:07 AM EDT -----  Regarding: cOLONOSCOPY  03/29/23 10:07 AM    Hello, our patient Kiel Cooper has had CRC: Colonoscopy completed/performed  Please assist in updating the patient chart by pulling the document from the Media Tab  The date of service is 11/14/2022       Thank you,  Babar Agosto MA  [facility]

## 2023-04-29 ENCOUNTER — NURSE TRIAGE (OUTPATIENT)
Dept: OTHER | Facility: OTHER | Age: 68
End: 2023-04-29

## 2023-04-29 NOTE — TELEPHONE ENCOUNTER
"  Reason for Disposition  • Patient has refills remaining on their prescription    Answer Assessment - Initial Assessment Questions  1  DRUG NAME: \"What medicine do you need to have refilled? \"      Losartan, pramipexole, Anoro Ellipta inhaler      Protocols used: MEDICATION REFILL AND RENEWAL CALL-ADULT-    "

## 2023-04-29 NOTE — TELEPHONE ENCOUNTER
Pt called office 4/26 about getting refills prior to losing coverage  Did not hear back from office  Calling today for refills  Informed unable to provide anything more than 7 day refill  Pt has enough meds for the weekend, just wanted additional medication prior to losing coverage  Advised to call office Monday to discuss

## 2023-04-29 NOTE — TELEPHONE ENCOUNTER
Regarding: Medication refill problem  ----- Message from Jonathan Peña sent at 4/29/2023  1:21 PM EDT -----  I received a call from [facility] stating that they need clarification on dosing and that the script sent over is missing information  They need additional information for all the scripts sent in to them

## 2023-04-29 NOTE — TELEPHONE ENCOUNTER
"  Reason for Disposition  • [1] Caller has NON-URGENT medicine question about med that PCP prescribed AND [2] triager unable to answer question    Answer Assessment - Initial Assessment Questions  1  NAME of MEDICATION: \"What medicine are you calling about? \"      Unsure  2  QUESTION: Evelyn Abarca is your question? \" (e g , medication refill, side effect)      [facility] me to tell me there was an issue with my prescription  Can you call them to fix it? Protocols used: [facility]    [facility] her regarding and issue with a prescription that was sent in  Do not see any recent prescriptions  Explained that pharmacy can call the office on Monday  Pt states she is losing health coverage tomorrow and needs this taken care of today  Attempted to call [facility] but was on hold for 10-15 minutes  Called pt back and explained that the pharmacy will have to call the office on Monday regarding this  Unsure what prescription this is regarding  Please f/u with the pt regarding this on Monday     "

## 2023-04-29 NOTE — TELEPHONE ENCOUNTER
"Regarding: Medication Refill Issue  ----- Message from Delynn Oppenheim sent at 4/29/2023  9:28 AM EDT -----  Aliyah Valera had spoke with a nurse yesterday about refilling my medication before my coverage ends on Monday  However, my pharmacy never received anything  My refill was suppose to be for losartan, pramipexole, and Anoro Ellipta inhaler  \"    "

## 2023-05-01 NOTE — TELEPHONE ENCOUNTER
Spoke with patient- Pt states cannot afford Anora inhaler  Patient questioned if she could be weaned off of medication and go without or if there was a cheaper alternative  Patient states she is going to switch over to medicare but unsure what type of coverage she will have  Told patient about goodrx card for other medication refills  Pt contacted [address] [place] [address] for financial assistance but she is unable to get help         Please advise, thank you

## 2023-05-02 NOTE — TELEPHONE ENCOUNTER
Spoke with patient- pt now has [facility]/[employer] supplemental rx insurance- pt is in [place] [address] and will call when she is home to find out about her prescriptions if they will be covered and how much they will cost  Pt will call office back where to send prescriptions and to let us know if inhaler is not covered  Offered pt goodrx card to see if that is cheaper

## 2023-05-17 NOTE — PROGRESS NOTES
Pt here for Granix injection  Pt offers no complaints  Granix given in R arm without any issues  Pt aware of next appointment 4/28   Declines AVS 
ABSCESS

## 2023-06-07 ENCOUNTER — TELEPHONE (OUTPATIENT)
Dept: HEMATOLOGY ONCOLOGY | Facility: CLINIC | Age: 68
End: 2023-06-07

## 2023-06-07 NOTE — TELEPHONE ENCOUNTER
Returned call to patient  She states that she has a high dollar co-pay for her Anoro inhaler  Patient asking if there is any financial assistance for this  I explained that she should reach out to the ordering provider - Per patient pulmonary orders this for her    I explained we do have a financial assistance department but this is for medications prescribed by our practice or related to her cancer  Patient will reach out to pulmonary for assistance

## 2023-06-07 NOTE — TELEPHONE ENCOUNTER
Patient Call    Who are you speaking with? self   If it is not the patient, are they listed on an active communication consent form? self   What is the reason for this call? Calling about inhaler alternatives or financial support   Does this require a call back? yes   If a call back is required, please list best call back number [phone]   If a call back is required, advise that a message will be forwarded to their care team and someone will return their call as soon as possible  Did you relay this information to the patient?  yes

## 2023-06-28 DIAGNOSIS — G25.81 RESTLESS LEGS SYNDROME (RLS): ICD-10-CM

## 2023-06-28 DIAGNOSIS — F41.9 ANXIETY DISORDER, UNSPECIFIED TYPE: ICD-10-CM

## 2023-06-28 RX ORDER — BUPROPION HYDROCHLORIDE 300 MG/1
300 TABLET ORAL EVERY MORNING
Qty: 90 TABLET | Refills: 1 | Status: SHIPPED | OUTPATIENT
Start: 2023-06-28

## 2023-06-28 RX ORDER — PRAMIPEXOLE DIHYDROCHLORIDE 0.5 MG/1
0.5 TABLET ORAL
Qty: 90 TABLET | Refills: 1 | Status: SHIPPED | OUTPATIENT
Start: 2023-06-28

## 2023-07-17 ENCOUNTER — APPOINTMENT (OUTPATIENT)
Dept: LAB | Facility: CLINIC | Age: 68
End: 2023-07-17
Payer: MEDICARE

## 2023-07-17 DIAGNOSIS — C34.91 SQUAMOUS CELL LUNG CANCER, RIGHT (HCC): ICD-10-CM

## 2023-07-17 LAB
ALBUMIN SERPL BCP-MCNC: 3.7 G/DL (ref 3.5–5)
ALP SERPL-CCNC: 56 U/L (ref 46–116)
ALT SERPL W P-5'-P-CCNC: 32 U/L (ref 12–78)
ANION GAP SERPL CALCULATED.3IONS-SCNC: 1 MMOL/L
AST SERPL W P-5'-P-CCNC: 18 U/L (ref 5–45)
BASOPHILS # BLD AUTO: 0.05 THOUSANDS/ÂΜL (ref 0–0.1)
BASOPHILS NFR BLD AUTO: 1 % (ref 0–1)
BILIRUB SERPL-MCNC: 0.71 MG/DL (ref 0.2–1)
BUN SERPL-MCNC: 11 MG/DL (ref 5–25)
CALCIUM SERPL-MCNC: 9 MG/DL (ref 8.3–10.1)
CHLORIDE SERPL-SCNC: 108 MMOL/L (ref 96–108)
CO2 SERPL-SCNC: 29 MMOL/L (ref 21–32)
CREAT SERPL-MCNC: 0.84 MG/DL (ref 0.6–1.3)
EOSINOPHIL # BLD AUTO: 0.09 THOUSAND/ÂΜL (ref 0–0.61)
EOSINOPHIL NFR BLD AUTO: 2 % (ref 0–6)
ERYTHROCYTE [DISTWIDTH] IN BLOOD BY AUTOMATED COUNT: 12.7 % (ref 11.6–15.1)
GFR SERPL CREATININE-BSD FRML MDRD: 70 ML/MIN/1.73SQ M
GLUCOSE P FAST SERPL-MCNC: 100 MG/DL (ref 65–99)
HCT VFR BLD AUTO: 40.4 % (ref 34.8–46.1)
HGB BLD-MCNC: 13.5 G/DL (ref 11.5–15.4)
IMM GRANULOCYTES # BLD AUTO: 0.02 THOUSAND/UL (ref 0–0.2)
IMM GRANULOCYTES NFR BLD AUTO: 0 % (ref 0–2)
LYMPHOCYTES # BLD AUTO: 1.34 THOUSANDS/ÂΜL (ref 0.6–4.47)
LYMPHOCYTES NFR BLD AUTO: 24 % (ref 14–44)
MCH RBC QN AUTO: 31.8 PG (ref 26.8–34.3)
MCHC RBC AUTO-ENTMCNC: 33.4 G/DL (ref 31.4–37.4)
MCV RBC AUTO: 95 FL (ref 82–98)
MONOCYTES # BLD AUTO: 0.44 THOUSAND/ÂΜL (ref 0.17–1.22)
MONOCYTES NFR BLD AUTO: 8 % (ref 4–12)
NEUTROPHILS # BLD AUTO: 3.65 THOUSANDS/ÂΜL (ref 1.85–7.62)
NEUTS SEG NFR BLD AUTO: 65 % (ref 43–75)
NRBC BLD AUTO-RTO: 0 /100 WBCS
PLATELET # BLD AUTO: 266 THOUSANDS/UL (ref 149–390)
PMV BLD AUTO: 10.5 FL (ref 8.9–12.7)
POTASSIUM SERPL-SCNC: 4 MMOL/L (ref 3.5–5.3)
PROT SERPL-MCNC: 7 G/DL (ref 6.4–8.4)
RBC # BLD AUTO: 4.25 MILLION/UL (ref 3.81–5.12)
SODIUM SERPL-SCNC: 138 MMOL/L (ref 135–147)
WBC # BLD AUTO: 5.59 THOUSAND/UL (ref 4.31–10.16)

## 2023-07-17 PROCEDURE — 80053 COMPREHEN METABOLIC PANEL: CPT

## 2023-07-17 PROCEDURE — 36415 COLL VENOUS BLD VENIPUNCTURE: CPT

## 2023-07-17 PROCEDURE — 85025 COMPLETE CBC W/AUTO DIFF WBC: CPT

## 2023-07-18 DIAGNOSIS — K21.00 GASTROESOPHAGEAL REFLUX DISEASE WITH ESOPHAGITIS, UNSPECIFIED WHETHER HEMORRHAGE: ICD-10-CM

## 2023-07-18 RX ORDER — FAMOTIDINE 40 MG/1
40 TABLET, FILM COATED ORAL EVERY MORNING
Qty: 90 TABLET | Refills: 1 | Status: SHIPPED | OUTPATIENT
Start: 2023-07-18

## 2023-08-03 ENCOUNTER — OFFICE VISIT (OUTPATIENT)
Dept: HEMATOLOGY ONCOLOGY | Facility: CLINIC | Age: 68
End: 2023-08-03
Payer: MEDICARE

## 2023-08-03 ENCOUNTER — APPOINTMENT (OUTPATIENT)
Dept: LAB | Facility: CLINIC | Age: 68
End: 2023-08-03
Payer: MEDICARE

## 2023-08-03 VITALS
SYSTOLIC BLOOD PRESSURE: 124 MMHG | TEMPERATURE: 97.4 F | HEART RATE: 63 BPM | HEIGHT: 56 IN | WEIGHT: 141 LBS | OXYGEN SATURATION: 97 % | BODY MASS INDEX: 31.72 KG/M2 | DIASTOLIC BLOOD PRESSURE: 78 MMHG | RESPIRATION RATE: 17 BRPM

## 2023-08-03 DIAGNOSIS — G25.81 RESTLESS LEGS SYNDROME (RLS): ICD-10-CM

## 2023-08-03 DIAGNOSIS — I10 HYPERTENSION, ESSENTIAL: ICD-10-CM

## 2023-08-03 DIAGNOSIS — C77.9 REGIONAL LYMPH NODE METASTASIS PRESENT (HCC): ICD-10-CM

## 2023-08-03 DIAGNOSIS — C34.91 SQUAMOUS CELL LUNG CANCER, RIGHT (HCC): Primary | ICD-10-CM

## 2023-08-03 DIAGNOSIS — J44.9 CHRONIC OBSTRUCTIVE PULMONARY DISEASE, UNSPECIFIED COPD TYPE (HCC): ICD-10-CM

## 2023-08-03 DIAGNOSIS — D49.0 PAROTID TUMOR: ICD-10-CM

## 2023-08-03 DIAGNOSIS — M48.061 SPINAL STENOSIS OF LUMBAR REGION WITHOUT NEUROGENIC CLAUDICATION: ICD-10-CM

## 2023-08-03 DIAGNOSIS — R15.9 FECAL INCONTINENCE DUE TO ANORECTAL DISORDER: ICD-10-CM

## 2023-08-03 DIAGNOSIS — G35 MULTIPLE SCLEROSIS (HCC): ICD-10-CM

## 2023-08-03 DIAGNOSIS — E07.9 THYROID DISORDER: ICD-10-CM

## 2023-08-03 DIAGNOSIS — K62.9 FECAL INCONTINENCE DUE TO ANORECTAL DISORDER: ICD-10-CM

## 2023-08-03 LAB — TSH SERPL DL<=0.05 MIU/L-ACNC: 2.86 UIU/ML (ref 0.45–4.5)

## 2023-08-03 PROCEDURE — 36415 COLL VENOUS BLD VENIPUNCTURE: CPT

## 2023-08-03 PROCEDURE — 84443 ASSAY THYROID STIM HORMONE: CPT

## 2023-08-03 PROCEDURE — 99214 OFFICE O/P EST MOD 30 MIN: CPT | Performed by: INTERNAL MEDICINE

## 2023-08-03 RX ORDER — TIOTROPIUM BROMIDE INHALATION SPRAY 3.12 UG/1
SPRAY, METERED RESPIRATORY (INHALATION) DAILY
COMMUNITY
Start: 2023-07-06

## 2023-08-03 RX ORDER — TIOTROPIUM BROMIDE AND OLODATEROL 3.124; 2.736 UG/1; UG/1
2 SPRAY, METERED RESPIRATORY (INHALATION) DAILY
COMMUNITY
Start: 2023-07-06

## 2023-08-03 NOTE — PROGRESS NOTES
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 Tumor tested negative for EGFR. Patient  Received 4 cycles of adjuvant chemotherapy, a combination of carboplatin and Gemzar and last treatment was on 06/07/2021. .  She did not receive taxane because of history of MS. She also received Granix because of neutropenia. She did well and there has not been any documented recurrent or metastatic disease clinically. She gets CT chest from thoracic surgery. She has gained weight and has thinning of hair and she will be checked for thyroid functions. She has right parotid gland tumor that was biopsied once at [facility] and was not malignant and she gets ultrasound and has remained stable. She gives history of fecal incontinence   She had a rectal surgeon. He also had a colonoscopy from her rectal surgeon. She has history of MS. She follows with her neurologist.  No more dizziness    She has minimal  exertional dyspnea when going up on incline. .  Has some tiredness and arthritic symptoms. She sleeps poorly. She has chronic low back discomfort and had  epidural shots. No metastatic disease in lumbar spine area on MRI scan. She had PET-CT scan in December 2021 . PET   showed positive activity in right parotid gland  And that was biopsied and was negative for malignancy and she followed with ENT specialist   Remote past history of suspected MS in 1989. She had some tingling. CSF came back suspicious and MRI brain came back suspicious. Patient was treated for MS at that time. No active treatment for  MS for the last several years. No more tingling in her hands. Patient gave me all this information.  .  Patient had MRI scan of brain, neck and dorsal spine in 2021 and there were lesions at T3 and T7. Patient  had PET scan and spine lesions were not FDG avid. History of tinnitus off and on in the ears. She follows with ENT specialist    ROS:  08/03/23 Reviewed 12 systems: See symptoms in HPI  Presently no other neurological, cardiac, pulmonary, GI and  symptoms other than listed in HPI. Other symptoms are in HPI. No  fever, chills, bleeding, bone pains  skin rash, weight loss, night sweats,    weakness, numbness, claudication and gait problem. No frequent infections. Not unusually sensitive to heat or cold. No swelling of the ankles. No swollen glands. Patient is  anxious . Shameka Varela Physical Exam:    8/3/23 3/29/23 3/6/23   Blood Pressure 124/78 142/74 124/64   Pulse 63 62 62   Respirations 17 -- --   Temperature 97.4 °F (36.3 °C) Abnormal  98.1 °F (36.7 °C) 98.8 °F (37.1 °C)   Temp Source -- Temporal Temporal   SpO2 97 % 97 % 95 %   Weight - Scale 64 kg (141 lb) 64.5 kg (142 lb 3.2 oz) 64 kg (141 lb)   Height 4' 8" (1.422 m) 4' 8" (1.422 m) 4' 8" (1.422 m)   Pain Score Zero -- --            Alert and oriented and not in distress. Vitals are above. No icterus. There is no oral thrush. There is no palpable neck mass. There is no palpable parotid gland mass. Lung fields are clear to percussion and auscultation. Heart rate is regular. Liver and spleen are not palpably enlarged. There is no palpable abdominal mass. There is no ascites. There is no edema of the ankles. There is no calf tenderness, no focal neurological deficit, no skin rash, no palpable lymphadenopathy in the neck and axillary areas,  no clubbing. Patient is  anxious. Performance status 1. Historical Information   Past Medical History:   Diagnosis Date   • Allergic ?    • Anxiety    • Bronchial malignant neoplasm, right (HCC)    • Cancer (HCC)     lung   • Chronic major depressive disorder, single episode    • COPD (chronic obstructive pulmonary disease) (HCC)    • Full incontinence of feces    • GERD (gastroesophageal reflux disease)    • Hyperlipidemia    • Hypertension    • Malignant neoplasm of bronchus and lung (HCC)    • Multiple sclerosis (HCC)    • Pulmonary emphysema (HCC)    • Restless leg syndrome    • Tobacco use      Past Surgical History:   Procedure Laterality Date   • AUGMENTATION MAMMAPLASTY Bilateral 11/2014    retro-pectoral silicone   • BREAST BIOPSY Left 03/23/2015    u/s core bx   • BREAST BIOPSY Left 08/02/2010    high-risk lesion   • BREAST EXCISIONAL BIOPSY Left 08/26/2010    high-risk lesion   • BREAST EXCISIONAL BIOPSY Left 1994   • BREAST EXCISIONAL BIOPSY Right 1984   • CT NEEDLE BIOPSY LUNG  12/2/2020   • FL GUIDED CENTRAL VENOUS ACCESS DEVICE INSERTION  03/09/2021   • IR PORT REMOVAL  03/07/2022   • LYMPH NODE BIOPSY  2021   • MAMMO (HISTORICAL)  2020   •  [address] INCL FLUOR GDNCE DX W/CELL WASHG SPX N/A 01/28/2021    Procedure: BRONCHOSCOPY FLEXIBLE;  Surgeon: Abdiel Govea MD;  [facility]: [facility];  Service: Thoracic   • UT INSJ TUNNELED CTR VAD W/SUBQ PORT AGE 3 YR/> N/A 03/09/2021    Procedure: INSERTION VENOUS PORT ( PORT-A-CATH) IR;  Surgeon: Saeed Renee DO;  [facility];  Service: Interventional Radiology   • UT THORACOSCOPY W/LOBECTOMY SINGLE LOBE Right 01/28/2021    Procedure: RIGHT VATS UPPER LOBECTOMY ; MEDIASTINAL LYMPH NODE DISSECTION; right lower lobe bleb resection  ;  Surgeon: Abdiel Govea MD;  Location[facility];  Service: Thoracic   • TONSILLECTOMY     • TOTAL ABDOMINAL HYSTERECTOMY  2010    age 60   • TOTAL ABDOMINAL HYSTERECTOMY W/ BILATERAL SALPINGOOPHORECTOMY Bilateral 2010    age 60   • TUBAL LIGATION  1971     Social History   Social History     Substance and Sexual Activity   Alcohol Use Yes   • Alcohol/week: 5.0 standard drinks of alcohol   • Types: 5 Glasses of wine per week    Comment: wine @       Social History     Substance and Sexual Activity   Drug Use Never     Social History     Tobacco Use   Smoking Status Former   • Packs/day: 1.00   • Years: 53.00   • Total pack years: 53.00   • Types: Cigarettes   • Start date: 1968   • Quit date: 1/10/2021   • Years since quittin.5   Smokeless Tobacco Never   Tobacco Comments    Wish i never started     Family History:   Family History   Problem Relation Age of Onset   • No Known Problems Mother    • COPD Father    • Hypertension Father    • No Known Problems Sister    • No Known Problems Daughter    • No Known Problems Maternal Grandmother    • No Known Problems Maternal Grandfather    • No Known Problems Paternal Grandmother    • Rectal cancer Paternal Grandfather         unknown age   • No Known Problems Sister    • No Known Problems Maternal Aunt    • Breast cancer Paternal Aunt         unknown age   • No Known Problems Paternal Aunt    • No Known Problems Paternal Aunt    • No Known Problems Paternal Aunt    • No Known Problems Paternal Aunt    • No Known Problems Paternal Aunt    • No Known Problems Paternal Aunt    • COPD Brother    • No Known Problems Brother          Current Outpatient Medications:   •  amoxicillin (AMOXIL) 500 MG tablet, Take 1 tablet by mouth every 8 (eight) hours, Disp: , Rfl:   •  atorvastatin (LIPITOR) 10 mg tablet, Take 1 tablet (10 mg total) by mouth daily, Disp: 90 tablet, Rfl: 1  •  Biotin 5 MG CAPS, Take 5,000 mg by mouth daily, Disp: , Rfl:   •  buPROPion (WELLBUTRIN XL) 300 mg 24 hr tablet, Take 1 tablet (300 mg total) by mouth every morning, Disp: 90 tablet, Rfl: 1  •  Cholecalciferol (Vitamin D3) 50 MCG (2000 UT) capsule, Take 5,000 Units by mouth daily , Disp: , Rfl:   •  famotidine (PEPCID) 40 MG tablet, Take 1 tablet (40 mg total) by mouth every morning, Disp: 90 tablet, Rfl: 1  •  losartan-hydrochlorothiazide (HYZAAR) 50-12.5 mg per tablet, Take 1 tablet by mouth daily, Disp: 90 tablet, Rfl: 1  •  pramipexole (MIRAPEX) 0.5 mg tablet, Take 1 tablet (0.5 mg total) by mouth daily at bedtime, Disp: 90 tablet, Rfl: 1  • tiotropium (Spiriva Respimat) 2.5 MCG/ACT AERS inhaler, Inhale daily, Disp: , Rfl:   •  tiotropium-olodaterol (Stiolto Respimat) 2.5-2.5 MCG/ACT inhaler, Inhale 2 puffs daily, Disp: , Rfl:   •  umeclidinium-vilanterol (Anoro Ellipta) 62.5-25 MCG/INH inhaler, Inhale 1 puff daily , Disp: , Rfl:     Allergies   Allergen Reactions   • Sulfa Antibiotics Hives             Lab Results: I have reviewed all pertinent labs.   LABS:    Results for orders placed or performed in visit on 07/17/23   CBC and differential   Result Value Ref Range    WBC 5.59 4.31 - 10.16 Thousand/uL    RBC 4.25 3.81 - 5.12 Million/uL    Hemoglobin 13.5 11.5 - 15.4 g/dL    Hematocrit 40.4 34.8 - 46.1 %    MCV 95 82 - 98 fL    MCH 31.8 26.8 - 34.3 pg    MCHC 33.4 31.4 - 37.4 g/dL    RDW 12.7 11.6 - 15.1 %    MPV 10.5 8.9 - 12.7 fL    Platelets 764 922 - 424 Thousands/uL    nRBC 0 /100 WBCs    Neutrophils Relative 65 43 - 75 %    Immat GRANS % 0 0 - 2 %    Lymphocytes Relative 24 14 - 44 %    Monocytes Relative 8 4 - 12 %    Eosinophils Relative 2 0 - 6 %    Basophils Relative 1 0 - 1 %    Neutrophils Absolute 3.65 1.85 - 7.62 Thousands/µL    Immature Grans Absolute 0.02 0.00 - 0.20 Thousand/uL    Lymphocytes Absolute 1.34 0.60 - 4.47 Thousands/µL    Monocytes Absolute 0.44 0.17 - 1.22 Thousand/µL    Eosinophils Absolute 0.09 0.00 - 0.61 Thousand/µL    Basophils Absolute 0.05 0.00 - 0.10 Thousands/µL   Comprehensive metabolic panel   Result Value Ref Range    Sodium 138 135 - 147 mmol/L    Potassium 4.0 3.5 - 5.3 mmol/L    Chloride 108 96 - 108 mmol/L    CO2 29 21 - 32 mmol/L    ANION GAP 1 mmol/L    BUN 11 5 - 25 mg/dL    Creatinine 0.84 0.60 - 1.30 mg/dL    Glucose, Fasting 100 (H) 65 - 99 mg/dL    Calcium 9.0 8.3 - 10.1 mg/dL    AST 18 5 - 45 U/L    ALT 32 12 - 78 U/L    Alkaline Phosphatase 56 46 - 116 U/L    Total Protein 7.0 6.4 - 8.4 g/dL    Albumin 3.7 3.5 - 5.0 g/dL    Total Bilirubin 0.71 0.20 - 1.00 mg/dL    eGFR 70 ml/min/1.73sq m Comprehensive metabolic panel (Order #[id number]) on 4/9/21           Imaging Studies: I have personally reviewed pertinent reports. November   [address]  Result Impression   IMPRESSION:  1. FDG avid stable right upper lobe lung mass, still suspicious for lung  malignancy despite negative biopsy. 2. Smaller stable, mildly FDG avid nodules in the posterior right lung apex  could be inflammatory or metastatic. 3. Intense uptake in or adjacent to the deep lobe of the right parotid gland. MRI recommended for further evaluation   MRI NECK SOFT TISSUE [address]  Result Impression   Impression:     Question parotid lesion is present on the right. This abuts the parotid gland  however the majority appears to be posterior to the sternocleidomastoid muscle  raising question of metastatic disease to a level 2 lymph node. Parotid  malignancy is not excluded. Right upper lobe lung nodule is poorly evaluated on this modality                  Workstation:[id number]     IMPRESSION:     No intracranial metastasis.     No acute acute intracranial abnormality. 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 and moderate chronic microangiopathy.     Workstation performed: [id number]          Imaging    MRI brain w wo contrast (Order: [id number]) - 10/29/2021  IMPRESSION:     1. Stable 2.2 cm posterosuperior right parotid/juxtaparotid enhancing nodule which is previously biopsied as benign (per chart).     2. Indeterminate T1 hypointense lesion within T3 vertebral body. This level is not fully imaged in prior MRI.   Metastasis should be excluded given history of malignancy.                          The study was marked in EPIC for significant notification.      Workstation performed: [id number]          Imaging    MRI soft tissue neck wo and w contrast (Order: [id number]) - 11/22/2021  IMPRESSION:     Osseous lesions involve T3 and T7 vertebral bodies (larger lesion in T3 vertebral body), indeterminate. These may represent treated osseous metastatic lesions. No abnormal enhancement in thoracic spine.     Mild multilevel degenerative changes of thoracic spine.           Workstation performed: [id number]          Imaging    MRI thoracic spine w wo contrast (Order: [id number]) - 12/10/2021    IMPRESSION:     1. Stable in size but increasing in FDG avidity lesion in the region of the deep right parotid gland which by report has had a negative biopsy. Correlation is recommended.     2. There is otherwise no evidence for hypermetabolic malignancy. Specifically, there is no focal FDG activity in the region of indeterminate thoracic spine lesions seen on recent MRI which remain nonspecific.     Workstation performed: [id number]          Imaging    NM PET CT skull base to mid thigh (Order: [id number]) - 12/28/2021    MRI BRAIN WO CONTRAST 06/6/2022    Anatomical Region Laterality Modality   Head -- Magnetic Resonance   MRHEAD -- --       Impression    IMPRESSION:     White matter lesions in keeping with provided diagnosis of multiple sclerosis   are again seen demonstrating no significant change. No mass effect, recent infarct, intracranial hemorrhage, or hydrocephalus. Chronic lacunar infarcts versus dilated perivascular spaces of the right   cerebellar hemisphere and left basal ganglia, unchanged. Workstation:[id number]    Narrative    History: Fall and concussion. Multiple sclerosis    CT HEAD WO CONTRAST 05/09/2022    Anatomical Region Laterality Modality   Head -- Computed Tomography       Impression    Impression:     Interval significant decrease of right frontal and temporal subdural hematoma   with minimal residual hemorrhage. Chronic lacunar infarcts in left basal ganglia. White matter chronic small vessel ischemic disease.                Workstation:[id number]    Narrative    Clinical information: Subdural hematoma OSSEOUS STRUCTURES:  No acute fracture or osseous destructive lesion identified. Degenerative changes of the spine.     IMPRESSION:     No evidence of recurrent tumor or thoracic metastatic disease.     No significant interval change.              Workstation performed: [id number]          Imaging    CT chest wo contrast (Order: [id number]) - 7/21/2022    IMPRESSION:  No mammographic evidence of malignancy.           ASSESSMENT/BI-RADS CATEGORY:  Left: 2 - Benign  Right: 2 - Benign  Overall: 2 - Benign     RECOMMENDATION:       - Routine screening mammogram in 1 year for both breasts.     Workstation ID: [id number]          Imaging    Mammo screening bilateral w 3d & cad (Order: [id number]) - 2/23/2022      OSSEOUS STRUCTURES:  No acute fracture or destructive osseous lesion.     IMPRESSION:     Nothing to suggest recurrent squamous cell carcinoma.              Workstation performed: [id number]        Imaging    CT chest wo contrast (Order: [id number]) - 2/13/2023    Narrative & Impression   NECK ULTRASOUND     INDICATION:   D49.0: Neoplasm of unspecified behavior of digestive system.     COMPARISON:  MRI neck 11/22/2021     TECHNIQUE:   Real-time ultrasound of the right parotid gland was performed with a linear transducer with both volumetric sweeps and still imaging techniques.     FINDINGS:  2.2 x 0.7 x 1.7 cm hypoechoic, smoothly marginated hypovascular nodule seen along the posterior right parotid gland corresponding to previous study. As per previous report, this has been biopsied with benign result. IMPRESSION:     Stable size of 2.2 cm hypoechoic, smoothly marginated nodule posterior right parotid gland.        Workstation performed: [id number]        Imaging    US head neck soft tissue (Order: [id number]) - 2/6/2023    Pathology, and Other Studies: I have personally reviewed pertinent reports.       Report  NON-GYNECOLOGICAL CYTOLOGYResulted: 1/7/2021 12:20 PM  [facility]  Component Name Value Ref Range   Non-Gynecological Cytology [facility]  [address] [place] [address]  [phone]    PATHOLOGY REPORT             MR#:             [id number]  Patient:         Randi Traore  /Sex:         1955  F  Provider:        SYLVIA LUO  Location:        [facility] Radiol. OP ([facility])  Collect Date:    2021  N21-53    CYTOLOGIC DIAGNOSIS:  A  Fine Needle Aspiration with Cytotech Assistance and Cell Block,  Right Parotid Mass  Adequacy: Adequate for evaluation. General Category: Negative for Diagnostic Malignancy. Descriptive Interpretation: Cytologic features consistent with  pleomorphic adenoma. .  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 Tumor tested negative for EGFR. Patient  Received 4 cycles of adjuvant chemotherapy, a combination of carboplatin and Gemzar and last treatment was on 2021. .  She did not receive taxane because of history of MS. She also received Granix because of neutropenia. She did well and there has not been any documented recurrent or metastatic disease clinically. She gets CT chest from thoracic surgery. She has gained weight and has thinning of hair and she will be checked for thyroid functions. 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 She gives history of fecal incontinence   She had a rectal surgeon. He also had a colonoscopy from her rectal surgeon. She has history of MS.  She follows with her neurologist.  No more dizziness    She has minimal  exertional dyspnea when going up on incline. .  Has some tiredness and arthritic symptoms. She sleeps poorly. She has chronic low back discomfort and had  epidural shots. No metastatic disease in lumbar spine area on MRI scan. She had PET-CT scan in December 2021 . PET   showed positive activity in right parotid gland  And that was biopsied and was negative for malignancy and she followed with ENT specialist   Remote past history of suspected MS in 1989. She had some tingling. CSF came back suspicious and MRI brain came back suspicious. Patient was treated for MS at that time. No active treatment for  MS for the last several years. No more tingling in her hands. Patient gave me all this information. .  Patient had MRI scan of brain, neck and dorsal spine in 2021 and there were lesions at T3 and T7. Patient  had PET scan and spine lesions were not FDG avid. History of tinnitus off and on in the ears. She follows with ENT specialist    Physical examination and test results are as recorded and discussed in detail. .  Plan is surveillance for lung cancer history. She gets CT scan from thoracic surgeon. She follows with her rectal surgeon Dr. Mariaa Singh for fecal incontinence and colonoscopy. She will have thyroid blood test for weight gain and thinning of hair. All discussed in very much detail. Questions answered. Discussed the importance of self-breast examination, eating healthy foods, activities as tolerated and health screening tests. Patient has already stopped smoking cigarettes. She is capable of self-care. Goal and plan:  surveillance and prolongation of survival and possible cure from lung cancer. Discussed precautions against coronavirus, flu and other infections. Patient will continue to follow with her primary physician and other consultants. Discussed diagnoses, orders and instructions below    1.  Squamous cell lung cancer, right (HCC)    - CBC and differential; Future  - Comprehensive metabolic panel; Future    2. Regional lymph node metastasis present ([address])      3. Parotid tumor    - US head neck soft tissue; Future    4. Chronic obstructive pulmonary disease, unspecified COPD type ([address])      5. Multiple sclerosis ([address])      6. Hypertension, essential      7. Spinal stenosis of lumbar region without neurogenic claudication      8. Fecal incontinence due to anorectal disorder      9. Restless legs syndrome (RLS)      10. Thyroid disorder    - TSH, 3rd generation with Free T4 reflex; Future    Thyroid blood work today. Regular blood work and ultrasound of parotid gland prior to next visit in 6 months. She is already scheduled to have CT chest and mammography. I used a dictation device to dictate this note and there could be mistakes in my note and she may contact my office for that    Patient voiced understanding and agrees    Counseling / Coordination of Care  . Michell Heredia   Provided counseling and support

## 2023-08-03 NOTE — PATIENT INSTRUCTIONS
Thyroid blood work today. Regular blood work and ultrasound of parotid gland prior to next visit in 6 months. She is already scheduled to have CT chest and mammography.

## 2023-08-10 ENCOUNTER — TELEPHONE (OUTPATIENT)
Dept: INTERNAL MEDICINE CLINIC | Age: 68
End: 2023-08-10

## 2023-08-10 NOTE — TELEPHONE ENCOUNTER
Patient called office and asked if she is able to get her teeth cleaned since she is on the prolia injection? Please advise.

## 2023-08-30 ENCOUNTER — TELEPHONE (OUTPATIENT)
Dept: INTERNAL MEDICINE CLINIC | Age: 68
End: 2023-08-30

## 2023-08-30 ENCOUNTER — OFFICE VISIT (OUTPATIENT)
Dept: INTERNAL MEDICINE CLINIC | Age: 68
End: 2023-08-30
Payer: MEDICARE

## 2023-08-30 VITALS
WEIGHT: 143 LBS | HEIGHT: 56 IN | OXYGEN SATURATION: 97 % | BODY MASS INDEX: 32.17 KG/M2 | HEART RATE: 76 BPM | DIASTOLIC BLOOD PRESSURE: 70 MMHG | SYSTOLIC BLOOD PRESSURE: 128 MMHG | TEMPERATURE: 98.6 F

## 2023-08-30 DIAGNOSIS — M48.061 SPINAL STENOSIS OF LUMBAR REGION WITHOUT NEUROGENIC CLAUDICATION: ICD-10-CM

## 2023-08-30 DIAGNOSIS — C34.91 SQUAMOUS CELL LUNG CANCER, RIGHT (HCC): ICD-10-CM

## 2023-08-30 DIAGNOSIS — L30.9 DERMATITIS: ICD-10-CM

## 2023-08-30 DIAGNOSIS — M81.6 LOCALIZED OSTEOPOROSIS WITHOUT CURRENT PATHOLOGICAL FRACTURE: ICD-10-CM

## 2023-08-30 DIAGNOSIS — M62.81 MUSCLE WEAKNESS OF RIGHT UPPER EXTREMITY: ICD-10-CM

## 2023-08-30 DIAGNOSIS — G35 MULTIPLE SCLEROSIS (HCC): ICD-10-CM

## 2023-08-30 DIAGNOSIS — M62.81 MUSCLE WEAKNESS OF LOWER EXTREMITY: ICD-10-CM

## 2023-08-30 DIAGNOSIS — F33.9 DEPRESSION, RECURRENT (HCC): ICD-10-CM

## 2023-08-30 DIAGNOSIS — K21.9 GASTROESOPHAGEAL REFLUX DISEASE WITHOUT ESOPHAGITIS: ICD-10-CM

## 2023-08-30 DIAGNOSIS — J44.9 CHRONIC OBSTRUCTIVE PULMONARY DISEASE, UNSPECIFIED COPD TYPE (HCC): ICD-10-CM

## 2023-08-30 DIAGNOSIS — I10 HYPERTENSION, ESSENTIAL: Primary | ICD-10-CM

## 2023-08-30 DIAGNOSIS — L23.9 ALLERGIC DERMATITIS: ICD-10-CM

## 2023-08-30 PROCEDURE — 99214 OFFICE O/P EST MOD 30 MIN: CPT | Performed by: INTERNAL MEDICINE

## 2023-08-30 NOTE — TELEPHONE ENCOUNTER
Patient was here today, she is scheduled for her Prolia vaccine in six months. Patient is now on Medicare, was asking if this would be covered by Medicare.

## 2023-08-30 NOTE — PROGRESS NOTES
Assessment/Plan:    Essential hypertension  -Stable  -Continue with losartan-hydrochlorothiazide and a low-salt diet    COPD  -Currently following with pulmonology  -Continue with current inhalers and continue to follow with pulmonology    Depression  -Stable, although with current life stressors  -Continue Wellbutrin    GERD  -Stable  -Continue with famotidine and continue to avoid diets and behaviors that trigger symptoms    Squamous cell lung cancer  -Status post lobectomy and chemotherapy  -Continue to follow with hematology oncology  -We will refer patient for physical therapy as requested    Multiple sclerosis with worsening muscle weakness   -Affecting mostly the right upper extremity and right lower extremity with a history of multiple sclerosis  -We will refer patient to physical therapy as requested  -Continue with multivitamins including oral vitamin B12 and vitamin D    Osteoporosis  -Currently on Prolia  -Creatinine reportedly early vitamin D and calcium supplements. Allergic dermatitis  -Likely related to allergy to tomato plants  -We will order hydrocortisone 2.5% cream  -Patient should avoid tomato plants in the future         Diagnoses and all orders for this visit:    Hypertension, essential    Depression, recurrent ([address])    Gastroesophageal reflux disease without esophagitis    Squamous cell lung cancer, right ([address])  -     Ambulatory Referral to Physical Therapy; Future    Spinal stenosis of lumbar region without neurogenic claudication  -     Ambulatory Referral to Physical Therapy; Future    Muscle weakness of right upper extremity  -     Ambulatory Referral to Physical Therapy; Future    Muscle weakness of lower extremity  -     Ambulatory Referral to Physical Therapy;  Future    Localized osteoporosis without current pathological fracture    Dermatitis  -     hydrocortisone 2.5 % cream; Apply topically 2 (two) times a day    Allergic dermatitis  -     hydrocortisone 2.5 % cream; Apply topically 2 (two) times a day    Multiple sclerosis (HCC)    Chronic obstructive pulmonary disease, unspecified COPD type (HCC)          Subjective:      Patient ID: Pola Romeo is a 77 y.o. female. HPI  Patient presents for a follow-up visit regarding her essential hypertension, depression, lung cancer, GERD, lumbar spinal stenosis and COPD. She states that she has been taking his/her medications as prescribed but states that her insurance would no longer cover her inhaler so it became very expensive running into the 100s of dollars for a month. She states that her pulmonologist gave her some samples but she is saving them and has been doing without but states that her shortness of breath has not been bad. Today pt complains that she feels very tired and finished her chemo 13 months ago. She also had right-sided lobectomy. She states that she is gaining wt and went for blood work recently. She would like to have vit b 12 shots but her last vitamin B 12 was checked about 8 years ago and was 398. Recently had blood work done and wants to review the results. Has been losing hair   Taking multivitamins and vit d but not vitamin b 12   Last vit b 12 was 398 ub 2015  She would like to go for PT   She states that she feels very tired and unable to lift her RLE and keeps dropping her RUE when she raises it. She states that she wanted to start riding a bike but could not lift her leg to start bike riding and believes that physical therapy might help. Mood is not so good but she thinks that the welbutrin is working   She complains that she gets itchy after touching tomato plants and currently has an itchy red rash on her left forearm. Has not needed to use her rescue inhaler in a while.     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   She   Patient Active Problem List    Diagnosis Date Noted   • Traumatic subdural hemorrhage with loss of consciousness of unspecified duration, initial encounter ([address]) 03/06/2023   • Localized osteoporosis without current pathological fracture 03/06/2023   • Incontinence of feces 02/02/2023   • Annual physical exam 10/03/2022   • Subdural hematoma ([address]) 05/19/2022   • Syncope 05/19/2022   • BMI 31.0-31.9,adult 05/19/2022   • Intervertebral disc disorder with radiculopathy of lumbar region    • Parotid tumor 02/11/2022   • Dizziness 10/04/2021   • Encounter for care related to vascular access port 07/23/2021   • Chemotherapy-induced nausea 04/30/2021   • Insomnia due to medical condition 04/05/2021   • Cancer related pain 04/05/2021   • Palliative care patient 04/04/2021   • Medical marijuana use 04/04/2021   • Anxiety disorder 04/01/2021   • Chemotherapy induced neutropenia ([address]) 03/31/2021   • Regional lymph node metastasis present ([address]) 03/02/2021   • GERD (gastroesophageal reflux disease) 02/26/2021   • Depression, recurrent ([address])    • Squamous cell lung cancer, right ([address]) 01/12/2021   • Chronic obstructive pulmonary disease ([address]) 10/07/2019   • Tinnitus of both ears 11/06/2018   • Hypertension, essential 11/07/2017   • Restless legs syndrome (RLS) 11/07/2017   • Cystocele 09/26/2016   • Rectocele 09/26/2016   • Cystocele with prolapse 09/26/2016   • Atypical lobular hyperplasia of breast 03/24/2015   • Multiple sclerosis ([address]) 11/29/2013   • Hyperlipidemia 09/15/2010   • Spinal stenosis of lumbar region 09/15/2010   • History of colonic polyps 09/15/2010     She  has a past surgical history that includes Breast biopsy (Left, 03/23/2015); Breast excisional biopsy (Left, 08/26/2010); Breast biopsy (Left, 08/02/2010); Breast excisional biopsy (Left, 1994); Breast excisional biopsy (Right, 1984); Total abdominal hysterectomy w/ bilateral salpingoophorectomy (Bilateral, 2010); Total abdominal hysterectomy (2010); Augmentation mammaplasty (Bilateral, 11/2014); pr thoracoscopy w/lobectomy single lobe (Right, 01/28/2021); pr [facility] incl fluor gdnce dx w/cell washg spx (N/A, 01/28/2021); Tonsillectomy; pr insj tunneled ctr vad w/subq port age 9 yr/> (N/A, 03/09/2021); FL guided central venous access device insertion (03/09/2021); Mammo (historical) (2020); IR port removal (03/07/2022); Lymph node biopsy (2021); Tubal ligation (1971); and CT needle biopsy lung (12/2/2020). Her family history includes Breast cancer in her paternal aunt; COPD in her brother and father; Hypertension in her father; No Known Problems in her brother, daughter, maternal aunt, maternal grandfather, maternal grandmother, mother, paternal aunt, paternal aunt, paternal aunt, paternal aunt, paternal aunt, paternal aunt, paternal grandmother, sister, and sister; Rectal cancer in her paternal grandfather. She  reports that she quit smoking about 2 years ago. Her smoking use included cigarettes. She started smoking about 55 years ago. She has a 53.00 pack-year smoking history. She has never used smokeless tobacco. She reports current alcohol use of about 5.0 standard drinks of alcohol per week. She reports that she does not use drugs.   Current Outpatient Medications   Medication Sig Dispense Refill   • atorvastatin (LIPITOR) 10 mg tablet Take 1 tablet (10 mg total) by mouth daily 90 tablet 1   • Biotin 5 MG CAPS Take 5,000 mg by mouth daily     • buPROPion (WELLBUTRIN XL) 300 mg 24 hr tablet Take 1 tablet (300 mg total) by mouth every morning 90 tablet 1   • Cholecalciferol (Vitamin D3) 50 MCG (2000 UT) capsule Take 5,000 Units by mouth daily      • famotidine (PEPCID) 40 MG tablet Take 1 tablet (40 mg total) by mouth every morning 90 tablet 1   • hydrocortisone 2.5 % cream Apply topically 2 (two) times a day 453.6 g 1   • losartan-hydrochlorothiazide (HYZAAR) 50-12.5 mg per tablet Take 1 tablet by mouth daily 90 tablet 1   • pramipexole (MIRAPEX) 0.5 mg tablet Take 1 tablet (0.5 mg total) by mouth daily at bedtime 90 tablet 1   • tiotropium (Spiriva Respimat) 2.5 MCG/ACT AERS inhaler Inhale daily     • tiotropium-olodaterol (Stiolto Respimat) 2.5-2.5 MCG/ACT inhaler Inhale 2 puffs daily     • umeclidinium-vilanterol (Anoro Ellipta) 62.5-25 MCG/INH inhaler Inhale 1 puff daily        No current facility-administered medications for this visit. Current Outpatient Medications on File Prior to Visit   Medication Sig   • atorvastatin (LIPITOR) 10 mg tablet Take 1 tablet (10 mg total) by mouth daily   • Biotin 5 MG CAPS Take 5,000 mg by mouth daily   • buPROPion (WELLBUTRIN XL) 300 mg 24 hr tablet Take 1 tablet (300 mg total) by mouth every morning   • Cholecalciferol (Vitamin D3) 50 MCG (2000 UT) capsule Take 5,000 Units by mouth daily    • famotidine (PEPCID) 40 MG tablet Take 1 tablet (40 mg total) by mouth every morning   • losartan-hydrochlorothiazide (HYZAAR) 50-12.5 mg per tablet Take 1 tablet by mouth daily   • pramipexole (MIRAPEX) 0.5 mg tablet Take 1 tablet (0.5 mg total) by mouth daily at bedtime   • tiotropium (Spiriva Respimat) 2.5 MCG/ACT AERS inhaler Inhale daily   • tiotropium-olodaterol (Stiolto Respimat) 2.5-2.5 MCG/ACT inhaler Inhale 2 puffs daily   • umeclidinium-vilanterol (Anoro Ellipta) 62.5-25 MCG/INH inhaler Inhale 1 puff daily    • [DISCONTINUED] amoxicillin (AMOXIL) 500 MG tablet Take 1 tablet by mouth every 8 (eight) hours     No current facility-administered medications on file prior to visit. She is allergic to sulfa antibiotics. .    Review of Systems   Constitutional: Negative for activity change, chills, fatigue, fever and unexpected weight change. HENT: Negative for ear pain, postnasal drip, rhinorrhea, sinus pressure and sore throat. Eyes: Negative for pain. Respiratory: Positive for shortness of breath (on exertion). Negative for cough, choking, chest tightness and wheezing. Cardiovascular: Negative for chest pain, palpitations and leg swelling. Gastrointestinal: Negative for abdominal pain, constipation, diarrhea, nausea and vomiting. Genitourinary: Negative for dysuria and hematuria. Musculoskeletal: Positive for arthralgias. Negative for back pain, gait problem, joint swelling, myalgias and neck stiffness. Skin: Positive for color change (redness of the skin of the forearms with itching ). Negative for pallor and rash. Neurological: Positive for weakness (weakness of the RUE and RLE). Negative for dizziness, tremors, seizures, syncope, light-headedness and headaches. Hematological: Negative for adenopathy. Psychiatric/Behavioral: Negative for behavioral problems. Objective:      /70 (BP Location: Left arm, Patient Position: Sitting, Cuff Size: Standard)   Pulse 76   Temp 98.6 °F (37 °C) (Temporal)   Ht 4' 8" (1.422 m)   Wt 64.9 kg (143 lb)   LMP  (LMP Unknown)   SpO2 97%   BMI 32.06 kg/m²          Physical Exam  Constitutional:       General: She is not in acute distress. Appearance: She is well-developed. She is not diaphoretic. HENT:      Head: Normocephalic and atraumatic. Right Ear: External ear normal.      Left Ear: External ear normal.      Nose: Nose normal.      Mouth/Throat:      Mouth: Mucous membranes are dry. Pharynx: No oropharyngeal exudate. Eyes:      General: No scleral icterus. Right eye: No discharge. Left eye: No discharge. Conjunctiva/sclera: Conjunctivae normal.      Pupils: Pupils are equal, round, and reactive to light. Neck:      Thyroid: No thyromegaly. Vascular: No JVD.       Trachea: No tracheal deviation. Cardiovascular:      Rate and Rhythm: Normal rate and regular rhythm. Heart sounds: Normal heart sounds. No murmur heard. No friction rub. No gallop. Pulmonary:      Effort: Pulmonary effort is normal. No respiratory distress. Breath sounds: Normal breath sounds. No wheezing or rales. Chest:      Chest wall: No tenderness. Abdominal:      General: Bowel sounds are normal. There is no distension. Palpations: Abdomen is soft. There is no mass. Tenderness: There is no abdominal tenderness. There is no guarding or rebound. Musculoskeletal:         General: No tenderness or deformity. Normal range of motion. Cervical back: Normal range of motion and neck supple. Lymphadenopathy:      Cervical: No cervical adenopathy. Skin:     General: Skin is warm and dry. Coloration: Skin is not pale. Findings: Rash (Erythematous macular rash on the medial aspect of the left forearm) present. No erythema. Neurological:      Mental Status: She is alert and oriented to person, place, and time. Cranial Nerves: No cranial nerve deficit. Motor: Weakness (mildly decreased mus weakness on the RLE cf LLU) present. No abnormal muscle tone. Coordination: Coordination normal.      Deep Tendon Reflexes: Reflexes are normal and symmetric. Psychiatric:         Mood and Affect: Mood is depressed. Behavior: Behavior normal.           Appointment on 08/03/2023   Component Date Value Ref Range Status   • TSH 3RD GENERATON 08/03/2023 2.863  0.450 - 4.500 uIU/mL Final    The recommended reference ranges for TSH during pregnancy are as follows:   First trimester 0.1 to 2.5 uIU/mL   Second trimester  0.2 to 3.0 uIU/mL   Third trimester 0.3 to 3.0 uIU/m    Note: Normal ranges may not apply to patients who are transgender, non-binary, or whose legal sex, sex at birth, and gender identity differ.   Adult TSH (3rd generation) reference range follows the recommended guidelines of the [employer], January, 2020.    Appointment on 07/17/2023   Component Date Value Ref Range Status   • WBC 07/17/2023 5.59  4.31 - 10.16 Thousand/uL Final   • RBC 07/17/2023 4.25  3.81 - 5.12 Million/uL Final   • Hemoglobin 07/17/2023 13.5  11.5 - 15.4 g/dL Final   • Hematocrit 07/17/2023 40.4  34.8 - 46.1 % Final   • MCV 07/17/2023 95  82 - 98 fL Final   • MCH 07/17/2023 31.8  26.8 - 34.3 pg Final   • MCHC 07/17/2023 33.4  31.4 - 37.4 g/dL Final   • RDW 07/17/2023 12.7  11.6 - 15.1 % Final   • MPV 07/17/2023 10.5  8.9 - 12.7 fL Final   • Platelets 91/95/4768 266  149 - 390 Thousands/uL Final   • nRBC 07/17/2023 0  /100 WBCs Final   • Neutrophils Relative 07/17/2023 65  43 - 75 % Final   • Immat GRANS % 07/17/2023 0  0 - 2 % Final   • Lymphocytes Relative 07/17/2023 24  14 - 44 % Final   • Monocytes Relative 07/17/2023 8  4 - 12 % Final   • Eosinophils Relative 07/17/2023 2  0 - 6 % Final   • Basophils Relative 07/17/2023 1  0 - 1 % Final   • Neutrophils Absolute 07/17/2023 3.65  1.85 - 7.62 Thousands/µL Final   • Immature Grans Absolute 07/17/2023 0.02  0.00 - 0.20 Thousand/uL Final   • Lymphocytes Absolute 07/17/2023 1.34  0.60 - 4.47 Thousands/µL Final   • Monocytes Absolute 07/17/2023 0.44  0.17 - 1.22 Thousand/µL Final   • Eosinophils Absolute 07/17/2023 0.09  0.00 - 0.61 Thousand/µL Final   • Basophils Absolute 07/17/2023 0.05  0.00 - 0.10 Thousands/µL Final   • Sodium 07/17/2023 138  135 - 147 mmol/L Final   • Potassium 07/17/2023 4.0  3.5 - 5.3 mmol/L Final   • Chloride 07/17/2023 108  96 - 108 mmol/L Final   • CO2 07/17/2023 29  21 - 32 mmol/L Final   • ANION GAP 07/17/2023 1  mmol/L Final   • BUN 07/17/2023 11  5 - 25 mg/dL Final   • Creatinine 07/17/2023 0.84  0.60 - 1.30 mg/dL Final    Standardized to IDMS reference method   • Glucose, Fasting 07/17/2023 100 (H)  65 - 99 mg/dL Final    Specimen collection should occur prior to Sulfasalazine administration due to the potential for falsely depressed results. Specimen collection should occur prior to Sulfapyridine administration due to the potential for falsely elevated results. • Calcium 07/17/2023 9.0  8.3 - 10.1 mg/dL Final   • AST 07/17/2023 18  5 - 45 U/L Final    Specimen collection should occur prior to Sulfasalazine administration due to the potential for falsely depressed results. • ALT 07/17/2023 32  12 - 78 U/L Final    Specimen collection should occur prior to Sulfasalazine and/or Sulfapyridine administration due to the potential for falsely depressed results. • Alkaline Phosphatase 07/17/2023 56  46 - 116 U/L Final   • Total Protein 07/17/2023 7.0  6.4 - 8.4 g/dL Final   • Albumin 07/17/2023 3.7  3.5 - 5.0 g/dL Final   • Total Bilirubin 07/17/2023 0.71  0.20 - 1.00 mg/dL Final    Use of this assay is not recommended for patients undergoing treatment with eltrombopag due to the potential for falsely elevated results. • eGFR 07/17/2023 70  ml/min/1.73sq m Final   Appointment on 03/01/2023   Component Date Value Ref Range Status   • Cholesterol 03/01/2023 227 (H)  See Comment mg/dL Final    Cholesterol:         Pediatric <18 Years        Desirable          <170 mg/dL      Borderline High    170-199 mg/dL      High               >=200 mg/dL        Adult >=18 Years            Desirable         <200 mg/dL      Borderline High   200-239 mg/dL      High              >239 mg/dL     • Triglycerides 03/01/2023 137  See Comment mg/dL Final    Triglyceride:     0-9Y            <75mg/dL     10Y-17Y         <90 mg/dL       >=18Y     Normal          <150 mg/dL     Borderline High 150-199 mg/dL     High            200-499 mg/dL        Very High       >499 mg/dL    Specimen collection should occur prior to N-Acetylcysteine or Metamizole administration due to the potential for falsely depressed results.    • HDL, Direct 03/01/2023 62  >=50 mg/dL Final    Specimen collection should occur prior to Metamizole administration due to the potential for falsley depressed results. • LDL Calculated 03/01/2023 138 (H)  0 - 100 mg/dL Final    LDL Cholesterol:     Optimal           <100 mg/dl     Near Optimal      100-129 mg/dl     Above Optimal       Borderline High 130-159 mg/dl       High            160-189 mg/dl       Very High       >189 mg/dl         This screening LDL is a calculated result. It does not have the accuracy of the Direct Measured LDL in the monitoring of patients with hyperlipidemia and/or statin therapy. Direct Measure LDL (JAT781) must be ordered separately in these patients.    • Non-HDL-Chol (CHOL-HDL) 03/01/2023 165  mg/dl Final   Appointment on 01/26/2023   Component Date Value Ref Range Status   • WBC 01/26/2023 5.07  4.31 - 10.16 Thousand/uL Final   • RBC 01/26/2023 4.41  3.81 - 5.12 Million/uL Final   • Hemoglobin 01/26/2023 13.7  11.5 - 15.4 g/dL Final   • Hematocrit 01/26/2023 42.9  34.8 - 46.1 % Final   • MCV 01/26/2023 97  82 - 98 fL Final   • MCH 01/26/2023 31.1  26.8 - 34.3 pg Final   • MCHC 01/26/2023 31.9  31.4 - 37.4 g/dL Final   • RDW 01/26/2023 12.2  11.6 - 15.1 % Final   • MPV 01/26/2023 10.1  8.9 - 12.7 fL Final   • Platelets 27/13/5584 319  149 - 390 Thousands/uL Final   • nRBC 01/26/2023 0  /100 WBCs Final   • Neutrophils Relative 01/26/2023 58  43 - 75 % Final   • Immat GRANS % 01/26/2023 0  0 - 2 % Final   • Lymphocytes Relative 01/26/2023 31  14 - 44 % Final   • Monocytes Relative 01/26/2023 9  4 - 12 % Final   • Eosinophils Relative 01/26/2023 1  0 - 6 % Final   • Basophils Relative 01/26/2023 1  0 - 1 % Final   • Neutrophils Absolute 01/26/2023 2.95  1.85 - 7.62 Thousands/µL Final   • Immature Grans Absolute 01/26/2023 0.00  0.00 - 0.20 Thousand/uL Final   • Lymphocytes Absolute 01/26/2023 1.56  0.60 - 4.47 Thousands/µL Final   • Monocytes Absolute 01/26/2023 0.43  0.17 - 1.22 Thousand/µL Final   • Eosinophils Absolute 01/26/2023 0.07  0.00 - 0.61 Thousand/µL Final   • Basophils Absolute 01/26/2023 0. 06  0.00 - 0.10 Thousands/µL Final   • Sodium 01/26/2023 136  135 - 147 mmol/L Final   • Potassium 01/26/2023 4.0  3.5 - 5.3 mmol/L Final   • Chloride 01/26/2023 102  96 - 108 mmol/L Final   • CO2 01/26/2023 32  21 - 32 mmol/L Final   • ANION GAP 01/26/2023 2 (L)  4 - 13 mmol/L Final   • BUN 01/26/2023 14  5 - 25 mg/dL Final   • Creatinine 01/26/2023 0.82  0.60 - 1.30 mg/dL Final    Standardized to IDMS reference method   • Glucose 01/26/2023 82  65 - 140 mg/dL Final    If the patient is fasting, the ADA then defines impaired fasting glucose as > 100 mg/dL and diabetes as > or equal to 123 mg/dL. Specimen collection should occur prior to Sulfasalazine administration due to the potential for falsely depressed results. Specimen collection should occur prior to Sulfapyridine administration due to the potential for falsely elevated results. • Calcium 01/26/2023 9.1  8.3 - 10.1 mg/dL Final   • AST 01/26/2023 16  5 - 45 U/L Final    Specimen collection should occur prior to Sulfasalazine administration due to the potential for falsely depressed results. • ALT 01/26/2023 29  12 - 78 U/L Final    Specimen collection should occur prior to Sulfasalazine and/or Sulfapyridine administration due to the potential for falsely depressed results. • Alkaline Phosphatase 01/26/2023 78  46 - 116 U/L Final   • Total Protein 01/26/2023 7.2  6.4 - 8.4 g/dL Final   • Albumin 01/26/2023 3.8  3.5 - 5.0 g/dL Final   • Total Bilirubin 01/26/2023 0.56  0.20 - 1.00 mg/dL Final    Use of this assay is not recommended for patients undergoing treatment with eltrombopag due to the potential for falsely elevated results.    • eGFR 01/26/2023 73  ml/min/1.73sq m Final   • CEA 01/26/2023 1.8  0.0 - 3.0 ng/mL Final    Normal/Non-Smoker: 0.0-3.0 ng/mL   Normal/Smoker:     3.1-5.0 ng/mL

## 2023-08-31 NOTE — TELEPHONE ENCOUNTER
Patient is scheduled for her next Prolia on 10/09/2023,  recently switched to Medicare this may.   Will run through [address] to see if she has a deductible to meet with this

## 2023-09-05 ENCOUNTER — HOSPITAL ENCOUNTER (OUTPATIENT)
Dept: RADIOLOGY | Age: 68
Discharge: HOME | End: 2023-09-05
Payer: MEDICARE

## 2023-09-05 DIAGNOSIS — E78.49 OTHER HYPERLIPIDEMIA: ICD-10-CM

## 2023-09-05 DIAGNOSIS — C34.91 SQUAMOUS CELL LUNG CANCER, RIGHT (HCC): ICD-10-CM

## 2023-09-05 DIAGNOSIS — L23.9 ALLERGIC DERMATITIS: ICD-10-CM

## 2023-09-05 DIAGNOSIS — L30.9 DERMATITIS: ICD-10-CM

## 2023-09-05 DIAGNOSIS — I10 HYPERTENSION, ESSENTIAL: ICD-10-CM

## 2023-09-05 PROCEDURE — G1004 CDSM NDSC: HCPCS

## 2023-09-05 PROCEDURE — 71250 CT THORAX DX C-: CPT

## 2023-09-05 NOTE — TELEPHONE ENCOUNTER
Patient states that the cream that you sent over to her was a big jar instead of a tube for her to get for the rash that she has    May you change the order and send over a new one for her     Please send to [facility] on [address]    Please call patient back when done

## 2023-09-06 RX ORDER — LOSARTAN POTASSIUM AND HYDROCHLOROTHIAZIDE 12.5; 5 MG/1; MG/1
1 TABLET ORAL DAILY
Qty: 90 TABLET | Refills: 1 | Status: SHIPPED | OUTPATIENT
Start: 2023-09-06

## 2023-09-06 RX ORDER — ATORVASTATIN CALCIUM 10 MG/1
10 TABLET, FILM COATED ORAL DAILY
Qty: 90 TABLET | Refills: 1 | Status: SHIPPED | OUTPATIENT
Start: 2023-09-06

## 2023-09-06 NOTE — PROGRESS NOTES
28 g of hydrocortisone cream sent for pt's dermatitis as requested and order of large jar cancelled.

## 2023-09-06 NOTE — TELEPHONE ENCOUNTER
Patient needs the hydrocortisone sent to [facility]. She no longer uses [facility]    She also needs refills on 2 medications.

## 2023-09-12 ENCOUNTER — OFFICE VISIT (OUTPATIENT)
Dept: CARDIAC SURGERY | Facility: CLINIC | Age: 68
End: 2023-09-12
Payer: MEDICARE

## 2023-09-12 VITALS
TEMPERATURE: 98.3 F | HEART RATE: 64 BPM | WEIGHT: 140.43 LBS | RESPIRATION RATE: 15 BRPM | HEIGHT: 55 IN | SYSTOLIC BLOOD PRESSURE: 118 MMHG | OXYGEN SATURATION: 96 % | BODY MASS INDEX: 32.5 KG/M2 | DIASTOLIC BLOOD PRESSURE: 78 MMHG

## 2023-09-12 DIAGNOSIS — C34.91 SQUAMOUS CELL LUNG CANCER, RIGHT (HCC): Primary | ICD-10-CM

## 2023-09-12 PROCEDURE — 99213 OFFICE O/P EST LOW 20 MIN: CPT | Performed by: THORACIC SURGERY (CARDIOTHORACIC VASCULAR SURGERY)

## 2023-09-12 NOTE — PROGRESS NOTES
Thoracic Follow-Up  Assessment/Plan:    Squamous cell lung cancer, right [facility])  Mr. Lorelei Lynch is about 2.5 years out from a right thoracoscopic upper lobectomy and lower lobe wedge for Stage IIB squamous cell carcinoma. She completed adjuvant chemotherapy. Her most recent imaging shows no evidence of recurrent disease. We discussed NCCN guidelines of CT chest Q6 months for 3 years and then yearly from then on out, which would make her next CT chest in March 2024. However, she will believes she will be in [place] at that time. I offered her a script to have CT chest done in March 2024 in [place] but she does not wish to do that and wants to wait until she is back here in May. She understands this is longer than the recommended interval. We will scheduled a CT chest in May per her request and will see her back at that time. Should she have availability to move this up she will call. All questions were answered and she is in agreement with this plan.         Diagnoses and all orders for this visit:    Squamous cell lung cancer, right ([address])  -     CT chest wo contrast; Future          Thoracic History      Cancer Staging   Squamous cell lung cancer, right ([address])  Staging form: Lung, AJCC 8th Edition  - Pathologic stage from 1/28/2021: Stage IIB (pT2a, pN1, cM0) - Signed by Luic Francis PA-C on 9/12/2023  Histopathologic type: Squamous cell carcinoma, NOS    Oncology History   Squamous cell lung cancer, right ([address])   1/12/2021 Initial Diagnosis    Squamous cell lung cancer, right ([address])     1/28/2021 -  Cancer Staged    Staging form: Lung, AJCC 8th Edition  - Pathologic stage from 1/28/2021: Stage IIB (pT2a, pN1, cM0) - Signed by Luci Francis PA-C on 9/12/2023  Histopathologic type: Squamous cell carcinoma, NOS       1/28/2021 Surgery    VATS right upper lobectomy     3/10/2021 - 6/8/2021 Chemotherapy    fosaprepitant (EMEND) IVPB, 150 mg, Intravenous, Once, 4 of 4 cycles  Administration: 150 mg (3/10/2021), 150 mg (4/7/2021), 150 mg (4/28/2021), 150 mg (5/26/2021)  CARBOplatin (PARAPLATIN) IVPB (GOG AUC DOSING), 429.5 mg, Intravenous, Once, 4 of 4 cycles  Administration: 429.5 mg (3/10/2021), 374 mg (4/7/2021), 399 mg (4/28/2021), 438.5 mg (5/26/2021)  gemcitabine (GEMZAR) infusion, 1,000 mg/m2 = 1,520.2 mg, Intravenous, Once, 4 of 4 cycles  Administration: 1,520.2 mg (3/10/2021), 1,520.2 mg (3/17/2021), 1,520.2 mg (4/7/2021), 1,520.2 mg (4/14/2021), 1,520.2 mg (4/28/2021), 1,520.2 mg (5/13/2021), 1,520.2 mg (5/26/2021), 1,520.2 mg (6/7/2021)  tbo-filgrastim (GRANIX), 300 mcg (100 % of original dose 300 mcg), Subcutaneous, Once, 3 of 3 cycles  Dose modification: 300 mcg (original dose 300 mcg, Cycle 2), 480 mcg (original dose 300 mcg, Cycle 3)  Administration: 300 mcg (4/8/2021), 300 mcg (4/15/2021), 300 mcg (4/29/2021), 480 mcg (5/14/2021), 480 mcg (5/27/2021), 480 mcg (6/8/2021)            Subjective:    Patient ID: Bianca Horton is a 77 y.o. female. ECOG 0    HPI   Ms Barney Vale is a 77year old female with a PMH Stage IIB squamous cell carcinoma s/p VATS right upper lobectomy and lower lobe wedge resection on 1/28/2021 s/p adjuvant chemotherapy who presents for routine surveillance. CT chest on 9/5/2023 was personally reviewed by myself in PACS and reveals expected post surgical changes. No evidence of recurrent or metastatic disease. Scattered stable calcified granulomata. On discussion she is feeling well. She has no SOB, cough, hemoptysis. She is going back to the gym and is getting more active. She has had no recent illnesses, fevers/chills. The following portions of the patient's history were reviewed and updated as appropriate: allergies, current medications, past family history, past medical history, past social history, past surgical history and problem list.    Review of Systems   Constitutional: Negative for chills, diaphoresis and unexpected weight change. HENT: Negative. Eyes: Negative. Respiratory: Negative for cough, shortness of breath and wheezing. Cardiovascular: Negative for chest pain and leg swelling. Gastrointestinal: Negative for abdominal pain, diarrhea and nausea. Endocrine: Negative. Genitourinary: Negative. Musculoskeletal: Negative. Skin: Negative. Allergic/Immunologic: Negative. Neurological: Negative. Hematological: Negative for adenopathy. Does not bruise/bleed easily. Psychiatric/Behavioral: Negative. All other systems reviewed and are negative. Objective:   Physical Exam  Vitals reviewed. Constitutional:       General: She is not in acute distress. Appearance: Normal appearance. She is not ill-appearing. HENT:      Head: Normocephalic. Nose: Nose normal.      Mouth/Throat:      Mouth: Mucous membranes are moist.   Eyes:      Pupils: Pupils are equal, round, and reactive to light. Cardiovascular:      Rate and Rhythm: Normal rate and regular rhythm. Heart sounds: Normal heart sounds. Pulmonary:      Effort: Pulmonary effort is normal.      Breath sounds: Normal breath sounds. No wheezing, rhonchi or rales. Abdominal:      Palpations: Abdomen is soft. Musculoskeletal:         General: No swelling. Normal range of motion. Lymphadenopathy:      Cervical: No cervical adenopathy. Skin:     General: Skin is warm. Neurological:      General: No focal deficit present. Mental Status: She is alert and oriented to person, place, and time. Psychiatric:         Mood and Affect: Mood normal.     /78 (BP Location: Left arm, Patient Position: Sitting, Cuff Size: Standard)   Pulse 64   Temp 98.3 °F (36.8 °C) (Temporal)   Resp 15   Ht 4' 4" (1.321 m)   Wt 63.7 kg (140 lb 6.9 oz)   LMP  (LMP Unknown)   SpO2 96%   BMI 36.51 kg/m²     No Chest XR results available for this patient. CT chest wo contrast    Result Date: 9/9/2023  Narrative CT CHEST WITHOUT IV CONTRAST INDICATION:   C34.91:  Malignant neoplasm of unspecified part of right bronchus or lung. History of squamous cell lung cancer, reassessment COMPARISON: 2/13/2023 TECHNIQUE: CT examination of the chest was performed without intravenous contrast. Multiplanar 2D reformatted images were created from the source data. This examination, like all CT scans performed in the [facility], was performed utilizing techniques to minimize radiation dose exposure, including the use of iterative reconstruction and automated exposure control. Radiation dose length product (DLP) for this visit:  225 mGy-cm FINDINGS: LUNGS: Status post right upper lobe ectomy. No evidence of recurrent or metastatic disease. Scattered stable calcified granulomata. Pulmonary emphysema noted PLEURA:  Unremarkable. HEART/GREAT VESSELS: Coronary artery calcification is seen. No thoracic aortic aneurysm. No pericardial effusion. MEDIASTINUM AND CARMINA:  Unremarkable. CHEST WALL AND LOWER NECK:  Unremarkable. VISUALIZED STRUCTURES IN THE UPPER ABDOMEN:  Unremarkable. OSSEOUS STRUCTURES: Unchanged. No acute finding     Impression No evidence of recurrent or metastatic disease in the thorax. Workstation performed: [id number]     CT chest wo contrast    Result Date: 2/21/2023  Narrative CT CHEST WITHOUT IV CONTRAST INDICATION:   C34.91: Malignant neoplasm of unspecified part of right bronchus or lung. History of poorly differentiated squamous cell carcinoma of the right upper lung, status post right upper lobectomy. The patient underwent subsequent adjuvant chemotherapy. Surveillance. COMPARISON:  CT, dated 7/21/2022. CT, dated 7/22/2021. TECHNIQUE: CT examination of the chest was performed without intravenous contrast. Axial, sagittal, and coronal 2D reformatted images were created from the source data and submitted for interpretation. Radiation dose length product (DLP) for this visit:  228 mGy-cm .   This examination, like all CT scans performed in the [facility], was performed utilizing techniques to minimize radiation dose exposure, including the use of iterative reconstruction and automated exposure control. FINDINGS: LUNGS:  No new concerning pulmonary nodules. Redemonstrated are postsurgical changes related to right upper lobectomy. Surgical staple line is normal. Unchanged basilar scarring in the reoriented right lower lobe. Scattered benign granulomas. Mild apical predominant emphysema. Mild bronchial wall thickening. PLEURA:  Unremarkable. HEART/GREAT VESSELS: Mild cardiac enlargement. No pericardial effusion. Coronary arterial atherosclerotic calcifications. No thoracic aortic aneurysm. MEDIASTINUM AND CARMINA:  Unremarkable. CHEST WALL AND LOWER NECK:  Breast implants. VISUALIZED STRUCTURES IN THE UPPER ABDOMEN:  Unremarkable. OSSEOUS STRUCTURES:  No acute fracture or destructive osseous lesion. Impression Nothing to suggest recurrent squamous cell carcinoma. Workstation performed: [id number]     No CT Chest,Abdomen,Pelvis results available for this patient. No NM PET CT results available for this patient. No Barium Swallow results available for this patient.

## 2023-09-12 NOTE — ASSESSMENT & PLAN NOTE
Mr. Tyrone Talbot is about 2.5 years out from a right thoracoscopic upper lobectomy and lower lobe wedge for Stage IIB squamous cell carcinoma. She completed adjuvant chemotherapy. Her most recent imaging shows no evidence of recurrent disease. We discussed NCCN guidelines of CT chest Q6 months for 3 years and then yearly from then on out, which would make her next CT chest in March 2024. However, she will believes she will be in [place] at that time. I offered her a script to have CT chest done in March 2024 in [place] but she does not wish to do that and wants to wait until she is back here in May. She understands this is longer than the recommended interval. We will scheduled a CT chest in May per her request and will see her back at that time. Should she have availability to move this up she will call. All questions were answered and she is in agreement with this plan.

## 2023-10-09 ENCOUNTER — OFFICE VISIT (OUTPATIENT)
Dept: INTERNAL MEDICINE CLINIC | Age: 68
End: 2023-10-09
Payer: MEDICARE

## 2023-10-09 VITALS
DIASTOLIC BLOOD PRESSURE: 72 MMHG | HEART RATE: 68 BPM | HEIGHT: 56 IN | TEMPERATURE: 98.7 F | BODY MASS INDEX: 31.27 KG/M2 | WEIGHT: 139 LBS | SYSTOLIC BLOOD PRESSURE: 122 MMHG | OXYGEN SATURATION: 97 %

## 2023-10-09 DIAGNOSIS — Z86.010 PERSONAL HISTORY OF COLONIC POLYPS: ICD-10-CM

## 2023-10-09 DIAGNOSIS — Z23 NEED FOR INFLUENZA VACCINATION: ICD-10-CM

## 2023-10-09 DIAGNOSIS — Z23 ENCOUNTER FOR IMMUNIZATION: ICD-10-CM

## 2023-10-09 DIAGNOSIS — M81.6 LOCALIZED OSTEOPOROSIS WITHOUT CURRENT PATHOLOGICAL FRACTURE: ICD-10-CM

## 2023-10-09 DIAGNOSIS — Z00.00 WELCOME TO MEDICARE PREVENTIVE VISIT: Primary | ICD-10-CM

## 2023-10-09 DIAGNOSIS — M81.0 AGE-RELATED OSTEOPOROSIS WITHOUT CURRENT PATHOLOGICAL FRACTURE: ICD-10-CM

## 2023-10-09 DIAGNOSIS — Z13.6 SCREENING FOR AAA (ABDOMINAL AORTIC ANEURYSM): ICD-10-CM

## 2023-10-09 DIAGNOSIS — Z11.59 NEED FOR HEPATITIS C SCREENING TEST: ICD-10-CM

## 2023-10-09 DIAGNOSIS — Z12.31 ENCOUNTER FOR SCREENING MAMMOGRAM FOR BREAST CANCER: ICD-10-CM

## 2023-10-09 DIAGNOSIS — Z13.6 SCREENING FOR CARDIOVASCULAR CONDITION: ICD-10-CM

## 2023-10-09 PROCEDURE — G0402 INITIAL PREVENTIVE EXAM: HCPCS | Performed by: INTERNAL MEDICINE

## 2023-10-09 PROCEDURE — G0008 ADMIN INFLUENZA VIRUS VAC: HCPCS

## 2023-10-09 PROCEDURE — 96372 THER/PROPH/DIAG INJ SC/IM: CPT | Performed by: INTERNAL MEDICINE

## 2023-10-09 PROCEDURE — 99213 OFFICE O/P EST LOW 20 MIN: CPT | Performed by: INTERNAL MEDICINE

## 2023-10-09 PROCEDURE — G0403 EKG FOR INITIAL PREVENT EXAM: HCPCS | Performed by: INTERNAL MEDICINE

## 2023-10-09 PROCEDURE — 90662 IIV NO PRSV INCREASED AG IM: CPT

## 2023-10-09 NOTE — PROGRESS NOTES
Assessment and Plan:     Problem List Items Addressed This Visit        Musculoskeletal and Integument    Localized osteoporosis without current pathological fracture    Relevant Medications    denosumab (PROLIA) subcutaneous injection 60 mg (Completed)       Other    BMI 31.0-31.9,adult   Other Visit Diagnoses     Welcome to Medicare preventive visit    -  Primary    Screening for cardiovascular condition        Relevant Orders    POCT ECG (Completed)    Need for hepatitis C screening test        Relevant Orders    Hepatitis C antibody    Encounter for screening mammogram for breast cancer        last mammogram was in Feb 2023 and she will be due for repeat in Feb 2024    Personal history of colonic polyps        last colonoscopy was in Nov 2022 and they found a tubular adenoma and she was told to return in 3 years. + family of colon ca in [facility]    Age-related osteoporosis without current pathological fracture        last dexa scan was 2022 and showed osteoporosis and pt is on prolia and will be due for repeat dexa scan 2024    Screening for AAA (abdominal aortic aneurysm)        no family hx of AAA    Encounter for immunization        she is up to date with all her covid vaccines except the new booster, had the td at [facility], shingles vaccine, pneumoccocal vaccine, and will get the flu shot today       Need for influenza vaccination        Relevant Orders    influenza vaccine, high-dose, PF 0.7 mL (FLUZONE HIGH-DOSE) (Completed)          Urinary Incontinence Plan of Care: counseling topics discussed: limit alcohol, caffeine, spicy foods, and acidic foods, limiting fluid intake 3-4 hours before bed and preventing constipation. Preventive health issues were discussed with patient, and age appropriate screening tests were ordered as noted in patient's After Visit Summary.   Personalized health advice and appropriate referrals for health education or preventive services given if needed, as noted in patient's After Visit Summary. History of Present Illness:     Patient presents for a Medicare Wellness Visit    HPI   Patient Care Team:  Jonh Adams DO as PCP - General (Internal Medicine)  Isamar Graham MD     Review of Systems:     Review of Systems     Problem List:     Patient Active Problem List   Diagnosis   • Squamous cell lung cancer, right [facility])   • Atypical lobular hyperplasia of breast   • Chronic obstructive pulmonary disease ([address])   • Cystocele   • Hyperlipidemia   • Hypertension, essential   • Multiple sclerosis (HCC)   • Tinnitus of both ears   • Spinal stenosis of lumbar region   • Restless legs syndrome (RLS)   • Rectocele   • Cystocele with prolapse   • Depression, recurrent ([address])   • History of colonic polyps   • GERD (gastroesophageal reflux disease)   • Regional lymph node metastasis present ([address])   • Chemotherapy induced neutropenia    • Anxiety disorder   • Palliative care patient   • Medical marijuana use   • Insomnia due to medical condition   • Cancer related pain   • Chemotherapy-induced nausea   • Encounter for care related to vascular access port   • Dizziness   • Parotid tumor   • Intervertebral disc disorder with radiculopathy of lumbar region   • Subdural hematoma ([address])   • Syncope   • BMI 31.0-31.9,adult   • Annual physical exam   • Incontinence of feces   • Traumatic subdural hemorrhage with loss of consciousness of unspecified duration, initial encounter [facility])   • Localized osteoporosis without current pathological fracture      Past Medical and Surgical History:     Past Medical History:   Diagnosis Date   • Allergic ?    • Anxiety    • Bronchial malignant neoplasm, right (HCC)    • Cancer (HCC)     lung   • Chronic major depressive disorder, single episode    • COPD (chronic obstructive pulmonary disease) (HCC)    • Full incontinence of feces    • GERD (gastroesophageal reflux disease)    • Hyperlipidemia    • Hypertension    • Malignant neoplasm of bronchus and lung (HCC)    • Multiple sclerosis ([address])    • Pulmonary emphysema ([address])    • Restless leg syndrome    • Tobacco use      Past Surgical History:   Procedure Laterality Date   • AUGMENTATION MAMMAPLASTY Bilateral 11/2014    retro-pectoral silicone   • BREAST BIOPSY Left 03/23/2015    u/s core bx   • BREAST BIOPSY Left 08/02/2010    high-risk lesion   • BREAST EXCISIONAL BIOPSY Left 08/26/2010    high-risk lesion   • BREAST EXCISIONAL BIOPSY Left 1994   • BREAST EXCISIONAL BIOPSY Right 1984   • CT NEEDLE BIOPSY LUNG  12/2/2020   • FL GUIDED CENTRAL VENOUS ACCESS DEVICE INSERTION  03/09/2021   • IR PORT REMOVAL  03/07/2022   • LYMPH NODE BIOPSY  2021   • MAMMO (HISTORICAL)  2020   •  [address] INCL FLUOR GDNCE DX W/CELL WASHG SPX N/A 01/28/2021    Procedure: BRONCHOSCOPY FLEXIBLE;  Surgeon: Brant Dunne MD;  [facility];  Service: Thoracic   • ME INSJ TUNNELED CTR VAD W/SUBQ PORT AGE 3 YR/> N/A 03/09/2021    Procedure: INSERTION VENOUS PORT ( PORT-A-CATH) IR;  Surgeon: Marita Serna DO;  [facility];  Service: Interventional Radiology   • ME THORACOSCOPY W/LOBECTOMY SINGLE LOBE Right 01/28/2021    Procedure: RIGHT VATS UPPER LOBECTOMY ; MEDIASTINAL LYMPH NODE DISSECTION; right lower lobe bleb resection  ;  Surgeon: Brant Dunne MD;  Location: [facility];  Service: Thoracic   • TONSILLECTOMY     • TOTAL ABDOMINAL HYSTERECTOMY  2010    age 60   • TOTAL ABDOMINAL HYSTERECTOMY W/ BILATERAL SALPINGOOPHORECTOMY Bilateral 2010    age 60   • TUBAL LIGATION  1971      Family History:     Family History   Problem Relation Age of Onset   • No Known Problems Mother    • COPD Father    • Hypertension Father    • No Known Problems Sister    • No Known Problems Daughter    • No Known Problems Maternal Grandmother    • No Known Problems Maternal Grandfather    • No Known Problems Paternal Grandmother    • Rectal cancer Paternal Grandfather         unknown age   • No Known Problems Sister    • No Known Problems Maternal Aunt • Breast cancer Paternal Aunt         unknown age   • No Known Problems Paternal Aunt    • No Known Problems Paternal Aunt    • No Known Problems Paternal Aunt    • No Known Problems Paternal Aunt    • No Known Problems Paternal Aunt    • No Known Problems Paternal Aunt    • COPD Brother    • No Known Problems Brother       Social History:     Social History     Socioeconomic History   • Marital status: Legally      Spouse name: None   • Number of children: None   • Years of education: None   • Highest education level: None   Occupational History   • None   Tobacco Use   • Smoking status: Former     Packs/day: 1.00     Years: 30.00     Total pack years: 30.00     Types: Cigarettes     Start date: 1968     Quit date: 1/10/2021     Years since quittin.7   • Smokeless tobacco: Never   • Tobacco comments:     Wish i never started   Vaping Use   • Vaping Use: Never used   Substance and Sexual Activity   • Alcohol use: Yes     Alcohol/week: 5.0 standard drinks of alcohol     Types: 5 Glasses of wine per week     Comment: wine @ hs    • Drug use: Never   • Sexual activity: Not Currently     Partners: Male     Birth control/protection: Female Sterilization   Other Topics Concern   • None   Social History Narrative    Patient is  from  [place]. Patient has one daughter from a previous union. Social Determinants of Health     Financial Resource Strain: Not on file   Food Insecurity: Not on file   Transportation Needs: No Transportation Needs (10/2/2023)    PRAPARE - Transportation    • Lack of Transportation (Medical): No    • Lack of Transportation (Non-Medical):  No   Physical Activity: Not on file   Stress: Not on file   Social Connections: Not on file   Intimate Partner Violence: Not on file   Housing Stability: Not on file      Medications and Allergies:     Current Outpatient Medications   Medication Sig Dispense Refill   • atorvastatin (LIPITOR) 10 mg tablet Take 1 tablet (10 mg total) by mouth daily 90 tablet 1   • Biotin 5 MG CAPS Take 5,000 mg by mouth daily     • buPROPion (WELLBUTRIN XL) 300 mg 24 hr tablet Take 1 tablet (300 mg total) by mouth every morning 90 tablet 1   • Cholecalciferol (Vitamin D3) 50 MCG (2000 UT) capsule Take 5,000 Units by mouth daily      • famotidine (PEPCID) 40 MG tablet Take 1 tablet (40 mg total) by mouth every morning 90 tablet 1   • hydrocortisone 2.5 % cream Apply topically 2 (two) times a day 28 g 2   • losartan-hydrochlorothiazide (HYZAAR) 50-12.5 mg per tablet Take 1 tablet by mouth daily 90 tablet 1   • pramipexole (MIRAPEX) 0.5 mg tablet Take 1 tablet (0.5 mg total) by mouth daily at bedtime 90 tablet 1     No current facility-administered medications for this visit.      Allergies   Allergen Reactions   • Sulfa Antibiotics Hives      Immunizations:     Immunization History   Administered Date(s) Administered   • COVID-19 PFIZER VACCINE 0.3 ML IM 01/14/2021, 02/03/2021, 08/24/2021, 05/17/2022   • H1N1, All Formulations 11/16/2009   • Hep A / Hep B 03/06/2003, 05/02/2003, 10/06/2003   • Hep B, adult 03/06/2003, 05/02/2003, 10/06/2003   • INFLUENZA 11/18/2005, 11/07/2006, 10/04/2007, 10/15/2007, 10/02/2008, 09/29/2009, 09/24/2010, 11/10/2010, 10/06/2011, 11/08/2011, 09/21/2012, 10/26/2012, 11/05/2013, 11/24/2014, 09/30/2015, 08/26/2019, 09/18/2020, 09/18/2020, 09/15/2022   • Influenza Quadrivalent Preservative Free 3 years and older IM 09/30/2015, 10/11/2016, 11/07/2017   • Influenza Split High Dose Preservative Free IM 11/06/2018   • Influenza, high dose seasonal 0.7 mL 10/09/2023   • Pneumococcal Conjugate 13-Valent 01/10/2019   • Pneumococcal Polysaccharide PPV23 09/30/2015, 09/18/2020, 09/18/2020   • Td (adult), Unspecified 12/03/2002   • Tdap 09/21/2012   • Zoster 11/05/2013, 08/26/2019, 08/26/2019   • Zoster Vaccine Recombinant 06/11/2019, 08/13/2019   • influenza, injectable, quadrivalent 09/18/2020   • influenza, trivalent, adjuvanted 08/13/2019      Health Maintenance:         Topic Date Due   • Hepatitis C Screening  Never done   • Breast Cancer Screening: Mammogram  02/27/2024   • Colorectal Cancer Screening  11/14/2025         Topic Date Due   • COVID-19 Vaccine (5 - Booster for Pfizer series) 07/12/2022      Medicare Screening Tests and Risk Assessments:     Robe Mcintyre is here for her Subsequent Wellness visit. Health Risk Assessment:   Patient rates overall health as fair. Patient feels that their physical health rating is same. Patient is satisfied with their life. Eyesight was rated as same. Hearing was rated as same. Patient feels that their emotional and mental health rating is same. Patients states they are never, rarely angry. Patient states they are sometimes unusually tired/fatigued. Pain experienced in the last 7 days has been none. Patient states that she has experienced no weight loss or gain in last 6 months. Depression Screening:   PHQ-9 Score: 0      Fall Risk Screening: In the past year, patient has experienced: no history of falling in past year      Urinary Incontinence Screening:   Patient has leaked urine accidently in the last six months. Home Safety:  Patient does not have trouble with stairs inside or outside of their home. Patient has working smoke alarms and has working carbon monoxide detector. Home safety hazards include: none. Nutrition:   Current diet is Regular. Medications:   Patient is currently taking over-the-counter supplements. OTC medications include: biotin5,000. Patient is able to manage medications. Activities of Daily Living (ADLs)/Instrumental Activities of Daily Living (IADLs):   Walk and transfer into and out of bed and chair?: Yes  Dress and groom yourself?: Yes    Bathe or shower yourself?: Yes    Feed yourself?  Yes  Do your laundry/housekeeping?: Yes  Manage your money, pay your bills and track your expenses?: Yes  Make your own meals?: Yes    Do your own shopping?: Yes    Previous Hospitalizations:   Any hospitalizations or ED visits within the last 12 months?: No      Advance Care Planning:   Living will: No    Durable POA for healthcare: No    Advanced directive: No      PREVENTIVE SCREENINGS      Cardiovascular Screening:    General: Screening Not Indicated and History Lipid Disorder      Diabetes Screening:     General: Screening Current      Colorectal Cancer Screening:     General: Screening Current      Breast Cancer Screening:     General: Screening Current      Cervical Cancer Screening:    General: Screening Not Indicated      Osteoporosis Screening:    General: Screening Not Indicated and History Osteoporosis      Lung Cancer Screening:     General: Screening Not Indicated and History Lung Cancer    Screening, Brief Intervention, and Referral to Treatment (SBIRT)    Screening  Typical number of drinks in a day: 3  Typical number of drinks in a week: 1  Interpretation: Low risk drinking behavior. AUDIT-C Screenin) How often did you have a drink containing alcohol in the past year? 4 or more times a week  2) How many drinks did you have on a typical day when you were drinking in the past year? 1 to 2  3) How often did you have 6 or more drinks on one occasion in the past year? never    AUDIT-C Score: 4  Interpretation: Score 3-12 (female): POSITIVE screen for alcohol misuse    AUDIT Screenin) How often during the last year have you found that you were not able to stop drinking once you had started? 0 - never  5) How often during the last year have you failed to do what was normally expected from you because of drinking? 0 - never  6) How often during the last year have you needed a first drink in the morning to get yourself going after a heavy drinking session?  0 - never  7) How often during the last year have you had a feeling of guilt or remorse after drinking? 0 - never  8) How often during the last year have you been unable to remember what happened the night before because you had been drinking? 0 - never  9) Have you or someone else been injured as a result of your drinking? 0 - no  10) Has a relative or friend or a doctor or another health worker been concerned about your drinking or suggested you cut down? 0 - no    AUDIT Score: 4  Interpretation: Low risk alcohol consumption    Single Item Drug Screening:  How often have you used an illegal drug (including marijuana) or a prescription medication for non-medical reasons in the past year? never    Single Item Drug Screen Score: 0  Interpretation: Negative screen for possible drug use disorder    Vision Screening    Right eye Left eye Both eyes   Without correction 20/100 20/50 20/50   With correction           Physical Exam:     /72 (BP Location: Left arm, Patient Position: Sitting, Cuff Size: Standard)   Pulse 68   Temp 98.7 °F (37.1 °C) (Temporal)   Ht 4' 8" (1.422 m)   Wt 63 kg (139 lb)   LMP  (LMP Unknown)   SpO2 97%   BMI 31.16 kg/m²     Physical Exam     Anai Alatorre DO

## 2023-10-09 NOTE — PATIENT INSTRUCTIONS
Medicare Preventive Visit Patient Instructions  Thank you for completing your Welcome to Medicare Visit or Medicare Annual Wellness Visit today. Your next wellness visit will be due in one year (10/9/2024). The screening/preventive services that you may require over the next 5-10 years are detailed below. Some tests may not apply to you based off risk factors and/or age. Screening tests ordered at today's visit but not completed yet may show as past due. Also, please note that scanned in results may not display below. Preventive Screenings:  Service Recommendations Previous Testing/Comments   Colorectal Cancer Screening  * Colonoscopy    * Fecal Occult Blood Test (FOBT)/Fecal Immunochemical Test (FIT)  * Fecal DNA/Cologuard Test  * Flexible Sigmoidoscopy Age: 43-73 years old   Colonoscopy: every 10 years (may be performed more frequently if at higher risk)  OR  FOBT/FIT: every 1 year  OR  Cologuard: every 3 years  OR  Sigmoidoscopy: every 5 years  Screening may be recommended earlier than age 39 if at higher risk for colorectal cancer. Also, an individualized decision between you and your healthcare provider will decide whether screening between the ages of 77-80 would be appropriate. Colonoscopy: 11/14/2022  FOBT/FIT: Not on file  Cologuard: Not on file  Sigmoidoscopy: Not on file    Screening Current     Breast Cancer Screening Age: 36 years old  Frequency: every 1-2 years  Not required if history of left and right mastectomy Mammogram: 02/27/2023    Screening Current   Cervical Cancer Screening Between the ages of 21-29, pap smear recommended once every 3 years. Between the ages of 32-69, can perform pap smear with HPV co-testing every 5 years.    Recommendations may differ for women with a history of total hysterectomy, cervical cancer, or abnormal pap smears in past. Pap Smear: Not on file    Screening Not Indicated   Hepatitis C Screening Once for adults born between 1945 and 1965  More frequently in patients at high risk for Hepatitis C Hep C Antibody: Not on file        Diabetes Screening 1-2 times per year if you're at risk for diabetes or have pre-diabetes Fasting glucose: 100 mg/dL (7/17/2023)  A1C: 5.3 % (5/4/2021)  Screening Current   Cholesterol Screening Once every 5 years if you don't have a lipid disorder. May order more often based on risk factors. Lipid panel: 03/01/2023    Screening Not Indicated  History Lipid Disorder     Other Preventive Screenings Covered by Medicare:  1. Abdominal Aortic Aneurysm (AAA) Screening: covered once if your at risk. You're considered to be at risk if you have a family history of AAA. 2. Lung Cancer Screening: covers low dose CT scan once per year if you meet all of the following conditions: (1) Age 48-67; (2) No signs or symptoms of lung cancer; (3) Current smoker or have quit smoking within the last 15 years; (4) You have a tobacco smoking history of at least 20 pack years (packs per day multiplied by number of years you smoked); (5) You get a written order from a healthcare provider. 3. Glaucoma Screening: covered annually if you're considered high risk: (1) You have diabetes OR (2) Family history of glaucoma OR (3)  aged 48 and older OR (3)  American aged 72 and older  3. Osteoporosis Screening: covered every 2 years if you meet one of the following conditions: (1) You're estrogen deficient and at risk for osteoporosis based off medical history and other findings; (2) Have a vertebral abnormality; (3) On glucocorticoid therapy for more than 3 months; (4) Have primary hyperparathyroidism; (5) On osteoporosis medications and need to assess response to drug therapy. · Last bone density test (DXA Scan): 11/07/2022.  5. HIV Screening: covered annually if you're between the age of 15-65. Also covered annually if you are younger than 13 and older than 72 with risk factors for HIV infection.  For pregnant patients, it is covered up to 3 times per pregnancy. Immunizations:  Immunization Recommendations   Influenza Vaccine Annual influenza vaccination during flu season is recommended for all persons aged >= 6 months who do not have contraindications   Pneumococcal Vaccine   * Pneumococcal conjugate vaccine = PCV13 (Prevnar 13), PCV15 (Vaxneuvance), PCV20 (Prevnar 20)  * Pneumococcal polysaccharide vaccine = PPSV23 (Pneumovax) Adults 20-63 years old: 1-3 doses may be recommended based on certain risk factors  Adults 72 years old: 1-2 doses may be recommended based off what pneumonia vaccine you previously received   Hepatitis B Vaccine 3 dose series if at intermediate or high risk (ex: diabetes, end stage renal disease, liver disease)   Tetanus (Td) Vaccine - COST NOT COVERED BY MEDICARE PART B Following completion of primary series, a booster dose should be given every 10 years to maintain immunity against tetanus. Td may also be given as tetanus wound prophylaxis. Tdap Vaccine - COST NOT COVERED BY MEDICARE PART B Recommended at least once for all adults. For pregnant patients, recommended with each pregnancy. Shingles Vaccine (Shingrix) - COST NOT COVERED BY MEDICARE PART B  2 shot series recommended in those aged 48 and above     Health Maintenance Due:      Topic Date Due   • Hepatitis C Screening  Never done   • Breast Cancer Screening: Mammogram  02/27/2024   • Colorectal Cancer Screening  11/14/2025     Immunizations Due:      Topic Date Due   • COVID-19 Vaccine (5 - Booster for Pfizer series) 07/12/2022   • Influenza Vaccine (1) 09/01/2023     Advance Directives   What are advance directives? Advance directives are legal documents that state your wishes and plans for medical care. These plans are made ahead of time in case you lose your ability to make decisions for yourself. Advance directives can apply to any medical decision, such as the treatments you want, and if you want to donate organs. What are the types of advance directives? There are many types of advance directives, and each state has rules about how to use them. You may choose a combination of any of the following:  · Living will: This is a written record of the treatment you want. You can also choose which treatments you do not want, which to limit, and which to stop at a certain time. This includes surgery, medicine, IV fluid, and tube feedings. · Durable power of  for healthcare [facility]): This is a written record that states who you want to make healthcare choices for you when you are unable to make them for yourself. This person, called a proxy, is usually a family member or a friend. You may choose more than 1 proxy. · Do not resuscitate (DNR) order:  A DNR order is used in case your heart stops beating or you stop breathing. It is a request not to have certain forms of treatment, such as CPR. A DNR order may be included in other types of advance directives. · Medical directive: This covers the care that you want if you are in a coma, near death, or unable to make decisions for yourself. You can list the treatments you want for each condition. Treatment may include pain medicine, surgery, blood transfusions, dialysis, IV or tube feedings, and a ventilator (breathing machine). · Values history: This document has questions about your views, beliefs, and how you feel and think about life. This information can help others choose the care that you would choose. Why are advance directives important? An advance directive helps you control your care. Although spoken wishes may be used, it is better to have your wishes written down. Spoken wishes can be misunderstood, or not followed. Treatments may be given even if you do not want them. An advance directive may make it easier for your family to make difficult choices about your care. Urinary Incontinence   Urinary incontinence (UI)  is when you lose control of your bladder.  UI develops because your bladder cannot store or empty urine properly. The 3 most common types of UI are stress incontinence, urge incontinence, or both. Medicines:   · May be given to help strengthen your bladder control. Report any side effects of medication to your healthcare provider. Do pelvic muscle exercises often:  Your pelvic muscles help you stop urinating. Squeeze these muscles tight for 5 seconds, then relax for 5 seconds. Gradually work up to squeezing for 10 seconds. Do 3 sets of 15 repetitions a day, or as directed. This will help strengthen your pelvic muscles and improve bladder control. Train your bladder:  Go to the bathroom at set times, such as every 2 hours, even if you do not feel the urge to go. You can also try to hold your urine when you feel the urge to go. For example, hold your urine for 5 minutes when you feel the urge to go. As that becomes easier, hold your urine for 10 minutes. Self-care:   · Keep a UI record. Write down how often you leak urine and how much you leak. Make a note of what you were doing when you leaked urine. · Drink liquids as directed. You may need to limit the amount of liquid you drink to help control your urine leakage. Do not drink any liquid right before you go to bed. Limit or do not have drinks that contain caffeine or alcohol. · Prevent constipation. Eat a variety of high-fiber foods. Good examples are high-fiber cereals, beans, vegetables, and whole-grain breads. Walking is the best way to trigger your intestines to have a bowel movement. · Exercise regularly and maintain a healthy weight. Weight loss and exercise will decrease pressure on your bladder and help you control your leakage. · Use a catheter as directed  to help empty your bladder. A catheter is a tiny, plastic tube that is put into your bladder to drain your urine. · Go to behavior therapy as directed. Behavior therapy may be used to help you learn to control your urge to urinate.     Weight Management   Why it is important to manage your weight:  Being overweight increases your risk of health conditions such as heart disease, high blood pressure, type 2 diabetes, and certain types of cancer. It can also increase your risk for osteoarthritis, sleep apnea, and other respiratory problems. Aim for a slow, steady weight loss. Even a small amount of weight loss can lower your risk of health problems. How to lose weight safely:  A safe and healthy way to lose weight is to eat fewer calories and get regular exercise. You can lose up about 1 pound a week by decreasing the number of calories you eat by 500 calories each day. Healthy meal plan for weight management:  A healthy meal plan includes a variety of foods, contains fewer calories, and helps you stay healthy. A healthy meal plan includes the following:  · Eat whole-grain foods more often. A healthy meal plan should contain fiber. Fiber is the part of grains, fruits, and vegetables that is not broken down by your body. Whole-grain foods are healthy and provide extra fiber in your diet. Some examples of whole-grain foods are whole-wheat breads and pastas, oatmeal, brown rice, and bulgur. · Eat a variety of vegetables every day. Include dark, leafy greens such as spinach, kale, maddy greens, and mustard greens. Eat yellow and orange vegetables such as carrots, sweet potatoes, and winter squash. · Eat a variety of fruits every day. Choose fresh or canned fruit (canned in its own juice or light syrup) instead of juice. Fruit juice has very little or no fiber. · Eat low-fat dairy foods. Drink fat-free (skim) milk or 1% milk. Eat fat-free yogurt and low-fat cottage cheese. Try low-fat cheeses such as mozzarella and other reduced-fat cheeses. · Choose meat and other protein foods that are low in fat. Choose beans or other legumes such as split peas or lentils. Choose fish, skinless poultry (chicken or turkey), or lean cuts of red meat (beef or pork).  Before you cook meat or poultry, cut off any visible fat. · Use less fat and oil. Try baking foods instead of frying them. Add less fat, such as margarine, sour cream, regular salad dressing and mayonnaise to foods. Eat fewer high-fat foods. Some examples of high-fat foods include french fries, doughnuts, ice cream, and cakes. · Eat fewer sweets. Limit foods and drinks that are high in sugar. This includes candy, cookies, regular soda, and sweetened drinks. Exercise:  Exercise at least 30 minutes per day on most days of the week. Some examples of exercise include walking, biking, dancing, and swimming. You can also fit in more physical activity by taking the stairs instead of the elevator or parking farther away from stores. Ask your healthcare provider about the best exercise plan for you. Alcohol Use and Your Health    Drinking too much can harm your health. Excessive alcohol use leads to about 88,000 death in the [facility] each year, and shortens the life of those who diet by almost 30 years. Further, excessive drinking cost the economy $249 billion in 2010. Most excessive drinkers are not alcohol dependent. Excessive alcohol use has immediate effects that increase the risk of many harmful health conditions. These are most often the result of binge drinking. Over time, excessive alcohol use can lead to the development of chronic diseases and other series health problems. What is considered a "drink"? Excessive alcohol use includes:  · Binge Drinking: For women, 4 or more drinks consumed on one occasion. For men, 5 or more drinks consumed on one occasion. · Heavy Drinking: For women, 8 or more drinks per week.  For men, 15 or more drinks per week  · Any alcohol used by pregnant women  · Any alcohol used by those under the age of 21 years    If you choose to drink, do so in moderation:  · Do not drink at all if you are under the age of 24, or if you are or may be pregnant, or have health problems that could be made worse by drinking. · For women, up to 1 drink per day  · For men, up to 2 drinks a day    No one should begin drinking or drink more frequently based on potential health benefits    Short-Term Health Risks:  · Injuries: motor vehicle crashes, falls, drownings, burns  · Violence: homicide, suicide, sexual assault, intimate partner violence  · Alcohol poisoning  · Reproductive health: risky sexual behaviors, unintended prengnacy, sexually transmitted diseases, miscarriage, stillbirth, fetal alcohol syndrome    Long-Term Health Risks:  · Chronic diseases: high blood pressure, heart disease, stroke, liver disease, digestive problems  · Cancers: breast, mouth and throat, liver, colon  · Learning and memory problems: dementia, poor school performance  · Mental health: depression, anxiety, insomnia  · Social problems: lost productivity, family problems, unemployment  · Alcohol dependence    For support and more information:  · Substance Abuse and [address]  Web Address: https://[employer]/    · Alcoholics Anonymous        Web Address: http://www.[employer].info/    https://www.cdc.gov/alcohol/fact-sheets/alcohol-use.htm     © Copyright [address] 2018 Information is for End User's use only and may not be sold, redistributed or otherwise used for commercial purposes. All illustrations and images included in CareNotes® are the copyrighted property of [employer] or [facility] Preventive Visit Patient Instructions  Thank you for completing your Welcome to Medicare Visit or Medicare Annual Wellness Visit today. Your next wellness visit will be due in one year (10/9/2024). The screening/preventive services that you may require over the next 5-10 years are detailed below. Some tests may not apply to you based off risk factors and/or age. Screening tests ordered at today's visit but not completed yet may show as past due.  Also, please note that scanned in results may not display below. Preventive Screenings:  Service Recommendations Previous Testing/Comments   Colorectal Cancer Screening  * Colonoscopy    * Fecal Occult Blood Test (FOBT)/Fecal Immunochemical Test (FIT)  * Fecal DNA/Cologuard Test  * Flexible Sigmoidoscopy Age: 43-73 years old   Colonoscopy: every 10 years (may be performed more frequently if at higher risk)  OR  FOBT/FIT: every 1 year  OR  Cologuard: every 3 years  OR  Sigmoidoscopy: every 5 years  Screening may be recommended earlier than age 39 if at higher risk for colorectal cancer. Also, an individualized decision between you and your healthcare provider will decide whether screening between the ages of 77-80 would be appropriate. Colonoscopy: 11/14/2022  FOBT/FIT: Not on file  Cologuard: Not on file  Sigmoidoscopy: Not on file    Screening Current     Breast Cancer Screening Age: 36 years old  Frequency: every 1-2 years  Not required if history of left and right mastectomy Mammogram: 02/27/2023    Screening Current   Cervical Cancer Screening Between the ages of 21-29, pap smear recommended once every 3 years. Between the ages of 32-69, can perform pap smear with HPV co-testing every 5 years. Recommendations may differ for women with a history of total hysterectomy, cervical cancer, or abnormal pap smears in past. Pap Smear: Not on file    Screening Not Indicated   Hepatitis C Screening Once for adults born between 1945 and 1965  More frequently in patients at high risk for Hepatitis C Hep C Antibody: Not on file        Diabetes Screening 1-2 times per year if you're at risk for diabetes or have pre-diabetes Fasting glucose: 100 mg/dL (7/17/2023)  A1C: 5.3 % (5/4/2021)  Screening Current   Cholesterol Screening Once every 5 years if you don't have a lipid disorder. May order more often based on risk factors.  Lipid panel: 03/01/2023    Screening Not Indicated  History Lipid Disorder     Other Preventive Screenings Covered by Medicare:  6. Abdominal Aortic Aneurysm (AAA) Screening: covered once if your at risk. You're considered to be at risk if you have a family history of AAA. 7. Lung Cancer Screening: covers low dose CT scan once per year if you meet all of the following conditions: (1) Age 48-67; (2) No signs or symptoms of lung cancer; (3) Current smoker or have quit smoking within the last 15 years; (4) You have a tobacco smoking history of at least 20 pack years (packs per day multiplied by number of years you smoked); (5) You get a written order from a healthcare provider. 8. Glaucoma Screening: covered annually if you're considered high risk: (1) You have diabetes OR (2) Family history of glaucoma OR (3)  aged 48 and older OR (3)  American aged 72 and older  5. Osteoporosis Screening: covered every 2 years if you meet one of the following conditions: (1) You're estrogen deficient and at risk for osteoporosis based off medical history and other findings; (2) Have a vertebral abnormality; (3) On glucocorticoid therapy for more than 3 months; (4) Have primary hyperparathyroidism; (5) On osteoporosis medications and need to assess response to drug therapy. · Last bone density test (DXA Scan): 11/07/2022.  10. HIV Screening: covered annually if you're between the age of 15-65. Also covered annually if you are younger than 13 and older than 72 with risk factors for HIV infection. For pregnant patients, it is covered up to 3 times per pregnancy.     Immunizations:  Immunization Recommendations   Influenza Vaccine Annual influenza vaccination during flu season is recommended for all persons aged >= 6 months who do not have contraindications   Pneumococcal Vaccine   * Pneumococcal conjugate vaccine = PCV13 (Prevnar 13), PCV15 (Vaxneuvance), PCV20 (Prevnar 20)  * Pneumococcal polysaccharide vaccine = PPSV23 (Pneumovax) Adults 20-63 years old: 1-3 doses may be recommended based on certain risk factors  Adults 72 years old: 1-2 doses may be recommended based off what pneumonia vaccine you previously received   Hepatitis B Vaccine 3 dose series if at intermediate or high risk (ex: diabetes, end stage renal disease, liver disease)   Tetanus (Td) Vaccine - COST NOT COVERED BY MEDICARE PART B Following completion of primary series, a booster dose should be given every 10 years to maintain immunity against tetanus. Td may also be given as tetanus wound prophylaxis. Tdap Vaccine - COST NOT COVERED BY MEDICARE PART B Recommended at least once for all adults. For pregnant patients, recommended with each pregnancy. Shingles Vaccine (Shingrix) - COST NOT COVERED BY MEDICARE PART B  2 shot series recommended in those aged 48 and above     Health Maintenance Due:      Topic Date Due   • Hepatitis C Screening  Never done   • Breast Cancer Screening: Mammogram  02/27/2024   • Colorectal Cancer Screening  11/14/2025     Immunizations Due:      Topic Date Due   • COVID-19 Vaccine (5 - Booster for Pfizer series) 07/12/2022   • Influenza Vaccine (1) 09/01/2023     Advance Directives   What are advance directives? Advance directives are legal documents that state your wishes and plans for medical care. These plans are made ahead of time in case you lose your ability to make decisions for yourself. Advance directives can apply to any medical decision, such as the treatments you want, and if you want to donate organs. What are the types of advance directives? There are many types of advance directives, and each state has rules about how to use them. You may choose a combination of any of the following:  · Living will: This is a written record of the treatment you want. You can also choose which treatments you do not want, which to limit, and which to stop at a certain time. This includes surgery, medicine, IV fluid, and tube feedings. · Durable power of  for healthcare [facility]):   This is a written record that states who you want to make healthcare choices for you when you are unable to make them for yourself. This person, called a proxy, is usually a family member or a friend. You may choose more than 1 proxy. · Do not resuscitate (DNR) order:  A DNR order is used in case your heart stops beating or you stop breathing. It is a request not to have certain forms of treatment, such as CPR. A DNR order may be included in other types of advance directives. · Medical directive: This covers the care that you want if you are in a coma, near death, or unable to make decisions for yourself. You can list the treatments you want for each condition. Treatment may include pain medicine, surgery, blood transfusions, dialysis, IV or tube feedings, and a ventilator (breathing machine). · Values history: This document has questions about your views, beliefs, and how you feel and think about life. This information can help others choose the care that you would choose. Why are advance directives important? An advance directive helps you control your care. Although spoken wishes may be used, it is better to have your wishes written down. Spoken wishes can be misunderstood, or not followed. Treatments may be given even if you do not want them. An advance directive may make it easier for your family to make difficult choices about your care. Urinary Incontinence   Urinary incontinence (UI)  is when you lose control of your bladder. UI develops because your bladder cannot store or empty urine properly. The 3 most common types of UI are stress incontinence, urge incontinence, or both. Medicines:   · May be given to help strengthen your bladder control. Report any side effects of medication to your healthcare provider. Do pelvic muscle exercises often:  Your pelvic muscles help you stop urinating. Squeeze these muscles tight for 5 seconds, then relax for 5 seconds. Gradually work up to squeezing for 10 seconds.  Do 3 sets of 15 repetitions a day, or as directed. This will help strengthen your pelvic muscles and improve bladder control. Train your bladder:  Go to the bathroom at set times, such as every 2 hours, even if you do not feel the urge to go. You can also try to hold your urine when you feel the urge to go. For example, hold your urine for 5 minutes when you feel the urge to go. As that becomes easier, hold your urine for 10 minutes. Self-care:   · Keep a UI record. Write down how often you leak urine and how much you leak. Make a note of what you were doing when you leaked urine. · Drink liquids as directed. You may need to limit the amount of liquid you drink to help control your urine leakage. Do not drink any liquid right before you go to bed. Limit or do not have drinks that contain caffeine or alcohol. · Prevent constipation. Eat a variety of high-fiber foods. Good examples are high-fiber cereals, beans, vegetables, and whole-grain breads. Walking is the best way to trigger your intestines to have a bowel movement. · Exercise regularly and maintain a healthy weight. Weight loss and exercise will decrease pressure on your bladder and help you control your leakage. · Use a catheter as directed  to help empty your bladder. A catheter is a tiny, plastic tube that is put into your bladder to drain your urine. · Go to behavior therapy as directed. Behavior therapy may be used to help you learn to control your urge to urinate. Weight Management   Why it is important to manage your weight:  Being overweight increases your risk of health conditions such as heart disease, high blood pressure, type 2 diabetes, and certain types of cancer. It can also increase your risk for osteoarthritis, sleep apnea, and other respiratory problems. Aim for a slow, steady weight loss. Even a small amount of weight loss can lower your risk of health problems.   How to lose weight safely:  A safe and healthy way to lose weight is to eat fewer calories and get regular exercise. You can lose up about 1 pound a week by decreasing the number of calories you eat by 500 calories each day. Healthy meal plan for weight management:  A healthy meal plan includes a variety of foods, contains fewer calories, and helps you stay healthy. A healthy meal plan includes the following:  · Eat whole-grain foods more often. A healthy meal plan should contain fiber. Fiber is the part of grains, fruits, and vegetables that is not broken down by your body. Whole-grain foods are healthy and provide extra fiber in your diet. Some examples of whole-grain foods are whole-wheat breads and pastas, oatmeal, brown rice, and bulgur. · Eat a variety of vegetables every day. Include dark, leafy greens such as spinach, kale, maddy greens, and mustard greens. Eat yellow and orange vegetables such as carrots, sweet potatoes, and winter squash. · Eat a variety of fruits every day. Choose fresh or canned fruit (canned in its own juice or light syrup) instead of juice. Fruit juice has very little or no fiber. · Eat low-fat dairy foods. Drink fat-free (skim) milk or 1% milk. Eat fat-free yogurt and low-fat cottage cheese. Try low-fat cheeses such as mozzarella and other reduced-fat cheeses. · Choose meat and other protein foods that are low in fat. Choose beans or other legumes such as split peas or lentils. Choose fish, skinless poultry (chicken or turkey), or lean cuts of red meat (beef or pork). Before you cook meat or poultry, cut off any visible fat. · Use less fat and oil. Try baking foods instead of frying them. Add less fat, such as margarine, sour cream, regular salad dressing and mayonnaise to foods. Eat fewer high-fat foods. Some examples of high-fat foods include french fries, doughnuts, ice cream, and cakes. · Eat fewer sweets. Limit foods and drinks that are high in sugar. This includes candy, cookies, regular soda, and sweetened drinks.   Exercise: Exercise at least 30 minutes per day on most days of the week. Some examples of exercise include walking, biking, dancing, and swimming. You can also fit in more physical activity by taking the stairs instead of the elevator or parking farther away from stores. Ask your healthcare provider about the best exercise plan for you. Alcohol Use and Your Health    Drinking too much can harm your health. Excessive alcohol use leads to about 88,000 death in the [facility] each year, and shortens the life of those who diet by almost 30 years. Further, excessive drinking cost the economy $249 billion in 2010. Most excessive drinkers are not alcohol dependent. Excessive alcohol use has immediate effects that increase the risk of many harmful health conditions. These are most often the result of binge drinking. Over time, excessive alcohol use can lead to the development of chronic diseases and other series health problems. What is considered a "drink"? Excessive alcohol use includes:  · Binge Drinking: For women, 4 or more drinks consumed on one occasion. For men, 5 or more drinks consumed on one occasion. · Heavy Drinking: For women, 8 or more drinks per week. For men, 15 or more drinks per week  · Any alcohol used by pregnant women  · Any alcohol used by those under the age of 21 years    If you choose to drink, do so in moderation:  · Do not drink at all if you are under the age of 24, or if you are or may be pregnant, or have health problems that could be made worse by drinking.   · For women, up to 1 drink per day  · For men, up to 2 drinks a day    No one should begin drinking or drink more frequently based on potential health benefits    Short-Term Health Risks:  · Injuries: motor vehicle crashes, falls, drownings, burns  · Violence: homicide, suicide, sexual assault, intimate partner violence  · Alcohol poisoning  · Reproductive health: risky sexual behaviors, unintended prengnacy, sexually transmitted diseases, miscarriage, stillbirth, fetal alcohol syndrome    Long-Term Health Risks:  · Chronic diseases: high blood pressure, heart disease, stroke, liver disease, digestive problems  · Cancers: breast, mouth and throat, liver, colon  · Learning and memory problems: dementia, poor school performance  · Mental health: depression, anxiety, insomnia  · Social problems: lost productivity, family problems, unemployment  · Alcohol dependence    For support and more information:  · Substance Abuse and [address]  Web Address: https://[employer]/    · Alcoholics Anonymous        Web Address: http://www.weartolook.info/    https://www.cdc.gov/alcohol/fact-sheets/alcohol-use.htm     © [employer] 2018 Information is for End User's use only and may not be sold, redistributed or otherwise used for commercial purposes.  All illustrations and images included in [employer]® are the copyrighted property of [employer] or [address] [place] [address]

## 2023-10-09 NOTE — PROGRESS NOTES
Assessment/Plan:    Welcome to Medicare visit  -Medicare annual wellness visit done   - last colonoscopy was done in November 2020 and showed a tubular adenoma. Patient will be due for repeat colonoscopy in 2025  - DEXA scan done in 2022 showed osteoporosis and patient is on Prolia.  -last mammogram was done in February 2023 and patient will be due in 1 year.  -She is up-to-date with all her COVID vaccines minus the new  booster, she also got the shingles vaccine, pneumococcal vaccine and states that she got the tetanus vaccine at [facility] and will get the flu shot today. -EKG done and reviewed  -We will order a hepatitis C antibody test and follow-up with results. -follow-up in 6 months    Osteoporosis  - Prolia shot was given on left upper arm by subcutaneous injection. - lot number [id number] and expiration date 02/28/2026  -patient tolerated injection without any adverse reactions  -continue with calcium and vitamin-D supplements  - follow-up in 6 months for repeat Prolia shot       Diagnoses and all orders for this visit:    Welcome to Medicare preventive visit    Screening for cardiovascular condition  -     POCT ECG    Localized osteoporosis without current pathological fracture  -     denosumab (PROLIA) subcutaneous injection 60 mg    Need for hepatitis C screening test  -     Hepatitis C antibody; Future    Encounter for screening mammogram for breast cancer  Comments:  last mammogram was in Feb 2023 and she will be due for repeat in Feb 2024    Personal history of colonic polyps  Comments:  last colonoscopy was in Nov 2022 and they found a tubular adenoma and she was told to return in 3 years.  + family of colon ca in PGF    Age-related osteoporosis without current pathological fracture  Comments:  last dexa scan was 2022 and showed osteoporosis and pt is on prolia and will be due for repeat dexa scan 2024    Screening for AAA (abdominal aortic aneurysm)  Comments:  no family hx of AAA    Encounter for immunization  Comments:  she is up to date with all her covid vaccines except the new booster, had the td at [facility], shingles vaccine, pneumoccocal vaccine, and will get the flu shot today       BMI 31.0-31.9,adult    Need for influenza vaccination  -     influenza vaccine, high-dose, PF 0.7 mL (FLUZONE HIGH-DOSE)          Subjective:      Patient ID: Anali Mac is a 77 y.o. female. HPI  Patient presents for a welcome to Medicare annual wellness visit. Patient presents for a Prolia injection. She has a history of osteoporosis  Last DEXA scan was 11/07/22  Patient is taking calcium and vitamin-D supplements. Last vitamin-D level was checked on 07/10/22 and was 83.9  Last calcium level was checked on 7/17/23 and was normal at 9.0  Doing Weight-bearing exercise - going for classes for wt bearing at [facility]  She denies any side effects of Prolia including jaw pain, infections,  Musculoskeletal pain, fractures, back pain, headaches, abdominal pain, constipation, acid reflux. She denies fever, chills, night sweats, headache, dizziness, chest pain, shortness of breath, palpitations, nausea, vomiting, diarrhea or constipation. 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   She   Patient Active Problem List    Diagnosis Date Noted   • Traumatic subdural hemorrhage with loss of consciousness of unspecified duration, initial encounter ([address]) 03/06/2023   • Localized osteoporosis without current pathological fracture 03/06/2023   • Incontinence of feces 02/02/2023   • Annual physical exam 10/03/2022   • Subdural hematoma ([address]) 05/19/2022   • Syncope 05/19/2022   • BMI 31.0-31.9,adult 05/19/2022   • Intervertebral disc disorder with radiculopathy of lumbar region    • Parotid tumor 02/11/2022   • Dizziness 10/04/2021   • Encounter for care related to vascular access port 07/23/2021   • Chemotherapy-induced nausea 04/30/2021   • Insomnia due to medical condition 04/05/2021   • Cancer related pain 04/05/2021   • Palliative care patient 04/04/2021   • Medical marijuana use 04/04/2021   • Anxiety disorder 04/01/2021   • Chemotherapy induced neutropenia  03/31/2021   • Regional lymph node metastasis present ([address]) 03/02/2021   • GERD (gastroesophageal reflux disease) 02/26/2021   • Depression, recurrent ([address])    • Squamous cell lung cancer, right ([address]) 01/12/2021   • Chronic obstructive pulmonary disease ([address]) 10/07/2019   • Tinnitus of both ears 11/06/2018   • Hypertension, essential 11/07/2017   • Restless legs syndrome (RLS) 11/07/2017   • Cystocele 09/26/2016   • Rectocele 09/26/2016   • Cystocele with prolapse 09/26/2016   • Atypical lobular hyperplasia of breast 03/24/2015   • Multiple sclerosis ([address]) 11/29/2013   • Hyperlipidemia 09/15/2010   • Spinal stenosis of lumbar region 09/15/2010   • History of colonic polyps 09/15/2010     She  has a past surgical history that includes Breast biopsy (Left, 03/23/2015); Breast excisional biopsy (Left, 08/26/2010); Breast biopsy (Left, 08/02/2010); Breast excisional biopsy (Left, 1994); Breast excisional biopsy (Right, 1984); Total abdominal hysterectomy w/ bilateral salpingoophorectomy (Bilateral, 2010); Total abdominal hysterectomy (2010); Augmentation mammaplasty (Bilateral, 11/2014); pr thoracoscopy w/lobectomy single lobe (Right, 01/28/2021); pr brnchsc incl fluor gdnce dx w/cell washg spx (N/A, 01/28/2021);  Tonsillectomy; pr insj tunneled ctr vad w/subq port age 9 yr/> (N/A, 03/09/2021); FL guided central venous access device insertion (03/09/2021); Mammo (historical) (2020); IR port removal (03/07/2022); Lymph node biopsy (2021); Tubal ligation (1971); and CT needle biopsy lung (12/2/2020). Her family history includes Breast cancer in her paternal aunt; COPD in her brother and father; Hypertension in her father; No Known Problems in her brother, daughter, maternal aunt, maternal grandfather, maternal grandmother, mother, paternal aunt, paternal aunt, paternal aunt, paternal aunt, paternal aunt, paternal aunt, paternal grandmother, sister, and sister; Rectal cancer in her paternal grandfather. She  reports that she quit smoking about 2 years ago. Her smoking use included cigarettes. She started smoking about 55 years ago. She has a 30.00 pack-year smoking history. She has never used smokeless tobacco. She reports current alcohol use of about 5.0 standard drinks of alcohol per week. She reports that she does not use drugs. Current Outpatient Medications   Medication Sig Dispense Refill   • atorvastatin (LIPITOR) 10 mg tablet Take 1 tablet (10 mg total) by mouth daily 90 tablet 1   • Biotin 5 MG CAPS Take 5,000 mg by mouth daily     • buPROPion (WELLBUTRIN XL) 300 mg 24 hr tablet Take 1 tablet (300 mg total) by mouth every morning 90 tablet 1   • Cholecalciferol (Vitamin D3) 50 MCG (2000 UT) capsule Take 5,000 Units by mouth daily      • famotidine (PEPCID) 40 MG tablet Take 1 tablet (40 mg total) by mouth every morning 90 tablet 1   • hydrocortisone 2.5 % cream Apply topically 2 (two) times a day 28 g 2   • losartan-hydrochlorothiazide (HYZAAR) 50-12.5 mg per tablet Take 1 tablet by mouth daily 90 tablet 1   • pramipexole (MIRAPEX) 0.5 mg tablet Take 1 tablet (0.5 mg total) by mouth daily at bedtime 90 tablet 1     No current facility-administered medications for this visit.      Current Outpatient Medications on File Prior to Visit   Medication Sig   • atorvastatin (LIPITOR) 10 mg tablet Take 1 tablet (10 mg total) by mouth daily   • Biotin 5 MG CAPS Take 5,000 mg by mouth daily   • buPROPion (WELLBUTRIN XL) 300 mg 24 hr tablet Take 1 tablet (300 mg total) by mouth every morning   • Cholecalciferol (Vitamin D3) 50 MCG (2000 UT) capsule Take 5,000 Units by mouth daily    • famotidine (PEPCID) 40 MG tablet Take 1 tablet (40 mg total) by mouth every morning   • hydrocortisone 2.5 % cream Apply topically 2 (two) times a day   • losartan-hydrochlorothiazide (HYZAAR) 50-12.5 mg per tablet Take 1 tablet by mouth daily   • pramipexole (MIRAPEX) 0.5 mg tablet Take 1 tablet (0.5 mg total) by mouth daily at bedtime   • [DISCONTINUED] tiotropium (Spiriva Respimat) 2.5 MCG/ACT AERS inhaler Inhale daily   • [DISCONTINUED] tiotropium-olodaterol (Stiolto Respimat) 2.5-2.5 MCG/ACT inhaler Inhale 2 puffs daily   • [DISCONTINUED] umeclidinium-vilanterol (Anoro Ellipta) 62.5-25 MCG/INH inhaler Inhale 1 puff daily      No current facility-administered medications on file prior to visit. She is allergic to sulfa antibiotics. .    Review of Systems   Constitutional: Negative for activity change, chills, fatigue, fever and unexpected weight change. HENT: Negative for ear pain, postnasal drip, rhinorrhea, sinus pressure and sore throat. Eyes: Negative for pain. Respiratory: Negative for cough, choking, chest tightness, shortness of breath and wheezing. Cardiovascular: Negative for chest pain, palpitations and leg swelling. Gastrointestinal: Negative for abdominal pain, constipation, diarrhea, nausea and vomiting. Genitourinary: Negative for dysuria and hematuria. Musculoskeletal: Negative for arthralgias, back pain, gait problem, joint swelling, myalgias and neck stiffness. Skin: Negative for pallor and rash. Neurological: Negative for dizziness, tremors, seizures, syncope, light-headedness and headaches. Hematological: Negative for adenopathy.    Psychiatric/Behavioral: Negative for behavioral problems. Objective:      /72 (BP Location: Left arm, Patient Position: Sitting, Cuff Size: Standard)   Pulse 68   Temp 98.7 °F (37.1 °C) (Temporal)   Ht 4' 8" (1.422 m)   Wt 63 kg (139 lb)   LMP  (LMP Unknown)   SpO2 97%   BMI 31.16 kg/m²          Physical Exam  Constitutional:       General: She is not in acute distress. Appearance: She is well-developed. She is not diaphoretic. HENT:      Head: Normocephalic and atraumatic. Right Ear: External ear normal.      Left Ear: External ear normal.      Nose: Nose normal.      Mouth/Throat:      Mouth: Mucous membranes are dry. Pharynx: Posterior oropharyngeal erythema (Oropharyngeal erythema with dry mucous membranes) present. No oropharyngeal exudate. Eyes:      General: No scleral icterus. Right eye: No discharge. Left eye: No discharge. Conjunctiva/sclera: Conjunctivae normal.      Pupils: Pupils are equal, round, and reactive to light. Neck:      Thyroid: No thyromegaly. Vascular: No JVD. Trachea: No tracheal deviation. Cardiovascular:      Rate and Rhythm: Normal rate and regular rhythm. Heart sounds: Normal heart sounds. No murmur heard. No friction rub. No gallop. Pulmonary:      Effort: Pulmonary effort is normal. No respiratory distress. Breath sounds: Normal breath sounds. No wheezing or rales. Chest:      Chest wall: No tenderness. Abdominal:      General: Bowel sounds are normal. There is no distension. Palpations: Abdomen is soft. There is no mass. Tenderness: There is no abdominal tenderness. There is no guarding or rebound. Musculoskeletal:         General: No tenderness or deformity. Normal range of motion. Cervical back: Normal range of motion and neck supple. Lymphadenopathy:      Cervical: No cervical adenopathy. Skin:     General: Skin is warm and dry. Coloration: Skin is not pale. Findings: No erythema or rash. Neurological:      Mental Status: She is alert and oriented to person, place, and time. Cranial Nerves: No cranial nerve deficit. Motor: No abnormal muscle tone. Coordination: Coordination normal.      Deep Tendon Reflexes: Reflexes are normal and symmetric. Psychiatric:         Behavior: Behavior normal.           Appointment on 08/03/2023   Component Date Value Ref Range Status   • TSH 3RD GENERATON 08/03/2023 2.863  0.450 - 4.500 uIU/mL Final    The recommended reference ranges for TSH during pregnancy are as follows:   First trimester 0.1 to 2.5 uIU/mL   Second trimester  0.2 to 3.0 uIU/mL   Third trimester 0.3 to 3.0 uIU/m    Note: Normal ranges may not apply to patients who are transgender, non-binary, or whose legal sex, sex at birth, and gender identity differ. Adult TSH (3rd generation) reference range follows the recommended guidelines of the [employer] Association, January, 2020.    Appointment on 07/17/2023   Component Date Value Ref Range Status   • WBC 07/17/2023 5.59  4.31 - 10.16 Thousand/uL Final   • RBC 07/17/2023 4.25  3.81 - 5.12 Million/uL Final   • Hemoglobin 07/17/2023 13.5  11.5 - 15.4 g/dL Final   • Hematocrit 07/17/2023 40.4  34.8 - 46.1 % Final   • MCV 07/17/2023 95  82 - 98 fL Final   • MCH 07/17/2023 31.8  26.8 - 34.3 pg Final   • MCHC 07/17/2023 33.4  31.4 - 37.4 g/dL Final   • RDW 07/17/2023 12.7  11.6 - 15.1 % Final   • MPV 07/17/2023 10.5  8.9 - 12.7 fL Final   • Platelets 68/62/2561 266  149 - 390 Thousands/uL Final   • nRBC 07/17/2023 0  /100 WBCs Final   • Neutrophils Relative 07/17/2023 65  43 - 75 % Final   • Immat GRANS % 07/17/2023 0  0 - 2 % Final   • Lymphocytes Relative 07/17/2023 24  14 - 44 % Final   • Monocytes Relative 07/17/2023 8  4 - 12 % Final   • Eosinophils Relative 07/17/2023 2  0 - 6 % Final   • Basophils Relative 07/17/2023 1  0 - 1 % Final   • Neutrophils Absolute 07/17/2023 3.65  1.85 - 7.62 Thousands/µL Final   • Immature Grans Absolute 07/17/2023 0.02  0.00 - 0.20 Thousand/uL Final   • Lymphocytes Absolute 07/17/2023 1.34  0.60 - 4.47 Thousands/µL Final   • Monocytes Absolute 07/17/2023 0.44  0.17 - 1.22 Thousand/µL Final   • Eosinophils Absolute 07/17/2023 0.09  0.00 - 0.61 Thousand/µL Final   • Basophils Absolute 07/17/2023 0.05  0.00 - 0.10 Thousands/µL Final   • Sodium 07/17/2023 138  135 - 147 mmol/L Final   • Potassium 07/17/2023 4.0  3.5 - 5.3 mmol/L Final   • Chloride 07/17/2023 108  96 - 108 mmol/L Final   • CO2 07/17/2023 29  21 - 32 mmol/L Final   • ANION GAP 07/17/2023 1  mmol/L Final   • BUN 07/17/2023 11  5 - 25 mg/dL Final   • Creatinine 07/17/2023 0.84  0.60 - 1.30 mg/dL Final    Standardized to IDMS reference method   • Glucose, Fasting 07/17/2023 100 (H)  65 - 99 mg/dL Final    Specimen collection should occur prior to Sulfasalazine administration due to the potential for falsely depressed results. Specimen collection should occur prior to Sulfapyridine administration due to the potential for falsely elevated results. • Calcium 07/17/2023 9.0  8.3 - 10.1 mg/dL Final   • AST 07/17/2023 18  5 - 45 U/L Final    Specimen collection should occur prior to Sulfasalazine administration due to the potential for falsely depressed results. • ALT 07/17/2023 32  12 - 78 U/L Final    Specimen collection should occur prior to Sulfasalazine and/or Sulfapyridine administration due to the potential for falsely depressed results. • Alkaline Phosphatase 07/17/2023 56  46 - 116 U/L Final   • Total Protein 07/17/2023 7.0  6.4 - 8.4 g/dL Final   • Albumin 07/17/2023 3.7  3.5 - 5.0 g/dL Final   • Total Bilirubin 07/17/2023 0.71  0.20 - 1.00 mg/dL Final    Use of this assay is not recommended for patients undergoing treatment with eltrombopag due to the potential for falsely elevated results.    • eGFR 07/17/2023 70  ml/min/1.73sq m Final   Appointment on 03/01/2023   Component Date Value Ref Range Status   • Cholesterol 03/01/2023 227 (H)  See Comment mg/dL Final    Cholesterol:         Pediatric <18 Years        Desirable          <170 mg/dL      Borderline High    170-199 mg/dL      High               >=200 mg/dL        Adult >=18 Years            Desirable         <200 mg/dL      Borderline High   200-239 mg/dL      High              >239 mg/dL     • Triglycerides 03/01/2023 137  See Comment mg/dL Final    Triglyceride:     0-9Y            <75mg/dL     10Y-17Y         <90 mg/dL       >=18Y     Normal          <150 mg/dL     Borderline High 150-199 mg/dL     High            200-499 mg/dL        Very High       >499 mg/dL    Specimen collection should occur prior to N-Acetylcysteine or Metamizole administration due to the potential for falsely depressed results. • HDL, Direct 03/01/2023 62  >=50 mg/dL Final    Specimen collection should occur prior to Metamizole administration due to the potential for falsley depressed results. • LDL Calculated 03/01/2023 138 (H)  0 - 100 mg/dL Final    LDL Cholesterol:     Optimal           <100 mg/dl     Near Optimal      100-129 mg/dl     Above Optimal       Borderline High 130-159 mg/dl       High            160-189 mg/dl       Very High       >189 mg/dl         This screening LDL is a calculated result. It does not have the accuracy of the Direct Measured LDL in the monitoring of patients with hyperlipidemia and/or statin therapy. Direct Measure LDL (XEQ073) must be ordered separately in these patients.    • Non-HDL-Chol (CHOL-HDL) 03/01/2023 165  mg/dl Final   Appointment on 01/26/2023   Component Date Value Ref Range Status   • WBC 01/26/2023 5.07  4.31 - 10.16 Thousand/uL Final   • RBC 01/26/2023 4.41  3.81 - 5.12 Million/uL Final   • Hemoglobin 01/26/2023 13.7  11.5 - 15.4 g/dL Final   • Hematocrit 01/26/2023 42.9  34.8 - 46.1 % Final   • MCV 01/26/2023 97  82 - 98 fL Final   • MCH 01/26/2023 31.1  26.8 - 34.3 pg Final   • MCHC 01/26/2023 31.9  31.4 - 37.4 g/dL Final   • RDW 01/26/2023 12.2  11.6 - 15.1 % Final   • MPV 01/26/2023 10.1  8.9 - 12.7 fL Final   • Platelets 87/14/1137 319  149 - 390 Thousands/uL Final   • nRBC 01/26/2023 0  /100 WBCs Final   • Neutrophils Relative 01/26/2023 58  43 - 75 % Final   • Immat GRANS % 01/26/2023 0  0 - 2 % Final   • Lymphocytes Relative 01/26/2023 31  14 - 44 % Final   • Monocytes Relative 01/26/2023 9  4 - 12 % Final   • Eosinophils Relative 01/26/2023 1  0 - 6 % Final   • Basophils Relative 01/26/2023 1  0 - 1 % Final   • Neutrophils Absolute 01/26/2023 2.95  1.85 - 7.62 Thousands/µL Final   • Immature Grans Absolute 01/26/2023 0.00  0.00 - 0.20 Thousand/uL Final   • Lymphocytes Absolute 01/26/2023 1.56  0.60 - 4.47 Thousands/µL Final   • Monocytes Absolute 01/26/2023 0.43  0.17 - 1.22 Thousand/µL Final   • Eosinophils Absolute 01/26/2023 0.07  0.00 - 0.61 Thousand/µL Final   • Basophils Absolute 01/26/2023 0.06  0.00 - 0.10 Thousands/µL Final   • Sodium 01/26/2023 136  135 - 147 mmol/L Final   • Potassium 01/26/2023 4.0  3.5 - 5.3 mmol/L Final   • Chloride 01/26/2023 102  96 - 108 mmol/L Final   • CO2 01/26/2023 32  21 - 32 mmol/L Final   • ANION GAP 01/26/2023 2 (L)  4 - 13 mmol/L Final   • BUN 01/26/2023 14  5 - 25 mg/dL Final   • Creatinine 01/26/2023 0.82  0.60 - 1.30 mg/dL Final    Standardized to IDMS reference method   • Glucose 01/26/2023 82  65 - 140 mg/dL Final    If the patient is fasting, the ADA then defines impaired fasting glucose as > 100 mg/dL and diabetes as > or equal to 123 mg/dL. Specimen collection should occur prior to Sulfasalazine administration due to the potential for falsely depressed results. Specimen collection should occur prior to Sulfapyridine administration due to the potential for falsely elevated results. • Calcium 01/26/2023 9.1  8.3 - 10.1 mg/dL Final   • AST 01/26/2023 16  5 - 45 U/L Final    Specimen collection should occur prior to Sulfasalazine administration due to the potential for falsely depressed results.     • ALT 01/26/2023 29  12 - 78 U/L Final    Specimen collection should occur prior to Sulfasalazine and/or Sulfapyridine administration due to the potential for falsely depressed results. • Alkaline Phosphatase 01/26/2023 78  46 - 116 U/L Final   • Total Protein 01/26/2023 7.2  6.4 - 8.4 g/dL Final   • Albumin 01/26/2023 3.8  3.5 - 5.0 g/dL Final   • Total Bilirubin 01/26/2023 0.56  0.20 - 1.00 mg/dL Final    Use of this assay is not recommended for patients undergoing treatment with eltrombopag due to the potential for falsely elevated results.    • eGFR 01/26/2023 73  ml/min/1.73sq m Final   • CEA 01/26/2023 1.8  0.0 - 3.0 ng/mL Final    Normal/Non-Smoker: 0.0-3.0 ng/mL   Normal/Smoker:     3.1-5.0 ng/mL         Answers for HPI/ROS submitted by the patient on 10/2/2023  How often during the last year have you found that you were not able to stop drinking once you had started?: 0 - never  How often during the last year have you failed to do what was normally expected from you because of drinking?: 0 - never  How often during the last year have you needed a first drink in the morning to get yourself going after a heavy drinking session?: 0 - never  How often during the last year have you had a feeling of guilt or remorse after drinking?: 0 - never  How often during the last year have you been unable to remember what happened the night before because you had been drinking?: 0 - never  Have you or someone else been injured as a result of your drinking?: 0 - no  Has a relative or friend or a doctor or another health worker been concerned about your drinking or suggested you cut down?: 0 - no  How would you rate your overall health?: fair  Compared to last year, how is your physical health?: same  In general, how satisfied are you with your life?: satisfied  Compared to last year, how is your eyesight?: same  Compared to last year, how is your hearing?: same  Compared to last year, how is your emotional/mental health?: same  How often is anger a problem for you?: never, rarely  How often do you feel unusually tired/fatigued?: sometimes  In the past 7 days, how much pain have you experienced?: none  In the past 6 months, have you lost or gained 10 pounds without trying?: No  One or more falls in the last year: No  In the past 6 months, have you accidentally leaked urine?: Yes  Do you have trouble with the stairs inside or outside your home?: No  Does your home have working smoke alarms?: Yes  Does your home have a carbon monoxide monitor?: Yes  Which safety hazards (if any) have you experienced in your home? Please select all that apply.: none  How would you describe your current diet?  Please select all that apply.: Regular  In addition to prescription medications, are you taking any over-the-counter supplements?: Yes  If yes, what supplements are you taking?: biotin5,000  Can you manage your medications?: Yes  Are you currently taking any opioid medications?: No  Can you walk and transfer into and out of your bed and chair?: Yes  Can you dress and groom yourself?: Yes  Can you bathe or shower yourself?: Yes  Can you feed yourself?: Yes  Can you do your laundry/ housekeeping?: Yes  Can you manage your money, pay your bills, and track your expenses?: Yes  Can you make your own meals?: Yes  Can you do your own shopping?: Yes  Within the last 12 months, have you had any hospitalizations or Emergency Department visits?: No  Do you have a living will?: No  Do you have a Durable POA (Power of ) for healthcare decisions?: No  Do you have an Advanced Directive for end of life decisions?: No  How often have you used an illegal drug (including marijuana) or a prescription medication for non-medical reasons in the past year?: never  What is the typical number of drinks you consume in a day?: 3  What is the typical number of drinks you consume in a week?: 1  How often did you have a drink containing alcohol in the past year?: 4 or more times a week  How many drinks did you have on a typical day  when you were drinking in the past year?: 1 to 2  How often did you have 6 or more drinks on one occasion in the past year?: never

## 2023-10-10 ENCOUNTER — TELEPHONE (OUTPATIENT)
Dept: INTERNAL MEDICINE CLINIC | Age: 68
End: 2023-10-10

## 2023-10-10 NOTE — TELEPHONE ENCOUNTER
Patient is scheduled for her next two prolia's for the bath office  04/11/2024 and 10/14/2024 with Dr. Saud Hancock at the bath office.     Marked on both Calendars and updated the list

## 2024-01-03 ENCOUNTER — TELEPHONE (OUTPATIENT)
Dept: INTERNAL MEDICINE CLINIC | Age: 69
End: 2024-01-03

## 2024-01-03 DIAGNOSIS — K21.00 GASTROESOPHAGEAL REFLUX DISEASE WITH ESOPHAGITIS, UNSPECIFIED WHETHER HEMORRHAGE: ICD-10-CM

## 2024-01-03 DIAGNOSIS — E78.49 OTHER HYPERLIPIDEMIA: ICD-10-CM

## 2024-01-03 DIAGNOSIS — F41.9 ANXIETY DISORDER, UNSPECIFIED TYPE: ICD-10-CM

## 2024-01-03 DIAGNOSIS — I10 HYPERTENSION, ESSENTIAL: ICD-10-CM

## 2024-01-03 DIAGNOSIS — G25.81 RESTLESS LEGS SYNDROME (RLS): ICD-10-CM

## 2024-01-03 RX ORDER — PRAMIPEXOLE DIHYDROCHLORIDE 0.5 MG/1
0.5 TABLET ORAL
Qty: 90 TABLET | Refills: 1 | Status: SHIPPED | OUTPATIENT
Start: 2024-01-03

## 2024-01-03 RX ORDER — ATORVASTATIN CALCIUM 10 MG/1
10 TABLET, FILM COATED ORAL DAILY
Qty: 90 TABLET | Refills: 1 | Status: SHIPPED | OUTPATIENT
Start: 2024-01-03

## 2024-01-03 RX ORDER — BUPROPION HYDROCHLORIDE 300 MG/1
300 TABLET ORAL EVERY MORNING
Qty: 90 TABLET | Refills: 1 | Status: SHIPPED | OUTPATIENT
Start: 2024-01-03

## 2024-01-03 RX ORDER — LOSARTAN POTASSIUM AND HYDROCHLOROTHIAZIDE 12.5; 5 MG/1; MG/1
1 TABLET ORAL DAILY
Qty: 90 TABLET | Refills: 1 | Status: SHIPPED | OUTPATIENT
Start: 2024-01-03

## 2024-01-03 RX ORDER — FAMOTIDINE 40 MG/1
40 TABLET, FILM COATED ORAL EVERY MORNING
Qty: 90 TABLET | Refills: 1 | Status: SHIPPED | OUTPATIENT
Start: 2024-01-03

## 2024-01-03 RX ORDER — BUPROPION HYDROCHLORIDE 300 MG/1
300 TABLET ORAL EVERY MORNING
Qty: 90 TABLET | Refills: 1 | Status: CANCELLED | OUTPATIENT
Start: 2024-01-03

## 2024-01-03 NOTE — TELEPHONE ENCOUNTER
Patient called the office and stated that her insurance plan has changed and the wellbutrin is 130$, she is asking if she can stop it/wean off of it and/or start a different medication.    She is aware that you are out of the office this week.      Please advise.

## 2024-01-03 NOTE — TELEPHONE ENCOUNTER
Nov 4/11/24      Patient is in [place] and needs all scripts sent down there. She will not be back until her appointment in April.

## 2024-01-12 NOTE — TELEPHONE ENCOUNTER
Patient stated she hasn't smoked in over 2 years so she is confident she would be okay to taper from the wellbutrin and switch to prozac if you'd give her the tapering instructions

## 2024-01-18 DIAGNOSIS — F33.9 DEPRESSION, RECURRENT (HCC): Primary | ICD-10-CM

## 2024-01-18 DIAGNOSIS — F33.9 DEPRESSION, RECURRENT (HCC): ICD-10-CM

## 2024-01-18 RX ORDER — FLUOXETINE HYDROCHLORIDE 20 MG/1
20 CAPSULE ORAL DAILY
Qty: 90 CAPSULE | Refills: 1 | Status: SHIPPED | OUTPATIENT
Start: 2024-01-18

## 2024-01-18 RX ORDER — FLUOXETINE HYDROCHLORIDE 20 MG/1
20 CAPSULE ORAL DAILY
Qty: 60 CAPSULE | Refills: 1 | Status: SHIPPED | OUTPATIENT
Start: 2024-01-18 | End: 2024-01-18

## 2024-01-18 NOTE — PROGRESS NOTES
Fluoxetine 20 mg daily prescribed for pt for her depression.  She will be given instructions to taper her Wellbutrin over 2 weeks and then stop and continue with the Fluoxetine.

## 2024-02-10 NOTE — PROGRESS NOTES
yariel from [facility] called me back call ref# [id number] pts ins is active effect 07/01/16 plan runs on a cal year    deduct $500 met that jan 2021 co-ins 100% out of pocket $6600 met $769 26 once that is met all co-pays are waived    since the deduct has been met all services are covered 100%    called pt to go over that info & she thanked me for the call 2

## 2024-03-06 ENCOUNTER — TELEPHONE (OUTPATIENT)
Dept: INTERNAL MEDICINE CLINIC | Facility: OTHER | Age: 69
End: 2024-03-06

## 2024-03-06 NOTE — TELEPHONE ENCOUNTER
Patient is asking if she can have something prescribed to her to take before she comes back to [place].  She is currently in [place] and she will be coming back by plane and she gets very anxious while on it.  She does take the prozac but that doesn't help with the anxiety part.    Please send to the [facility] down in [facility]    Please call her back on the mobile phone

## 2024-03-07 DIAGNOSIS — F41.9 ANXIETY: Primary | ICD-10-CM

## 2024-03-07 RX ORDER — HYDROXYZINE HYDROCHLORIDE 25 MG/1
25 TABLET, FILM COATED ORAL EVERY 6 HOURS PRN
Qty: 20 TABLET | Refills: 0 | Status: SHIPPED | OUTPATIENT
Start: 2024-03-07

## 2024-04-08 ENCOUNTER — TELEPHONE (OUTPATIENT)
Dept: HEMATOLOGY ONCOLOGY | Facility: CLINIC | Age: 69
End: 2024-04-08

## 2024-04-08 ENCOUNTER — TELEPHONE (OUTPATIENT)
Age: 69
End: 2024-04-08

## 2024-04-08 NOTE — TELEPHONE ENCOUNTER
Alanra inhaler.  Patient would like a refill for this.  She believes [employer] will cover this for her.

## 2024-04-23 ENCOUNTER — TELEPHONE (OUTPATIENT)
Dept: HEMATOLOGY ONCOLOGY | Facility: CLINIC | Age: 69
End: 2024-04-23

## 2024-04-23 NOTE — TELEPHONE ENCOUNTER
Appointment Change  Cancel, Reschedule, Change to Virtual      Who are you speaking with? Patient   If it is not the patient, is the caller listed on the communication consent form? N/A   Which provider is the appointment scheduled with? Dr. Ochoa   When was the original appointment scheduled?    Please list date and time 4/30/24 840   At which location is the appointment scheduled to take place? [place]   Was the appointment rescheduled?     Was the appointment changed from an in person visit to a virtual visit?    If so, please list the details of the change. 6/21/24 10am   What is the reason for the appointment change? Requested June in [place]       Was STAR transport scheduled? N/A   Does STAR transport need to be scheduled for the new visit (if applicable) N/A   Does the patient need an infusion appointment rescheduled? N/A   Does the patient have an upcoming infusion appointment scheduled? If so, when? No   Is the patient undergoing chemotherapy? N/A   For appointments cancelled with less than 24 hours:  Was the no-show policy reviewed? N/A

## 2024-04-23 NOTE — TELEPHONE ENCOUNTER
Appointment Change  Cancel, Reschedule, Change to Virtual      Who are you speaking with? Patient   If it is not the patient, is the caller listed on the communication consent form? N/A   Which provider is the appointment scheduled with? Smita Real PA-C   When was the original appointment scheduled?    Please list date and time 5/20/24 1030   At which location is the appointment scheduled to take place? [place]   Was the appointment rescheduled?     Was the appointment changed from an in person visit to a virtual visit?    If so, please list the details of the change. N/a   What is the reason for the appointment change? She will call back to schedule with Dr. Glover       Was STAR transport scheduled? N/A   Does STAR transport need to be scheduled for the new visit (if applicable) N/A   Does the patient need an infusion appointment rescheduled? N/A   Does the patient have an upcoming infusion appointment scheduled? If so, when? No   Is the patient undergoing chemotherapy? N/A   For appointments cancelled with less than 24 hours:  Was the no-show policy reviewed? N/A

## 2024-05-07 ENCOUNTER — TELEPHONE (OUTPATIENT)
Dept: HEMATOLOGY ONCOLOGY | Facility: CLINIC | Age: 69
End: 2024-05-07

## 2024-05-07 NOTE — TELEPHONE ENCOUNTER
Appointment Change  Cancel, Reschedule, Change to Virtual      Who are you speaking with? Patient   If it is not the patient, is the caller listed on the communication consent form? N/A   Which provider is the appointment scheduled with? Dr. Ochoa   When was the original appointment scheduled?    Please list date and time 06/21/2024 @ 10AM    At which location is the appointment scheduled to take place? [place]   Was the appointment rescheduled?     Was the appointment changed from an in person visit to a virtual visit?    If so, please list the details of the change.              Yes, 07/01/2024 @ 9AM    What is the reason for the appointment change? Provider

## 2024-05-28 ENCOUNTER — TELEPHONE (OUTPATIENT)
Age: 69
End: 2024-05-28

## 2024-05-28 NOTE — TELEPHONE ENCOUNTER
PT call about her 6 month follow up and is inquiring about lab work. I currently did not see any lab order in the PT chart. PT would like to ask if there will be lab order for due to her fasting today. Thank you

## 2024-05-29 DIAGNOSIS — I10 HYPERTENSION, ESSENTIAL: Primary | ICD-10-CM

## 2024-05-29 DIAGNOSIS — J44.9 CHRONIC OBSTRUCTIVE PULMONARY DISEASE, UNSPECIFIED COPD TYPE (HCC): ICD-10-CM

## 2024-05-29 DIAGNOSIS — E78.49 OTHER HYPERLIPIDEMIA: ICD-10-CM

## 2024-05-29 DIAGNOSIS — C34.91 SQUAMOUS CELL LUNG CANCER, RIGHT (HCC): ICD-10-CM

## 2024-05-29 DIAGNOSIS — R42 DIZZINESS: ICD-10-CM

## 2024-05-29 DIAGNOSIS — F33.9 DEPRESSION, RECURRENT (HCC): ICD-10-CM

## 2024-05-29 DIAGNOSIS — G35 MULTIPLE SCLEROSIS (HCC): ICD-10-CM

## 2024-05-29 DIAGNOSIS — F41.8 OTHER SPECIFIED ANXIETY DISORDERS: ICD-10-CM

## 2024-05-30 ENCOUNTER — HOSPITAL ENCOUNTER (OUTPATIENT)
Dept: RADIOLOGY | Age: 69
Discharge: HOME | End: 2024-05-30
Payer: MEDICARE

## 2024-05-30 ENCOUNTER — APPOINTMENT (OUTPATIENT)
Dept: LAB | Age: 69
End: 2024-05-30
Payer: MEDICARE

## 2024-05-30 VITALS — BODY MASS INDEX: 29.77 KG/M2 | WEIGHT: 138 LBS | HEIGHT: 57 IN

## 2024-05-30 DIAGNOSIS — E78.49 OTHER HYPERLIPIDEMIA: ICD-10-CM

## 2024-05-30 DIAGNOSIS — G35 MULTIPLE SCLEROSIS (HCC): ICD-10-CM

## 2024-05-30 DIAGNOSIS — Z11.59 NEED FOR HEPATITIS C SCREENING TEST: ICD-10-CM

## 2024-05-30 DIAGNOSIS — F41.8 OTHER SPECIFIED ANXIETY DISORDERS: ICD-10-CM

## 2024-05-30 DIAGNOSIS — I10 HYPERTENSION, ESSENTIAL: ICD-10-CM

## 2024-05-30 DIAGNOSIS — F33.9 DEPRESSION, RECURRENT (HCC): ICD-10-CM

## 2024-05-30 DIAGNOSIS — J44.9 CHRONIC OBSTRUCTIVE PULMONARY DISEASE, UNSPECIFIED COPD TYPE (HCC): ICD-10-CM

## 2024-05-30 DIAGNOSIS — Z12.31 VISIT FOR SCREENING MAMMOGRAM: ICD-10-CM

## 2024-05-30 DIAGNOSIS — C34.91 SQUAMOUS CELL LUNG CANCER, RIGHT (HCC): ICD-10-CM

## 2024-05-30 DIAGNOSIS — R42 DIZZINESS: ICD-10-CM

## 2024-05-30 LAB
ALBUMIN SERPL BCP-MCNC: 4.2 G/DL (ref 3.5–5)
ALP SERPL-CCNC: 51 U/L (ref 34–104)
ALT SERPL W P-5'-P-CCNC: 20 U/L (ref 7–52)
ANION GAP SERPL CALCULATED.3IONS-SCNC: 8 MMOL/L (ref 4–13)
AST SERPL W P-5'-P-CCNC: 19 U/L (ref 13–39)
BASOPHILS # BLD AUTO: 0.06 THOUSANDS/ÂΜL (ref 0–0.1)
BASOPHILS NFR BLD AUTO: 1 % (ref 0–1)
BILIRUB SERPL-MCNC: 0.64 MG/DL (ref 0.2–1)
BUN SERPL-MCNC: 15 MG/DL (ref 5–25)
CALCIUM SERPL-MCNC: 9.3 MG/DL (ref 8.4–10.2)
CHLORIDE SERPL-SCNC: 100 MMOL/L (ref 96–108)
CHOLEST SERPL-MCNC: 174 MG/DL
CO2 SERPL-SCNC: 31 MMOL/L (ref 21–32)
CREAT SERPL-MCNC: 0.68 MG/DL (ref 0.6–1.3)
EOSINOPHIL # BLD AUTO: 0.14 THOUSAND/ÂΜL (ref 0–0.61)
EOSINOPHIL NFR BLD AUTO: 3 % (ref 0–6)
ERYTHROCYTE [DISTWIDTH] IN BLOOD BY AUTOMATED COUNT: 12.9 % (ref 11.6–15.1)
GFR SERPL CREATININE-BSD FRML MDRD: 88 ML/MIN/1.73SQ M
GLUCOSE P FAST SERPL-MCNC: 92 MG/DL (ref 65–99)
HCT VFR BLD AUTO: 43.2 % (ref 34.8–46.1)
HCV AB SER QL: NORMAL
HDLC SERPL-MCNC: 64 MG/DL
HGB BLD-MCNC: 13.8 G/DL (ref 11.5–15.4)
IMM GRANULOCYTES # BLD AUTO: 0.01 THOUSAND/UL (ref 0–0.2)
IMM GRANULOCYTES NFR BLD AUTO: 0 % (ref 0–2)
LDLC SERPL CALC-MCNC: 83 MG/DL (ref 0–100)
LYMPHOCYTES # BLD AUTO: 1.28 THOUSANDS/ÂΜL (ref 0.6–4.47)
LYMPHOCYTES NFR BLD AUTO: 29 % (ref 14–44)
MCH RBC QN AUTO: 30.5 PG (ref 26.8–34.3)
MCHC RBC AUTO-ENTMCNC: 31.9 G/DL (ref 31.4–37.4)
MCV RBC AUTO: 96 FL (ref 82–98)
MONOCYTES # BLD AUTO: 0.42 THOUSAND/ÂΜL (ref 0.17–1.22)
MONOCYTES NFR BLD AUTO: 9 % (ref 4–12)
NEUTROPHILS # BLD AUTO: 2.54 THOUSANDS/ÂΜL (ref 1.85–7.62)
NEUTS SEG NFR BLD AUTO: 58 % (ref 43–75)
NONHDLC SERPL-MCNC: 110 MG/DL
NRBC BLD AUTO-RTO: 0 /100 WBCS
PLATELET # BLD AUTO: 315 THOUSANDS/UL (ref 149–390)
PMV BLD AUTO: 10.5 FL (ref 8.9–12.7)
POTASSIUM SERPL-SCNC: 4.2 MMOL/L (ref 3.5–5.3)
PROT SERPL-MCNC: 7.2 G/DL (ref 6.4–8.4)
RBC # BLD AUTO: 4.52 MILLION/UL (ref 3.81–5.12)
SODIUM SERPL-SCNC: 139 MMOL/L (ref 135–147)
TRIGL SERPL-MCNC: 137 MG/DL
TSH SERPL DL<=0.05 MIU/L-ACNC: 2.16 UIU/ML (ref 0.45–4.5)
WBC # BLD AUTO: 4.45 THOUSAND/UL (ref 4.31–10.16)

## 2024-05-30 PROCEDURE — 77067 SCR MAMMO BI INCL CAD: CPT

## 2024-05-30 PROCEDURE — 36415 COLL VENOUS BLD VENIPUNCTURE: CPT

## 2024-05-30 PROCEDURE — G1004 CDSM NDSC: HCPCS

## 2024-05-30 PROCEDURE — 80061 LIPID PANEL: CPT

## 2024-05-30 PROCEDURE — 84443 ASSAY THYROID STIM HORMONE: CPT

## 2024-05-30 PROCEDURE — 80053 COMPREHEN METABOLIC PANEL: CPT

## 2024-05-30 PROCEDURE — 85025 COMPLETE CBC W/AUTO DIFF WBC: CPT

## 2024-05-30 PROCEDURE — 86803 HEPATITIS C AB TEST: CPT

## 2024-05-30 PROCEDURE — 77063 BREAST TOMOSYNTHESIS BI: CPT

## 2024-05-30 PROCEDURE — 71250 CT THORAX DX C-: CPT

## 2024-05-31 ENCOUNTER — OFFICE VISIT (OUTPATIENT)
Dept: INTERNAL MEDICINE CLINIC | Age: 69
End: 2024-05-31
Payer: MEDICARE

## 2024-05-31 ENCOUNTER — TELEPHONE (OUTPATIENT)
Age: 69
End: 2024-05-31

## 2024-05-31 VITALS
TEMPERATURE: 98.6 F | HEIGHT: 57 IN | BODY MASS INDEX: 29.99 KG/M2 | HEART RATE: 65 BPM | WEIGHT: 139 LBS | DIASTOLIC BLOOD PRESSURE: 78 MMHG | SYSTOLIC BLOOD PRESSURE: 124 MMHG

## 2024-05-31 DIAGNOSIS — D70.1 CHEMOTHERAPY INDUCED NEUTROPENIA (HCC): ICD-10-CM

## 2024-05-31 DIAGNOSIS — T45.1X5A CHEMOTHERAPY INDUCED NEUTROPENIA (HCC): ICD-10-CM

## 2024-05-31 DIAGNOSIS — M81.6 LOCALIZED OSTEOPOROSIS WITHOUT CURRENT PATHOLOGICAL FRACTURE: Primary | ICD-10-CM

## 2024-05-31 DIAGNOSIS — S06.5X9A TRAUMATIC SUBDURAL HEMORRHAGE WITH LOSS OF CONSCIOUSNESS OF UNSPECIFIED DURATION, INITIAL ENCOUNTER (HCC): ICD-10-CM

## 2024-05-31 DIAGNOSIS — C77.9 REGIONAL LYMPH NODE METASTASIS PRESENT (HCC): ICD-10-CM

## 2024-05-31 DIAGNOSIS — J44.9 CHRONIC OBSTRUCTIVE PULMONARY DISEASE, UNSPECIFIED COPD TYPE (HCC): ICD-10-CM

## 2024-05-31 DIAGNOSIS — G35 MULTIPLE SCLEROSIS (HCC): ICD-10-CM

## 2024-05-31 DIAGNOSIS — I73.9 INTERMITTENT CLAUDICATION (HCC): ICD-10-CM

## 2024-05-31 DIAGNOSIS — Z87.891 HISTORY OF NICOTINE DEPENDENCE: ICD-10-CM

## 2024-05-31 DIAGNOSIS — E78.49 OTHER HYPERLIPIDEMIA: ICD-10-CM

## 2024-05-31 DIAGNOSIS — F33.9 DEPRESSION, RECURRENT (HCC): ICD-10-CM

## 2024-05-31 DIAGNOSIS — R15.1 FECAL SMEARING: ICD-10-CM

## 2024-05-31 PROCEDURE — 99214 OFFICE O/P EST MOD 30 MIN: CPT | Performed by: INTERNAL MEDICINE

## 2024-05-31 PROCEDURE — 96372 THER/PROPH/DIAG INJ SC/IM: CPT | Performed by: INTERNAL MEDICINE

## 2024-05-31 RX ORDER — LOPERAMIDE HYDROCHLORIDE 2 MG/1
2 CAPSULE ORAL DAILY
COMMUNITY

## 2024-05-31 NOTE — TELEPHONE ENCOUNTER
Pt called stating nurse was having trouble with phone. I was able to warm transfer to [place] on the clinical line.    VIRGIL

## 2024-05-31 NOTE — PROGRESS NOTES
Assessment/Plan:    Osteoporosis  - Prolia shot was given on left upper arm by subcut inj  - lot number [id number] and expiration date 08/31/2026  -patient tolerated injection without any adverse reactions  -continue with calcium and vitamin-D supplements  - follow-up in 6 months for repeat Prolia shot    Intermittent claudication  -Symptoms started many years ago and venous duplex done at the time was negative for a DVT  -Patient does have risk factors of atherosclerosis including hyperlipidemia and nicotine dependence so order an arterial duplex and follow-up with the results  -Continue with a heart healthy diet  -Continue with atorvastatin    Multiple sclerosis  -Currently not on treatment at this time secondary to side effects of medications  -She was encouraged to follow-up with neurology    Chronic obstructive pulmonary disease  -Stable without acute exacerbation  -Patient states that her Anoro Ellipta is no longer fully covered by her insurance and would cost her $500.  -She was encouraged to reach out to her pulmonologist or call her insurance to find out which inhaler is formulary with them so that we can prescribe that 1 for her in order to reduce her cost.    Chemotherapy-induced neutropenia  -Resolved  -We will resolve this problem    Fecal incontinence  -Fairly well-controlled on Imodium  -Follow-up with colorectal surgery    Depression with anxiety  -Stable  -Continue with Prozac and as needed hydroxyzine     Diagnoses and all orders for this visit:    Localized osteoporosis without current pathological fracture  -     denosumab (PROLIA) subcutaneous injection 60 mg    Regional lymph node metastasis present (HCC)    Chemotherapy induced neutropenia (HCC)    Multiple sclerosis (HCC)    Chronic obstructive pulmonary disease, unspecified COPD type (HCC)    Depression, recurrent (HCC)    Traumatic subdural hemorrhage with loss of consciousness of unspecified duration, initial encounter ([facility])    Intermittent  "claudication (HCC)  -     VAS ARTERIAL DUPLEX- LOWER LIMB BILATERAL; Future    Other hyperlipidemia  -     VAS ARTERIAL DUPLEX- LOWER LIMB BILATERAL; Future    History of nicotine dependence  -     VAS ARTERIAL DUPLEX- LOWER LIMB BILATERAL; Future    Fecal smearing    Other orders  -     loperamide (IMODIUM) 2 mg capsule; Take 2 mg by mouth daily otc        Depression Screening and Follow-up Plan: Patient was screened for depression during today's encounter. They screened negative with a PHQ-9 score of 0.        Subjective:      Patient ID: Britni Fermin is a 69 y.o. female.    HPI    Patient presents for a Prolia injection.  She has a history of osteoporosis  Last DEXA scan was 11/07/22 and showed osteoporosis  Patient is taking calcium and vitamin-D supplements   Last vitamin-D level was checked on 7/10/22 and was 83.9  Last calcium level was checked on 5/30/24 and was normal at 9.3  Was doing weight-bearing exercise in [place] but not since she returned to [place] for 2 weeks now.  Denies any side effects of Prolia including jaw pain, infections,  no new musculoskeletal pain, fractures, back pain, headaches, abdominal pain, constipation, acid reflux   She complains that she sometimes has numbness in her lower legs, and pain in her calves, R>L, for over a year or 2.  Symptoms are worse when she walks and improves when she rests.  She states that a venous duplex was done that was negative for blood clot when the symptoms started.  She also states that she has been taking imodium everyday for diarrhea and fecal incontinence.  She reports that she had a rectocele a couple of years ago and since then has been having difficulty holding her bowels.  Of note, she states that she was seen by the colorectal surgeon who tested her and told her that her rectal musle is \"not there\"  In [place], she has been going through a lot of stress with a current law suit and has been having fecal leakage even when she goes on a walk.  She " believes that the stress has worsened her symptoms.  She takes one imodium daily and states that it helps.  She also states that she was taking magnesium for her muscle cramps and it made the fecal leakage worse so she stopped taking it.  She took metamucil as recc by the colorectal surgeon and that helped for a while till she started having stress, and then it stopped working  Colorectal surgeon recommended a device to insert in her back with a transmitter but she does not like it.  She also states that she was diagnosed with MS years ago which could also be a cause of her leg cramps.  She was diagnosed with MS many years ago and has been treated with diff meds and felt wiorse so stopped going to see neurology.     The following portions of the patient's history were reviewed and updated as appropriate: She  has a past medical history of Allergic (?), Anxiety, Bronchial malignant neoplasm, right (HCC), Cancer (HCC), Chronic major depressive disorder, single episode, COPD (chronic obstructive pulmonary disease) (HCC), Full incontinence of feces, GERD (gastroesophageal reflux disease), Hyperlipidemia, Hypertension, Malignant neoplasm of bronchus and lung (HCC), Multiple sclerosis (HCC), Pulmonary emphysema (HCC), Restless leg syndrome, and Tobacco use.  She   Patient Active Problem List    Diagnosis Date Noted   • Traumatic subdural hemorrhage with loss of consciousness of unspecified duration, initial encounter ([facility]) 03/06/2023   • Localized osteoporosis without current pathological fracture 03/06/2023   • Incontinence of feces 02/02/2023   • Annual physical exam 10/03/2022   • Subdural hematoma (HCC) 05/19/2022   • Syncope 05/19/2022   • BMI 31.0-31.9,adult 05/19/2022   • Intervertebral disc disorder with radiculopathy of lumbar region    • Parotid tumor 02/11/2022   • Dizziness 10/04/2021   • Encounter for care related to vascular access port 07/23/2021   • Chemotherapy-induced nausea 04/30/2021   • Insomnia due  to medical condition 04/05/2021   • Cancer related pain 04/05/2021   • Palliative care patient 04/04/2021   • Medical marijuana use 04/04/2021   • Anxiety disorder 04/01/2021   • Chemotherapy induced neutropenia (HCC) 03/31/2021   • Regional lymph node metastasis present (HCC) 03/02/2021   • GERD (gastroesophageal reflux disease) 02/26/2021   • Depression, recurrent (HCC)    • Squamous cell lung cancer, right (HCC) 01/12/2021   • Chronic obstructive pulmonary disease (HCC) 10/07/2019   • Tinnitus of both ears 11/06/2018   • Hypertension, essential 11/07/2017   • Restless legs syndrome (RLS) 11/07/2017   • Cystocele 09/26/2016   • Rectocele 09/26/2016   • Cystocele with prolapse 09/26/2016   • Atypical lobular hyperplasia of breast 03/24/2015   • Multiple sclerosis (HCC) 11/29/2013   • Hyperlipidemia 09/15/2010   • Spinal stenosis of lumbar region 09/15/2010   • History of colonic polyps 09/15/2010     She  has a past surgical history that includes Breast biopsy (Left, 03/23/2015); Breast excisional biopsy (Left, 08/26/2010); Breast biopsy (Left, 08/02/2010); Breast excisional biopsy (Left, 1994); Breast excisional biopsy (Right, 1984); Total abdominal hysterectomy w/ bilateral salpingoophorectomy (Bilateral, 2010); Total abdominal hysterectomy (2010); Augmentation mammaplasty (Bilateral, 11/2014); pr thoracoscopy w/lobectomy single lobe (Right, 01/28/2021); pr [facility] incl fluor gdnce dx w/cell washg spx (N/A, 01/28/2021); Tonsillectomy; pr insj tunneled ctr vad w/subq port age 3 yr/> (N/A, 03/09/2021); [place] guided central venous access device insertion (03/09/2021); Mammo (historical) (2020); IR port removal (03/07/2022); Lymph node biopsy (2021); Tubal ligation (1971); and CT needle biopsy lung (12/2/2020).  Her family history includes Breast cancer in her paternal aunt; COPD in her brother and father; Hypertension in her father; No Known Problems in her brother, daughter, maternal aunt, maternal grandfather,  maternal grandmother, mother, paternal aunt, paternal aunt, paternal aunt, paternal aunt, paternal aunt, paternal aunt, paternal grandmother, sister, and sister; Rectal cancer in her paternal grandfather.  She  reports that she quit smoking about 3 years ago. Her smoking use included cigarettes. She started smoking about 56 years ago. She has a 53 pack-year smoking history. She has never used smokeless tobacco. She reports current alcohol use of about 5.0 standard drinks of alcohol per week. She reports that she does not use drugs.  Current Outpatient Medications   Medication Sig Dispense Refill   • atorvastatin (LIPITOR) 10 mg tablet Take 1 tablet (10 mg total) by mouth daily 90 tablet 1   • Biotin 5 MG CAPS Take 5,000 mg by mouth daily     • Cholecalciferol (Vitamin D3) 50 MCG (2000 UT) capsule Take 5,000 Units by mouth daily      • famotidine (PEPCID) 40 MG tablet Take 1 tablet (40 mg total) by mouth every morning 90 tablet 1   • FLUoxetine (PROzac) 20 mg capsule Take 1 capsule (20 mg total) by mouth daily 90 capsule 1   • hydrocortisone 2.5 % cream Apply topically 2 (two) times a day 28 g 2   • hydrOXYzine HCL (ATARAX) 25 mg tablet Take 1 tablet (25 mg total) by mouth every 6 (six) hours as needed for anxiety 20 tablet 0   • loperamide (IMODIUM) 2 mg capsule Take 2 mg by mouth daily otc     • losartan-hydrochlorothiazide (HYZAAR) 50-12.5 mg per tablet Take 1 tablet by mouth daily 90 tablet 1   • pramipexole (MIRAPEX) 0.5 mg tablet Take 1 tablet (0.5 mg total) by mouth daily at bedtime 90 tablet 1   • buPROPion (WELLBUTRIN XL) 300 mg 24 hr tablet Take 1 tablet (300 mg total) by mouth every morning (Patient not taking: Reported on 5/31/2024) 90 tablet 1     No current facility-administered medications for this visit.     Current Outpatient Medications on File Prior to Visit   Medication Sig   • atorvastatin (LIPITOR) 10 mg tablet Take 1 tablet (10 mg total) by mouth daily   • Biotin 5 MG CAPS Take 5,000 mg by  mouth daily   • Cholecalciferol (Vitamin D3) 50 MCG (2000 UT) capsule Take 5,000 Units by mouth daily    • famotidine (PEPCID) 40 MG tablet Take 1 tablet (40 mg total) by mouth every morning   • FLUoxetine (PROzac) 20 mg capsule Take 1 capsule (20 mg total) by mouth daily   • hydrocortisone 2.5 % cream Apply topically 2 (two) times a day   • hydrOXYzine HCL (ATARAX) 25 mg tablet Take 1 tablet (25 mg total) by mouth every 6 (six) hours as needed for anxiety   • loperamide (IMODIUM) 2 mg capsule Take 2 mg by mouth daily otc   • losartan-hydrochlorothiazide (HYZAAR) 50-12.5 mg per tablet Take 1 tablet by mouth daily   • pramipexole (MIRAPEX) 0.5 mg tablet Take 1 tablet (0.5 mg total) by mouth daily at bedtime   • buPROPion (WELLBUTRIN XL) 300 mg 24 hr tablet Take 1 tablet (300 mg total) by mouth every morning (Patient not taking: Reported on 5/31/2024)     No current facility-administered medications on file prior to visit.     She is allergic to sulfa antibiotics..    Review of Systems   Constitutional:  Negative for activity change, chills, fatigue, fever and unexpected weight change.   HENT:  Negative for ear pain, postnasal drip, rhinorrhea, sinus pressure and sore throat.    Eyes:  Negative for pain.   Respiratory:  Positive for shortness of breath and wheezing. Negative for cough, choking and chest tightness.    Cardiovascular:  Negative for chest pain, palpitations and leg swelling.   Gastrointestinal:  Positive for diarrhea (with fecal incontinence) and nausea (occasionally with her pills). Negative for abdominal pain, constipation and vomiting.   Genitourinary:  Negative for dysuria and hematuria.   Musculoskeletal:  Negative for arthralgias, back pain, gait problem, joint swelling, myalgias and neck stiffness.   Skin:  Negative for pallor and rash.   Neurological:  Negative for dizziness, tremors, seizures, syncope, light-headedness and headaches.   Hematological:  Negative for adenopathy.  "  Psychiatric/Behavioral:  Negative for behavioral problems.          Objective:      /78 (BP Location: Left arm, Patient Position: Sitting, Cuff Size: Standard)   Pulse 65   Temp 98.6 °F (37 °C) (Temporal)   Ht 4' 9.01\" (1.448 m)   Wt 63 kg (139 lb)   LMP  (LMP Unknown)   PF 97 L/min   BMI 30.07 kg/m²          Physical Exam  Constitutional:       General: She is not in acute distress.     Appearance: She is well-developed. She is not diaphoretic.   HENT:      Head: Normocephalic and atraumatic.      Right Ear: External ear normal.      Left Ear: External ear normal.      Nose: Nose normal.      Mouth/Throat:      Mouth: Mucous membranes are dry.      Pharynx: Posterior oropharyngeal erythema (Oropharyngeal erythema with dry mucous membranes) present. No oropharyngeal exudate.   Eyes:      General: No scleral icterus.        Right eye: No discharge.         Left eye: No discharge.      Conjunctiva/sclera: Conjunctivae normal.      Pupils: Pupils are equal, round, and reactive to light.   Neck:      Thyroid: No thyromegaly.      Vascular: No JVD.      Trachea: No tracheal deviation.   Cardiovascular:      Rate and Rhythm: Normal rate and regular rhythm.      Heart sounds: No murmur heard.     No friction rub. No gallop.   Pulmonary:      Effort: Pulmonary effort is normal. No respiratory distress.      Breath sounds: Decreased breath sounds (Decreased breath sounds in all lung fields without wheezes, rhonchi or rales) present. No wheezing or rales.   Chest:      Chest wall: No tenderness.   Abdominal:      General: Bowel sounds are normal. There is no distension.      Palpations: Abdomen is soft. There is no mass.      Tenderness: There is no abdominal tenderness. There is no guarding or rebound.   Musculoskeletal:         General: No tenderness or deformity. Normal range of motion.      Cervical back: Normal range of motion and neck supple.   Lymphadenopathy:      Cervical: No cervical adenopathy. "   Skin:     General: Skin is warm and dry.      Coloration: Skin is not pale.      Findings: No erythema or rash.   Neurological:      Mental Status: She is alert and oriented to person, place, and time.      Cranial Nerves: No cranial nerve deficit.      Motor: No abnormal muscle tone.      Coordination: Coordination normal.      Deep Tendon Reflexes: Reflexes are normal and symmetric.   Psychiatric:         Behavior: Behavior normal.           Appointment on 05/30/2024   Component Date Value Ref Range Status   • Hepatitis C Ab 05/30/2024 Non-reactive  Non-Reactive Final   • Cholesterol 05/30/2024 174  See Comment mg/dL Final    Cholesterol:         Pediatric <18 Years        Desirable          <170 mg/dL      Borderline High    170-199 mg/dL      High               >=200 mg/dL        Adult >=18 Years            Desirable         <200 mg/dL      Borderline High   200-239 mg/dL      High              >239 mg/dL     • Triglycerides 05/30/2024 137  See Comment mg/dL Final    Triglyceride:     0-9Y            <75mg/dL     10Y-17Y         <90 mg/dL       >=18Y     Normal          <150 mg/dL     Borderline High 150-199 mg/dL     High            200-499 mg/dL        Very High       >499 mg/dL    Specimen collection should occur prior to Metamizole administration due to the potential for falsely depressed results.   • HDL, Direct 05/30/2024 64  >=50 mg/dL Final   • LDL Calculated 05/30/2024 83  0 - 100 mg/dL Final    LDL Cholesterol:     Optimal           <100 mg/dl     Near Optimal      100-129 mg/dl     Above Optimal       Borderline High 130-159 mg/dl       High            160-189 mg/dl       Very High       >189 mg/dl         This screening LDL is a calculated result.   It does not have the accuracy of the Direct Measured LDL in the monitoring of patients with hyperlipidemia and/or statin therapy.   Direct Measure LDL (IZC359) must be ordered separately in these patients.   • Non-HDL-Chol (CHOL-HDL) 05/30/2024 110   mg/dl Final   • TSH 3RD GENERATON 05/30/2024 2.158  0.450 - 4.500 uIU/mL Final    The recommended reference ranges for TSH during pregnancy are as follows:   First trimester 0.100 to 2.500 uIU/mL   Second trimester  0.200 to 3.000 uIU/mL   Third trimester 0.300 to 3.000 uIU/m    Note: Normal ranges may not apply to patients who are transgender, non-binary, or whose legal sex, sex at birth, and gender identity differ.  Adult TSH (3rd generation) reference range follows the recommended guidelines of the American Thyroid Association, January, 2020.   • WBC 05/30/2024 4.45  4.31 - 10.16 Thousand/uL Final   • RBC 05/30/2024 4.52  3.81 - 5.12 Million/uL Final   • Hemoglobin 05/30/2024 13.8  11.5 - 15.4 g/dL Final   • Hematocrit 05/30/2024 43.2  34.8 - 46.1 % Final   • MCV 05/30/2024 96  82 - 98 fL Final   • MCH 05/30/2024 30.5  26.8 - 34.3 pg Final   • MCHC 05/30/2024 31.9  31.4 - 37.4 g/dL Final   • RDW 05/30/2024 12.9  11.6 - 15.1 % Final   • MPV 05/30/2024 10.5  8.9 - 12.7 fL Final   • Platelets 05/30/2024 315  149 - 390 Thousands/uL Final   • nRBC 05/30/2024 0  /100 WBCs Final   • Segmented % 05/30/2024 58  43 - 75 % Final   • Immature Grans % 05/30/2024 0  0 - 2 % Final   • Lymphocytes % 05/30/2024 29  14 - 44 % Final   • Monocytes % 05/30/2024 9  4 - 12 % Final   • Eosinophils Relative 05/30/2024 3  0 - 6 % Final   • Basophils Relative 05/30/2024 1  0 - 1 % Final   • Absolute Neutrophils 05/30/2024 2.54  1.85 - 7.62 Thousands/µL Final   • Absolute Immature Grans 05/30/2024 0.01  0.00 - 0.20 Thousand/uL Final   • Absolute Lymphocytes 05/30/2024 1.28  0.60 - 4.47 Thousands/µL Final   • Absolute Monocytes 05/30/2024 0.42  0.17 - 1.22 Thousand/µL Final   • Eosinophils Absolute 05/30/2024 0.14  0.00 - 0.61 Thousand/µL Final   • Basophils Absolute 05/30/2024 0.06  0.00 - 0.10 Thousands/µL Final   • Sodium 05/30/2024 139  135 - 147 mmol/L Final   • Potassium 05/30/2024 4.2  3.5 - 5.3 mmol/L Final   • Chloride 05/30/2024  100  96 - 108 mmol/L Final   • CO2 05/30/2024 31  21 - 32 mmol/L Final   • ANION GAP 05/30/2024 8  4 - 13 mmol/L Final   • BUN 05/30/2024 15  5 - 25 mg/dL Final   • Creatinine 05/30/2024 0.68  0.60 - 1.30 mg/dL Final    Standardized to IDMS reference method   • Glucose, Fasting 05/30/2024 92  65 - 99 mg/dL Final   • Calcium 05/30/2024 9.3  8.4 - 10.2 mg/dL Final   • AST 05/30/2024 19  13 - 39 U/L Final   • ALT 05/30/2024 20  7 - 52 U/L Final    Specimen collection should occur prior to Sulfasalazine administration due to the potential for falsely depressed results.    • Alkaline Phosphatase 05/30/2024 51  34 - 104 U/L Final   • Total Protein 05/30/2024 7.2  6.4 - 8.4 g/dL Final   • Albumin 05/30/2024 4.2  3.5 - 5.0 g/dL Final   • Total Bilirubin 05/30/2024 0.64  0.20 - 1.00 mg/dL Final    Use of this assay is not recommended for patients undergoing treatment with eltrombopag due to the potential for falsely elevated results.  N-acetyl-p-benzoquinone imine (metabolite of Acetaminophen) will generate erroneously low results in samples for patients that have taken an overdose of Acetaminophen.   • eGFR 05/30/2024 88  ml/min/1.73sq m Final   Appointment on 08/03/2023   Component Date Value Ref Range Status   • TSH 3RD GENERATON 08/03/2023 2.863  0.450 - 4.500 uIU/mL Final    The recommended reference ranges for TSH during pregnancy are as follows:   First trimester 0.1 to 2.5 uIU/mL   Second trimester  0.2 to 3.0 uIU/mL   Third trimester 0.3 to 3.0 uIU/m    Note: Normal ranges may not apply to patients who are transgender, non-binary, or whose legal sex, sex at birth, and gender identity differ.  Adult TSH (3rd generation) reference range follows the recommended guidelines of the American Thyroid Association, January, 2020.   Appointment on 07/17/2023   Component Date Value Ref Range Status   • WBC 07/17/2023 5.59  4.31 - 10.16 Thousand/uL Final   • RBC 07/17/2023 4.25  3.81 - 5.12 Million/uL Final   • Hemoglobin  07/17/2023 13.5  11.5 - 15.4 g/dL Final   • Hematocrit 07/17/2023 40.4  34.8 - 46.1 % Final   • MCV 07/17/2023 95  82 - 98 fL Final   • MCH 07/17/2023 31.8  26.8 - 34.3 pg Final   • MCHC 07/17/2023 33.4  31.4 - 37.4 g/dL Final   • RDW 07/17/2023 12.7  11.6 - 15.1 % Final   • MPV 07/17/2023 10.5  8.9 - 12.7 fL Final   • Platelets 07/17/2023 266  149 - 390 Thousands/uL Final   • nRBC 07/17/2023 0  /100 WBCs Final   • Segmented % 07/17/2023 65  43 - 75 % Final   • Immature Grans % 07/17/2023 0  0 - 2 % Final   • Lymphocytes % 07/17/2023 24  14 - 44 % Final   • Monocytes % 07/17/2023 8  4 - 12 % Final   • Eosinophils Relative 07/17/2023 2  0 - 6 % Final   • Basophils Relative 07/17/2023 1  0 - 1 % Final   • Absolute Neutrophils 07/17/2023 3.65  1.85 - 7.62 Thousands/µL Final   • Absolute Immature Grans 07/17/2023 0.02  0.00 - 0.20 Thousand/uL Final   • Absolute Lymphocytes 07/17/2023 1.34  0.60 - 4.47 Thousands/µL Final   • Absolute Monocytes 07/17/2023 0.44  0.17 - 1.22 Thousand/µL Final   • Eosinophils Absolute 07/17/2023 0.09  0.00 - 0.61 Thousand/µL Final   • Basophils Absolute 07/17/2023 0.05  0.00 - 0.10 Thousands/µL Final   • Sodium 07/17/2023 138  135 - 147 mmol/L Final   • Potassium 07/17/2023 4.0  3.5 - 5.3 mmol/L Final   • Chloride 07/17/2023 108  96 - 108 mmol/L Final   • CO2 07/17/2023 29  21 - 32 mmol/L Final   • ANION GAP 07/17/2023 1  mmol/L Final   • BUN 07/17/2023 11  5 - 25 mg/dL Final   • Creatinine 07/17/2023 0.84  0.60 - 1.30 mg/dL Final    Standardized to IDMS reference method   • Glucose, Fasting 07/17/2023 100 (H)  65 - 99 mg/dL Final    Specimen collection should occur prior to Sulfasalazine administration due to the potential for falsely depressed results. Specimen collection should occur prior to Sulfapyridine administration due to the potential for falsely elevated results.   • Calcium 07/17/2023 9.0  8.3 - 10.1 mg/dL Final   • AST 07/17/2023 18  5 - 45 U/L Final    Specimen collection  should occur prior to Sulfasalazine administration due to the potential for falsely depressed results.    • ALT 07/17/2023 32  12 - 78 U/L Final    Specimen collection should occur prior to Sulfasalazine and/or Sulfapyridine administration due to the potential for falsely depressed results.    • Alkaline Phosphatase 07/17/2023 56  46 - 116 U/L Final   • Total Protein 07/17/2023 7.0  6.4 - 8.4 g/dL Final   • Albumin 07/17/2023 3.7  3.5 - 5.0 g/dL Final   • Total Bilirubin 07/17/2023 0.71  0.20 - 1.00 mg/dL Final    Use of this assay is not recommended for patients undergoing treatment with eltrombopag due to the potential for falsely elevated results.   • eGFR 07/17/2023 70  ml/min/1.73sq m Final

## 2024-06-05 ENCOUNTER — TELEPHONE (OUTPATIENT)
Dept: INTERNAL MEDICINE CLINIC | Facility: OTHER | Age: 69
End: 2024-06-05

## 2024-06-05 DIAGNOSIS — M81.6 LOCALIZED OSTEOPOROSIS WITHOUT CURRENT PATHOLOGICAL FRACTURE: Primary | ICD-10-CM

## 2024-06-05 NOTE — TELEPHONE ENCOUNTER
Darrel to have patient call back directly to my number and ask to speak with shady to schedule her next Prolia injection for the [place] office with Dr. Alatorre

## 2024-06-05 NOTE — TELEPHONE ENCOUNTER
Patient will not be able to get her Prolia injection in December due to her in the [place] for the Winter.    Also she is due for her Dexa Scan this November.  Please put the order in for her to get this scheduled before she leaves in November.      Please let me know when it is in the chart to contact the patient.    Also what would be the best course of treatment for her while she is down in [place] till April of next year.    Please call her back on the mobile phone

## 2024-06-10 ENCOUNTER — HOSPITAL ENCOUNTER (OUTPATIENT)
Dept: NON INVASIVE DIAGNOSTICS | Facility: CLINIC | Age: 69
Discharge: HOME | End: 2024-06-10
Payer: MEDICARE

## 2024-06-10 DIAGNOSIS — Z87.891 HISTORY OF NICOTINE DEPENDENCE: ICD-10-CM

## 2024-06-10 DIAGNOSIS — E78.49 OTHER HYPERLIPIDEMIA: ICD-10-CM

## 2024-06-10 DIAGNOSIS — I73.9 INTERMITTENT CLAUDICATION (HCC): ICD-10-CM

## 2024-06-10 PROCEDURE — 93925 LOWER EXTREMITY STUDY: CPT

## 2024-06-10 PROCEDURE — 93923 UPR/LXTR ART STDY 3+ LVLS: CPT

## 2024-06-10 PROCEDURE — 93925 LOWER EXTREMITY STUDY: CPT | Performed by: SURGERY

## 2024-06-10 PROCEDURE — 93922 UPR/L XTREMITY ART 2 LEVELS: CPT | Performed by: SURGERY

## 2024-06-11 ENCOUNTER — TELEPHONE (OUTPATIENT)
Age: 69
End: 2024-06-11

## 2024-06-11 NOTE — TELEPHONE ENCOUNTER
Patient is calling to go over test results and advise if she will need to be referred somewhere for follow up care  Please review  Thank you

## 2024-06-12 DIAGNOSIS — E78.49 OTHER HYPERLIPIDEMIA: ICD-10-CM

## 2024-06-12 DIAGNOSIS — Z87.891 HISTORY OF NICOTINE DEPENDENCE: ICD-10-CM

## 2024-06-12 DIAGNOSIS — I73.9 PAD (PERIPHERAL ARTERY DISEASE) (HCC): Primary | ICD-10-CM

## 2024-06-12 DIAGNOSIS — I73.9 INTERMITTENT CLAUDICATION (HCC): ICD-10-CM

## 2024-06-12 NOTE — TELEPHONE ENCOUNTER
Blood work was done on 5/30/24      VAS ARTERIAL DUPLEX- LOWER LIMB BILATERAL  was done on 6/10/24    Please advise, thank you

## 2024-06-13 NOTE — PROGRESS NOTES
I called and discussed the results of patient's arterial duplex results with her.  She has peripheral arterial disease in the right worse than the left with greater than 75% stenosis in the distal superficial femoral artery and I have put in a referral to vascular surgery.  She was counseled to call and make an appointment to follow-up with them to discuss her treatment options with them.

## 2024-06-20 ENCOUNTER — TELEPHONE (OUTPATIENT)
Age: 69
End: 2024-06-20

## 2024-06-20 NOTE — TELEPHONE ENCOUNTER
Pt called and is concerned that her vascular appt isn't until 7/29 for the consult. Appt is on waiting list but every time she gets a call the appt is taken. Pt asking if provider either can call to have her seen sooner or if she has another vascular provider that she would recommend. Pt is having a lot of pain and is very concerned.

## 2024-07-01 ENCOUNTER — OFFICE VISIT (OUTPATIENT)
Dept: HEMATOLOGY ONCOLOGY | Facility: CLINIC | Age: 69
End: 2024-07-01
Payer: MEDICARE

## 2024-07-01 VITALS
HEIGHT: 57 IN | BODY MASS INDEX: 30.2 KG/M2 | WEIGHT: 140 LBS | HEART RATE: 60 BPM | DIASTOLIC BLOOD PRESSURE: 82 MMHG | TEMPERATURE: 97.2 F | SYSTOLIC BLOOD PRESSURE: 141 MMHG | OXYGEN SATURATION: 98 % | RESPIRATION RATE: 17 BRPM

## 2024-07-01 DIAGNOSIS — I73.9 PERIPHERAL ARTERIAL DISEASE (HCC): ICD-10-CM

## 2024-07-01 DIAGNOSIS — D49.0 PAROTID TUMOR: ICD-10-CM

## 2024-07-01 DIAGNOSIS — C34.91 SQUAMOUS CELL LUNG CANCER, RIGHT (HCC): Primary | ICD-10-CM

## 2024-07-01 DIAGNOSIS — C77.9 REGIONAL LYMPH NODE METASTASIS PRESENT (HCC): ICD-10-CM

## 2024-07-01 DIAGNOSIS — M48.061 SPINAL STENOSIS OF LUMBAR REGION WITHOUT NEUROGENIC CLAUDICATION: ICD-10-CM

## 2024-07-01 DIAGNOSIS — J44.9 CHRONIC OBSTRUCTIVE PULMONARY DISEASE, UNSPECIFIED COPD TYPE (HCC): ICD-10-CM

## 2024-07-01 DIAGNOSIS — G35 MULTIPLE SCLEROSIS (HCC): ICD-10-CM

## 2024-07-01 DIAGNOSIS — I10 HYPERTENSION, ESSENTIAL: ICD-10-CM

## 2024-07-01 PROCEDURE — G2211 COMPLEX E/M VISIT ADD ON: HCPCS | Performed by: INTERNAL MEDICINE

## 2024-07-01 PROCEDURE — 99214 OFFICE O/P EST MOD 30 MIN: CPT | Performed by: INTERNAL MEDICINE

## 2024-07-01 NOTE — PATIENT INSTRUCTIONS
Patient has symptomatic peripheral arterial disease in both lower legs and has appointment with vascular surgery on 7/29/2024.  Please try to get her into see vascular surgery sooner.  Patient will start taking 1 aspirin daily with food.  She prefers regular aspirin.  Blood work, CT chest and ultrasound neck prior to next visit in early May 2025.

## 2024-07-01 NOTE — PROGRESS NOTES
HPI: Continuation of care for lung cancer.  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  Her main symptom right now is pain in lower legs and she has PAD and has appointment with vascular surgery on 7/29/2024 and has asked me if our office could get her in sooner.  Patient's primary physician made that appointment for her.  We can try.  In the meantime she will take 1 aspirin daily and she would prefer to take regular aspirin and she will take that with food.  History of spinal stenosis and she states she does not have much pain in the lower back.    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 Tumor tested negative for EGFR.  Patient  Received 4 cycles of adjuvant chemotherapy, a combination of carboplatin and Gemzar and last treatment was on 06/07/2021. .  She did not receive taxane because of history of MS. She also received Granix because of neutropenia.  She did well and there has not been any documented recurrent or metastatic disease clinically.  She gets CT chest .  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  She gives history of fecal incontinence and she follows with her rectal surgeon and also goes for colonoscopy.    She has history of MS. She follows with her neurologist.  No symptoms from MS right now.    She has minimal  exertional dyspnea when going up on incline..  Has some tiredness and arthritic symptoms.  She sleeps poorly.  She has chronic low back discomfort and had  epidural shots.  No metastatic disease in lumbar spine area on MRI scan.    Remote past history of suspected MS in 1989.  History  "of tinnitus off and on in the ears.  She follows with ENT specialist.  She has a house in [place] and she flew back in April 2025.  She would like to have tests in April 2025 and visit after that.    ROS:  07/01/24 Reviewed 12 systems: See symptoms in HPI  Presently no other neurological, cardiac, pulmonary, GI and  symptoms other than listed in HPI.  Other symptoms are in HPI.  No  fever, chills, bleeding, bone pains  skin rash, weight loss, night sweats,  weakness, numbness, claudication and gait problem. No frequent infections.  Not unusually sensitive to heat or cold. No swelling of the ankles. No swollen glands.  Patient is  anxious ..      Physical Exam:          7/1/24 5/31/24 5/30/24   Blood Pressure 141/82 124/78 --   Pulse 60 65 --   Respirations 17 -- --   Temperature 97.2 °F (36.2 °C) (Abnormal)   98.6 °F (37 °C) --   Temp Source -- Temporal --   SpO2 98 % -- --   Weight - Scale 63.5 kg (140 lb) 63 kg (139 lb) 62.6 kg (138 lb)   Height 4' 9\" (1.448 m) 4' 9.01\" (1.448 m) 4' 9\" (1.448 m)   Peak Flow -- 97 L/min --     Patient is alert and oriented.  Patient is not in distress.  Vital signs are above.  There is no icterus.  No oral thrush.  No palpable neck mass.  No palpable nodule in the parotid areas.  Clear lung fields.  Regular heart rate.  Soft and nontender abdomen.  No palpable abdominal mass.  No ascites.  No edema of ankles.  No calf tenderness.  No focal neurological deficit.  There is no skin rash, no palpable lymphadenopathy in the neck and axillary areas,  no clubbing.   Patient is  anxious.  Performance status 1.  Distal arterial pulses in the legs are not easily palpable/pulsatile.    Historical Information   Past Medical History:   Diagnosis Date   • Allergic ?   • Anxiety    • Bronchial malignant neoplasm, right (HCC)    • Cancer (HCC)     lung   • Chronic major depressive disorder, single episode    • COPD (chronic obstructive pulmonary disease) (HCC)    • Full incontinence of feces  "   • GERD (gastroesophageal reflux disease)    • Hyperlipidemia    • Hypertension    • Malignant neoplasm of bronchus and lung (HCC)    • Multiple sclerosis (HCC)    • Pulmonary emphysema (HCC)    • Restless leg syndrome    • Tobacco use      Past Surgical History:   Procedure Laterality Date   • AUGMENTATION MAMMAPLASTY Bilateral 11/2014    retro-pectoral silicone   • BREAST BIOPSY Left 03/23/2015    u/s core bx   • BREAST BIOPSY Left 08/02/2010    high-risk lesion   • BREAST EXCISIONAL BIOPSY Left 08/26/2010    high-risk lesion   • BREAST EXCISIONAL BIOPSY Left 1994   • BREAST EXCISIONAL BIOPSY Right 1984   • CT NEEDLE BIOPSY LUNG  12/2/2020   • FL GUIDED CENTRAL VENOUS ACCESS DEVICE INSERTION  03/09/2021   • IR PORT REMOVAL  03/07/2022   • LYMPH NODE BIOPSY  2021   • MAMMO (HISTORICAL)  2020   • KY BRNCHSC INCL FLUOR GDNCE DX W/CELL WASHG SPX N/A 01/28/2021    Procedure: BRONCHOSCOPY FLEXIBLE;  Surgeon: Jose Glover MD;  [facility]: [facility];  Service: Thoracic   • KY INSJ TUNNELED CTR VAD W/SUBQ PORT AGE 3 YR/> N/A 03/09/2021    Procedure: INSERTION VENOUS PORT ( PORT-A-CATH) IR;  Surgeon: Krzysztof Barros DO;  [facility];  Service: Interventional Radiology   • KY THORACOSCOPY W/LOBECTOMY SINGLE LOBE Right 01/28/2021    Procedure: RIGHT VATS UPPER LOBECTOMY ; MEDIASTINAL LYMPH NODE DISSECTION; right lower lobe bleb resection  ;  Surgeon: Jose Glover MD;  Location: [facility];  Service: Thoracic   • TONSILLECTOMY     • TOTAL ABDOMINAL HYSTERECTOMY  2010    age 55   • TOTAL ABDOMINAL HYSTERECTOMY W/ BILATERAL SALPINGOOPHORECTOMY Bilateral 2010    age 55   • TUBAL LIGATION  1971     Social History   Social History     Substance and Sexual Activity   Alcohol Use Yes   • Alcohol/week: 5.0 standard drinks of alcohol   • Types: 5 Glasses of wine per week    Comment: wine @       Social History     Substance and Sexual Activity   Drug Use Never     Social History     Tobacco Use   Smoking Status  Former   • Current packs/day: 0.00   • Average packs/day: 1 pack/day for 53.0 years (53.0 ttl pk-yrs)   • Types: Cigarettes   • Start date: 1/1/1968   • Quit date: 1/10/2021   • Years since quitting: 3.4   Smokeless Tobacco Never   Tobacco Comments    Wish i never started     Family History:   Family History   Problem Relation Age of Onset   • No Known Problems Mother    • COPD Father    • Hypertension Father    • No Known Problems Sister    • No Known Problems Daughter    • No Known Problems Maternal Grandmother    • No Known Problems Maternal Grandfather    • No Known Problems Paternal Grandmother    • Rectal cancer Paternal Grandfather         unknown age   • No Known Problems Sister    • No Known Problems Maternal Aunt    • Breast cancer Paternal Aunt         unknown age   • No Known Problems Paternal Aunt    • No Known Problems Paternal Aunt    • No Known Problems Paternal Aunt    • No Known Problems Paternal Aunt    • No Known Problems Paternal Aunt    • No Known Problems Paternal Aunt    • COPD Brother    • No Known Problems Brother          Current Outpatient Medications:   •  atorvastatin (LIPITOR) 10 mg tablet, Take 1 tablet (10 mg total) by mouth daily, Disp: 90 tablet, Rfl: 1  •  Biotin 5 MG CAPS, Take 5,000 mg by mouth daily, Disp: , Rfl:   •  Cholecalciferol (Vitamin D3) 50 MCG (2000 UT) capsule, Take 5,000 Units by mouth daily , Disp: , Rfl:   •  famotidine (PEPCID) 40 MG tablet, Take 1 tablet (40 mg total) by mouth every morning, Disp: 90 tablet, Rfl: 1  •  FLUoxetine (PROzac) 20 mg capsule, Take 1 capsule (20 mg total) by mouth daily, Disp: 90 capsule, Rfl: 1  •  hydrocortisone 2.5 % cream, Apply topically 2 (two) times a day, Disp: 28 g, Rfl: 2  •  hydrOXYzine HCL (ATARAX) 25 mg tablet, Take 1 tablet (25 mg total) by mouth every 6 (six) hours as needed for anxiety, Disp: 20 tablet, Rfl: 0  •  loperamide (IMODIUM) 2 mg capsule, Take 2 mg by mouth daily otc, Disp: , Rfl:   •   losartan-hydrochlorothiazide (HYZAAR) 50-12.5 mg per tablet, Take 1 tablet by mouth daily, Disp: 90 tablet, Rfl: 1  •  pramipexole (MIRAPEX) 0.5 mg tablet, Take 1 tablet (0.5 mg total) by mouth daily at bedtime, Disp: 90 tablet, Rfl: 1  •  buPROPion (WELLBUTRIN XL) 300 mg 24 hr tablet, Take 1 tablet (300 mg total) by mouth every morning (Patient not taking: Reported on 5/31/2024), Disp: 90 tablet, Rfl: 1    Allergies   Allergen Reactions   • Sulfa Antibiotics Hives             Lab Results: I have reviewed all pertinent labs.  LABS:      Results for orders placed or performed in visit on 05/30/24   Hepatitis C antibody   Result Value Ref Range    Hepatitis C Ab Non-reactive Non-Reactive   Lipid panel   Result Value Ref Range    Cholesterol 174 See Comment mg/dL    Triglycerides 137 See Comment mg/dL    HDL, Direct 64 >=50 mg/dL    LDL Calculated 83 0 - 100 mg/dL    Non-HDL-Chol (CHOL-HDL) 110 mg/dl   TSH, 3rd generation with Free T4 reflex   Result Value Ref Range    TSH 3RD GENERATON 2.158 0.450 - 4.500 uIU/mL   CBC and differential   Result Value Ref Range    WBC 4.45 4.31 - 10.16 Thousand/uL    RBC 4.52 3.81 - 5.12 Million/uL    Hemoglobin 13.8 11.5 - 15.4 g/dL    Hematocrit 43.2 34.8 - 46.1 %    MCV 96 82 - 98 fL    MCH 30.5 26.8 - 34.3 pg    MCHC 31.9 31.4 - 37.4 g/dL    RDW 12.9 11.6 - 15.1 %    MPV 10.5 8.9 - 12.7 fL    Platelets 315 149 - 390 Thousands/uL    nRBC 0 /100 WBCs    Segmented % 58 43 - 75 %    Immature Grans % 0 0 - 2 %    Lymphocytes % 29 14 - 44 %    Monocytes % 9 4 - 12 %    Eosinophils Relative 3 0 - 6 %    Basophils Relative 1 0 - 1 %    Absolute Neutrophils 2.54 1.85 - 7.62 Thousands/µL    Absolute Immature Grans 0.01 0.00 - 0.20 Thousand/uL    Absolute Lymphocytes 1.28 0.60 - 4.47 Thousands/µL    Absolute Monocytes 0.42 0.17 - 1.22 Thousand/µL    Eosinophils Absolute 0.14 0.00 - 0.61 Thousand/µL    Basophils Absolute 0.06 0.00 - 0.10 Thousands/µL   Comprehensive metabolic panel   Result  Value Ref Range    Sodium 139 135 - 147 mmol/L    Potassium 4.2 3.5 - 5.3 mmol/L    Chloride 100 96 - 108 mmol/L    CO2 31 21 - 32 mmol/L    ANION GAP 8 4 - 13 mmol/L    BUN 15 5 - 25 mg/dL    Creatinine 0.68 0.60 - 1.30 mg/dL    Glucose, Fasting 92 65 - 99 mg/dL    Calcium 9.3 8.4 - 10.2 mg/dL    AST 19 13 - 39 U/L    ALT 20 7 - 52 U/L    Alkaline Phosphatase 51 34 - 104 U/L    Total Protein 7.2 6.4 - 8.4 g/dL    Albumin 4.2 3.5 - 5.0 g/dL    Total Bilirubin 0.64 0.20 - 1.00 mg/dL    eGFR 88 ml/min/1.73sq m                               MPRESSION:  No mammographic evidence of malignancy.           ASSESSMENT/BI-RADS CATEGORY:  Left: 2 - Benign  Right: 2 - Benign  Overall: 2 - Benign     RECOMMENDATION:       - Routine screening mammogram in 1 year for both breasts.     Workstation ID: [id number]              Imaging    Mammo screening bilateral w 3d & cad (Order: [id number]) - 5/30/2024    IMPRESSION:     Postsurgical changes of right upper lobectomy without evidence of recurrent or metastatic disease in the chest.              Workstation performed: [id number]        Imaging    CT chest wo contrast (Order: [id number]) - 5/30/2024      Narrative & Impression   NECK ULTRASOUND     INDICATION:   D49.0: Neoplasm of unspecified behavior of digestive system.     COMPARISON:  MRI neck 11/22/2021     TECHNIQUE:   Real-time ultrasound of the right parotid gland was performed with a linear transducer with both volumetric sweeps and still imaging techniques.     FINDINGS:  2.2 x 0.7 x 1.7 cm hypoechoic, smoothly marginated hypovascular nodule seen along the posterior right parotid gland corresponding to previous study.  As per previous report, this has been biopsied with benign result.       IMPRESSION:     Stable size of 2.2 cm hypoechoic, smoothly marginated nodule posterior right parotid gland.        Workstation performed: [id number]        Imaging    US head neck soft tissue (Order: [id number]) - 2/6/2023    Pathology,  and Other Studies: I have personally reviewed pertinent reports.      Report  NON-GYNECOLOGICAL CYTOLOGYResulted: 2021 12:20 PM  [facility]  Component Name Value Ref Range   Non-Gynecological Cytology [facility]  [address] [place] [id number] [phone]    PATHOLOGY REPORT             MR#:             [id number]  Patient:         RANDY ORR  /Sex:         1955  F  Provider:        SYLVIA LUO  [place]        [facility] [place] [place] Date:    2021  N21-53    CYTOLOGIC DIAGNOSIS:  A  Fine Needle Aspiration with Cytotech Assistance and Cell Block,  Right Parotid Mass  Adequacy: Adequate for evaluation.  General Category: Negative for Diagnostic Malignancy.  Descriptive Interpretation: Cytologic features consistent with  pleomorphic adenoma.        .  Reviewed  above results and   discussed with patient    Assessment and Plan:   Continuation of care for lung cancer.  She also has parotid tumor that was biopsied in  and was not malignant and stable on ultrasound.  Her main symptom right now is pain in lower legs and she has PAD and has appointment with vascular surgery on 2024 and has asked me if our office could get her in sooner.  Patient's primary physician made that appointment for her.  We can try.  In the meantime she will take 1 aspirin daily and she would prefer to take regular aspirin and she will take that with food.  History of spinal stenosis and she states she does not have much pain in the lower back.    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 Tumor tested negative for EGFR.  Patient  Received 4 cycles of adjuvant chemotherapy, a combination of carboplatin and Gemzar and  last treatment was on 06/07/2021. .  She did not receive taxane because of history of MS. She also received Granix because of neutropenia.  She did well and there has not been any documented recurrent or metastatic disease clinically.  She gets CT chest .  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  She gives history of fecal incontinence and she follows with her rectal surgeon and also goes for colonoscopy.    She has history of MS. She follows with her neurologist.  No symptoms from MS right now.    She has minimal  exertional dyspnea when going up on incline..  Has some tiredness and arthritic symptoms.  She sleeps poorly.  She has chronic low back discomfort and had  epidural shots.  No metastatic disease in lumbar spine area on MRI scan.    Remote past history of suspected MS in 1989.  History of tinnitus off and on in the ears.  She follows with ENT specialist.  She has a house in [place] and she flew back in April 2025.  She would like to have tests in April 2025 and visit after that.  Physical examination and test results are as recorded and discussed in detail..  Plan is surveillance for lung cancer.  Other problems listed above.  I discussed all the above problems with the patient.  She gets CT scan of chest.  She will have ultrasound of parotid mass.  She will follow-up with her primary physician and other consultants.  He will see vascular surgeon.  All discussed in very much detail.  Questions answered.  Discussed the importance of self-breast examination, eating healthy foods, activities as tolerated and health screening tests.  Patient has already stopped smoking cigarettes.  She is capable of self-care.  Goal and plan:  surveillance and prolongation of survival and possible cure from lung cancer.     Discussed precautions against coronavirus, flu and other infections.    Patient will  continue to follow with her primary physician and other consultants.  Discussed diagnoses, orders and instructions below    1. Squamous cell lung cancer, right (HCC)    - CBC and differential; Future  - Comprehensive metabolic panel; Future  - CT chest wo contrast; Future    2. Regional lymph node metastasis present (HCC)      3. Parotid tumor    - US head neck soft tissue; Future    4. Chronic obstructive pulmonary disease, unspecified COPD type (HCC)      5. Multiple sclerosis (HCC)      6. Hypertension, essential      7. Spinal stenosis of lumbar region without neurogenic claudication      8. Peripheral arterial disease (HCC)      Patient has symptomatic peripheral arterial disease in both lower legs and has appointment with vascular surgery on 7/29/2024.  Please try to get her into see vascular surgery sooner.  Patient will start taking 1 aspirin daily with food.  She prefers regular aspirin.  Blood work, CT chest and ultrasound neck prior to next visit in early May 2025.      I used a dictation device to dictate this note and there could be mistakes in my note and she may contact my office for that    Patient voiced understanding and agrees    Counseling / Coordination of Care  ..  Provided counseling and support

## 2024-07-03 DIAGNOSIS — K21.00 GASTROESOPHAGEAL REFLUX DISEASE WITH ESOPHAGITIS, UNSPECIFIED WHETHER HEMORRHAGE: ICD-10-CM

## 2024-07-03 DIAGNOSIS — I10 HYPERTENSION, ESSENTIAL: ICD-10-CM

## 2024-07-03 DIAGNOSIS — E78.49 OTHER HYPERLIPIDEMIA: ICD-10-CM

## 2024-07-03 DIAGNOSIS — G25.81 RESTLESS LEGS SYNDROME (RLS): ICD-10-CM

## 2024-07-03 DIAGNOSIS — F33.9 DEPRESSION, RECURRENT (HCC): ICD-10-CM

## 2024-07-03 RX ORDER — FLUOXETINE HYDROCHLORIDE 20 MG/1
20 CAPSULE ORAL DAILY
Qty: 90 CAPSULE | Refills: 1 | Status: SHIPPED | OUTPATIENT
Start: 2024-07-03

## 2024-07-03 RX ORDER — FAMOTIDINE 40 MG/1
40 TABLET, FILM COATED ORAL EVERY MORNING
Qty: 90 TABLET | Refills: 1 | Status: SHIPPED | OUTPATIENT
Start: 2024-07-03

## 2024-07-03 RX ORDER — LOSARTAN POTASSIUM AND HYDROCHLOROTHIAZIDE 12.5; 5 MG/1; MG/1
1 TABLET ORAL DAILY
Qty: 90 TABLET | Refills: 1 | Status: SHIPPED | OUTPATIENT
Start: 2024-07-03

## 2024-07-03 RX ORDER — PRAMIPEXOLE DIHYDROCHLORIDE 0.5 MG/1
0.5 TABLET ORAL
Qty: 90 TABLET | Refills: 1 | Status: SHIPPED | OUTPATIENT
Start: 2024-07-03

## 2024-07-03 RX ORDER — ATORVASTATIN CALCIUM 10 MG/1
10 TABLET, FILM COATED ORAL DAILY
Qty: 90 TABLET | Refills: 1 | Status: SHIPPED | OUTPATIENT
Start: 2024-07-03

## 2024-07-10 ENCOUNTER — OFFICE VISIT (OUTPATIENT)
Dept: VASCULAR SURGERY | Facility: CLINIC | Age: 69
End: 2024-07-10
Payer: MEDICARE

## 2024-07-10 VITALS
BODY MASS INDEX: 30.2 KG/M2 | SYSTOLIC BLOOD PRESSURE: 134 MMHG | DIASTOLIC BLOOD PRESSURE: 70 MMHG | HEIGHT: 57 IN | HEART RATE: 70 BPM | WEIGHT: 140 LBS

## 2024-07-10 DIAGNOSIS — I73.9 PAD (PERIPHERAL ARTERY DISEASE) (HCC): ICD-10-CM

## 2024-07-10 DIAGNOSIS — I73.9 INTERMITTENT CLAUDICATION (HCC): ICD-10-CM

## 2024-07-10 DIAGNOSIS — E78.49 OTHER HYPERLIPIDEMIA: ICD-10-CM

## 2024-07-10 DIAGNOSIS — I73.9 PERIPHERAL ARTERIAL DISEASE (HCC): Primary | ICD-10-CM

## 2024-07-10 DIAGNOSIS — Z87.891 HISTORY OF NICOTINE DEPENDENCE: ICD-10-CM

## 2024-07-10 PROCEDURE — 99205 OFFICE O/P NEW HI 60 MIN: CPT | Performed by: NURSE PRACTITIONER

## 2024-07-10 RX ORDER — CILOSTAZOL 100 MG/1
100 TABLET ORAL DAILY
Qty: 90 TABLET | Refills: 0 | Status: SHIPPED | OUTPATIENT
Start: 2024-07-10

## 2024-07-10 NOTE — PROGRESS NOTES
69-year-old female former smoker with hx of HTN, R squamous cell lung cancer, COPD, GERD, multiple sclerosis, intervertebral disc disorder, localized osteoporosis, depression, anxiety disorder, chemotherapy and neutropenia, restless leg syndrome, HLD is a new consult to the office to review her LEAD.    Peripheral arterial disease (HCC)  6/10/24 LEAD  Rt: 50-75% stenosis in the profunda femoris, proximal superficial  femoral, and proximal popliteal artery. >75% stenosis noted in the distal SFA  FLACO: 0.65/ MP  99 mmHg/ GTP 52 mmHg     Lt: Evaluation shows diffuse atherosclerotic disease throughout the  femoro-popliteal disease.  FLACO: 1.14, normal/  mmHg/ GTP 97 mmHg    -Reports b/l lateral thigh pain when going from sitting to standing, Denies lifestyle limiting claudication, denies true rest pain, no wounds/ tissue loss.   -Upon evaluation of claudication, reported she can walk any distance without stopping, later in appointment reports she must stop while walking due to pain in the b/l thighs. Inconsistent on located which leg is causing her pain.   -Palpable L DP pulse, doppler signals at L PT, R DP and PT.    -B/L feet are are warm and dry, no discoloration, no wounds/ tissue loss.     -Reviewed lower extremity ultrasound in detail with pt and answered all questions. Educated pt on peripheral arterial disease and indication for vascular intervention. No indications for vascular intervention at this time. Discussed that her bilateral leg pain symptoms are likely not caused by vascular etiology as she experiencing pain in both legs that occurs when going from sitting to standing. Recommending supervised exercise program and medical management at this time. Discussed prior to her return office visit she should be evaluated by PCP/ Multiple sclerosis doctor to determine etiology of left leg pain. Pt verbalized understanding.    Recommendations:  -Complete supervised walking program and return to the office in 3  months for follow up.  -Provided with trial of pletal for pain symptoms. RX provided today.  -Follow up with PCP for further workup for leg pain symptoms.   -Call the office with any new or worsening leg pain, discoloration, swelling, new wounds/ tissue loss.  -Start to take aspirin 81 mg daily.  -Continue to take statin medication daily as per PCP.  -Do daily foot checks, food protection, wear well fitted shoes.  -Increase daily walking for 20-30 min everyday.      Assessment/Plan:      Diagnoses and all orders for this visit:    Peripheral arterial disease (HCC)    PAD (peripheral artery disease) ([facility])  -     Ambulatory Referral to Vascular Surgery  -     cilostazol (PLETAL) 100 mg tablet; Take 1 tablet (100 mg total) by mouth in the morning  -     Ambulatory Referral to Peripheral Artery Disease Supervised Exercise Therapy; Future    History of nicotine dependence  -     Ambulatory Referral to Vascular Surgery    Other hyperlipidemia  -     Ambulatory Referral to Vascular Surgery    Intermittent claudication (HCC)  -     Ambulatory Referral to Vascular Surgery  -     cilostazol (PLETAL) 100 mg tablet; Take 1 tablet (100 mg total) by mouth in the morning  -     Ambulatory Referral to Peripheral Artery Disease Supervised Exercise Therapy; Future          Subjective:     Patient ID: Britni Fermin is a 69 y.o. female.    New patient, referred for PAD and presents today for evaluation. LAYNE done 6/10/24. Patient reports <1 block thigh and calf claudication, R>L. Denies rest pain and no wounds.     69-year-old female HTN, R squamous cell lung cancer, COPD, GERD, multiple sclerosis, intervertebral disc disorder, localized osteoporosis, depression, anxiety disorder, chemotherapy and neutropenia, restless leg syndrome, HLD is a new consult to the office to review her LEAD.    Reports she has pain in the B/L lateral thighs R>L described as cramping, this pain subsides when she begins walking. Reports she does frequent  walking daily with her dog.   She does daily walking. She has no self limiting claudication, reports b/l leg cramping at night, no wounds/ tissue loss. Reports b/l feet 'fall asleep'. Discussed her symptoms are not consistent with vascular etiology as she has b/l leg pain symptoms that are located in the lateral thighs when going from sitting to standing that improve with walking and that there is suspicious for non vascular etiology. Pt reports she was hopeful that vascular would be the cause of her pain symptoms and could be solved with intervention. Pt continues to indicate her blockage in her arteries, we discussed treating with conservative measures with supervised walking program, pletal and further work up from her PCP to uncover any alternate causes of her b/l leg pain symptoms.   She is compliant with lipitor daily, denies asa daily.Recommended starting ASA 81mg daily.   Former smoker 1.5ppd, quit 2 years ago with diagnosis of lung cancer.         Review of Systems   Constitutional: Negative.    HENT: Negative.     Eyes: Negative.    Respiratory:  Positive for shortness of breath.    Cardiovascular: Negative.  Negative for chest pain.   Gastrointestinal: Negative.    Endocrine: Negative.    Genitourinary: Negative.    Musculoskeletal:         Leg pain  Thigh/calf cramping with walking   Skin: Negative.    Allergic/Immunologic: Negative.    Neurological:  Positive for numbness.   Hematological: Negative.    Psychiatric/Behavioral: Negative.           Objective:     Physical Exam  Vitals and nursing note reviewed.   Constitutional:       General: She is not in acute distress.     Appearance: She is obese. She is not ill-appearing.   HENT:      Head: Normocephalic and atraumatic.   Cardiovascular:      Rate and Rhythm: Normal rate and regular rhythm.      Pulses:           Carotid pulses are 2+ on the right side and 2+ on the left side.       Radial pulses are 2+ on the right side and 1+ on the left side.       "  Femoral pulses are 1+ on the right side and 2+ on the left side.       Popliteal pulses are 0 on the right side and 0 on the left side.        Dorsalis pedis pulses are 2+ on the right side and detected w/ Doppler on the left side.        Posterior tibial pulses are detected w/ Doppler on the right side and detected w/ Doppler on the left side.   Pulmonary:      Effort: Pulmonary effort is normal. No respiratory distress.      Breath sounds: Normal breath sounds.   Musculoskeletal:      Right lower leg: No edema.      Left lower leg: No edema.   Skin:     General: Skin is warm and dry.      Capillary Refill: Capillary refill takes less than 2 seconds.      Findings: No erythema or rash.   Neurological:      General: No focal deficit present.      Mental Status: She is alert and oriented to person, place, and time.      Sensory: Sensory deficit present.      Motor: No weakness.      Gait: Gait normal.   Psychiatric:         Mood and Affect: Mood is anxious.         Behavior: Behavior normal.         I have reviewed and made appropriate changes to the review of systems input by the medical assistant.    Vitals:    07/10/24 0927   BP: 134/70   BP Location: Right arm   Patient Position: Sitting   Pulse: 70   Weight: 63.5 kg (140 lb)   Height: 4' 9\" (1.448 m)       Patient Active Problem List   Diagnosis    Squamous cell lung cancer, right (HCC)    Atypical lobular hyperplasia of breast    Chronic obstructive pulmonary disease (HCC)    Cystocele    Hyperlipidemia    Hypertension, essential    Multiple sclerosis (HCC)    Tinnitus of both ears    Spinal stenosis of lumbar region    Restless legs syndrome (RLS)    Rectocele    Cystocele with prolapse    Depression, recurrent (HCC)    History of colonic polyps    GERD (gastroesophageal reflux disease)    Regional lymph node metastasis present (HCC)    Chemotherapy induced neutropenia (HCC)    Anxiety disorder    Palliative care patient    Medical marijuana use    Insomnia " due to medical condition    Cancer related pain    Chemotherapy-induced nausea    Encounter for care related to vascular access port    Dizziness    Parotid tumor    Intervertebral disc disorder with radiculopathy of lumbar region    Subdural hematoma (HCC)    Syncope    BMI 31.0-31.9,adult    Annual physical exam    Incontinence of feces    Traumatic subdural hemorrhage with loss of consciousness of unspecified duration, initial encounter (HCC)    Localized osteoporosis without current pathological fracture    Peripheral arterial disease (HCC)       Past Surgical History:   Procedure Laterality Date    AUGMENTATION MAMMAPLASTY Bilateral 11/2014    retro-pectoral silicone    BREAST BIOPSY Left 03/23/2015    u/s core bx    BREAST BIOPSY Left 08/02/2010    high-risk lesion    BREAST EXCISIONAL BIOPSY Left 08/26/2010    high-risk lesion    BREAST EXCISIONAL BIOPSY Left 1994    BREAST EXCISIONAL BIOPSY Right 1984    CT NEEDLE BIOPSY LUNG  12/2/2020    FL GUIDED CENTRAL VENOUS ACCESS DEVICE INSERTION  03/09/2021    IR PORT REMOVAL  03/07/2022    LYMPH NODE BIOPSY  2021    MAMMO (HISTORICAL)  2020    NE BRNCHSC INCL FLUOR GDNCE DX W/CELL WASHG SPX N/A 01/28/2021    Procedure: BRONCHOSCOPY FLEXIBLE;  Surgeon: Jose Glover MD;  Location: [facility];  Service: Thoracic    NE INSJ TUNNELED CTR VAD W/SUBQ PORT AGE 5 YR/> N/A 03/09/2021    Procedure: INSERTION VENOUS PORT ( PORT-A-CATH) IR;  Surgeon: Krzysztof Barros DO;  Location: [facility];  Service: Interventional Radiology    NE THORACOSCOPY W/LOBECTOMY SINGLE LOBE Right 01/28/2021    Procedure: RIGHT VATS UPPER LOBECTOMY ; MEDIASTINAL LYMPH NODE DISSECTION; right lower lobe bleb resection  ;  Surgeon: Jose Glover MD;  Location: [facility];  Service: Thoracic    TONSILLECTOMY      TOTAL ABDOMINAL HYSTERECTOMY  2010    age 55    TOTAL ABDOMINAL HYSTERECTOMY W/ BILATERAL SALPINGOOPHORECTOMY Bilateral 2010    age 55    TUBAL LIGATION  1971       Family History    Problem Relation Age of Onset    No Known Problems Mother     COPD Father     Hypertension Father     No Known Problems Sister     No Known Problems Daughter     No Known Problems Maternal Grandmother     No Known Problems Maternal Grandfather     No Known Problems Paternal Grandmother     Rectal cancer Paternal Grandfather         unknown age    No Known Problems Sister     No Known Problems Maternal Aunt     Breast cancer Paternal Aunt         unknown age    No Known Problems Paternal Aunt     No Known Problems Paternal Aunt     No Known Problems Paternal Aunt     No Known Problems Paternal Aunt     No Known Problems Paternal Aunt     No Known Problems Paternal Aunt     COPD Brother     No Known Problems Brother        Social History     Socioeconomic History    Marital status: Legally      Spouse name: Not on file    Number of children: Not on file    Years of education: Not on file    Highest education level: Not on file   Occupational History    Not on file   Tobacco Use    Smoking status: Former     Current packs/day: 0.00     Average packs/day: 1 pack/day for 53.0 years (53.0 ttl pk-yrs)     Types: Cigarettes     Start date: 1/1/1968     Quit date: 1/10/2021     Years since quitting: 3.4    Smokeless tobacco: Never    Tobacco comments:     Wish i never started   Vaping Use    Vaping status: Never Used   Substance and Sexual Activity    Alcohol use: Yes     Alcohol/week: 5.0 standard drinks of alcohol     Types: 5 Glasses of wine per week     Comment: wine @      Drug use: Never    Sexual activity: Not Currently     Partners: Male     Birth control/protection: Female Sterilization   Other Topics Concern    Not on file   Social History Narrative    Patient is  from  [place]. Patient has one daughter from a previous union.      Social Determinants of Health     Financial Resource Strain: Not on file   Food Insecurity: Not on file   Transportation Needs: No Transportation Needs  (10/2/2023)    PRAPARE - Transportation     Lack of Transportation (Medical): No     Lack of Transportation (Non-Medical): No   Physical Activity: Not on file   Stress: Not on file   Social Connections: Not on file   Intimate Partner Violence: Not on file   Housing Stability: Not on file       Allergies   Allergen Reactions    Sulfa Antibiotics Hives         Current Outpatient Medications:     atorvastatin (LIPITOR) 10 mg tablet, Take 1 tablet (10 mg total) by mouth daily, Disp: 90 tablet, Rfl: 1    Biotin 5 MG CAPS, Take 5,000 mg by mouth daily, Disp: , Rfl:     Cholecalciferol (Vitamin D3) 50 MCG (2000 UT) capsule, Take 5,000 Units by mouth daily , Disp: , Rfl:     cilostazol (PLETAL) 100 mg tablet, Take 1 tablet (100 mg total) by mouth in the morning, Disp: 90 tablet, Rfl: 0    famotidine (PEPCID) 40 MG tablet, Take 1 tablet (40 mg total) by mouth every morning, Disp: 90 tablet, Rfl: 1    FLUoxetine (PROzac) 20 mg capsule, Take 1 capsule (20 mg total) by mouth daily, Disp: 90 capsule, Rfl: 1    hydrocortisone 2.5 % cream, Apply topically 2 (two) times a day, Disp: 28 g, Rfl: 2    loperamide (IMODIUM) 2 mg capsule, Take 2 mg by mouth daily otc, Disp: , Rfl:     losartan-hydrochlorothiazide (HYZAAR) 50-12.5 mg per tablet, Take 1 tablet by mouth daily, Disp: 90 tablet, Rfl: 1    buPROPion (WELLBUTRIN XL) 300 mg 24 hr tablet, Take 1 tablet (300 mg total) by mouth every morning (Patient not taking: Reported on 5/31/2024), Disp: 90 tablet, Rfl: 1    hydrOXYzine HCL (ATARAX) 25 mg tablet, Take 1 tablet (25 mg total) by mouth every 6 (six) hours as needed for anxiety (Patient not taking: Reported on 7/10/2024), Disp: 20 tablet, Rfl: 0    pramipexole (MIRAPEX) 0.5 mg tablet, Take 1 tablet (0.5 mg total) by mouth daily at bedtime, Disp: 90 tablet, Rfl: 1  I have spent a total time of 42 minutes in caring for this patient on the day of the visit/encounter including Diagnostic results, Risks and benefits of tx options,  Instructions for management, Patient and family education, Importance of tx compliance, Risk factor reductions, Documenting in the medical record, Reviewing / ordering tests, medicine, procedures  , and Obtaining or reviewing history  .

## 2024-07-10 NOTE — ASSESSMENT & PLAN NOTE
6/10/24 LEAD  Rt: 50-75% stenosis in the profunda femoris, proximal superficial  femoral, and proximal popliteal artery. >75% stenosis noted in the distal SFA  FLACO: 0.65/ MP  99 mmHg/ GTP 52 mmHg     Lt: Evaluation shows diffuse atherosclerotic disease throughout the  femoro-popliteal disease.  FLACO: 1.14, normal/  mmHg/ GTP 97 mmHg    -Reports b/l lateral thigh pain when going from sitting to standing, Denies lifestyle limiting claudication, denies true rest pain, no wounds/ tissue loss.   -Upon evaluation of claudication, reported she can walk any distance without stopping, later in appointment reports she must stop while walking due to pain in the b/l thighs. Inconsistent on located which leg is causing her pain.   -Palpable L DP pulse, doppler signals at L PT, R DP and PT.    -B/L feet are are warm and dry, no discoloration, no wounds/ tissue loss.     -Reviewed lower extremity ultrasound in detail with pt and answered all questions. Educated pt on peripheral arterial disease and indication for vascular intervention. No indications for vascular intervention at this time. Discussed that her bilateral leg pain symptoms are likely not caused by vascular etiology as she experiencing pain in both legs that occurs when going from sitting to standing. Recommending supervised exercise program and medical management at this time. Discussed prior to her return office visit she should be evaluated by PCP/ Multiple sclerosis doctor to determine etiology of left leg pain. Pt verbalized understanding.    Recommendations:  -Complete supervised walking program and return to the office in 3 months for follow up.  -Provided with trial of pletal for pain symptoms. RX provided today.  -Follow up with PCP for further workup for leg pain symptoms.   -Call the office with any new or worsening leg pain, discoloration, swelling, new wounds/ tissue loss.  -Start to take aspirin 81 mg daily.  -Continue to take statin medication daily  as per PCP.  -Do daily foot checks, food protection, wear well fitted shoes.  -Increase daily walking for 20-30 min everyday.

## 2024-07-12 ENCOUNTER — TELEPHONE (OUTPATIENT)
Dept: CARDIAC REHAB | Facility: CLINIC | Age: 69
End: 2024-07-12

## 2024-07-15 ENCOUNTER — TRANSCRIBE ORDERS (OUTPATIENT)
Dept: CARDIAC REHAB | Facility: CLINIC | Age: 69
End: 2024-07-15

## 2024-07-15 ENCOUNTER — CLINICAL SUPPORT (OUTPATIENT)
Dept: PULMONOLOGY | Facility: CLINIC | Age: 69
End: 2024-07-15
Payer: MEDICARE

## 2024-07-15 DIAGNOSIS — I73.9 PAD (PERIPHERAL ARTERY DISEASE) (HCC): Primary | ICD-10-CM

## 2024-07-15 DIAGNOSIS — I73.9 INTERMITTENT CLAUDICATION (HCC): ICD-10-CM

## 2024-07-15 PROCEDURE — 93668 PERIPHERAL VASCULAR REHAB: CPT

## 2024-07-15 NOTE — PROGRESS NOTES
PAD REHABILITATION   ASSESSMENT AND INDIVIDUALIZED TREATMENT PLAN  INITIAL           Today's date: 7/15/2024   # of Exercise Sessions Completed: 1- initial eval  Patient name: Britni Fermin      : 1955  Age: 69 y.o.       MRN: [id number]  Vascular Physician:   has not seen doctor, only NURIS Mathis    Provider: Selvin  Clinician: Yue Camacho MS, CEP, EPC         Treatment is tailored to this patient's individual needs.  The ITP was reviewed with the patient and all questions were answered to their satisfaction.  Additional ITP documentation can be found electronically including daily and monthly exercise summaries, daily session notes with ECG summaries, education notes, daily medication reconciliation, and daily physician supervision.      The patient had a Face to Face visit with NURIS Mathis where they received education regarding cardiovascular disease and PAD risk factor reduction.    This visit took place on 7/10/2024.     Comments: <1 block thigh and calf claudication R>L. Reports that goes away quickly with rest. Has hx of COPD and part of her R lung removed d/t lung cancer. Not using inhaler currently d/t price. Reports being SOB especially with stairs or incline        Dx: No diagnosis found.    VASCULAR COURSE:  Dx: No diagnosis found.    Date of onset: 6/10/24  Vascular Testing Impressions:   Date: 6/10/24  Right Leg: FLACO: 0.65 (moderate)  Left Leg: FLACO: 1.14 (normal) but diffuse atherosclerotic disease       Other cardiac history:  HTN, HLD, COPD, R squamos cell lung cancer        ASSESSMENT    Medical History:   Past Medical History:   Diagnosis Date    Allergic ?    Anxiety     Bronchial malignant neoplasm, right (HCC)     Cancer (HCC)     lung    Chronic major depressive disorder, single episode     COPD (chronic obstructive pulmonary disease) (HCC)     Full incontinence of feces     GERD (gastroesophageal reflux disease)     Hyperlipidemia     Hypertension      Malignant neoplasm of bronchus and lung (HCC)     Multiple sclerosis (HCC)     Pulmonary emphysema (HCC)     Restless leg syndrome     Tobacco use        Family History:  Family History   Problem Relation Age of Onset    No Known Problems Mother     COPD Father     Hypertension Father     No Known Problems Sister     No Known Problems Daughter     No Known Problems Maternal Grandmother     No Known Problems Maternal Grandfather     No Known Problems Paternal Grandmother     Rectal cancer Paternal Grandfather         unknown age    No Known Problems Sister     No Known Problems Maternal Aunt     Breast cancer Paternal Aunt         unknown age    No Known Problems Paternal Aunt     No Known Problems Paternal Aunt     No Known Problems Paternal Aunt     No Known Problems Paternal Aunt     No Known Problems Paternal Aunt     No Known Problems Paternal Aunt     COPD Brother     No Known Problems Brother        Allergies:   Sulfa antibiotics    Current Medications:   Current Outpatient Medications   Medication Sig Dispense Refill    atorvastatin (LIPITOR) 10 mg tablet Take 1 tablet (10 mg total) by mouth daily 90 tablet 1    Biotin 5 MG CAPS Take 5,000 mg by mouth daily      buPROPion (WELLBUTRIN XL) 300 mg 24 hr tablet Take 1 tablet (300 mg total) by mouth every morning (Patient not taking: Reported on 5/31/2024) 90 tablet 1    Cholecalciferol (Vitamin D3) 50 MCG (2000 UT) capsule Take 5,000 Units by mouth daily       cilostazol (PLETAL) 100 mg tablet Take 1 tablet (100 mg total) by mouth in the morning 90 tablet 0    famotidine (PEPCID) 40 MG tablet Take 1 tablet (40 mg total) by mouth every morning 90 tablet 1    FLUoxetine (PROzac) 20 mg capsule Take 1 capsule (20 mg total) by mouth daily 90 capsule 1    hydrocortisone 2.5 % cream Apply topically 2 (two) times a day 28 g 2    hydrOXYzine HCL (ATARAX) 25 mg tablet Take 1 tablet (25 mg total) by mouth every 6 (six) hours as needed for anxiety (Patient not taking:  Reported on 7/10/2024) 20 tablet 0    loperamide (IMODIUM) 2 mg capsule Take 2 mg by mouth daily otc      losartan-hydrochlorothiazide (HYZAAR) 50-12.5 mg per tablet Take 1 tablet by mouth daily 90 tablet 1    pramipexole (MIRAPEX) 0.5 mg tablet Take 1 tablet (0.5 mg total) by mouth daily at bedtime 90 tablet 1     No current facility-administered medications for this visit.       Medication compliance: Yes   Comments: Pt reports to be compliant with medications    Physical Limitations: osteoporosis- gets injections    Fall Risk: Low   Comments: Ambulates with a steady gait with no assist device    Cultural needs: none      CAD Risk Factors:  Cholesterol: Yes  HTN: Yes  DM: No  Obesity: Yes   Inactivity: Yes      EXERCISE ASSESSMENT:        CLAUDICATION PAIN   Restin/5  Exercise: 1-4/5  Minutes to onset with walkin mins.      Initial Fitness Assessment:   [place] ETT:   Resting:  BP: 118/80  HR: 56, Exercise:  BP: 164/74  HR: 99, METs:  5.84, ECG Summary: sinus ayala- NSR, Symptoms: SOB and claudication pain, Time to CDS 4/5: 14, and Comments: tolerated exercise very well    Current Functional Status:  Occupation: retired  Recreation/Physical Activity: n/a  ADL’s:Capable of performing light to moderate ADLs limited by Dyspnea  [place]ws: No limitations  Home exercise:  walking dog for about ~20 mins   Other Comments:  previously using gym at [facility], hasn't used for the last 2 months      Functional Capacity Screening Tool:  [facility] Activity Status Index:  6.05 METs    SMART Exercise Goals:   10% improvement in functional capacity based on max METs achieved in initial fitness assessment  reduced dyspnea with physical activity    improved DASI score by 10%  increased exercise capacity by 40% based on peak METs tolerated in cardiac rehab exercise session  maintain > 150 minutes per week of moderate intensity exercise  increased walking duration until CDS 4/5    Patient Specific EXERCISE GOALS:        1.   "Return to gym   2.  Decrease claudication pain      PSYCHOSOCIAL ASSESSMENT:    Date last assessed:  7/15/24  Depression screening:  PHQ-9 = 11    Interpretation:  10-14 = Moderate Depression  Anxiety screening:  SHIVA-7 = 12    Interpretation: 10-14 = Moderate anxiety    Pt self-report of depression and anxiety   Patient has a history of depression  Patient has a history of anxiety   Compliant with medical therapy      Self-reported stress level:  5   Stressors:  health, life  Stress Management Tools: exercise, keep a positive mindset, and enjoy a hobby    Quality of Life Screen:  (Higher score indicates disease impact on QOL)  [facility] COOP score: 40/45     Social Support:   patient does not have close relationships for emotional/social support  Community/Social Activities: n/a     Psychosocial Assessment as it relates to rehabilitation:   Patient denies issues with his/her family or home life that may affect their rehabilitation efforts.       NUTRITION ASSESSMENT:    Initial Weight:  140  Current Weight:     Height:   Ht Readings from Last 1 Encounters:   07/10/24 4' 9\" (1.448 m)       Rate Your Plate Score: 51/81    Diabetes: N/A  A1c:     last measured:     Lipid management: Discussed diet and lipid management and Last lipid profile 5/30/24  Chol 174    HDL 64  LDL 83    Current Dietary Habits:  Reports over doing on it on snacking    Drug/Alcohol Use:   Yes, rare alcohol consumption    SMART Nutrition Goals:   Improved Rate Your Plate score  >64, eat 6 or more servings of grain products per day, eat whole grain breads, brown rice and whole grain cereals, eat 5 or more servings of fruits and vegetables a day, eat no more than 6oz of meat per day, dine out less than once per week or choose low fat restaurant meals, eat red meat once a week or less, eat chicken and fish that is not fried, eat meatless meals twice a week or more, choose 1% or skim milk, rarely eat cheese or choose reduced fat or skim, Do " "not cook with oil, butter or margarine, Do not eat fried foods, use \"light\" tub margarine on bread, potatoes and vegetables, choose healthy snacks such as fruit, pretzels, and low fat crackers, choose healthy desserts and sweets such as derek food cake or  fruit, never add salt to food when cooking or at the table, choose low sodium canned, frozen/packaged foods or rarely/never eat, rarely/never eat salty snacks, and choose low sugar desserts and sweets    Patient Specific NUTRITION GOALS:     1.  Increase water intake   2. Decrease snacking   3. Reduce sodium intake      OTHER CORE COMPONENT ASSESSMENT:    Tobacco Use:     Pt quit 1/10/21   and has abstained    SMART Goals:   consistent, controlled resting BP < 130/80, medication compliance, and demonstrate pursed lipped breathing  reduced dietary sodium <2000mg, medication compliance, and Abstain from smoking    Patient Specific CORE COMPONENT GOALS:     1. Monitor SpO2   2. Decrease SOB    INDIVIDUALIZED TREATMENT PLAN      EXERCISE GOALS and PLAN      Progress toward Exercise goals:    Reviewed Pt goals and determined plan of care    Exercise Intervention:    education on home exercise guidelines, home exercise 30+ mins 2 days opposite CR, and group class: Risk Factors for PAD    The patient was counseled on exercise guidelines to achieve a minimum of 150 mins/wk of moderate intensity (RPE 4-6)   exercise and encouraged to add 1-2 days of exercise on opposite days of cardiac rehab as tolerated.     Current Aerobic Exercise Prescription:      Frequency: 3 days/week   Supplement with home exercise 2+ days/wk as tolerated      Minutes: 20 - 40         METS: 3-5            HR: RHR +30-40bpm   RPE: 4-6    CDS: 4/5        Modalities: Treadmill, NuStep, and Recumbent bike     Exercise workloads will be progressed gradually as tolerated, within limits of patient's ability, and according to the patient's   response to the exercise program.      Aerobic Exercise " "Prescription Plan for Progression   Frequency: 3 days/week of cardiac rehab       Supplement with home exercise 2+ days/wk as tolerated    Minutes: 40       >150 mins/wk of moderate intensity exercise   METS: 4-6   HR: RHR +30-40bpm     RPE: 4-6   CDS: 4/5   Modalities: Treadmill, Lifecycle, and NuStep    Strength trainin-3 days / week  12-15 repetitions  1-2 sets per modality   Will be added following 2-3 weeks of monitored exercise sessions   Modalities: Chest Press, Pull Downs, Arm Curl, and Seated Row    Home Exercise:  walking her dog for ~20 mins/day    Exercise Education: benefit of exercise for CAD risk factors, home exercise guidelines, AHA guidelines to achieve >150 mins/wk of moderate exercise, RPE scale, and group class: risk factors for PAD      Readiness to change: Preparation:  (Getting ready to change)       NUTRITION GOALS AND PLAN    Weight control:    Starting weight: 140   Current weight:     Nutritional   Reviewed patient's Rate your Plate. Discussed key elements of heart healthy eating. Reviewed patient goals for dietary modifications and their clinical implications.  Reviewed most recent lipid profile.     Patient's progress toward Nutrition goals:    Reviewed Pt goals and determined plan of care    Nutrition Intervention:   group class: Reading Food Labels, group Class: Heart Healthy Eating, increase intake of whole grains, replace refined flours with whole grains, increase daily intake of fruits and vegetables, limit meat intake to less than 6oz per day, choose healthy meals while dining out, eat red meat once a week or less, choose lean red meat, eat chicken and fish that is baked, broiled or roasted, eat more meatless meals, drink/use 1%  low fat or skim  milk, eat reduced-fat or part-skim cheese or rarely eat, cook without fats or oils, never/rarely eat fried foods, use \"light\" tub margarine, eat healthy snacks like fruit, pretzels, and low fat crackers, choose desserts such as " fruit, derek food cake, low-fat or fat-free sweets, never/rarely add salt to food when cooking or at the table, choose low sodium canned, frozen, packaged foods or rarely eat these foods, rarely/never eat salty snacks, and choose low sugar desserts and sweets    Measurable goals were based Rate Your Plate Dietary Self-Assessment. These are the areas in which the patient could score higher on the assessment.  Goals include recommendations for a heart healthy diet based on American Heart Association.    Nutrition Education:   heart healthy eating principles  low sodium diet  maintaining hydration  nutrition for  lipid management    Readiness to change: Contemplation:  (Acknowledging that there is a problem but not yet ready or sure of wanting to make a change)      PSYCHOSOCIAL GOALS AND PLAN    Psychosocial Assessment as it relates to rehabilitation:   Patient denies issues with his/her family or home life that may affect their rehabilitation efforts.     SMART Psychosocial Goals:     Reduce perceived stress to 1-3/10, improved [facility] QOL < 27, PHQ-9 - reduced severity by one level, Feelings in [facility] Score < 3, Physical Fitness in [place] Score < 3, Social Support in [place] Score < 3, Daily Activity in [place] Score < 3, Social Activities in [place] Score < 3, Pain in [place] Score < 3, Overall Health in [facility] Score < 3, Quality of Life in [facility] Score < 3 , Change in Health in [place] Score < 3 , and  reduced time feeling down, depressed or hopeless    Patient Specific PSYCHOCOSOCIAL GOALS:     maintain compliance with medical therapy and practice relaxation techniques    Patient's progress toward Psychosocial goals:    Reviewed Pt goals and determined plan of care, compliant with medical therapy    Psychosocial Intervention:   Practice relaxation techniques, Exercise, Keep a positive mindset, and Repeat PHQ-9 every 30 days if score >5    Psychosocial Education: signs/sxs of depression,  benefits of a positive support system, stress management techniques, and tools to manage fear/anxiety related to becoming SOB    Information to utilize [employer] was provided as well as contact information for counseling through  Behavioral [facility] and group psychotherapy groups available.    Readiness to change: Preparation:  (Getting ready to change)       OTHER CORE COMPONENTS GOALS and PLAN      Blood Pressure will be monitored throughout the program and cardiologist will be notified of elevated trends.    Pt will be encouraged to monitor home BP if advised by cardiologist.    Tobacco Intervention:   Pt quit 1/10/21 and has abstained since quitting.        Progress toward Core Component goals:    Reviewed Pt goals and determined plan of care    Other Core Components Intervention:   medication compliance, avoid processed foods, engage in regular exercise, monitor home BP, and group education: Understanding PAD Anatomy and Physiology, group exercise: PAD Medications    Group and Individual Education:  components of blood pressure management, effects of nicotine and tobacco, group class: Understanding Heart Disease, and group class:  Common Heart Medications    Readiness to change: Preparation:  (Getting ready to change)

## 2024-07-18 ENCOUNTER — CLINICAL SUPPORT (OUTPATIENT)
Dept: PULMONOLOGY | Facility: CLINIC | Age: 69
End: 2024-07-18
Payer: MEDICARE

## 2024-07-18 DIAGNOSIS — I73.9 PAD (PERIPHERAL ARTERY DISEASE) (HCC): Primary | ICD-10-CM

## 2024-07-18 PROCEDURE — 93668 PERIPHERAL VASCULAR REHAB: CPT

## 2024-07-19 ENCOUNTER — CLINICAL SUPPORT (OUTPATIENT)
Dept: PULMONOLOGY | Facility: CLINIC | Age: 69
End: 2024-07-19
Payer: MEDICARE

## 2024-07-19 DIAGNOSIS — I73.9 PAD (PERIPHERAL ARTERY DISEASE) (HCC): Primary | ICD-10-CM

## 2024-07-23 ENCOUNTER — CLINICAL SUPPORT (OUTPATIENT)
Dept: PULMONOLOGY | Facility: CLINIC | Age: 69
End: 2024-07-23
Payer: MEDICARE

## 2024-07-23 DIAGNOSIS — I73.9 PAD (PERIPHERAL ARTERY DISEASE) (HCC): Primary | ICD-10-CM

## 2024-07-23 PROCEDURE — 93668 PERIPHERAL VASCULAR REHAB: CPT

## 2024-07-25 ENCOUNTER — CLINICAL SUPPORT (OUTPATIENT)
Dept: PULMONOLOGY | Facility: CLINIC | Age: 69
End: 2024-07-25
Payer: MEDICARE

## 2024-07-25 DIAGNOSIS — I73.9 PAD (PERIPHERAL ARTERY DISEASE) (HCC): Primary | ICD-10-CM

## 2024-07-25 PROCEDURE — 93668 PERIPHERAL VASCULAR REHAB: CPT

## 2024-07-26 ENCOUNTER — CLINICAL SUPPORT (OUTPATIENT)
Dept: PULMONOLOGY | Facility: CLINIC | Age: 69
End: 2024-07-26
Payer: MEDICARE

## 2024-07-26 DIAGNOSIS — I73.9 PAD (PERIPHERAL ARTERY DISEASE) (HCC): Primary | ICD-10-CM

## 2024-07-26 PROCEDURE — 93668 PERIPHERAL VASCULAR REHAB: CPT

## 2024-07-30 ENCOUNTER — CLINICAL SUPPORT (OUTPATIENT)
Dept: PULMONOLOGY | Facility: CLINIC | Age: 69
End: 2024-07-30
Payer: MEDICARE

## 2024-07-30 DIAGNOSIS — I73.9 PAD (PERIPHERAL ARTERY DISEASE) (HCC): Primary | ICD-10-CM

## 2024-07-30 PROCEDURE — 93668 PERIPHERAL VASCULAR REHAB: CPT

## 2024-08-01 ENCOUNTER — CLINICAL SUPPORT (OUTPATIENT)
Dept: PULMONOLOGY | Facility: CLINIC | Age: 69
End: 2024-08-01
Payer: MEDICARE

## 2024-08-01 DIAGNOSIS — I73.9 PAD (PERIPHERAL ARTERY DISEASE) (HCC): Primary | ICD-10-CM

## 2024-08-01 PROCEDURE — 93668 PERIPHERAL VASCULAR REHAB: CPT

## 2024-08-02 ENCOUNTER — CLINICAL SUPPORT (OUTPATIENT)
Dept: PULMONOLOGY | Facility: CLINIC | Age: 69
End: 2024-08-02
Payer: MEDICARE

## 2024-08-02 DIAGNOSIS — I73.9 PAD (PERIPHERAL ARTERY DISEASE) (HCC): Primary | ICD-10-CM

## 2024-08-02 PROCEDURE — 93668 PERIPHERAL VASCULAR REHAB: CPT

## 2024-08-05 ENCOUNTER — CLINICAL SUPPORT (OUTPATIENT)
Dept: PULMONOLOGY | Facility: CLINIC | Age: 69
End: 2024-08-05
Payer: MEDICARE

## 2024-08-05 DIAGNOSIS — I73.9 PAD (PERIPHERAL ARTERY DISEASE) (HCC): Primary | ICD-10-CM

## 2024-08-05 PROCEDURE — 93668 PERIPHERAL VASCULAR REHAB: CPT

## 2024-08-06 ENCOUNTER — APPOINTMENT (OUTPATIENT)
Dept: PULMONOLOGY | Facility: CLINIC | Age: 69
End: 2024-08-06
Payer: MEDICARE

## 2024-08-07 ENCOUNTER — CLINICAL SUPPORT (OUTPATIENT)
Dept: PULMONOLOGY | Facility: CLINIC | Age: 69
End: 2024-08-07
Payer: MEDICARE

## 2024-08-07 DIAGNOSIS — I73.9 PAD (PERIPHERAL ARTERY DISEASE) (HCC): Primary | ICD-10-CM

## 2024-08-07 PROCEDURE — 93668 PERIPHERAL VASCULAR REHAB: CPT

## 2024-08-08 ENCOUNTER — APPOINTMENT (OUTPATIENT)
Dept: PULMONOLOGY | Facility: CLINIC | Age: 69
End: 2024-08-08
Payer: MEDICARE

## 2024-08-09 ENCOUNTER — CLINICAL SUPPORT (OUTPATIENT)
Dept: PULMONOLOGY | Facility: CLINIC | Age: 69
End: 2024-08-09
Payer: MEDICARE

## 2024-08-09 DIAGNOSIS — I73.9 PAD (PERIPHERAL ARTERY DISEASE) (HCC): Primary | ICD-10-CM

## 2024-08-09 PROCEDURE — 93668 PERIPHERAL VASCULAR REHAB: CPT

## 2024-08-12 ENCOUNTER — CLINICAL SUPPORT (OUTPATIENT)
Dept: PULMONOLOGY | Facility: CLINIC | Age: 69
End: 2024-08-12
Payer: MEDICARE

## 2024-08-12 DIAGNOSIS — I73.9 PAD (PERIPHERAL ARTERY DISEASE) (HCC): Primary | ICD-10-CM

## 2024-08-12 PROCEDURE — 93668 PERIPHERAL VASCULAR REHAB: CPT

## 2024-08-13 ENCOUNTER — APPOINTMENT (OUTPATIENT)
Dept: PULMONOLOGY | Facility: CLINIC | Age: 69
End: 2024-08-13
Payer: MEDICARE

## 2024-08-13 NOTE — PROGRESS NOTES
PAD REHABILITATION   ASSESSMENT AND INDIVIDUALIZED TREATMENT PLAN  60 DAY          Today's date: 2024   # of Exercise Sessions Completed: 13  Patient name: Britni Fermin      : 1955  Age: 69 y.o.       MRN: [id number]  Vascular Physician:   has not seen doctor, only NURIS Mathis    Provider: Selvin  Clinician: Jose Luis Schaefer MS, CEP, EPC         Treatment is tailored to this patient's individual needs.  The ITP was reviewed with the patient and all questions were answered to their satisfaction.  Additional ITP documentation can be found electronically including daily and monthly exercise summaries, daily session notes with ECG summaries, education notes, daily medication reconciliation, and daily physician supervision.      The patient had a Face to Face visit with NURIS Mathis where they received education regarding cardiovascular disease and PAD risk factor reduction.    This visit took place on 7/10/2024.     Comments:     2024 Attending rehab for 31-58 min at 2.8-3.6 METS. CDS 3/5.Will continue to progress exercise intensity as tolerates       7/15/2024 <1 block thigh and calf claudication R>L. Reports that goes away quickly with rest. Has hx of COPD and part of her R lung removed d/t lung cancer. Not using inhaler currently d/t price. Reports being SOB especially with stairs or incline        Dx:   Encounter Diagnosis   Name Primary?    PAD (peripheral artery disease) (HCC) Yes       VASCULAR COURSE:  Dx:   Encounter Diagnosis   Name Primary?    PAD (peripheral artery disease) (HCC) Yes       Date of onset: 6/10/24  Vascular Testing Impressions:   Date: 6/10/24  Right Leg: FLACO: 0.65 (moderate)  Left Leg: FLACO: 1.14 (normal) but diffuse atherosclerotic disease       Other cardiac history:  HTN, HLD, COPD, R squamos cell lung cancer        ASSESSMENT    Medical History:   Past Medical History:   Diagnosis Date    Allergic ?    Anxiety     Bronchial malignant neoplasm, right  (HCC)     Cancer (HCC)     lung    Chronic major depressive disorder, single episode     COPD (chronic obstructive pulmonary disease) (HCC)     Full incontinence of feces     GERD (gastroesophageal reflux disease)     Hyperlipidemia     Hypertension     Malignant neoplasm of bronchus and lung (HCC)     Multiple sclerosis (HCC)     Pulmonary emphysema (HCC)     Restless leg syndrome     Tobacco use        Family History:  Family History   Problem Relation Age of Onset    No Known Problems Mother     COPD Father     Hypertension Father     No Known Problems Sister     No Known Problems Daughter     No Known Problems Maternal Grandmother     No Known Problems Maternal Grandfather     No Known Problems Paternal Grandmother     Rectal cancer Paternal Grandfather         unknown age    No Known Problems Sister     No Known Problems Maternal Aunt     Breast cancer Paternal Aunt         unknown age    No Known Problems Paternal Aunt     No Known Problems Paternal Aunt     No Known Problems Paternal Aunt     No Known Problems Paternal Aunt     No Known Problems Paternal Aunt     No Known Problems Paternal Aunt     COPD Brother     No Known Problems Brother        Allergies:   Sulfa antibiotics    Current Medications:   Current Outpatient Medications   Medication Sig Dispense Refill    atorvastatin (LIPITOR) 10 mg tablet Take 1 tablet (10 mg total) by mouth daily 90 tablet 1    Biotin 5 MG CAPS Take 5,000 mg by mouth daily      buPROPion (WELLBUTRIN XL) 300 mg 24 hr tablet Take 1 tablet (300 mg total) by mouth every morning (Patient not taking: Reported on 5/31/2024) 90 tablet 1    Cholecalciferol (Vitamin D3) 50 MCG (2000 UT) capsule Take 5,000 Units by mouth daily       cilostazol (PLETAL) 100 mg tablet Take 1 tablet (100 mg total) by mouth in the morning 90 tablet 0    famotidine (PEPCID) 40 MG tablet Take 1 tablet (40 mg total) by mouth every morning 90 tablet 1    FLUoxetine (PROzac) 20 mg capsule Take 1 capsule (20 mg  total) by mouth daily 90 capsule 1    hydrocortisone 2.5 % cream Apply topically 2 (two) times a day 28 g 2    hydrOXYzine HCL (ATARAX) 25 mg tablet Take 1 tablet (25 mg total) by mouth every 6 (six) hours as needed for anxiety (Patient not taking: Reported on 7/10/2024) 20 tablet 0    loperamide (IMODIUM) 2 mg capsule Take 2 mg by mouth daily otc      losartan-hydrochlorothiazide (HYZAAR) 50-12.5 mg per tablet Take 1 tablet by mouth daily 90 tablet 1    pramipexole (MIRAPEX) 0.5 mg tablet Take 1 tablet (0.5 mg total) by mouth daily at bedtime 90 tablet 1     No current facility-administered medications for this visit.       Medication compliance: Yes   Comments: Pt reports to be compliant with medications    Physical Limitations: osteoporosis- gets injections    Fall Risk: Low   Comments: Ambulates with a steady gait with no assist device    Cultural needs: none      CAD Risk Factors:  Cholesterol: Yes  HTN: Yes  DM: No  Obesity: Yes   Inactivity: Yes      EXERCISE ASSESSMENT:        CLAUDICATION PAIN   Restin/5  Exercise: 1-4/5  Minutes to onset with walking: varies on the day       Initial Fitness Assessment:   Jean Carlos ETT:   Resting:  BP: 118/80  HR: 56, Exercise:  BP: 164/74  HR: 99, METs:  5.84, ECG Summary: sinus ayala- NSR, Symptoms: SOB and claudication pain, Time to CDS 4/5: 14, and Comments: tolerated exercise very well    Current Functional Status:  Occupation: retired  Recreation/Physical Activity: n/a  ADL’s:Capable of performing light to moderate ADLs limited by Dyspnea  [place]: No limitations  Home exercise:  walking dog for about ~20 mins   Other Comments:  previously using gym at [facility], hasn't used for the last 2 months      Functional Capacity Screening Tool:  [facility] Activity Status Index:  6.05 METs    SMART Exercise Goals:   10% improvement in functional capacity based on max METs achieved in initial fitness assessment  reduced dyspnea with physical activity    improved DASI score  "by 10%  increased exercise capacity by 40% based on peak METs tolerated in cardiac rehab exercise session  maintain > 150 minutes per week of moderate intensity exercise  increased walking duration until CDS 4/5    Patient Specific EXERCISE GOALS:        1.  Return to gym   2.  Decrease claudication pain      PSYCHOSOCIAL ASSESSMENT:    Date last assessed:  7/15/24  Depression screening:  PHQ-9 = 11    Interpretation:  10-14 = Moderate Depression  Anxiety screening:  SHIVA-7 = 12    Interpretation: 10-14 = Moderate anxiety    Pt self-report of depression and anxiety   Patient has a history of depression  Patient has a history of anxiety   Compliant with medical therapy      Self-reported stress level:  5   Stressors:  health, life  Stress Management Tools: exercise, keep a positive mindset, and enjoy a hobby    Quality of Life Screen:  (Higher score indicates disease impact on QOL)  [facility] COOP score: 40/45     Social Support:   patient does not have close relationships for emotional/social support  Community/Social Activities: n/a     Psychosocial Assessment as it relates to rehabilitation:   Patient denies issues with his/her family or home life that may affect their rehabilitation efforts.       NUTRITION ASSESSMENT:    Initial Weight:  140  Current Weight: 141    Height:   Ht Readings from Last 1 Encounters:   07/10/24 4' 9\" (1.448 m)       Rate Your Plate Score: 51/81    Diabetes: N/A  A1c:     last measured:     Lipid management: Discussed diet and lipid management and Last lipid profile 5/30/24  Chol 174    HDL 64  LDL 83    Current Dietary Habits:  Reports over doing on it on snacking    Drug/Alcohol Use:   Yes, rare alcohol consumption    SMART Nutrition Goals:   Improved Rate Your Plate score  >64, eat 6 or more servings of grain products per day, eat whole grain breads, brown rice and whole grain cereals, eat 5 or more servings of fruits and vegetables a day, eat no more than 6oz of meat per day, " "dine out less than once per week or choose low fat restaurant meals, eat red meat once a week or less, eat chicken and fish that is not fried, eat meatless meals twice a week or more, choose 1% or skim milk, rarely eat cheese or choose reduced fat or skim, Do not cook with oil, butter or margarine, Do not eat fried foods, use \"light\" tub margarine on bread, potatoes and vegetables, choose healthy snacks such as fruit, pretzels, and low fat crackers, choose healthy desserts and sweets such as derek food cake or  fruit, never add salt to food when cooking or at the table, choose low sodium canned, frozen/packaged foods or rarely/never eat, rarely/never eat salty snacks, and choose low sugar desserts and sweets    Patient Specific NUTRITION GOALS:     1.  Increase water intake   2. Decrease snacking   3. Reduce sodium intake      OTHER CORE COMPONENT ASSESSMENT:    Tobacco Use:     Pt quit 1/10/21   and has abstained    SMART Goals:   consistent, controlled resting BP < 130/80, medication compliance, and demonstrate pursed lipped breathing  reduced dietary sodium <2000mg, medication compliance, and Abstain from smoking    Patient Specific CORE COMPONENT GOALS:     1. Monitor SpO2   2. Decrease SOB    INDIVIDUALIZED TREATMENT PLAN      EXERCISE GOALS and PLAN      Progress toward Exercise goals:    Reviewed Pt goals and determined plan of care,attending rehab 34-58 min per session at 2.8-3.6 METS (ave CDS 3/5)    Exercise Intervention:    education on home exercise guidelines, home exercise 30+ mins 2 days opposite CR, and group class: Risk Factors for PAD  continue to progress exercise duration and intensity as tolerated.    The patient was counseled on exercise guidelines to achieve a minimum of 150 mins/wk of moderate intensity (RPE 4-6)   exercise and encouraged to add 1-2 days of exercise on opposite days of cardiac rehab as tolerated.     Current Aerobic Exercise Prescription:      Frequency: 3 " days/week   Supplement with home exercise 2+ days/wk as tolerated      Minutes: 31-58        METS: 2.8-3.6            HR:    RPE: 3-4    CDS: 3/5        Modalities: Treadmill, NuStep, and Recumbent bike     Exercise workloads will be progressed gradually as tolerated, within limits of patient's ability, and according to the patient's   response to the exercise program.      Aerobic Exercise Prescription Plan for Progression   Frequency: 3 days/week of cardiac rehab       Supplement with home exercise 2+ days/wk as tolerated    Minutes: 40-55     >150 mins/wk of moderate intensity exercise   METS: 3-4   HR: RHR +30-40bpm     RPE: 4-6   CDS: 4/5   Modalities: Treadmill, Lifecycle, and NuStep    Strength trainin-3 days / week  12-15 repetitions  1-2 sets per modality   Will be added following 2-3 weeks of monitored exercise sessions   Modalities: Chest Press, Pull Downs, Arm Curl, and Seated Row    Home Exercise:  walking her dog for ~20 mins/day    Exercise Education: benefit of exercise for CAD risk factors, home exercise guidelines, AHA guidelines to achieve >150 mins/wk of moderate exercise, RPE scale, and group class: risk factors for PAD      Readiness to change: Preparation:  (Getting ready to change)       NUTRITION GOALS AND PLAN    Weight control:    Starting weight: 140   Current weight: 141    Nutritional   Reviewed patient's Rate your Plate. Discussed key elements of heart healthy eating. Reviewed patient goals for dietary modifications and their clinical implications.  Reviewed most recent lipid profile.     Patient's progress toward Nutrition goals:    Reviewed Pt goals and determined plan of care up 1lbs over the last 30 days     Nutrition Intervention:   group class: Reading Food Labels, group Class: Heart Healthy Eating, increase intake of whole grains, replace refined flours with whole grains, increase daily intake of fruits and vegetables, limit meat intake to less than 6oz per day,  "choose healthy meals while dining out, eat red meat once a week or less, choose lean red meat, eat chicken and fish that is baked, broiled or roasted, eat more meatless meals, drink/use 1%  low fat or skim  milk, eat reduced-fat or part-skim cheese or rarely eat, cook without fats or oils, never/rarely eat fried foods, use \"light\" tub margarine, eat healthy snacks like fruit, pretzels, and low fat crackers, choose desserts such as fruit, derek food cake, low-fat or fat-free sweets, never/rarely add salt to food when cooking or at the table, choose low sodium canned, frozen, packaged foods or rarely eat these foods, rarely/never eat salty snacks, and choose low sugar desserts and sweets    Measurable goals were based Rate Your Plate Dietary Self-Assessment. These are the areas in which the patient could score higher on the assessment.  Goals include recommendations for a heart healthy diet based on American Heart Association.    Nutrition Education:   heart healthy eating principles  low sodium diet  maintaining hydration  nutrition for  lipid management    Readiness to change: Contemplation:  (Acknowledging that there is a problem but not yet ready or sure of wanting to make a change)      PSYCHOSOCIAL GOALS AND PLAN    Psychosocial Assessment as it relates to rehabilitation:   Patient denies issues with his/her family or home life that may affect their rehabilitation efforts.     SMART Psychosocial Goals:     Reduce perceived stress to 1-3/10, improved [facility] QOL < 27, PHQ-9 - reduced severity by one level, Feelings in [place] Score < 3, Physical Fitness in [place] Score < 3, Social Support in [place] Score < 3, Daily Activity in [place] Score < 3, Social Activities in [place] Score < 3, Pain in [place] Score < 3, Overall Health in [place] Score < 3, Quality of Life in [facility] Score < 3 , Change in Health in [place] Score < 3 , and  reduced time feeling down, depressed or hopeless    Patient " Specific PSYCHOCOSOCIAL GOALS:     maintain compliance with medical therapy and practice relaxation techniques    Patient's progress toward Psychosocial goals:    Reviewed Pt goals and determined plan of care, compliant with medical therapy    Psychosocial Intervention:   Practice relaxation techniques, Exercise, Keep a positive mindset, and Repeat PHQ-9 every 30 days if score >5    Psychosocial Education: signs/sxs of depression, benefits of a positive support system, stress management techniques, and tools to manage fear/anxiety related to becoming SOB    Information to utilize [employer] was provided as well as contact information for counseling through  Behavioral Health and group psychotherapy groups available.    Readiness to change: Preparation:  (Getting ready to change)       OTHER CORE COMPONENTS GOALS and PLAN      Blood Pressure will be monitored throughout the program and cardiologist will be notified of elevated trends.    Pt will be encouraged to monitor home BP if advised by cardiologist.    Tobacco Intervention:   Pt quit 1/10/21 and has abstained since quitting.        Progress toward Core Component goals:    Reviewed Pt goals and determined plan of caremonitoring 02 at home    Other Core Components Intervention:   medication compliance, avoid processed foods, engage in regular exercise, monitor home BP, and group education: Understanding PAD Anatomy and Physiology, group exercise: PAD Medications    Group and Individual Education:  components of blood pressure management, effects of nicotine and tobacco, group class: Understanding Heart Disease, and group class:  Common Heart Medications    Readiness to change: Preparation:  (Getting ready to change)

## 2024-08-14 ENCOUNTER — CLINICAL SUPPORT (OUTPATIENT)
Dept: PULMONOLOGY | Facility: CLINIC | Age: 69
End: 2024-08-14
Payer: MEDICARE

## 2024-08-14 DIAGNOSIS — I73.9 PAD (PERIPHERAL ARTERY DISEASE) (HCC): Primary | ICD-10-CM

## 2024-08-14 PROCEDURE — 93668 PERIPHERAL VASCULAR REHAB: CPT

## 2024-08-15 ENCOUNTER — APPOINTMENT (OUTPATIENT)
Dept: PULMONOLOGY | Facility: CLINIC | Age: 69
End: 2024-08-15
Payer: MEDICARE

## 2024-08-16 ENCOUNTER — CLINICAL SUPPORT (OUTPATIENT)
Dept: PULMONOLOGY | Facility: CLINIC | Age: 69
End: 2024-08-16
Payer: MEDICARE

## 2024-08-16 DIAGNOSIS — I73.9 PAD (PERIPHERAL ARTERY DISEASE) (HCC): Primary | ICD-10-CM

## 2024-08-16 PROCEDURE — 93668 PERIPHERAL VASCULAR REHAB: CPT

## 2024-08-16 NOTE — PROGRESS NOTES
Chief Complaint   Patient presents with   • Hypertension   • Annual Exam       HPI:  Donovan Warner, -1982, is a 38 y.o. male who presents for an annual physical.  Patient has not seen PMD for years and typically goes to [facility].  Was noted to have high BP 4years ago and given a low dose of a medication but states after 2 doses stopped and changed lifestyle.  Has been having higher BP with symptoms of ears popping and headaches frequently (none now)  Was diagnosed with COVID in January and now improved but some residual mild intermittent cough.  Works at [employer] and high stress.      Recent Hospitalizations:  No hospitalization(s) within the last year..    Current Medical Providers:  Patient Care Team:  Quentin Delaney MD as PCP - General (Internal Medicine)    Compared to one year ago, the patient feels his physical health is the same and his mental health is the same.    Depression Screen:  PHQ-2/PHQ-9 Depression Screening 3/4/2021   Little interest or pleasure in doing things 0   Feeling down, depressed, or hopeless 0   Total Score 0       Past Medical/Family/Social History:  The following portions of the patient's history were reviewed and updated as appropriate: allergies, current medications, past family history, past medical history, past social history, past surgical history and problem list.    No Known Allergies    No current outpatient medications on file.    Current medication list contains no high risk medications.  No harmful drug interactions have been identified.     Family History   Problem Relation Age of Onset   • Diabetes Mother    • Hypertension Mother    • Hyperlipidemia Mother    • Diabetes Father    • Hypertension Father        Social History     Tobacco Use   • Smoking status: Never Smoker   • Smokeless tobacco: Never Used   Substance Use Topics   • Alcohol use: Yes     Frequency: Never       Past Surgical History:   Procedure Laterality Date   • UMBILICAL HERNIA  Exercise session details    "REPAIR     • VASECTOMY         Patient Active Problem List   Diagnosis   • BP (high blood pressure)       Review of Systems   Constitutional: Negative for activity change, appetite change, fatigue, fever, unexpected weight gain and unexpected weight loss.   HENT: Negative for nosebleeds, rhinorrhea, trouble swallowing and voice change.    Eyes: Negative for visual disturbance.   Respiratory: Negative for cough, chest tightness, shortness of breath and wheezing.    Cardiovascular: Negative for chest pain, palpitations and leg swelling.   Gastrointestinal: Negative for abdominal pain, blood in stool, constipation, diarrhea, nausea, vomiting, GERD and indigestion.   Genitourinary: Negative for dysuria, frequency and hematuria.   Musculoskeletal: Negative for arthralgias, back pain and myalgias.   Skin: Negative for rash and bruise.   Neurological: Negative for dizziness, tremors, weakness, light-headedness, numbness, headache and memory problem.   Hematological: Negative for adenopathy. Does not bruise/bleed easily.   Psychiatric/Behavioral: Negative for sleep disturbance and depressed mood. The patient is not nervous/anxious.        Objective     Vitals:    03/04/21 1125   BP: 140/99   BP Location: Left arm   Patient Position: Sitting   Cuff Size: Large Adult   Pulse: 84   Temp: 98 °F (36.7 °C)   TempSrc: Temporal   SpO2: 97%   Weight: 90.3 kg (199 lb)   Height: 175.3 cm (69\")       Patient's Body mass index is 29.39 kg/m². BMI is above normal parameters. Recommendations include: exercise counseling and nutrition counseling.      No exam data present    Physical Exam  Vitals signs and nursing note reviewed.   Constitutional:       General: He is not in acute distress.     Appearance: He is well-developed. He is not diaphoretic.   HENT:      Head: Normocephalic and atraumatic.      Right Ear: External ear normal.      Left Ear: External ear normal.      Nose: Nose normal.   Eyes:      Conjunctiva/sclera: Conjunctivae " normal.      Pupils: Pupils are equal, round, and reactive to light.   Neck:      Musculoskeletal: Normal range of motion and neck supple.      Thyroid: No thyromegaly.      Trachea: No tracheal deviation.   Cardiovascular:      Rate and Rhythm: Normal rate and regular rhythm.      Heart sounds: Normal heart sounds. No murmur. No friction rub. No gallop.    Pulmonary:      Effort: Pulmonary effort is normal. No respiratory distress.      Breath sounds: Normal breath sounds.   Abdominal:      General: Bowel sounds are normal.      Palpations: Abdomen is soft. There is no mass.      Tenderness: There is no abdominal tenderness. There is no guarding.   Musculoskeletal: Normal range of motion.   Lymphadenopathy:      Cervical: No cervical adenopathy.   Skin:     General: Skin is warm and dry.      Capillary Refill: Capillary refill takes less than 2 seconds.      Findings: No rash.   Neurological:      Mental Status: He is alert and oriented to person, place, and time.      Motor: No abnormal muscle tone.      Deep Tendon Reflexes: Reflexes normal.   Psychiatric:         Behavior: Behavior normal.         Thought Content: Thought content normal.         Judgment: Judgment normal.         Recent Lab Results:  Lab Results   Component Value Date    GLU 95 05/06/2015          Assessment/Plan   Age-appropriate Screening Schedule:  Refer to the list below for future screening recommendations based on patient's age, sex and/or medical conditions.      Health Maintenance   Topic Date Due   • TDAP/TD VACCINES (1 - Tdap) 04/21/1999   • INFLUENZA VACCINE  08/01/2020       There are no diagnoses linked to this encounter.    Annual wellness visit reviewed with patient.  All past history, medications, social history, and problem list were reviewed.  Discussed advanced directives and living will.  Patient has living will: Living will: no and information packet given to patient to complete at home and bring copy to office.  Will check  the labs as ordered above to evaluate the blood sugars, kidney, liver, cholesterol for screening.  Discussed flu shot recommended to get the influenza vaccine annually in the fall.  Encouraged follow-up with the eye doctor on annual basis.  Discussed weight and encouraged exercise as tolerated while following a healthy diet.  Reviewed sexual health and safe sex practices.    Hypertension that appears to be a chronic condition based on history.  Will start lisinopril 10 mg daily and follow-up in 3-4 weeks to recheck the blood pressure.  We will check the labs as noted above to be sure there is no renal issues going on.    An After Visit Summary with all of these plans were given to the patient.        Follow Up:  No follow-ups on file.                                             Keep me inform of my plan of care

## 2024-08-19 ENCOUNTER — CLINICAL SUPPORT (OUTPATIENT)
Dept: PULMONOLOGY | Facility: CLINIC | Age: 69
End: 2024-08-19
Payer: MEDICARE

## 2024-08-19 DIAGNOSIS — I73.9 PAD (PERIPHERAL ARTERY DISEASE) (HCC): Primary | ICD-10-CM

## 2024-08-19 PROCEDURE — 93668 PERIPHERAL VASCULAR REHAB: CPT

## 2024-08-20 ENCOUNTER — APPOINTMENT (OUTPATIENT)
Dept: PULMONOLOGY | Facility: CLINIC | Age: 69
End: 2024-08-20
Payer: MEDICARE

## 2024-08-21 ENCOUNTER — CLINICAL SUPPORT (OUTPATIENT)
Dept: PULMONOLOGY | Facility: CLINIC | Age: 69
End: 2024-08-21
Payer: MEDICARE

## 2024-08-21 DIAGNOSIS — I73.9 PAD (PERIPHERAL ARTERY DISEASE) (HCC): Primary | ICD-10-CM

## 2024-08-21 PROCEDURE — 93668 PERIPHERAL VASCULAR REHAB: CPT

## 2024-08-22 ENCOUNTER — APPOINTMENT (OUTPATIENT)
Dept: PULMONOLOGY | Facility: CLINIC | Age: 69
End: 2024-08-22
Payer: MEDICARE

## 2024-08-23 ENCOUNTER — CLINICAL SUPPORT (OUTPATIENT)
Dept: PULMONOLOGY | Facility: CLINIC | Age: 69
End: 2024-08-23
Payer: MEDICARE

## 2024-08-23 DIAGNOSIS — I73.9 PAD (PERIPHERAL ARTERY DISEASE) (HCC): Primary | ICD-10-CM

## 2024-08-23 PROCEDURE — 93668 PERIPHERAL VASCULAR REHAB: CPT

## 2024-08-26 ENCOUNTER — CLINICAL SUPPORT (OUTPATIENT)
Dept: PULMONOLOGY | Facility: CLINIC | Age: 69
End: 2024-08-26
Payer: MEDICARE

## 2024-08-26 DIAGNOSIS — I73.9 PAD (PERIPHERAL ARTERY DISEASE) (HCC): Primary | ICD-10-CM

## 2024-08-26 PROCEDURE — 93668 PERIPHERAL VASCULAR REHAB: CPT

## 2024-08-27 ENCOUNTER — APPOINTMENT (OUTPATIENT)
Dept: PULMONOLOGY | Facility: CLINIC | Age: 69
End: 2024-08-27
Payer: MEDICARE

## 2024-08-28 ENCOUNTER — CLINICAL SUPPORT (OUTPATIENT)
Dept: PULMONOLOGY | Facility: CLINIC | Age: 69
End: 2024-08-28
Payer: MEDICARE

## 2024-08-28 DIAGNOSIS — I73.9 PAD (PERIPHERAL ARTERY DISEASE) (HCC): Primary | ICD-10-CM

## 2024-08-28 PROCEDURE — 93668 PERIPHERAL VASCULAR REHAB: CPT

## 2024-08-29 ENCOUNTER — APPOINTMENT (OUTPATIENT)
Dept: PULMONOLOGY | Facility: CLINIC | Age: 69
End: 2024-08-29
Payer: MEDICARE

## 2024-08-30 ENCOUNTER — CLINICAL SUPPORT (OUTPATIENT)
Dept: PULMONOLOGY | Facility: CLINIC | Age: 69
End: 2024-08-30
Payer: MEDICARE

## 2024-08-30 DIAGNOSIS — I73.9 PAD (PERIPHERAL ARTERY DISEASE) (HCC): Primary | ICD-10-CM

## 2024-08-30 PROCEDURE — 93668 PERIPHERAL VASCULAR REHAB: CPT

## 2024-09-02 ENCOUNTER — APPOINTMENT (OUTPATIENT)
Dept: PULMONOLOGY | Facility: CLINIC | Age: 69
End: 2024-09-02
Payer: MEDICARE

## 2024-09-03 ENCOUNTER — APPOINTMENT (OUTPATIENT)
Dept: PULMONOLOGY | Facility: CLINIC | Age: 69
End: 2024-09-03
Payer: MEDICARE

## 2024-09-04 ENCOUNTER — CLINICAL SUPPORT (OUTPATIENT)
Dept: PULMONOLOGY | Facility: CLINIC | Age: 69
End: 2024-09-04
Payer: MEDICARE

## 2024-09-04 DIAGNOSIS — I73.9 PAD (PERIPHERAL ARTERY DISEASE) (HCC): Primary | ICD-10-CM

## 2024-09-04 PROCEDURE — 93668 PERIPHERAL VASCULAR REHAB: CPT

## 2024-09-05 ENCOUNTER — APPOINTMENT (OUTPATIENT)
Dept: PULMONOLOGY | Facility: CLINIC | Age: 69
End: 2024-09-05
Payer: MEDICARE

## 2024-09-06 ENCOUNTER — CLINICAL SUPPORT (OUTPATIENT)
Dept: PULMONOLOGY | Facility: CLINIC | Age: 69
End: 2024-09-06
Payer: MEDICARE

## 2024-09-06 DIAGNOSIS — I73.9 PAD (PERIPHERAL ARTERY DISEASE) (HCC): Primary | ICD-10-CM

## 2024-09-06 PROCEDURE — 93668 PERIPHERAL VASCULAR REHAB: CPT

## 2024-09-09 ENCOUNTER — CLINICAL SUPPORT (OUTPATIENT)
Dept: PULMONOLOGY | Facility: CLINIC | Age: 69
End: 2024-09-09
Payer: MEDICARE

## 2024-09-09 DIAGNOSIS — I73.9 PAD (PERIPHERAL ARTERY DISEASE) (HCC): Primary | ICD-10-CM

## 2024-09-09 PROCEDURE — 93668 PERIPHERAL VASCULAR REHAB: CPT

## 2024-09-10 ENCOUNTER — APPOINTMENT (OUTPATIENT)
Dept: PULMONOLOGY | Facility: CLINIC | Age: 69
End: 2024-09-10
Payer: MEDICARE

## 2024-09-11 ENCOUNTER — NURSE TRIAGE (OUTPATIENT)
Age: 69
End: 2024-09-11

## 2024-09-11 ENCOUNTER — CLINICAL SUPPORT (OUTPATIENT)
Dept: PULMONOLOGY | Facility: CLINIC | Age: 69
End: 2024-09-11
Payer: MEDICARE

## 2024-09-11 DIAGNOSIS — I73.9 PAD (PERIPHERAL ARTERY DISEASE) (HCC): Primary | ICD-10-CM

## 2024-09-11 PROCEDURE — 93668 PERIPHERAL VASCULAR REHAB: CPT

## 2024-09-11 NOTE — TELEPHONE ENCOUNTER
Reviewed chart and imaging. Patient has no significant arterial disease in the left leg to explain her current leg complaints. I suspect that her pain is likely musculoskeletal or neurogenic and should continue with her supervised exercise program and medical management with ASA and statin. Keep OV with Marti as scheduled and notify the office with any concerns.

## 2024-09-11 NOTE — TELEPHONE ENCOUNTER
Called and s/w patient, relayed information and recommendations from Macy Weinberg PA-C. Pt verbalized understating.

## 2024-09-11 NOTE — TELEPHONE ENCOUNTER
"Pt last seen 7/10/24 hx PAD given recommendations and was told to follow up in 3 months     Received call from pt c/o worsening leg pain.  She states that she has pain in both legs but lately her L leg is bothering her more.  She states its worse when walking on an incline and steps, reports numbness and tingling in R foot while walking, and can walk about about 10 min before pain starts.  She denies any temp or color changes or swelling. Denies any wounds. The pain wakes her from sleep sometimes and she does get relief from sitting.    Patient schedule for f/u visit with RYLIE LAWLER on 10/16 but is asking if she needs a sooner appt.  She is currently at PT 3x's a week for her supervised walking program.  States she is taking her ASA but stopped taking the Pletal bc she thought it was making her gain weight.    Advised pt message would be sent to provider to review and advise.      Answer Assessment - Initial Assessment Questions  1. ONSET: \"When did the pain start?\"       Ongoing but worsening   2. LOCATION: \"Where is the pain located?\"       Pain is on both side but seems to be worse on the L   3. PAIN: \"How bad is the pain?\"    (Scale 1-10; or mild, moderate, severe)    -  MILD (1-3): doesn't interfere with normal activities     -  MODERATE (4-7): interferes with normal activities (e.g., work or school) or awakens from sleep, limping     -  SEVERE (8-10): excruciating pain, unable to do any normal activities, unable to walk      Wakes from sleep sometimes, get relief from sitting, can walk about about 10 min before pain starts  4. WORK OR EXERCISE: \"Has there been any recent work or exercise that involved this part of the body?\"       Currently going to PT 3 times a week  5. CAUSE: \"What do you think is causing the leg pain?\"      Unsure   6. OTHER SYMPTOMS: \"Do you have any other symptoms?\" (e.g., chest pain, back pain, breathing difficulty, swelling, rash, fever, numbness, weakness)      Worse when " walking on an incline and steps, reports numbness and tingling in R foot while walking, denies any temp or color changes or swelling. Denies any wounds    Protocols used: Leg Pain-ADULT-[place]

## 2024-09-12 ENCOUNTER — APPOINTMENT (OUTPATIENT)
Dept: PULMONOLOGY | Facility: CLINIC | Age: 69
End: 2024-09-12
Payer: MEDICARE

## 2024-09-13 ENCOUNTER — CLINICAL SUPPORT (OUTPATIENT)
Dept: PULMONOLOGY | Facility: CLINIC | Age: 69
End: 2024-09-13
Payer: MEDICARE

## 2024-09-13 DIAGNOSIS — I73.9 PAD (PERIPHERAL ARTERY DISEASE) (HCC): Primary | ICD-10-CM

## 2024-09-13 PROCEDURE — 93668 PERIPHERAL VASCULAR REHAB: CPT

## 2024-09-16 ENCOUNTER — CLINICAL SUPPORT (OUTPATIENT)
Dept: PULMONOLOGY | Facility: CLINIC | Age: 69
End: 2024-09-16
Payer: MEDICARE

## 2024-09-16 DIAGNOSIS — I73.9 PAD (PERIPHERAL ARTERY DISEASE) (HCC): Primary | ICD-10-CM

## 2024-09-16 PROCEDURE — 93668 PERIPHERAL VASCULAR REHAB: CPT

## 2024-09-17 ENCOUNTER — APPOINTMENT (OUTPATIENT)
Dept: PULMONOLOGY | Facility: CLINIC | Age: 69
End: 2024-09-17
Payer: MEDICARE

## 2024-09-18 ENCOUNTER — CLINICAL SUPPORT (OUTPATIENT)
Dept: PULMONOLOGY | Facility: CLINIC | Age: 69
End: 2024-09-18
Payer: MEDICARE

## 2024-09-18 DIAGNOSIS — I73.9 PAD (PERIPHERAL ARTERY DISEASE) (HCC): Primary | ICD-10-CM

## 2024-09-18 PROCEDURE — 93668 PERIPHERAL VASCULAR REHAB: CPT

## 2024-09-19 ENCOUNTER — APPOINTMENT (OUTPATIENT)
Dept: PULMONOLOGY | Facility: CLINIC | Age: 69
End: 2024-09-19
Payer: MEDICARE

## 2024-09-20 ENCOUNTER — CLINICAL SUPPORT (OUTPATIENT)
Dept: PULMONOLOGY | Facility: CLINIC | Age: 69
End: 2024-09-20
Payer: MEDICARE

## 2024-09-20 DIAGNOSIS — I73.9 PAD (PERIPHERAL ARTERY DISEASE) (HCC): Primary | ICD-10-CM

## 2024-09-20 PROCEDURE — 93668 PERIPHERAL VASCULAR REHAB: CPT

## 2024-09-23 ENCOUNTER — CLINICAL SUPPORT (OUTPATIENT)
Dept: PULMONOLOGY | Facility: CLINIC | Age: 69
End: 2024-09-23
Payer: MEDICARE

## 2024-09-23 DIAGNOSIS — I73.9 PAD (PERIPHERAL ARTERY DISEASE) (HCC): Primary | ICD-10-CM

## 2024-09-23 PROCEDURE — 93668 PERIPHERAL VASCULAR REHAB: CPT

## 2024-09-24 ENCOUNTER — APPOINTMENT (OUTPATIENT)
Dept: PULMONOLOGY | Facility: CLINIC | Age: 69
End: 2024-09-24
Payer: MEDICARE

## 2024-09-25 ENCOUNTER — CLINICAL SUPPORT (OUTPATIENT)
Dept: PULMONOLOGY | Facility: CLINIC | Age: 69
End: 2024-09-25
Payer: MEDICARE

## 2024-09-25 DIAGNOSIS — I73.9 PAD (PERIPHERAL ARTERY DISEASE) (HCC): Primary | ICD-10-CM

## 2024-09-25 PROCEDURE — 93668 PERIPHERAL VASCULAR REHAB: CPT

## 2024-09-26 ENCOUNTER — APPOINTMENT (OUTPATIENT)
Dept: PULMONOLOGY | Facility: CLINIC | Age: 69
End: 2024-09-26
Payer: MEDICARE

## 2024-09-27 ENCOUNTER — APPOINTMENT (OUTPATIENT)
Dept: RADIOLOGY | Facility: OTHER | Age: 69
End: 2024-09-27
Payer: MEDICARE

## 2024-09-27 ENCOUNTER — CLINICAL SUPPORT (OUTPATIENT)
Dept: PULMONOLOGY | Facility: CLINIC | Age: 69
End: 2024-09-27
Payer: MEDICARE

## 2024-09-27 ENCOUNTER — OFFICE VISIT (OUTPATIENT)
Dept: OBGYN CLINIC | Facility: OTHER | Age: 69
End: 2024-09-27

## 2024-09-27 VITALS
HEIGHT: 57 IN | SYSTOLIC BLOOD PRESSURE: 110 MMHG | DIASTOLIC BLOOD PRESSURE: 70 MMHG | BODY MASS INDEX: 30.2 KG/M2 | WEIGHT: 140 LBS

## 2024-09-27 DIAGNOSIS — M70.62 TROCHANTERIC BURSITIS OF LEFT HIP: ICD-10-CM

## 2024-09-27 DIAGNOSIS — M54.42 CHRONIC LEFT-SIDED LOW BACK PAIN WITH LEFT-SIDED SCIATICA: ICD-10-CM

## 2024-09-27 DIAGNOSIS — M54.42 CHRONIC LEFT-SIDED LOW BACK PAIN WITH LEFT-SIDED SCIATICA: Primary | ICD-10-CM

## 2024-09-27 DIAGNOSIS — M25.552 PAIN IN LEFT HIP: ICD-10-CM

## 2024-09-27 DIAGNOSIS — G89.29 CHRONIC LEFT-SIDED LOW BACK PAIN WITH LEFT-SIDED SCIATICA: ICD-10-CM

## 2024-09-27 DIAGNOSIS — G89.29 CHRONIC LEFT-SIDED LOW BACK PAIN WITH LEFT-SIDED SCIATICA: Primary | ICD-10-CM

## 2024-09-27 DIAGNOSIS — Z85.118 HISTORY OF LUNG CANCER: ICD-10-CM

## 2024-09-27 DIAGNOSIS — I73.9 PAD (PERIPHERAL ARTERY DISEASE) (HCC): Primary | ICD-10-CM

## 2024-09-27 PROCEDURE — 73502 X-RAY EXAM HIP UNI 2-3 VIEWS: CPT

## 2024-09-27 PROCEDURE — 93668 PERIPHERAL VASCULAR REHAB: CPT

## 2024-09-27 PROCEDURE — 72100 X-RAY EXAM L-S SPINE 2/3 VWS: CPT

## 2024-09-27 NOTE — PATIENT INSTRUCTIONS
Recommended mri lumbar spine due to history of lung cancer  May begin PT for back and hip  Will consider left troch bursa injection next visit after mri

## 2024-09-27 NOTE — PROGRESS NOTES
1. Chronic left-sided low back pain with left-sided sciatica  XR spine lumbar 2 or 3 views injury    MRI lumbar spine wo contrast    [facility]      2. Pain in left hip  XR hip/pelv 2-3 vws left if performed      3. History of lung cancer  MRI lumbar spine wo contrast      4. Trochanteric bursitis of left hip  [facility]        Orders Placed This Encounter   Procedures    XR spine lumbar 2 or 3 views injury    XR hip/pelv 2-3 vws left if performed    MRI lumbar spine wo contrast    [facility]        Xray lumbar spine 9/27/24:  Mod DDD [id number]    Xray left hip 9/27/24:  Mild hip oa      ASSESSMENT/PLAN:  Complex back and hip pain with h/o cancer  Left Troch Bursitis  Left Back Pain  PMH: h/o Lung Cancer    Repeat X-ray next visit: None    Return for Follow-up after MRI is completed for review.    Patient instructions below verbally summarized in person during encounter:  Patient Instructions   Recommended mri lumbar spine due to history of lung cancer  May begin PT for back and hip  Will consider left troch bursa injection next visit after mri      __________________________________________________________________________    HISTORY OF PRESENT ILLNESS:  Ms. Dunbar is a 70 yo F presenting with left hip pain that radiates down the lateral aspect of her leg all the way down to her foot.  She states that is been going on for several months and describes it as sharp and stabbing pain as well as stiffness. It is worse when climbing stairs, doing squats, or sitting for long periods of time especially when standing up.  The pain is waxing and waning in nature.  She has been taking aspirin for the pain without relief. It originally started after beginning physical therapy for her right leg which is sometime within the 11 weeks.  She is doing mostly walking on the treadmill and upper body strength resisted exercises.  She has had a duplex of the bilateral lower extremities which was remarkable for 50  to 75% stenosis in her profunda femoris and proximal superficial femoral and proximal popliteal arteries.  She also has greater than 75% stenosis in the distal superficial femoral artery on the right.  Her left leg has diffuse atherosclerotic disease but negative for stenosis.      Review of Systems      Following history reviewed and update:    Past Medical History:   Diagnosis Date    Allergic ?    Anxiety     Bronchial malignant neoplasm, right (HCC)     Cancer (HCC)     lung    Chronic major depressive disorder, single episode     COPD (chronic obstructive pulmonary disease) (HCC)     Full incontinence of feces     GERD (gastroesophageal reflux disease)     Hyperlipidemia     Hypertension     Malignant neoplasm of bronchus and lung (HCC)     Multiple sclerosis (HCC)     Pulmonary emphysema (HCC)     Restless leg syndrome     Tobacco use      Past Surgical History:   Procedure Laterality Date    AUGMENTATION MAMMAPLASTY Bilateral 11/2014    retro-pectoral silicone    BREAST BIOPSY Left 03/23/2015    u/s core bx    BREAST BIOPSY Left 08/02/2010    high-risk lesion    BREAST EXCISIONAL BIOPSY Left 08/26/2010    high-risk lesion    BREAST EXCISIONAL BIOPSY Left 1994    BREAST EXCISIONAL BIOPSY Right 1984    CT NEEDLE BIOPSY LUNG  12/2/2020    FL GUIDED CENTRAL VENOUS ACCESS DEVICE INSERTION  03/09/2021    IR PORT REMOVAL  03/07/2022    LYMPH NODE BIOPSY  2021    MAMMO (HISTORICAL)  2020    [facility] INCL FLUOR GDNCE DX W/CELL WASHG SPX N/A 01/28/2021    Procedure: BRONCHOSCOPY FLEXIBLE;  Surgeon: Jose Glover MD;  [facility]: [facility];  Service: Thoracic    MS INSJ TUNNELED CTR VAD W/SUBQ PORT AGE 3 YR/> N/A 03/09/2021    Procedure: INSERTION VENOUS PORT ( PORT-A-CATH) IR;  Surgeon: Krzysztof Barros DO;  [facility];  Service: Interventional Radiology    MS THORACOSCOPY W/LOBECTOMY SINGLE LOBE Right 01/28/2021    Procedure: RIGHT VATS UPPER LOBECTOMY ; MEDIASTINAL LYMPH NODE DISSECTION; right lower  "lobe bleb resection  ;  Surgeon: Jose Glover MD;  Location: [facility];  Service: Thoracic    TONSILLECTOMY      TOTAL ABDOMINAL HYSTERECTOMY  2010    age 55    TOTAL ABDOMINAL HYSTERECTOMY W/ BILATERAL SALPINGOOPHORECTOMY Bilateral 2010    age 55    TUBAL LIGATION  1971     Social History   Social History     Substance and Sexual Activity   Alcohol Use Yes    Alcohol/week: 5.0 standard drinks of alcohol    Types: 5 Glasses of wine per week    Comment: wine @ hs      Social History     Substance and Sexual Activity   Drug Use Never     Social History     Tobacco Use   Smoking Status Former    Current packs/day: 0.00    Average packs/day: 1 pack/day for 53.0 years (53.0 ttl pk-yrs)    Types: Cigarettes    Start date: 1/1/1968    Quit date: 1/10/2021    Years since quitting: 3.7   Smokeless Tobacco Never   Tobacco Comments    Wish i never started     Family History   Problem Relation Age of Onset    No Known Problems Mother     COPD Father     Hypertension Father     No Known Problems Sister     No Known Problems Daughter     No Known Problems Maternal Grandmother     No Known Problems Maternal Grandfather     No Known Problems Paternal Grandmother     Rectal cancer Paternal Grandfather         unknown age    No Known Problems Sister     No Known Problems Maternal Aunt     Breast cancer Paternal Aunt         unknown age    No Known Problems Paternal Aunt     No Known Problems Paternal Aunt     No Known Problems Paternal Aunt     No Known Problems Paternal Aunt     No Known Problems Paternal Aunt     No Known Problems Paternal Aunt     COPD Brother     No Known Problems Brother      Allergies   Allergen Reactions    Sulfa Antibiotics Hives          Physical Exam  /70 (BP Location: Left arm, Patient Position: Sitting, Cuff Size: Standard)   Ht 4' 9\" (1.448 m) Comment: verbal  Wt 63.5 kg (140 lb)   LMP  (LMP Unknown)   BMI 30.30 kg/m²         Ortho Exam    DERMATOMAL SENSATION:  L1: normal   L2: normal "   L3: normal   L4: normal   L5: normal   S1: normal    STRENGTH (bilateral):  Knee Extension: 5/5  Foot Dorsiflexion: 5/5  Great Toe Extension: 5/5  Foot Plantarflexion: 5/5  Hip Flexion: 5/5  Hip Abduction: 5/5      RIGHT HIP:  LOG ROLL: negative  ARABELLA: negative  FADIR: negative    LEFT HIP:  +tender lateral hip   LOG ROLL: negative  ARABELLA: negative  FADIR: negative               __________________________________________________________________________  Procedures

## 2024-09-30 ENCOUNTER — CLINICAL SUPPORT (OUTPATIENT)
Dept: PULMONOLOGY | Facility: CLINIC | Age: 69
End: 2024-09-30
Payer: MEDICARE

## 2024-09-30 DIAGNOSIS — I73.9 PAD (PERIPHERAL ARTERY DISEASE) (HCC): Primary | ICD-10-CM

## 2024-09-30 PROCEDURE — 93668 PERIPHERAL VASCULAR REHAB: CPT

## 2024-10-01 ENCOUNTER — APPOINTMENT (OUTPATIENT)
Dept: PULMONOLOGY | Facility: CLINIC | Age: 69
End: 2024-10-01
Payer: MEDICARE

## 2024-10-01 ENCOUNTER — TELEPHONE (OUTPATIENT)
Age: 69
End: 2024-10-01

## 2024-10-01 NOTE — TELEPHONE ENCOUNTER
Dr. Alatorre, please place orders if you would like patient to have blood work done prior to next appointment. Thank you!

## 2024-10-01 NOTE — TELEPHONE ENCOUNTER
Patient called to request lab orders before her annual visit on 10/14/24. Please advise and notify pt once orders are in

## 2024-10-02 ENCOUNTER — CLINICAL SUPPORT (OUTPATIENT)
Dept: PULMONOLOGY | Facility: CLINIC | Age: 69
End: 2024-10-02
Payer: MEDICARE

## 2024-10-02 DIAGNOSIS — I73.9 PAD (PERIPHERAL ARTERY DISEASE) (HCC): Primary | ICD-10-CM

## 2024-10-02 PROCEDURE — 93668 PERIPHERAL VASCULAR REHAB: CPT

## 2024-10-02 NOTE — TELEPHONE ENCOUNTER
Pt called from lab stating she called yesterday for order and nothing was there for her now. Called office spoke to Keke who talked to Dr Alatorre who stated she did not need updated Labs at this time.

## 2024-10-03 ENCOUNTER — APPOINTMENT (OUTPATIENT)
Dept: PULMONOLOGY | Facility: CLINIC | Age: 69
End: 2024-10-03
Payer: MEDICARE

## 2024-10-04 ENCOUNTER — CLINICAL SUPPORT (OUTPATIENT)
Dept: PULMONOLOGY | Facility: CLINIC | Age: 69
End: 2024-10-04
Payer: MEDICARE

## 2024-10-04 ENCOUNTER — HOSPITAL ENCOUNTER (OUTPATIENT)
Dept: MRI IMAGING | Facility: HOSPITAL | Age: 69
Discharge: HOME | End: 2024-10-04
Payer: MEDICARE

## 2024-10-04 DIAGNOSIS — Z85.118 HISTORY OF LUNG CANCER: ICD-10-CM

## 2024-10-04 DIAGNOSIS — M54.42 CHRONIC LEFT-SIDED LOW BACK PAIN WITH LEFT-SIDED SCIATICA: ICD-10-CM

## 2024-10-04 DIAGNOSIS — I73.9 PAD (PERIPHERAL ARTERY DISEASE) (HCC): Primary | ICD-10-CM

## 2024-10-04 DIAGNOSIS — G89.29 CHRONIC LEFT-SIDED LOW BACK PAIN WITH LEFT-SIDED SCIATICA: ICD-10-CM

## 2024-10-04 PROCEDURE — 93668 PERIPHERAL VASCULAR REHAB: CPT

## 2024-10-04 PROCEDURE — 72148 MRI LUMBAR SPINE W/O DYE: CPT

## 2024-10-04 NOTE — PROGRESS NOTES
PAD REHABILITATION   ASSESSMENT AND INDIVIDUALIZED TREATMENT PLAN  DISCHARGE            Today's date: 10/4/2024   # of Exercise Sessions Completed: 36  Patient name: Britni Fermin      : 1955  Age: 69 y.o.       MRN: [id number]  Vascular Physician:   has not seen doctor, only NURIS Mathis    Provider: Selvin  Clinician: Jose Luis Schaefer MS, CEP, EPC         Treatment is tailored to this patient's individual needs.  The ITP was reviewed with the patient and all questions were answered to their satisfaction.  Additional ITP documentation can be found electronically including daily and monthly exercise summaries, daily session notes with ECG summaries, education notes, daily medication reconciliation, and daily physician supervision.      The patient had a Face to Face visit with NURIS Mathis where they received education regarding cardiovascular disease and PAD risk factor reduction.    This visit took place on 7/10/2024.     Comments:       10/4/2024 Discharge note for Britni.  She had 9.6% improvement in functional capacity increasing Her max METs in the submaximal Quiroz-acosta protocol from 5.84 to 6.4 with test termination of  SOB 7/10 .   Her exercise tolerance (max METs in tolerated in cardiac rehab) increased by 65.5%.  She had no change in the [facility] activity estimated MET level with ADLs and physical activity. Her [facility] QOL improved by 12.5%.  PHQ-9 score decreased from 11 to 9.  SHIVA-7 decreased from 12 to 8.  Her weight increased by .9 pounds. Rate Your Plate score improved from 51 to 61.  She tolerates 43-52 mins at 2.9 - 4.3 METs plus wt training. RHR 56 - 78, ExHR 92 - 115. Resting BP 86/68 - 128/86 with appropriate response to exercise reaching 98/62 - 180/80.   The patient attended group educational classes on risk factor reduction and healthy eating.   Discharge plans include Pt is not sure what she will do to stay active,but states she will be doing physical therapy .   Encouraged the patient  to continue a disciplined exercise regimen: Frequency: 4-6 days/wk, Intensity: RPE 4-5, Time: 40-50 mins daily, 150-200 mins/wk.  The patient was encouraged to remain compliant with medications and follow up with cardiologist with any cardiac symptoms and medication management. Britni's claudication at rest 1-3/5 and varied at exercise at 1-4/5. Pt had in increase in claudication when exercising on the TM.    8/13/2024 Attending rehab for 31-58 min at 2.8-3.6 METS. CDS 3/5.Will continue to progress exercise intensity as tolerates       7/15/2024 <1 block thigh and calf claudication R>L. Reports that goes away quickly with rest. Has hx of COPD and part of her R lung removed d/t lung cancer. Not using inhaler currently d/t price. Reports being SOB especially with stairs or incline        Dx:   No diagnosis found.      VASCULAR COURSE:  Dx:   No diagnosis found.      Date of onset: 6/10/24  Vascular Testing Impressions:   Date: 6/10/24  Right Leg: FLACO: 0.65 (moderate)  Left Leg: FLACO: 1.14 (normal) but diffuse atherosclerotic disease       Other cardiac history:  HTN, HLD, COPD, R squamos cell lung cancer        ASSESSMENT    Medical History:   Past Medical History:   Diagnosis Date    Allergic ?    Anxiety     Bronchial malignant neoplasm, right (HCC)     Cancer (HCC)     lung    Chronic major depressive disorder, single episode     COPD (chronic obstructive pulmonary disease) (HCC)     Full incontinence of feces     GERD (gastroesophageal reflux disease)     Hyperlipidemia     Hypertension     Malignant neoplasm of bronchus and lung (HCC)     Multiple sclerosis (HCC)     Pulmonary emphysema (HCC)     Restless leg syndrome     Tobacco use        Family History:  Family History   Problem Relation Age of Onset    No Known Problems Mother     COPD Father     Hypertension Father     No Known Problems Sister     No Known Problems Daughter     No Known Problems Maternal Grandmother     No Known  Problems Maternal Grandfather     No Known Problems Paternal Grandmother     Rectal cancer Paternal Grandfather         unknown age    No Known Problems Sister     No Known Problems Maternal Aunt     Breast cancer Paternal Aunt         unknown age    No Known Problems Paternal Aunt     No Known Problems Paternal Aunt     No Known Problems Paternal Aunt     No Known Problems Paternal Aunt     No Known Problems Paternal Aunt     No Known Problems Paternal Aunt     COPD Brother     No Known Problems Brother        Allergies:   Sulfa antibiotics    Current Medications:   Current Outpatient Medications   Medication Sig Dispense Refill    atorvastatin (LIPITOR) 10 mg tablet Take 1 tablet (10 mg total) by mouth daily 90 tablet 1    Biotin 5 MG CAPS Take 5,000 mg by mouth daily      buPROPion (WELLBUTRIN XL) 300 mg 24 hr tablet Take 1 tablet (300 mg total) by mouth every morning (Patient not taking: Reported on 5/31/2024) 90 tablet 1    Cholecalciferol (Vitamin D3) 50 MCG (2000 UT) capsule Take 5,000 Units by mouth daily       cilostazol (PLETAL) 100 mg tablet Take 1 tablet (100 mg total) by mouth in the morning 90 tablet 0    famotidine (PEPCID) 40 MG tablet Take 1 tablet (40 mg total) by mouth every morning 90 tablet 1    FLUoxetine (PROzac) 20 mg capsule Take 1 capsule (20 mg total) by mouth daily 90 capsule 1    hydrocortisone 2.5 % cream Apply topically 2 (two) times a day 28 g 2    hydrOXYzine HCL (ATARAX) 25 mg tablet Take 1 tablet (25 mg total) by mouth every 6 (six) hours as needed for anxiety (Patient not taking: Reported on 7/10/2024) 20 tablet 0    loperamide (IMODIUM) 2 mg capsule Take 2 mg by mouth daily otc      losartan-hydrochlorothiazide (HYZAAR) 50-12.5 mg per tablet Take 1 tablet by mouth daily 90 tablet 1    pramipexole (MIRAPEX) 0.5 mg tablet Take 1 tablet (0.5 mg total) by mouth daily at bedtime 90 tablet 1     No current facility-administered medications for this visit.       Medication compliance:  Yes   Comments: Pt reports to be compliant with medications    Physical Limitations: osteoporosis- gets injections    Fall Risk: Low   Comments: Ambulates with a steady gait with no assist device    Cultural needs: none      CAD Risk Factors:  Cholesterol: Yes  HTN: Yes  DM: No  Obesity: Yes   Inactivity: Yes      EXERCISE ASSESSMENT:        CLAUDICATION PAIN   Restin-3/5  Exercise: 1-4/5  Minutes to onset with walking: varies on the day       Initial Fitness Assessment:   Lori-Kendrick ETT:   Resting:  BP: 118/80  HR: 56, Exercise:  BP: 164/74  HR: 99, METs:  5.84, ECG Summary: sinus ayala- NSR, Symptoms: SOB and claudication pain, Time to CDS : 14, and Comments: tolerated exercise very well    Current Functional Status:  Occupation: retired  Recreation/Physical Activity: n/a  ADL’s:Capable of performing light to moderate ADLs limited by Dyspnea  [place]: No limitations  Home exercise:  walking dog for about ~20 mins   Other Comments:  previously using gym at [facility], hasn't used for the last 2 months      Functional Capacity Screening Tool:  [facility] Activity Status Index:  6.05 METs    SMART Exercise Goals:   10% improvement in functional capacity based on max METs achieved in initial fitness assessment  reduced dyspnea with physical activity    improved DASI score by 10%  increased exercise capacity by 40% based on peak METs tolerated in cardiac rehab exercise session  maintain > 150 minutes per week of moderate intensity exercise  increased walking duration until CDS     Patient Specific EXERCISE GOALS:        1.  Return to gym   2.  Decrease claudication pain      PSYCHOSOCIAL ASSESSMENT:    Date last assessed:  10/4/2024  Depression screening:  PHQ-9 = 9  Interpretation:  5-9 = Mild Depression  Anxiety screening:  SHIVA-7 = 8    Interpretation: 5-9 = Mild anxiety    Pt self-report of depression and anxiety   Patient has a history of depression  Patient has a history of anxiety   Compliant with medical  "therapy      Self-reported stress level:  5   Stressors:  health, life  Stress Management Tools: exercise, keep a positive mindset, and enjoy a hobby    Quality of Life Screen:  (Higher score indicates disease impact on QOL)  [facility] COOP score: 28/45     Social Support:   patient does not have close relationships for emotional/social support  Community/Social Activities: n/a     Psychosocial Assessment as it relates to rehabilitation:   Patient denies issues with his/her family or home life that may affect their rehabilitation efforts.       NUTRITION ASSESSMENT:    Initial Weight:  140  Current Weight: 141.2    Height:   Ht Readings from Last 1 Encounters:   09/27/24 4' 9\" (1.448 m)       Rate Your Plate Score: 61/81    Diabetes: N/A  A1c:     last measured:     Lipid management: Discussed diet and lipid management and Last lipid profile 5/30/24  Chol 174    HDL 64  LDL 83    Current Dietary Habits:  Reports over doing on it on snacking    Drug/Alcohol Use:   Yes, rare alcohol consumption    SMART Nutrition Goals:   Improved Rate Your Plate score  >64, eat 6 or more servings of grain products per day, eat whole grain breads, brown rice and whole grain cereals, eat 5 or more servings of fruits and vegetables a day, eat no more than 6oz of meat per day, dine out less than once per week or choose low fat restaurant meals, eat red meat once a week or less, eat chicken and fish that is not fried, eat meatless meals twice a week or more, choose 1% or skim milk, rarely eat cheese or choose reduced fat or skim, Do not cook with oil, butter or margarine, Do not eat fried foods, use \"light\" tub margarine on bread, potatoes and vegetables, choose healthy snacks such as fruit, pretzels, and low fat crackers, choose healthy desserts and sweets such as derek food cake or  fruit, never add salt to food when cooking or at the table, choose low sodium canned, frozen/packaged foods or rarely/never eat, rarely/never eat " salty snacks, and choose low sugar desserts and sweets    Patient Specific NUTRITION GOALS:     1.  Increase water intake-MET   2. Decrease snacking-MET   3. Reduce sodium intake-MET      OTHER CORE COMPONENT ASSESSMENT:    Tobacco Use:     Pt quit 1/10/21   and has abstained    SMART Goals:   consistent, controlled resting BP < 130/80, medication compliance, and demonstrate pursed lipped breathing  reduced dietary sodium <2000mg, medication compliance, and Abstain from smoking    Patient Specific CORE COMPONENT GOALS:     1. Monitor SpO2   2. Decrease SOB    INDIVIDUALIZED TREATMENT PLAN      EXERCISE GOALS and PLAN      Progress toward Exercise goals:    Reviewed Pt goals and determined plan of care,attending rehab 43-52 min per session at 2.9-4.3 METS (ave CDS 1-4/5)    Exercise Intervention:    education on home exercise guidelines, home exercise 30+ mins 2 days opposite CR, and group class: Risk Factors for PAD  continue to progress exercise duration and intensity as tolerated.    The patient was counseled on exercise guidelines to achieve a minimum of 150 mins/wk of moderate intensity (RPE 4-6)   exercise and encouraged to add 1-2 days of exercise on opposite days of cardiac rehab as tolerated.     Current Aerobic Exercise Prescription:      Frequency: 3 days/week   Supplement with home exercise 2+ days/wk as tolerated      Minutes: 43-52       METS: 2.9-4.3            HR:    RPE: 3-4    CDS: 1-4/5        Modalities: Treadmill, NuStep, and Recumbent bike     Exercise workloads will be progressed gradually as tolerated, within limits of patient's ability, and according to the patient's   response to the exercise program.      Aerobic Exercise Prescription Plan for Discharge   Pt stated she is not sure what she will do to stay active,but states she will be doing physical therapy     Home Exercise:  walking her dog for ~20 mins/day    Exercise Education: benefit of exercise for CAD risk factors, home  exercise guidelines, AHA guidelines to achieve >150 mins/wk of moderate exercise, RPE scale, and group class: risk factors for PAD      Readiness to change: Action:  (Changing behavior)      NUTRITION GOALS AND PLAN    Weight control:    Starting weight: 140   Current weight: 141    Nutritional   Reviewed patient's Rate your Plate. Discussed key elements of heart healthy eating. Reviewed patient goals for dietary modifications and their clinical implications.  Reviewed most recent lipid profile.     Patient's progress toward Nutrition goals:    Patient will be encouraged to focus on lifestyle modifications following discharge., continue with current dietary changes    Nutrition Intervention:   limit meat intake to less than 6oz per day, choose healthy meals while dining out, choose lean red meat, eat reduced-fat or part-skim cheese or rarely eat, choose low sodium canned, frozen, packaged foods or rarely eat these foods, rarely/never eat salty snacks, and choose low sugar desserts and sweets    Measurable goals were based Rate Your Plate Dietary Self-Assessment. These are the areas in which the patient could score higher on the assessment.  Goals include recommendations for a heart healthy diet based on American Heart Association.    Nutrition Education:   heart healthy eating principles  low sodium diet  maintaining hydration  nutrition for  lipid management  nutrition for Improved BG control  healthy choices while dining out  portion control  group class: Heart Healthy Eating  group class:  Label Reading    Readiness to change: Contemplation:  (Acknowledging that there is a problem but not yet ready or sure of wanting to make a change)      PSYCHOSOCIAL GOALS AND PLAN    Psychosocial Assessment as it relates to rehabilitation:   Patient denies issues with his/her family or home life that may affect their rehabilitation efforts.     SMART Psychosocial Goals:     Reduce perceived stress to 1-3/10, improved [facility]  QOL < 27, PHQ-9 - reduced severity by one level, Feelings in [place] Score < 3, Physical Fitness in [place] Score < 3, Social Support in [place] Score < 3, Daily Activity in [place] Score < 3, Social Activities in [place] Score < 3, Pain in [place] Score < 3, Overall Health in [place] Score < 3, Quality of Life in [place] Score < 3 , Change in Health in [place] Score < 3 , and  reduced time feeling down, depressed or hopeless    Patient Specific PSYCHOCOSOCIAL GOALS:     maintain compliance with medical therapy and practice relaxation techniques    Patient's progress toward Psychosocial goals:    Patient will be encouraged to focus on lifestyle modifications following discharge., compliant with medical therapy    Psychosocial Intervention:   Practice relaxation techniques, Exercise, Keep a positive mindset, and Repeat PHQ-9 every 30 days if score >5    Psychosocial Education: signs/sxs of depression, benefits of a positive support system, stress management techniques, and tools to manage fear/anxiety related to becoming SOB    Information to utilize [employer] was provided as well as contact information for counseling through  Behavioral Health and group psychotherapy groups available.    Readiness to change: Action:  (Changing behavior)      OTHER CORE COMPONENTS GOALS and PLAN      Blood Pressure will be monitored throughout the program and cardiologist will be notified of elevated trends.    Pt will be encouraged to monitor home BP if advised by cardiologist.    Tobacco Intervention:   Pt quit 1/10/21 and has abstained since quitting.        Progress toward Core Component goals:    Patient will be encouraged to focus on lifestyle modifications following discharge.monitoring 02 at home    Other Core Components Intervention:   medication compliance, avoid processed foods, engage in regular exercise, monitor home BP, and group education: Understanding PAD Anatomy and Physiology, group exercise:  PAD Medications    Group and Individual Education:  components of blood pressure management, effects of nicotine and tobacco, group class: Understanding Heart Disease, and group class:  Common Heart Medications    Readiness to change: Action:  (Changing behavior)

## 2024-10-10 ENCOUNTER — OFFICE VISIT (OUTPATIENT)
Dept: OBGYN CLINIC | Facility: OTHER | Age: 69
End: 2024-10-10
Payer: MEDICARE

## 2024-10-10 ENCOUNTER — EVALUATION (OUTPATIENT)
Dept: PHYSICAL THERAPY | Age: 69
End: 2024-10-10
Payer: MEDICARE

## 2024-10-10 VITALS
WEIGHT: 140 LBS | SYSTOLIC BLOOD PRESSURE: 124 MMHG | BODY MASS INDEX: 30.2 KG/M2 | HEIGHT: 57 IN | DIASTOLIC BLOOD PRESSURE: 84 MMHG

## 2024-10-10 DIAGNOSIS — M70.62 TROCHANTERIC BURSITIS OF LEFT HIP: Primary | ICD-10-CM

## 2024-10-10 DIAGNOSIS — G89.29 CHRONIC LEFT-SIDED LOW BACK PAIN WITH LEFT-SIDED SCIATICA: ICD-10-CM

## 2024-10-10 DIAGNOSIS — M54.42 CHRONIC LEFT-SIDED LOW BACK PAIN WITH LEFT-SIDED SCIATICA: ICD-10-CM

## 2024-10-10 DIAGNOSIS — Z85.118 HISTORY OF LUNG CANCER: ICD-10-CM

## 2024-10-10 PROCEDURE — 97162 PT EVAL MOD COMPLEX 30 MIN: CPT

## 2024-10-10 PROCEDURE — 20610 DRAIN/INJ JOINT/BURSA W/O US: CPT | Performed by: FAMILY MEDICINE

## 2024-10-10 PROCEDURE — 99213 OFFICE O/P EST LOW 20 MIN: CPT | Performed by: FAMILY MEDICINE

## 2024-10-10 RX ORDER — TRIAMCINOLONE ACETONIDE 40 MG/ML
40 INJECTION, SUSPENSION INTRA-ARTICULAR; INTRAMUSCULAR
Status: COMPLETED | OUTPATIENT
Start: 2024-10-10 | End: 2024-10-10

## 2024-10-10 RX ORDER — LIDOCAINE HYDROCHLORIDE 10 MG/ML
9 INJECTION, SOLUTION INFILTRATION; PERINEURAL
Status: COMPLETED | OUTPATIENT
Start: 2024-10-10 | End: 2024-10-10

## 2024-10-10 RX ADMIN — LIDOCAINE HYDROCHLORIDE 9 ML: 10 INJECTION, SOLUTION INFILTRATION; PERINEURAL at 10:00

## 2024-10-10 RX ADMIN — TRIAMCINOLONE ACETONIDE 40 MG: 40 INJECTION, SUSPENSION INTRA-ARTICULAR; INTRAMUSCULAR at 10:00

## 2024-10-10 NOTE — PROGRESS NOTES
PT Evaluation     Today's date: 10/10/2024  Patient name: Britni Fermin  : 1955  MRN: [id number]  Referring provider: Johnathon Chang DO  Dx:   Encounter Diagnosis     ICD-10-CM    1. Trochanteric bursitis of left hip  M70.62 Ambulatory Referral to Physical Therapy      2. Chronic left-sided low back pain with left-sided sciatica  M54.42 Ambulatory Referral to Physical Therapy    G89.29                      Assessment  Impairments: activity intolerance, impaired physical strength, lacks appropriate home exercise program and pain with function  Functional limitations: Deficits with stair negotiation, ambulation, sleeping, transfers, etc    Assessment details: Patient presents to PT for evaluation with c/c of L LE pain, lateral hip region, onset a few months ago, aggravated with sit to stands, sleeping on this side, stair negotiation, and ambulation up inclines. Evaluation reveals deficits in L LE strength, as well as TTP directly over GT. Patient with full lumbar AROM in all directions without pain in any direction. ARABELLA (+) for reproduction of lateral hip pain. Patient will benefit from skilled outpt PT to address deficits and improve abilities with all functional tasks. No HEP today secondary to recent injection. Will establish next session.   Barriers to therapy:     Understanding of Dx/Px/POC: good     Prognosis: good    Goals  ST) Education: Patient to demonstrate compliance with attendance of therapy sessions and performance of introductory HEP.   2) Pain: Patient to report day to day pain levels <4/10, allowing for improvements with various functional tasks including stair negotiation, transfers, and improved ambulation tolerance.     LT) Education: Patient to demonstrate compliance with attendance of therapy sessions and performance of progressive HEP, allowing for transition to self-management of symptoms.  2) Pain: Patient to report <1/10 pain during her day to day activities, allowing  for improvements with various functional tasks including stair negotiation, ambulation, sitting for prolonged periods of time, etc.   3) FOTO: Patient to score >/= expected FOTO score, signifying improvements in functional abilities.     Plan  Planned modality interventions: electrical stimulation/Russian stimulation, cryotherapy, TENS and thermotherapy: hydrocollator packs    Planned therapy interventions: IASTM, abdominal trunk stabilization, joint mobilization, kinesiology taping, manual therapy, massage, activity modification, neuromuscular re-education, patient/caregiver education, stretching, strengthening, therapeutic activities, therapeutic exercise, home exercise program, gait training, flexibility, functional ROM exercises and balance/weight bearing training    Frequency: 2x week  Duration in weeks: 8  Plan of Care beginning date: 10/10/2024  Plan of Care expiration date: 12/6/2024  Treatment plan discussed with: patient and PTA      Subjective Evaluation    History of Present Illness  Mechanism of injury: Patient presents to PT with c/c of L LE pain, onset a few months ago. Patient reports was undergoing treatment for R LE pain secondary to vascular condition, and began to develop L LE symptoms. Localizes symptoms to lateral hip region extending into proximal thigh. No symptoms below knee. Patient denies numbness/tingling into L LE. Patient reports increase in symptoms with getting up from seated position, ambulating inclines, and stair negotiation, as well as sleeping on L side. Patient received injection into L hip earlier today at F/U with ortho, and was also referred to spine and pain who she will be seeing in a few weeks. Patient leaving for [place] mid Nov.   Patient Goals  Patient goals for therapy: decreased pain  Patient goal: Improved ambulatory tolerance    Diagnostic Tests  MRI studies: abnormal (New left subarticular/foraminal protrusion at L3-4 results in moderate central, severe left  subarticular, and mild left foraminal stenosis. There is likely mass effect on the left L4 nerve root in the subarticular zone.)      Objective     Tenderness     Additional Tenderness Details  No TTP lumbar spine and surrounding mm    Active Range of Motion     Lumbar   Normal active range of motion    Strength/Myotome Testing     Left Hip   Planes of Motion   Flexion: 4+  Abduction: 4  Adduction: 4+    Right Hip   Planes of Motion   Flexion: 4+  Abduction: 4+  Adduction: 4+    Left Knee   Flexion: 5  Extension: 5    Right Knee   Flexion: 5  Extension: 5    Left Ankle/Foot   Dorsiflexion: 5  Plantar flexion: 5  Great toe extension: 5    Right Ankle/Foot   Dorsiflexion: 5  Plantar flexion: 5  Great toe extension: 5    Tests     Lumbar     Left   Negative passive SLR.     Left Hip   Positive ARABELLA.              Precautions: L lateral hip/thigh pain      Manuals                                                                 Neuro Re-Ed                                                                                                        Ther Ex                                                                                                                     Ther Activity                                       Gait Training                                       Modalities

## 2024-10-10 NOTE — PATIENT INSTRUCTIONS
I recommend trial of left hip greater trochanteric process landmark guided corticosteroid injection for bursitis.  I explained the patient that she does have MRI evidence of pinched nerve in her spine which could be the culprit for much of her pain.  As such, I also provided patient with referral for spine and pain management in case she has incomplete relief with landmark guided lateral hip injection today.

## 2024-10-10 NOTE — PROGRESS NOTES
1. Trochanteric bursitis of left hip        2. Chronic left-sided low back pain with left-sided sciatica        3. History of lung cancer          Orders Placed This Encounter   Procedures    Large joint arthrocentesis        IMAGING STUDIES: (I personally reviewed images in PACS and report):    MRI lumbar spine 10/04/2024:  1. New left subarticular/foraminal protrusion at L3-4 results in moderate central, severe left subarticular, and mild left foraminal stenosis. There is likely mass effect on the left L4 nerve root in the subarticular zone.  2. Spondylosis is otherwise unchanged as detailed above.     Xray lumbar spine 9/27/24:  Moderate Degenerative Disc Disease at L4-5, S1     Xray left hip 9/27/24:  Mild hip oa      ASSESSMENT/PLAN:  Complex back and hip pain with h/o cancer  Left Troch Bursitis  Left Back Pain  PMH: h/o Lung Cancer    Repeat X-ray next visit: None    Return for Follow-up with specialist.    Patient instructions below verbally summarized in person during encounter:  Patient Instructions    I recommend trial of left hip greater trochanteric process landmark guided corticosteroid injection for bursitis.  I explained the patient that she does have MRI evidence of pinched nerve in her spine which could be the culprit for much of her pain.  As such, I also provided patient with referral for spine and pain management in case she has incomplete relief with landmark guided lateral hip injection today.      __________________________________________________________________________    HISTORY OF PRESENT ILLNESS:  Patient here for follow-up back and leg pain left side.  Last visit I ordered MRI lumbar spine due to patient's past med history of lung cancer.  MRI did reveal bulging disc with mass effect on the left L4 nerve; however, patient endorses lateral hip pain today when arising from a seated position.  She denies any back pain.  She states that her pain radiates towards the knee but not below to  the foot.      Review of Systems      Following history reviewed and update:    Past Medical History:   Diagnosis Date    Allergic ?    Anxiety     Bronchial malignant neoplasm, right (HCC)     Cancer (HCC)     lung    Chronic major depressive disorder, single episode     COPD (chronic obstructive pulmonary disease) (HCC)     Full incontinence of feces     GERD (gastroesophageal reflux disease)     Hyperlipidemia     Hypertension     Malignant neoplasm of bronchus and lung (HCC)     Multiple sclerosis (HCC)     Pulmonary emphysema (HCC)     Restless leg syndrome     Tobacco use      Past Surgical History:   Procedure Laterality Date    AUGMENTATION MAMMAPLASTY Bilateral 11/2014    retro-pectoral silicone    BREAST BIOPSY Left 03/23/2015    u/s core bx    BREAST BIOPSY Left 08/02/2010    high-risk lesion    BREAST EXCISIONAL BIOPSY Left 08/26/2010    high-risk lesion    BREAST EXCISIONAL BIOPSY Left 1994    BREAST EXCISIONAL BIOPSY Right 1984    CT NEEDLE BIOPSY LUNG  12/2/2020    FL GUIDED CENTRAL VENOUS ACCESS DEVICE INSERTION  03/09/2021    IR PORT REMOVAL  03/07/2022    LYMPH NODE BIOPSY  2021    MAMMO (HISTORICAL)  2020    OR BRNCHSC INCL FLUOR GDNCE DX W/CELL WASHG SPX N/A 01/28/2021    Procedure: BRONCHOSCOPY FLEXIBLE;  Surgeon: Jose Glover MD;  Location: [facility];  Service: Thoracic    OR INSJ TUNNELED CTR VAD W/SUBQ PORT AGE 5 YR/> N/A 03/09/2021    Procedure: INSERTION VENOUS PORT ( PORT-A-CATH) IR;  Surgeon: Krzysztof Barros DO;  Location: [facility];  Service: Interventional Radiology    OR THORACOSCOPY W/LOBECTOMY SINGLE LOBE Right 01/28/2021    Procedure: RIGHT VATS UPPER LOBECTOMY ; MEDIASTINAL LYMPH NODE DISSECTION; right lower lobe bleb resection  ;  Surgeon: Jose Glover MD;  Location: [facility];  Service: Thoracic    TONSILLECTOMY      TOTAL ABDOMINAL HYSTERECTOMY  2010    age 55    TOTAL ABDOMINAL HYSTERECTOMY W/ BILATERAL SALPINGOOPHORECTOMY Bilateral 2010    age 55    TUBAL  "LIGATION  1971     Social History   Social History     Substance and Sexual Activity   Alcohol Use Yes    Alcohol/week: 5.0 standard drinks of alcohol    Types: 5 Glasses of wine per week    Comment: wine @ hs      Social History     Substance and Sexual Activity   Drug Use Never     Social History     Tobacco Use   Smoking Status Former    Current packs/day: 0.00    Average packs/day: 1 pack/day for 53.0 years (53.0 ttl pk-yrs)    Types: Cigarettes    Start date: 1/1/1968    Quit date: 1/10/2021    Years since quitting: 3.7   Smokeless Tobacco Never   Tobacco Comments    Wish i never started     Family History   Problem Relation Age of Onset    No Known Problems Mother     COPD Father     Hypertension Father     No Known Problems Sister     No Known Problems Daughter     No Known Problems Maternal Grandmother     No Known Problems Maternal Grandfather     No Known Problems Paternal Grandmother     Rectal cancer Paternal Grandfather         unknown age    No Known Problems Sister     No Known Problems Maternal Aunt     Breast cancer Paternal Aunt         unknown age    No Known Problems Paternal Aunt     No Known Problems Paternal Aunt     No Known Problems Paternal Aunt     No Known Problems Paternal Aunt     No Known Problems Paternal Aunt     No Known Problems Paternal Aunt     COPD Brother     No Known Problems Brother      Allergies   Allergen Reactions    Sulfa Antibiotics Hives          Physical Exam  /84 (BP Location: Left arm, Patient Position: Standing, Cuff Size: Standard)   Ht 4' 9\" (1.448 m)   Wt 63.5 kg (140 lb)   LMP  (LMP Unknown)   BMI 30.30 kg/m²         Ortho Exam  BACK EXAM:  Gait: normal    BACK TENDERNESS:  Spinous Processes: no  Paraspinal Muscles: no    DERMATOMAL SENSATION:  L1: normal   L2: normal   L3: normal   L4: normal   L5: normal   S1: normal    STRENGTH (bilateral):  Knee Extension: 5/5  Foot Dorsiflexion: 5/5  Great Toe Extension: 5/5  Foot Plantarflexion: 5/5  Hip " "Flexion: 5/5  Hip Abduction: 5/5    BACK:   SUPINE STRAIGHT LEG: negative  PRONE STRAIGHT LEG:  SLUMP:     RIGHT HIP:  LOG ROLL: negative  ARABELLA: negative  FADIR: negative    LEFT HIP:  + Lateral hip pain  LOG ROLL: negative  ARABELLA: negative  FADIR: negative     __________________________________________________________________________  Large joint arthrocentesis: L greater trochanteric bursa  Universal Protocol:  Procedure performed by: (Johnathon Chang DO)  Consent: Verbal consent obtained.  Risks and benefits: risks, benefits and alternatives were discussed  Consent given by: patient  Time out: Immediately prior to procedure a \"time out\" was called to verify the correct patient, procedure, equipment, support staff and site/side marked as required.  Patient understanding: patient states understanding of the procedure being performed  Site marked: the operative site was marked  Patient identity confirmed: verbally with patient  Supporting Documentation  Indications: pain and diagnostic evaluation   Procedure Details  Location: hip - L greater trochanteric bursa  Preparation: Patient was prepped and draped in the usual sterile fashion  Needle size: 22 G  Ultrasound guidance: no  Approach: lateral  Medications administered: 9 mL lidocaine 1 %; 40 mg triamcinolone acetonide 40 mg/mL    Patient tolerance: patient tolerated the procedure well with no immediate complications  Dressing:  Sterile dressing applied                         "

## 2024-10-14 ENCOUNTER — OFFICE VISIT (OUTPATIENT)
Dept: INTERNAL MEDICINE CLINIC | Age: 69
End: 2024-10-14
Payer: MEDICARE

## 2024-10-14 ENCOUNTER — OFFICE VISIT (OUTPATIENT)
Dept: PHYSICAL THERAPY | Age: 69
End: 2024-10-14
Payer: MEDICARE

## 2024-10-14 VITALS
WEIGHT: 139 LBS | SYSTOLIC BLOOD PRESSURE: 128 MMHG | BODY MASS INDEX: 29.99 KG/M2 | TEMPERATURE: 97.9 F | HEIGHT: 57 IN | DIASTOLIC BLOOD PRESSURE: 80 MMHG | HEART RATE: 54 BPM | OXYGEN SATURATION: 97 %

## 2024-10-14 DIAGNOSIS — C34.91 SQUAMOUS CELL LUNG CANCER, RIGHT (HCC): ICD-10-CM

## 2024-10-14 DIAGNOSIS — F41.8 OTHER SPECIFIED ANXIETY DISORDERS: ICD-10-CM

## 2024-10-14 DIAGNOSIS — Z00.00 MEDICARE ANNUAL WELLNESS VISIT, SUBSEQUENT: Primary | ICD-10-CM

## 2024-10-14 DIAGNOSIS — E78.49 OTHER HYPERLIPIDEMIA: ICD-10-CM

## 2024-10-14 DIAGNOSIS — J44.9 CHRONIC OBSTRUCTIVE PULMONARY DISEASE, UNSPECIFIED COPD TYPE (HCC): ICD-10-CM

## 2024-10-14 DIAGNOSIS — M54.42 CHRONIC LEFT-SIDED LOW BACK PAIN WITH LEFT-SIDED SCIATICA: ICD-10-CM

## 2024-10-14 DIAGNOSIS — M70.62 TROCHANTERIC BURSITIS OF LEFT HIP: Primary | ICD-10-CM

## 2024-10-14 DIAGNOSIS — Z86.0100 HISTORY OF COLONIC POLYPS: ICD-10-CM

## 2024-10-14 DIAGNOSIS — I10 HYPERTENSION, ESSENTIAL: ICD-10-CM

## 2024-10-14 DIAGNOSIS — G25.81 RESTLESS LEGS SYNDROME (RLS): ICD-10-CM

## 2024-10-14 DIAGNOSIS — M81.6 LOCALIZED OSTEOPOROSIS WITHOUT CURRENT PATHOLOGICAL FRACTURE: ICD-10-CM

## 2024-10-14 DIAGNOSIS — F33.9 DEPRESSION, RECURRENT (HCC): ICD-10-CM

## 2024-10-14 DIAGNOSIS — G89.29 CHRONIC LEFT-SIDED LOW BACK PAIN WITH LEFT-SIDED SCIATICA: ICD-10-CM

## 2024-10-14 DIAGNOSIS — Z12.31 ENCOUNTER FOR SCREENING MAMMOGRAM FOR BREAST CANCER: ICD-10-CM

## 2024-10-14 DIAGNOSIS — Z23 ENCOUNTER FOR IMMUNIZATION: ICD-10-CM

## 2024-10-14 DIAGNOSIS — K21.9 GASTROESOPHAGEAL REFLUX DISEASE WITHOUT ESOPHAGITIS: ICD-10-CM

## 2024-10-14 PROBLEM — R55 SYNCOPE: Status: RESOLVED | Noted: 2022-05-19 | Resolved: 2024-10-14

## 2024-10-14 PROCEDURE — 97110 THERAPEUTIC EXERCISES: CPT | Performed by: SPECIALIST/TECHNOLOGIST

## 2024-10-14 PROCEDURE — G0439 PPPS, SUBSEQ VISIT: HCPCS | Performed by: INTERNAL MEDICINE

## 2024-10-14 PROCEDURE — 99214 OFFICE O/P EST MOD 30 MIN: CPT | Performed by: INTERNAL MEDICINE

## 2024-10-14 NOTE — PROGRESS NOTES
Ambulatory Visit  Name: Britni Fermin      : 1955      MRN: [id number]  Encounter Provider: Anai Alatorre DO  Encounter Date: 10/14/2024   Encounter department: [facility]    Assessment & Plan      Depression Screening and Follow-up Plan: Patient was screened for depression during today's encounter. They screened negative with a PHQ-9 score of 3.    Urinary Incontinence Plan of Care: counseling topics discussed: limit alcohol, caffeine, spicy foods, and acidic foods, limiting fluid intake 3-4 hours before bed and preventing constipation.       Preventive health issues were discussed with patient, and age appropriate screening tests were ordered as noted in patient's After Visit Summary. Personalized health advice and appropriate referrals for health education or preventive services given if needed, as noted in patient's After Visit Summary.    History of Present Illness     HPI   Patient Care Team:  Anai Alatorre DO as PCP - General (Internal Medicine)  MD Van Temple MD as Medical Oncologist (Hematology and Oncology)    Review of Systems  Medical History Reviewed by provider this encounter:       Annual Wellness Visit Questionnaire   Britni is here for her Subsequent Wellness visit. Last Medicare Wellness visit information reviewed, patient interviewed and updates made to the record today.      Health Risk Assessment:   Patient rates overall health as fair. Patient feels that their physical health rating is same. Patient is satisfied with their life. Eyesight was rated as same. Hearing was rated as same. Patient feels that their emotional and mental health rating is same. Patients states they are never, rarely angry. Patient states they are often unusually tired/fatigued. Pain experienced in the last 7 days has been a lot. Patient's pain rating has been 7/10. Patient states that she has experienced weight loss or gain in last 6 months.  Called patient and ask patient to arrive 11:00, he agreed       Depression Screening:   PHQ-9 Score: 3      Fall Risk Screening:   In the past year, patient has experienced: no history of falling in past year      Urinary Incontinence Screening:   Patient has leaked urine accidently in the last six months.     Home Safety:  Patient has trouble with stairs inside or outside of their home. Patient has working smoke alarms and has working carbon monoxide detector. Home safety hazards include: none.     Nutrition:   Current diet is Regular.     Medications:   Patient is currently taking over-the-counter supplements. OTC medications include: see medication list. Patient is able to manage medications.     Activities of Daily Living (ADLs)/Instrumental Activities of Daily Living (IADLs):   Walk and transfer into and out of bed and chair?: Yes  Dress and groom yourself?: Yes    Bathe or shower yourself?: Yes    Feed yourself? Yes  Do your laundry/housekeeping?: Yes  Manage your money, pay your bills and track your expenses?: Yes  Make your own meals?: Yes    Do your own shopping?: Yes    Previous Hospitalizations:   Any hospitalizations or ED visits within the last 12 months?: No      Advance Care Planning:   Living will: No    Durable POA for healthcare: No    Advanced directive: No      PREVENTIVE SCREENINGS      Cardiovascular Screening:    General: Screening Not Indicated and History Lipid Disorder      Diabetes Screening:     General: Screening Current      Colorectal Cancer Screening:     General: Screening Current      Breast Cancer Screening:     General: Screening Current      Cervical Cancer Screening:    General: Screening Not Indicated      Osteoporosis Screening:    General: Screening Not Indicated and History Osteoporosis      Lung Cancer Screening:     General: Screening Not Indicated and History Lung Cancer      Hepatitis C Screening:    General: Screening Current    Screening, Brief Intervention, and Referral to Treatment (SBIRT)    Screening  Typical number of  drinks in a day: 3  Typical number of drinks in a week: 3  Interpretation: Low risk drinking behavior.    AUDIT-C Screenin) How often did you have a drink containing alcohol in the past year? 4 or more times a week  2) How many drinks did you have on a typical day when you were drinking in the past year? 1 to 2  3) How often did you have 6 or more drinks on one occasion in the past year? never    AUDIT-C Score: 4  Interpretation: Score 3-12 (female): POSITIVE screen for alcohol misuse    AUDIT Screenin) How often during the last year have you found that you were not able to stop drinking once you had started? 0 - never  5) How often during the last year have you failed to do what was normally expected from you because of drinking? 0 - never  6) How often during the last year have you needed a first drink in the morning to get yourself going after a heavy drinking session? 0 - never  7) How often during the last year have you had a feeling of guilt or remorse after drinking? 0 - never  8) How often during the last year have you been unable to remember what happened the night before because you had been drinking? 0 - never  9) Have you or someone else been injured as a result of your drinking? 0 - no  10) Has a relative or friend or a doctor or another health worker been concerned about your drinking or suggested you cut down? 0 - no    AUDIT Score: 4  Interpretation: Low risk alcohol consumption    Single Item Drug Screening:  How often have you used an illegal drug (including marijuana) or a prescription medication for non-medical reasons in the past year? never    Single Item Drug Screen Score: 0  Interpretation: Negative screen for possible drug use disorder    Social Determinants of Health     Food Insecurity: No Food Insecurity (10/7/2024)    Hunger Vital Sign     Worried About Running Out of Food in the Last Year: Never true     Ran Out of Food in the Last Year: Never true   Transportation Needs: No  "Transportation Needs (10/7/2024)    PRAPARE - Transportation     Lack of Transportation (Medical): No     Lack of Transportation (Non-Medical): No   Housing Stability: Low Risk  (10/7/2024)    Housing Stability Vital Sign     Unable to Pay for Housing in the Last Year: No     Number of Times Moved in the Last Year: 0     Homeless in the Last Year: No   Utilities: Not At Risk (10/7/2024)    [facility] Utilities     Threatened with loss of utilities: No     No results found.    Objective     /80 (BP Location: Left arm, Patient Position: Sitting, Cuff Size: Standard)   Pulse (!) 54   Temp 97.9 °F (36.6 °C) (Temporal)   Ht 4' 9\" (1.448 m)   Wt 63 kg (139 lb)   LMP  (LMP Unknown)   SpO2 97%   BMI 30.08 kg/m²     Physical Exam    "

## 2024-10-14 NOTE — PROGRESS NOTES
Daily Note     Today's date: 10/14/2024  Patient name: Britni Fermin  : 1955  MRN: [id number]  Referring provider: Johnathon Chang DO  Dx:   Encounter Diagnosis     ICD-10-CM    1. Trochanteric bursitis of left hip  M70.62       2. Chronic left-sided low back pain with left-sided sciatica  M54.42     G89.29                      Subjective: No changes since IE.     Objective: See treatment diary below    Assessment: Pt demonstrates good target muscle activation with therex assigned this visit, no reproduction of L hip pain with therex assigned this visit. Tolerated treatment well. Patient demonstrated fatigue post treatment, exhibited good technique with therapeutic exercises, and would benefit from continued PT    Plan: Continue per plan of care.  Progress treatment as tolerated.       Precautions: L lateral hip/thigh pain      Manuals 10/14                                                                  Neuro Re-Ed                                                                                                        Ther Ex               TM 20'            Clamshells Iso 30x5s            Hip Add Iso 30x5s            Bridges 3x10            SL ABd 3x10            SL Leg Press 30# 3x10            SS w TB Yellow 5x             TA press down supine w pball  trail                                                  Ther Activity                                       Gait Training                                       Modalities

## 2024-10-14 NOTE — PROGRESS NOTES
Assessment/Plan:    Medicare annual wellness visit, subsequent  -Medicare annual wellness visit done   - last colonoscopy was done in 2022 and showed polyps and diverticulosis.  Patient will be due for repeat colonoscopy in 2025.  Of note, she has a family history of colon cancer.  -  Lung cancer screen is not indicated because she already has lung cancer.  She is scheduled for repeat CT chest.  - DEXA scan was done in November 2022 and she is scheduled for repeat DEXA scan on 11/11/2024.  -last mammogram was done on 5/30/2024 and she is scheduled for repeat mammogram next year.  She has a history of breast cancer in a paternal aunt.  -She is up-to-date with all her COVID vaccines, influenza vaccine, pneumococcal vaccine and shingles vaccine.  She also believes that she already got the RSV vaccine at her pharmacy.  -follow-up in 6 months.    Hypertension   - blood pressure is well controlled off antihypertensives.  -She was counseled to continue to monitor her blood pressure since she discontinued her antihypertensives.  She should call the office if blood pressure is consistently above 130/80.  -continue with a low-salt diet and with exercise    Squamous cell lung cancer  -Status post lobectomy and chemotherapy  -Continue to follow with hematology/oncology    Depression  - well controlled   - continue with fluoxetine  -Patient discontinued her Wellbutrin    Anxiety  - well controlled   - continue with fluoxetine  -Patient discontinued her hydroxyzine    GERD  -Well-controlled  -Continue with famotidine  -Continue to avoid diets and behaviors that trigger symptoms    Restless leg syndrome  -Stable  -Continue with pramipexole    Hyperlipidemia with peripheral arterial disease  -Stable  -Continue atorvastatin and a heart healthy diet  -Patient is status post physical therapy which helped her symptoms on the right lower extremity  -She discontinued her cilostazol  -Follow-up with vascular surgery    COPD   - stable  without acute exacerbation  - continue with Anoro Ellipta inhaler and p.r.n. albuterol inhaler  -Unfortunately, patient states that she cannot afford her inhaler  -I would have sent a message to pulmonology to look for an alternate inhaler for her but her pulmonologist is with [facility].       Diagnoses and all orders for this visit:    Medicare annual wellness visit, subsequent    Hypertension, essential    Squamous cell lung cancer, right (HCC)    Chronic obstructive pulmonary disease, unspecified COPD type (HCC)    Gastroesophageal reflux disease without esophagitis    Depression, recurrent (HCC)    Other specified anxiety disorders    Other hyperlipidemia    Encounter for screening mammogram for breast cancer  Comments:  last mmamogram was on 5/30/24 and she is scheduled for next year. + family hx of breast ca in an P aunt.    History of colonic polyps  Comments:  last colonoscopy was in 2022 and they found polyps, diverticulosis and she has a family  hx of colon ca. told to return in 2025.    Localized osteoporosis without current pathological fracture  Comments:  last dexa scan was 11/22 and showed osteoporosis and has been on prolia and she is scheduled for repeat dexa scan on 11/11/24    Encounter for immunization  Comments:  she is up to date with all her covid shots, flu shot, pneumococcal vaccines, shingles          Subjective:      Patient ID: Britni Fermin is a 69 y.o. female.    HPI    Patient presents for a medicare annual wellness visit as well as a follow up visit regarding her hypertension, squamous cell lung cancer, COPD, GERD, depression, anxiety, hyperlipidemia, peripheral arterial disease and osteoporosis.  She states that she is going for PT for her PAD and it helped the RLE pain.  Started having pain in her LLE and saw an orthopedic surgeon and was told that she had trochanteric bursitis and lower back issues and got a steroid shot and is now going for PT for her LLE.  She  states that she does not have a health care poa.  She states that she used to have 1 and does not have anymore.  She states that she stopped her bp  medications cos the bp was low everytime she went for PT but she denies any dizziness.  She has a hx of R sided squamous cell lung ca and is scheduled for repeat CT chest on 5/5/25.  She follows with oncology every 6 months   She states that she is not using her inhalers much cos she could not get the anoro ellipta because of financial issues.  She states that it will cost over $400 to get and she cannot afford that.  Mood is okay - she admits that she has a lot of stressors   Taking her fluoxetine but discontinued her Wellbutrin.    The following portions of the patient's history were reviewed and updated as appropriate: She  has a past medical history of Allergic (?), Anxiety, Bronchial malignant neoplasm, right (HCC), Cancer (HCC), Chronic major depressive disorder, single episode, COPD (chronic obstructive pulmonary disease) (HCC), Full incontinence of feces, GERD (gastroesophageal reflux disease), Hyperlipidemia, Hypertension, Malignant neoplasm of bronchus and lung (HCC), Multiple sclerosis (HCC), Pulmonary emphysema (HCC), Restless leg syndrome, and Tobacco use.  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    Dizziness 10/04/2021    Encounter for care related to vascular access port 07/23/2021    Chemotherapy-induced nausea 04/30/2021    Insomnia due to  medical condition 04/05/2021    Cancer related pain 04/05/2021    Palliative care patient 04/04/2021    Medical marijuana use 04/04/2021    Anxiety disorder 04/01/2021    Chemotherapy induced neutropenia (HCC) 03/31/2021    Regional lymph node metastasis present (HCC) 03/02/2021    GERD (gastroesophageal reflux disease) 02/26/2021    Depression, recurrent (HCC)     Squamous cell lung cancer, right (HCC) 01/12/2021    Chronic obstructive pulmonary disease (HCC) 10/07/2019    Tinnitus of both ears 11/06/2018    Hypertension, essential 11/07/2017    Restless legs syndrome (RLS) 11/07/2017    Cystocele 09/26/2016    Rectocele 09/26/2016    Cystocele with prolapse 09/26/2016    Atypical lobular hyperplasia of breast 03/24/2015    Multiple sclerosis (HCC) 11/29/2013    Hyperlipidemia 09/15/2010    Spinal stenosis of lumbar region 09/15/2010    History of colonic polyps 09/15/2010     She  has a past surgical history that includes Breast biopsy (Left, 03/23/2015); Breast excisional biopsy (Left, 08/26/2010); Breast biopsy (Left, 08/02/2010); Breast excisional biopsy (Left, 1994); Breast excisional biopsy (Right, 1984); Total abdominal hysterectomy w/ bilateral salpingoophorectomy (Bilateral, 2010); Total abdominal hysterectomy (2010); Augmentation mammaplasty (Bilateral, 11/2014); pr thoracoscopy w/lobectomy single lobe (Right, 01/28/2021); pr [facility] incl fluor gdnce dx w/cell washg spx (N/A, 01/28/2021); Tonsillectomy; pr insj tunneled ctr vad w/subq port age 3 yr/> (N/A, 03/09/2021); FL guided central venous access device insertion (03/09/2021); Mammo (historical) (2020); IR port removal (03/07/2022); Lymph node biopsy (2021); Tubal ligation (1971); and CT needle biopsy lung (12/2/2020).  Her family history includes Breast cancer in her paternal aunt; COPD in her brother and father; Hypertension in her father; No Known Problems in her brother, daughter, maternal aunt, maternal grandfather, maternal grandmother, mother,  paternal aunt, paternal aunt, paternal aunt, paternal aunt, paternal aunt, paternal aunt, paternal grandmother, sister, and sister; Rectal cancer in her paternal grandfather.  She  reports that she quit smoking about 3 years ago. Her smoking use included cigarettes. She started smoking about 56 years ago. She has a 53 pack-year smoking history. She has never used smokeless tobacco. She reports current alcohol use of about 5.0 standard drinks of alcohol per week. She reports that she does not use drugs.  Current Outpatient Medications   Medication Sig Dispense Refill    atorvastatin (LIPITOR) 10 mg tablet Take 1 tablet (10 mg total) by mouth daily 90 tablet 1    Biotin 5 MG CAPS Take 5,000 mg by mouth daily      Cholecalciferol (Vitamin D3) 50 MCG (2000 UT) capsule Take 5,000 Units by mouth daily       famotidine (PEPCID) 40 MG tablet Take 1 tablet (40 mg total) by mouth every morning 90 tablet 1    FLUoxetine (PROzac) 20 mg capsule Take 1 capsule (20 mg total) by mouth daily 90 capsule 1    hydrocortisone 2.5 % cream Apply topically 2 (two) times a day 28 g 2    loperamide (IMODIUM) 2 mg capsule Take 2 mg by mouth daily otc      pramipexole (MIRAPEX) 0.5 mg tablet Take 1 tablet (0.5 mg total) by mouth daily at bedtime 90 tablet 1    buPROPion (WELLBUTRIN XL) 300 mg 24 hr tablet Take 1 tablet (300 mg total) by mouth every morning (Patient not taking: Reported on 5/31/2024) 90 tablet 1    cilostazol (PLETAL) 100 mg tablet Take 1 tablet (100 mg total) by mouth in the morning 90 tablet 0    hydrOXYzine HCL (ATARAX) 25 mg tablet Take 1 tablet (25 mg total) by mouth every 6 (six) hours as needed for anxiety (Patient not taking: Reported on 7/10/2024) 20 tablet 0    losartan-hydrochlorothiazide (HYZAAR) 50-12.5 mg per tablet Take 1 tablet by mouth daily (Patient not taking: Reported on 10/14/2024) 90 tablet 1     No current facility-administered medications for this visit.     Current Outpatient Medications on File Prior  to Visit   Medication Sig    atorvastatin (LIPITOR) 10 mg tablet Take 1 tablet (10 mg total) by mouth daily    Biotin 5 MG CAPS Take 5,000 mg by mouth daily    Cholecalciferol (Vitamin D3) 50 MCG (2000 UT) capsule Take 5,000 Units by mouth daily     famotidine (PEPCID) 40 MG tablet Take 1 tablet (40 mg total) by mouth every morning    FLUoxetine (PROzac) 20 mg capsule Take 1 capsule (20 mg total) by mouth daily    hydrocortisone 2.5 % cream Apply topically 2 (two) times a day    loperamide (IMODIUM) 2 mg capsule Take 2 mg by mouth daily otc    pramipexole (MIRAPEX) 0.5 mg tablet Take 1 tablet (0.5 mg total) by mouth daily at bedtime    buPROPion (WELLBUTRIN XL) 300 mg 24 hr tablet Take 1 tablet (300 mg total) by mouth every morning (Patient not taking: Reported on 5/31/2024)    cilostazol (PLETAL) 100 mg tablet Take 1 tablet (100 mg total) by mouth in the morning    hydrOXYzine HCL (ATARAX) 25 mg tablet Take 1 tablet (25 mg total) by mouth every 6 (six) hours as needed for anxiety (Patient not taking: Reported on 7/10/2024)    losartan-hydrochlorothiazide (HYZAAR) 50-12.5 mg per tablet Take 1 tablet by mouth daily (Patient not taking: Reported on 10/14/2024)     No current facility-administered medications on file prior to visit.     She is allergic to sulfa antibiotics..    Review of Systems   Constitutional:  Negative for activity change, chills, fatigue, fever and unexpected weight change.   HENT:  Negative for ear pain, postnasal drip, rhinorrhea, sinus pressure and sore throat.    Eyes:  Negative for pain.   Respiratory:  Negative for cough, choking, chest tightness, shortness of breath and wheezing.    Cardiovascular:  Negative for chest pain, palpitations and leg swelling.   Gastrointestinal:  Negative for abdominal pain, constipation, diarrhea, nausea and vomiting.   Genitourinary:  Negative for dysuria and hematuria.   Musculoskeletal:  Positive for arthralgias. Negative for back pain, gait problem, joint  "swelling, myalgias and neck stiffness.   Skin:  Negative for pallor and rash.   Neurological:  Negative for dizziness, tremors, seizures, syncope, light-headedness and headaches.   Hematological:  Negative for adenopathy.   Psychiatric/Behavioral:  Negative for behavioral problems.          Objective:      /80 (BP Location: Left arm, Patient Position: Sitting, Cuff Size: Standard)   Pulse (!) 54   Temp 97.9 °F (36.6 °C) (Temporal)   Ht 4' 9\" (1.448 m)   Wt 63 kg (139 lb)   LMP  (LMP Unknown)   SpO2 97%   BMI 30.08 kg/m²          Physical Exam  Constitutional:       General: She is not in acute distress.     Appearance: She is well-developed. She is not diaphoretic.   HENT:      Head: Normocephalic and atraumatic.      Right Ear: External ear normal.      Left Ear: External ear normal.      Nose: Nose normal.      Mouth/Throat:      Mouth: Mucous membranes are dry.      Pharynx: Posterior oropharyngeal erythema present. No oropharyngeal exudate.   Eyes:      General: No scleral icterus.        Right eye: No discharge.         Left eye: No discharge.      Conjunctiva/sclera: Conjunctivae normal.      Pupils: Pupils are equal, round, and reactive to light.   Neck:      Thyroid: No thyromegaly.      Vascular: No JVD.      Trachea: No tracheal deviation.   Cardiovascular:      Rate and Rhythm: Regular rhythm. Bradycardia present.      Heart sounds: Normal heart sounds. No murmur heard.     No friction rub. No gallop.   Pulmonary:      Effort: Pulmonary effort is normal. No respiratory distress.      Breath sounds: Normal breath sounds. No wheezing or rales.   Chest:      Chest wall: No tenderness.   Abdominal:      General: Bowel sounds are normal. There is no distension.      Palpations: Abdomen is soft. There is no mass.      Tenderness: There is no abdominal tenderness. There is no guarding or rebound.   Musculoskeletal:         General: No tenderness or deformity. Normal range of motion.      Cervical " back: Normal range of motion and neck supple.   Lymphadenopathy:      Cervical: No cervical adenopathy.   Skin:     General: Skin is warm and dry.      Coloration: Skin is not pale.      Findings: No erythema or rash.   Neurological:      Mental Status: She is alert and oriented to person, place, and time.      Cranial Nerves: No cranial nerve deficit.      Motor: No abnormal muscle tone.      Coordination: Coordination normal.      Deep Tendon Reflexes: Reflexes are normal and symmetric.   Psychiatric:         Behavior: Behavior normal.           Appointment on 05/30/2024   Component Date Value Ref Range Status    Hepatitis C Ab 05/30/2024 Non-reactive  Non-Reactive Final    Cholesterol 05/30/2024 174  See Comment mg/dL Final    Cholesterol:         Pediatric <18 Years        Desirable          <170 mg/dL      Borderline High    170-199 mg/dL      High               >=200 mg/dL        Adult >=18 Years            Desirable         <200 mg/dL      Borderline High   200-239 mg/dL      High              >239 mg/dL      Triglycerides 05/30/2024 137  See Comment mg/dL Final    Triglyceride:     0-9Y            <75mg/dL     10Y-17Y         <90 mg/dL       >=18Y     Normal          <150 mg/dL     Borderline High 150-199 mg/dL     High            200-499 mg/dL        Very High       >499 mg/dL    Specimen collection should occur prior to Metamizole administration due to the potential for falsely depressed results.    HDL, Direct 05/30/2024 64  >=50 mg/dL Final    LDL Calculated 05/30/2024 83  0 - 100 mg/dL Final    LDL Cholesterol:     Optimal           <100 mg/dl     Near Optimal      100-129 mg/dl     Above Optimal       Borderline High 130-159 mg/dl       High            160-189 mg/dl       Very High       >189 mg/dl         This screening LDL is a calculated result.   It does not have the accuracy of the Direct Measured LDL in the monitoring of patients with hyperlipidemia and/or statin therapy.   Direct Measure LDL  (BEW565) must be ordered separately in these patients.    Non-HDL-Chol (CHOL-HDL) 05/30/2024 110  mg/dl Final    TSH 3RD GENERATON 05/30/2024 2.158  0.450 - 4.500 uIU/mL Final    The recommended reference ranges for TSH during pregnancy are as follows:   First trimester 0.100 to 2.500 uIU/mL   Second trimester  0.200 to 3.000 uIU/mL   Third trimester 0.300 to 3.000 uIU/m    Note: Normal ranges may not apply to patients who are transgender, non-binary, or whose legal sex, sex at birth, and gender identity differ.  Adult TSH (3rd generation) reference range follows the recommended guidelines of the [employer], January, 2020.    WBC 05/30/2024 4.45  4.31 - 10.16 Thousand/uL Final    RBC 05/30/2024 4.52  3.81 - 5.12 Million/uL Final    Hemoglobin 05/30/2024 13.8  11.5 - 15.4 g/dL Final    Hematocrit 05/30/2024 43.2  34.8 - 46.1 % Final    MCV 05/30/2024 96  82 - 98 fL Final    MCH 05/30/2024 30.5  26.8 - 34.3 pg Final    MCHC 05/30/2024 31.9  31.4 - 37.4 g/dL Final    RDW 05/30/2024 12.9  11.6 - 15.1 % Final    MPV 05/30/2024 10.5  8.9 - 12.7 fL Final    Platelets 05/30/2024 315  149 - 390 Thousands/uL Final    nRBC 05/30/2024 0  /100 WBCs Final    Segmented % 05/30/2024 58  43 - 75 % Final    Immature Grans % 05/30/2024 0  0 - 2 % Final    Lymphocytes % 05/30/2024 29  14 - 44 % Final    Monocytes % 05/30/2024 9  4 - 12 % Final    Eosinophils Relative 05/30/2024 3  0 - 6 % Final    Basophils Relative 05/30/2024 1  0 - 1 % Final    Absolute Neutrophils 05/30/2024 2.54  1.85 - 7.62 Thousands/µL Final    Absolute Immature Grans 05/30/2024 0.01  0.00 - 0.20 Thousand/uL Final    Absolute Lymphocytes 05/30/2024 1.28  0.60 - 4.47 Thousands/µL Final    Absolute Monocytes 05/30/2024 0.42  0.17 - 1.22 Thousand/µL Final    Eosinophils Absolute 05/30/2024 0.14  0.00 - 0.61 Thousand/µL Final    Basophils Absolute 05/30/2024 0.06  0.00 - 0.10 Thousands/µL Final    Sodium 05/30/2024 139  135 - 147 mmol/L Final     Potassium 05/30/2024 4.2  3.5 - 5.3 mmol/L Final    Chloride 05/30/2024 100  96 - 108 mmol/L Final    CO2 05/30/2024 31  21 - 32 mmol/L Final    ANION GAP 05/30/2024 8  4 - 13 mmol/L Final    BUN 05/30/2024 15  5 - 25 mg/dL Final    Creatinine 05/30/2024 0.68  0.60 - 1.30 mg/dL Final    Standardized to IDMS reference method    Glucose, Fasting 05/30/2024 92  65 - 99 mg/dL Final    Calcium 05/30/2024 9.3  8.4 - 10.2 mg/dL Final    AST 05/30/2024 19  13 - 39 U/L Final    ALT 05/30/2024 20  7 - 52 U/L Final    Specimen collection should occur prior to Sulfasalazine administration due to the potential for falsely depressed results.     Alkaline Phosphatase 05/30/2024 51  34 - 104 U/L Final    Total Protein 05/30/2024 7.2  6.4 - 8.4 g/dL Final    Albumin 05/30/2024 4.2  3.5 - 5.0 g/dL Final    Total Bilirubin 05/30/2024 0.64  0.20 - 1.00 mg/dL Final    Use of this assay is not recommended for patients undergoing treatment with eltrombopag due to the potential for falsely elevated results.  N-acetyl-p-benzoquinone imine (metabolite of Acetaminophen) will generate erroneously low results in samples for patients that have taken an overdose of Acetaminophen.    eGFR 05/30/2024 88  ml/min/1.73sq m Final       Answers submitted by the patient for this visit:  AUDIT-C (Alcohol Use Disorders Identification Test) Screening (Submitted on 10/7/2024)  How often during the last year have you found that you were not able to stop drinking once you had started?: 0 - never  How often during the last year have you failed to do what was normally expected from you because of drinking?: 0 - never  How often during the last year have you needed a first drink in the morning to get yourself going after a heavy drinking session?: 0 - never  How often during the last year have you had a feeling of guilt or remorse after drinking?: 0 - never  How often during the last year have you been unable to remember what happened the night before because you  "had been drinking?: 0 - never  Have you or someone else been injured as a result of your drinking?: 0 - no  Has a relative or friend or a doctor or another health worker been concerned about your drinking or suggested you cut down?: 0 - no  Medicare Annual Wellness Visit (Submitted on 10/7/2024)  How would you rate your overall health?: fair  Compared to last year, how is your physical health?: same  In general, how satisfied are you with your life?: satisfied  Compared to last year, how is your eyesight?: same  Compared to last year, how is your hearing?: same  Compared to last year, how is your emotional/mental health?: same  How often is anger a problem for you?: never, rarely  How often do you feel unusually tired/fatigued?: often  In the past 7 days, how much pain have you experienced?: a lot  If you answered \"some\" or \"a lot\", please rate the severity of your pain on a scale of 1 to 10 (1 being the least severe pain and 10 being the most intense pain).: 7/10  In the past 6 months, have you lost or gained 10 pounds without trying?: Yes  One or more falls in the last year: No  In the past 6 months, have you accidentally leaked urine?: Yes  Do you have trouble with the stairs inside or outside your home?: Yes  Does your home have working smoke alarms?: Yes  Does your home have a carbon monoxide monitor?: Yes  Which safety hazards (if any) have you experienced in your home? Please select all that apply.: none  How would you describe your current diet? Please select all that apply.: Regular  In addition to prescription medications, are you taking any over-the-counter supplements?: Yes  If yes, what supplements are you taking?: d3-biotin  Can you manage your medications?: Yes  Are you currently taking any opioid medications?: No  Can you walk and transfer into and out of your bed and chair?: Yes  Can you dress and groom yourself?: Yes  Can you bathe or shower yourself?: Yes  Can you feed yourself?: Yes  Can you do " your laundry/ housekeeping?: Yes  Can you manage your money, pay your bills, and track your expenses?: Yes  Can you make your own meals?: Yes  Can you do your own shopping?: Yes  Within the last 12 months, have you had any hospitalizations or Emergency Department visits?: No  Do you have a living will?: No  Do you have a Durable POA (Power of ) for healthcare decisions?: No  Do you have an Advanced Directive for end of life decisions?: No  How often have you used an illegal drug (including marijuana) or a prescription medication for non-medical reasons in the past year?: never  What is the typical number of drinks you consume in a day?: 3  What is the typical number of drinks you consume in a week?: 3  How often did you have a drink containing alcohol in the past year?: 4 or more times a week  How many drinks did you have on a typical day  when you were drinking in the past year?: 1 to 2  How often did you have 6 or more drinks on one occasion in the past year?: never

## 2024-10-14 NOTE — PATIENT INSTRUCTIONS
Medicare Preventive Visit Patient Instructions  Thank you for completing your Welcome to Medicare Visit or Medicare Annual Wellness Visit today. Your next wellness visit will be due in one year (10/15/2025).  The screening/preventive services that you may require over the next 5-10 years are detailed below. Some tests may not apply to you based off risk factors and/or age. Screening tests ordered at today's visit but not completed yet may show as past due. Also, please note that scanned in results may not display below.  Preventive Screenings:  Service Recommendations Previous Testing/Comments   Colorectal Cancer Screening  * Colonoscopy    * Fecal Occult Blood Test (FOBT)/Fecal Immunochemical Test (FIT)  * Fecal DNA/Cologuard Test  * Flexible Sigmoidoscopy Age: 45-75 years old   Colonoscopy: every 10 years (may be performed more frequently if at higher risk)  OR  FOBT/FIT: every 1 year  OR  Cologuard: every 3 years  OR  Sigmoidoscopy: every 5 years  Screening may be recommended earlier than age 45 if at higher risk for colorectal cancer. Also, an individualized decision between you and your healthcare provider will decide whether screening between the ages of 76-85 would be appropriate. Colonoscopy: 11/14/2022  FOBT/FIT: Not on file  Cologuard: Not on file  Sigmoidoscopy: Not on file    Screening Current     Breast Cancer Screening Age: 40+ years old  Frequency: every 1-2 years  Not required if history of left and right mastectomy Mammogram: 05/30/2024    Screening Current   Cervical Cancer Screening Between the ages of 21-29, pap smear recommended once every 3 years.   Between the ages of 30-65, can perform pap smear with HPV co-testing every 5 years.   Recommendations may differ for women with a history of total hysterectomy, cervical cancer, or abnormal pap smears in past. Pap Smear: Not on file    Screening Not Indicated   Hepatitis C Screening Once for adults born between 1945 and 1965  More frequently in  patients at high risk for Hepatitis C Hep C Antibody: 05/30/2024    Screening Current   Diabetes Screening 1-2 times per year if you're at risk for diabetes or have pre-diabetes Fasting glucose: 92 mg/dL (5/30/2024)  A1C: 5.3 % (5/4/2021)  Screening Current   Cholesterol Screening Once every 5 years if you don't have a lipid disorder. May order more often based on risk factors. Lipid panel: 05/30/2024    Screening Not Indicated  History Lipid Disorder     Other Preventive Screenings Covered by Medicare:  Abdominal Aortic Aneurysm (AAA) Screening: covered once if your at risk. You're considered to be at risk if you have a family history of AAA.  Lung Cancer Screening: covers low dose CT scan once per year if you meet all of the following conditions: (1) Age 55-77; (2) No signs or symptoms of lung cancer; (3) Current smoker or have quit smoking within the last 15 years; (4) You have a tobacco smoking history of at least 20 pack years (packs per day multiplied by number of years you smoked); (5) You get a written order from a healthcare provider.  Glaucoma Screening: covered annually if you're considered high risk: (1) You have diabetes OR (2) Family history of glaucoma OR (3)  aged 50 and older OR (4)  American aged 65 and older  Osteoporosis Screening: covered every 2 years if you meet one of the following conditions: (1) You're estrogen deficient and at risk for osteoporosis based off medical history and other findings; (2) Have a vertebral abnormality; (3) On glucocorticoid therapy for more than 3 months; (4) Have primary hyperparathyroidism; (5) On osteoporosis medications and need to assess response to drug therapy.   Last bone density test (DXA Scan): 11/07/2022.  HIV Screening: covered annually if you're between the age of 15-65. Also covered annually if you are younger than 15 and older than 65 with risk factors for HIV infection. For pregnant patients, it is covered up to 3 times per  pregnancy.    Immunizations:  Immunization Recommendations   Influenza Vaccine Annual influenza vaccination during flu season is recommended for all persons aged >= 6 months who do not have contraindications   Pneumococcal Vaccine   * Pneumococcal conjugate vaccine = PCV13 (Prevnar 13), PCV15 (Vaxneuvance), PCV20 (Prevnar 20)  * Pneumococcal polysaccharide vaccine = PPSV23 (Pneumovax) Adults 19-63 yo with certain risk factors or if 65+ yo  If never received any pneumonia vaccine: recommend Prevnar 20 (PCV20)  Give PCV20 if previously received 1 dose of PCV13 or PPSV23   Hepatitis B Vaccine 3 dose series if at intermediate or high risk (ex: diabetes, end stage renal disease, liver disease)   Respiratory syncytial virus (RSV) Vaccine - COVERED BY MEDICARE PART D  * RSVPreF3 (Arexvy) CDC recommends that adults 60 years of age and older may receive a single dose of RSV vaccine using shared clinical decision-making (SCDM)   Tetanus (Td) Vaccine - COST NOT COVERED BY MEDICARE PART B Following completion of primary series, a booster dose should be given every 10 years to maintain immunity against tetanus. Td may also be given as tetanus wound prophylaxis.   Tdap Vaccine - COST NOT COVERED BY MEDICARE PART B Recommended at least once for all adults. For pregnant patients, recommended with each pregnancy.   Shingles Vaccine (Shingrix) - COST NOT COVERED BY MEDICARE PART B  2 shot series recommended in those 19 years and older who have or will have weakened immune systems or those 50 years and older     Health Maintenance Due:      Topic Date Due   • Breast Cancer Screening: Mammogram  05/30/2025   • Colorectal Cancer Screening  11/14/2025   • Hepatitis C Screening  Completed     Immunizations Due:  There are no preventive care reminders to display for this patient.  Advance Directives   What are advance directives?  Advance directives are legal documents that state your wishes and plans for medical care. These plans are made  ahead of time in case you lose your ability to make decisions for yourself. Advance directives can apply to any medical decision, such as the treatments you want, and if you want to donate organs.   What are the types of advance directives?  There are many types of advance directives, and each state has rules about how to use them. You may choose a combination of any of the following:  Living will:  This is a written record of the treatment you want. You can also choose which treatments you do not want, which to limit, and which to stop at a certain time. This includes surgery, medicine, IV fluid, and tube feedings.   Durable power of  for healthcare (DPAHC):  This is a written record that states who you want to make healthcare choices for you when you are unable to make them for yourself. This person, called a proxy, is usually a family member or a friend. You may choose more than 1 proxy.  Do not resuscitate (DNR) order:  A DNR order is used in case your heart stops beating or you stop breathing. It is a request not to have certain forms of treatment, such as CPR. A DNR order may be included in other types of advance directives.  Medical directive:  This covers the care that you want if you are in a coma, near death, or unable to make decisions for yourself. You can list the treatments you want for each condition. Treatment may include pain medicine, surgery, blood transfusions, dialysis, IV or tube feedings, and a ventilator (breathing machine).  Values history:  This document has questions about your views, beliefs, and how you feel and think about life. This information can help others choose the care that you would choose.  Why are advance directives important?  An advance directive helps you control your care. Although spoken wishes may be used, it is better to have your wishes written down. Spoken wishes can be misunderstood, or not followed. Treatments may be given even if you do not want them. An  advance directive may make it easier for your family to make difficult choices about your care.   Urinary Incontinence   Urinary incontinence (UI)  is when you lose control of your bladder. UI develops because your bladder cannot store or empty urine properly. The 3 most common types of UI are stress incontinence, urge incontinence, or both.  Medicines:   May be given to help strengthen your bladder control. Report any side effects of medication to your healthcare provider.  Do pelvic muscle exercises often:  Your pelvic muscles help you stop urinating. Squeeze these muscles tight for 5 seconds, then relax for 5 seconds. Gradually work up to squeezing for 10 seconds. Do 3 sets of 15 repetitions a day, or as directed. This will help strengthen your pelvic muscles and improve bladder control.  Train your bladder:  Go to the bathroom at set times, such as every 2 hours, even if you do not feel the urge to go. You can also try to hold your urine when you feel the urge to go. For example, hold your urine for 5 minutes when you feel the urge to go. As that becomes easier, hold your urine for 10 minutes.   Self-care:   Keep a UI record.  Write down how often you leak urine and how much you leak. Make a note of what you were doing when you leaked urine.  Drink liquids as directed. You may need to limit the amount of liquid you drink to help control your urine leakage. Do not drink any liquid right before you go to bed. Limit or do not have drinks that contain caffeine or alcohol.   Prevent constipation.  Eat a variety of high-fiber foods. Good examples are high-fiber cereals, beans, vegetables, and whole-grain breads. Walking is the best way to trigger your intestines to have a bowel movement.  Exercise regularly and maintain a healthy weight.  Weight loss and exercise will decrease pressure on your bladder and help you control your leakage.   Use a catheter as directed  to help empty your bladder. A catheter is a tiny,  plastic tube that is put into your bladder to drain your urine.   Go to behavior therapy as directed.  Behavior therapy may be used to help you learn to control your urge to urinate.    Weight Management   Why it is important to manage your weight:  Being overweight increases your risk of health conditions such as heart disease, high blood pressure, type 2 diabetes, and certain types of cancer. It can also increase your risk for osteoarthritis, sleep apnea, and other respiratory problems. Aim for a slow, steady weight loss. Even a small amount of weight loss can lower your risk of health problems.  How to lose weight safely:  A safe and healthy way to lose weight is to eat fewer calories and get regular exercise. You can lose up about 1 pound a week by decreasing the number of calories you eat by 500 calories each day.   Healthy meal plan for weight management:  A healthy meal plan includes a variety of foods, contains fewer calories, and helps you stay healthy. A healthy meal plan includes the following:  Eat whole-grain foods more often.  A healthy meal plan should contain fiber. Fiber is the part of grains, fruits, and vegetables that is not broken down by your body. Whole-grain foods are healthy and provide extra fiber in your diet. Some examples of whole-grain foods are whole-wheat breads and pastas, oatmeal, brown rice, and bulgur.  Eat a variety of vegetables every day.  Include dark, leafy greens such as spinach, kale, maddy greens, and mustard greens. Eat yellow and orange vegetables such as carrots, sweet potatoes, and winter squash.   Eat a variety of fruits every day.  Choose fresh or canned fruit (canned in its own juice or light syrup) instead of juice. Fruit juice has very little or no fiber.  Eat low-fat dairy foods.  Drink fat-free (skim) milk or 1% milk. Eat fat-free yogurt and low-fat cottage cheese. Try low-fat cheeses such as mozzarella and other reduced-fat cheeses.  Choose meat and other  "protein foods that are low in fat.  Choose beans or other legumes such as split peas or lentils. Choose fish, skinless poultry (chicken or turkey), or lean cuts of red meat (beef or pork). Before you cook meat or poultry, cut off any visible fat.   Use less fat and oil.  Try baking foods instead of frying them. Add less fat, such as margarine, sour cream, regular salad dressing and mayonnaise to foods. Eat fewer high-fat foods. Some examples of high-fat foods include french fries, doughnuts, ice cream, and cakes.  Eat fewer sweets.  Limit foods and drinks that are high in sugar. This includes candy, cookies, regular soda, and sweetened drinks.  Exercise:  Exercise at least 30 minutes per day on most days of the week. Some examples of exercise include walking, biking, dancing, and swimming. You can also fit in more physical activity by taking the stairs instead of the elevator or parking farther away from stores. Ask your healthcare provider about the best exercise plan for you.   Alcohol Use and Your Health    Drinking too much can harm your health.  Excessive alcohol use leads to about 88,000 death in the [place] each year, and shortens the life of those who diet by almost 30 years.  Further, excessive drinking cost the economy $249 billion in 2010.  Most excessive drinkers are not alcohol dependent.    Excessive alcohol use has immediate effects that increase the risk of many harmful health conditions.  These are most often the result of binge drinking.  Over time, excessive alcohol use can lead to the development of chronic diseases and other series health problems.    What is considered a \"drink\"?        Excessive alcohol use includes:  Binge Drinking: For women, 4 or more drinks consumed on one occasion. For men, 5 or more drinks consumed on one occasion.  Heavy Drinking: For women, 8 or more drinks per week. For men, 15 or more drinks per week  Any alcohol used by pregnant women  Any alcohol used by " those under the age of 21 years    If you choose to drink, do so in moderation:  Do not drink at all if you are under the age of 21, or if you are or may be pregnant, or have health problems that could be made worse by drinking.  For women, up to 1 drink per day  For men, up to 2 drinks a day    No one should begin drinking or drink more frequently based on potential health benefits    Short-Term Health Risks:  Injuries: motor vehicle crashes, falls, drownings, burns  Violence: homicide, suicide, sexual assault, intimate partner violence  Alcohol poisoning  Reproductive health: risky sexual behaviors, unintended prengnacy, sexually transmitted diseases, miscarriage, stillbirth, fetal alcohol syndrome    Long-Term Health Risks:  Chronic diseases: high blood pressure, heart disease, stroke, liver disease, digestive problems  Cancers: breast, mouth and throat, liver, colon  Learning and memory problems: dementia, poor school performance  Mental health: depression, anxiety, insomnia  Social problems: lost productivity, family problems, unemployment  Alcohol dependence    For support and more information:  Substance Abuse and Mental Health Services Administration  [address]  [place] [address]  Web Address: http://www.[facility].gov    Alcoholics Anonymous        Web Address: http://www.aa.org    https://www.cdc.gov/alcohol/fact-sheets/alcohol-use.htm     © Copyright [employer] 2018 Information is for End User's use only and may not be sold, redistributed or otherwise used for commercial purposes. All illustrations and images included in CareNotes® are the copyrighted property of [employer] or [employer]      Medicare Preventive Visit Patient Instructions  Thank you for completing your Welcome to Medicare Visit or Medicare Annual Wellness Visit today. Your next wellness visit will be due in one year (10/15/2025).  The screening/preventive services that you may require over the next 5-10 years are  detailed below. Some tests may not apply to you based off risk factors and/or age. Screening tests ordered at today's visit but not completed yet may show as past due. Also, please note that scanned in results may not display below.  Preventive Screenings:  Service Recommendations Previous Testing/Comments   Colorectal Cancer Screening  * Colonoscopy    * Fecal Occult Blood Test (FOBT)/Fecal Immunochemical Test (FIT)  * Fecal DNA/Cologuard Test  * Flexible Sigmoidoscopy Age: 45-75 years old   Colonoscopy: every 10 years (may be performed more frequently if at higher risk)  OR  FOBT/FIT: every 1 year  OR  Cologuard: every 3 years  OR  Sigmoidoscopy: every 5 years  Screening may be recommended earlier than age 45 if at higher risk for colorectal cancer. Also, an individualized decision between you and your healthcare provider will decide whether screening between the ages of 76-85 would be appropriate. Colonoscopy: 11/14/2022  FOBT/FIT: Not on file  Cologuard: Not on file  Sigmoidoscopy: Not on file    Screening Current     Breast Cancer Screening Age: 40+ years old  Frequency: every 1-2 years  Not required if history of left and right mastectomy Mammogram: 05/30/2024    Screening Current   Cervical Cancer Screening Between the ages of 21-29, pap smear recommended once every 3 years.   Between the ages of 30-65, can perform pap smear with HPV co-testing every 5 years.   Recommendations may differ for women with a history of total hysterectomy, cervical cancer, or abnormal pap smears in past. Pap Smear: Not on file    Screening Not Indicated   Hepatitis C Screening Once for adults born between 1945 and 1965  More frequently in patients at high risk for Hepatitis C Hep C Antibody: 05/30/2024    Screening Current   Diabetes Screening 1-2 times per year if you're at risk for diabetes or have pre-diabetes Fasting glucose: 92 mg/dL (5/30/2024)  A1C: 5.3 % (5/4/2021)  Screening Current   Cholesterol Screening Once every 5  years if you don't have a lipid disorder. May order more often based on risk factors. Lipid panel: 05/30/2024    Screening Not Indicated  History Lipid Disorder     Other Preventive Screenings Covered by Medicare:  Abdominal Aortic Aneurysm (AAA) Screening: covered once if your at risk. You're considered to be at risk if you have a family history of AAA.  Lung Cancer Screening: covers low dose CT scan once per year if you meet all of the following conditions: (1) Age 55-77; (2) No signs or symptoms of lung cancer; (3) Current smoker or have quit smoking within the last 15 years; (4) You have a tobacco smoking history of at least 20 pack years (packs per day multiplied by number of years you smoked); (5) You get a written order from a healthcare provider.  Glaucoma Screening: covered annually if you're considered high risk: (1) You have diabetes OR (2) Family history of glaucoma OR (3)  aged 50 and older OR (4)  American aged 65 and older  Osteoporosis Screening: covered every 2 years if you meet one of the following conditions: (1) You're estrogen deficient and at risk for osteoporosis based off medical history and other findings; (2) Have a vertebral abnormality; (3) On glucocorticoid therapy for more than 3 months; (4) Have primary hyperparathyroidism; (5) On osteoporosis medications and need to assess response to drug therapy.   Last bone density test (DXA Scan): 11/07/2022.  HIV Screening: covered annually if you're between the age of 15-65. Also covered annually if you are younger than 15 and older than 65 with risk factors for HIV infection. For pregnant patients, it is covered up to 3 times per pregnancy.    Immunizations:  Immunization Recommendations   Influenza Vaccine Annual influenza vaccination during flu season is recommended for all persons aged >= 6 months who do not have contraindications   Pneumococcal Vaccine   * Pneumococcal conjugate vaccine = PCV13 (Prevnar 13), PCV15  (Vaxneuvance), PCV20 (Prevnar 20)  * Pneumococcal polysaccharide vaccine = PPSV23 (Pneumovax) Adults 19-63 yo with certain risk factors or if 65+ yo  If never received any pneumonia vaccine: recommend Prevnar 20 (PCV20)  Give PCV20 if previously received 1 dose of PCV13 or PPSV23   Hepatitis B Vaccine 3 dose series if at intermediate or high risk (ex: diabetes, end stage renal disease, liver disease)   Respiratory syncytial virus (RSV) Vaccine - COVERED BY MEDICARE PART D  * RSVPreF3 (Arexvy) CDC recommends that adults 60 years of age and older may receive a single dose of RSV vaccine using shared clinical decision-making (SCDM)   Tetanus (Td) Vaccine - COST NOT COVERED BY MEDICARE PART B Following completion of primary series, a booster dose should be given every 10 years to maintain immunity against tetanus. Td may also be given as tetanus wound prophylaxis.   Tdap Vaccine - COST NOT COVERED BY MEDICARE PART B Recommended at least once for all adults. For pregnant patients, recommended with each pregnancy.   Shingles Vaccine (Shingrix) - COST NOT COVERED BY MEDICARE PART B  2 shot series recommended in those 19 years and older who have or will have weakened immune systems or those 50 years and older     Health Maintenance Due:      Topic Date Due   • Breast Cancer Screening: Mammogram  05/30/2025   • Colorectal Cancer Screening  11/14/2025   • Hepatitis C Screening  Completed     Immunizations Due:  There are no preventive care reminders to display for this patient.  Advance Directives   What are advance directives?  Advance directives are legal documents that state your wishes and plans for medical care. These plans are made ahead of time in case you lose your ability to make decisions for yourself. Advance directives can apply to any medical decision, such as the treatments you want, and if you want to donate organs.   What are the types of advance directives?  There are many types of advance directives, and  each state has rules about how to use them. You may choose a combination of any of the following:  Living will:  This is a written record of the treatment you want. You can also choose which treatments you do not want, which to limit, and which to stop at a certain time. This includes surgery, medicine, IV fluid, and tube feedings.   Durable power of  for healthcare (DPAHC):  This is a written record that states who you want to make healthcare choices for you when you are unable to make them for yourself. This person, called a proxy, is usually a family member or a friend. You may choose more than 1 proxy.  Do not resuscitate (DNR) order:  A DNR order is used in case your heart stops beating or you stop breathing. It is a request not to have certain forms of treatment, such as CPR. A DNR order may be included in other types of advance directives.  Medical directive:  This covers the care that you want if you are in a coma, near death, or unable to make decisions for yourself. You can list the treatments you want for each condition. Treatment may include pain medicine, surgery, blood transfusions, dialysis, IV or tube feedings, and a ventilator (breathing machine).  Values history:  This document has questions about your views, beliefs, and how you feel and think about life. This information can help others choose the care that you would choose.  Why are advance directives important?  An advance directive helps you control your care. Although spoken wishes may be used, it is better to have your wishes written down. Spoken wishes can be misunderstood, or not followed. Treatments may be given even if you do not want them. An advance directive may make it easier for your family to make difficult choices about your care.   Urinary Incontinence   Urinary incontinence (UI)  is when you lose control of your bladder. UI develops because your bladder cannot store or empty urine properly. The 3 most common types of UI  are stress incontinence, urge incontinence, or both.  Medicines:   May be given to help strengthen your bladder control. Report any side effects of medication to your healthcare provider.  Do pelvic muscle exercises often:  Your pelvic muscles help you stop urinating. Squeeze these muscles tight for 5 seconds, then relax for 5 seconds. Gradually work up to squeezing for 10 seconds. Do 3 sets of 15 repetitions a day, or as directed. This will help strengthen your pelvic muscles and improve bladder control.  Train your bladder:  Go to the bathroom at set times, such as every 2 hours, even if you do not feel the urge to go. You can also try to hold your urine when you feel the urge to go. For example, hold your urine for 5 minutes when you feel the urge to go. As that becomes easier, hold your urine for 10 minutes.   Self-care:   Keep a UI record.  Write down how often you leak urine and how much you leak. Make a note of what you were doing when you leaked urine.  Drink liquids as directed. You may need to limit the amount of liquid you drink to help control your urine leakage. Do not drink any liquid right before you go to bed. Limit or do not have drinks that contain caffeine or alcohol.   Prevent constipation.  Eat a variety of high-fiber foods. Good examples are high-fiber cereals, beans, vegetables, and whole-grain breads. Walking is the best way to trigger your intestines to have a bowel movement.  Exercise regularly and maintain a healthy weight.  Weight loss and exercise will decrease pressure on your bladder and help you control your leakage.   Use a catheter as directed  to help empty your bladder. A catheter is a tiny, plastic tube that is put into your bladder to drain your urine.   Go to behavior therapy as directed.  Behavior therapy may be used to help you learn to control your urge to urinate.    Weight Management   Why it is important to manage your weight:  Being overweight increases your risk of  health conditions such as heart disease, high blood pressure, type 2 diabetes, and certain types of cancer. It can also increase your risk for osteoarthritis, sleep apnea, and other respiratory problems. Aim for a slow, steady weight loss. Even a small amount of weight loss can lower your risk of health problems.  How to lose weight safely:  A safe and healthy way to lose weight is to eat fewer calories and get regular exercise. You can lose up about 1 pound a week by decreasing the number of calories you eat by 500 calories each day.   Healthy meal plan for weight management:  A healthy meal plan includes a variety of foods, contains fewer calories, and helps you stay healthy. A healthy meal plan includes the following:  Eat whole-grain foods more often.  A healthy meal plan should contain fiber. Fiber is the part of grains, fruits, and vegetables that is not broken down by your body. Whole-grain foods are healthy and provide extra fiber in your diet. Some examples of whole-grain foods are whole-wheat breads and pastas, oatmeal, brown rice, and bulgur.  Eat a variety of vegetables every day.  Include dark, leafy greens such as spinach, kale, maddy greens, and mustard greens. Eat yellow and orange vegetables such as carrots, sweet potatoes, and winter squash.   Eat a variety of fruits every day.  Choose fresh or canned fruit (canned in its own juice or light syrup) instead of juice. Fruit juice has very little or no fiber.  Eat low-fat dairy foods.  Drink fat-free (skim) milk or 1% milk. Eat fat-free yogurt and low-fat cottage cheese. Try low-fat cheeses such as mozzarella and other reduced-fat cheeses.  Choose meat and other protein foods that are low in fat.  Choose beans or other legumes such as split peas or lentils. Choose fish, skinless poultry (chicken or turkey), or lean cuts of red meat (beef or pork). Before you cook meat or poultry, cut off any visible fat.   Use less fat and oil.  Try baking foods  "instead of frying them. Add less fat, such as margarine, sour cream, regular salad dressing and mayonnaise to foods. Eat fewer high-fat foods. Some examples of high-fat foods include french fries, doughnuts, ice cream, and cakes.  Eat fewer sweets.  Limit foods and drinks that are high in sugar. This includes candy, cookies, regular soda, and sweetened drinks.  Exercise:  Exercise at least 30 minutes per day on most days of the week. Some examples of exercise include walking, biking, dancing, and swimming. You can also fit in more physical activity by taking the stairs instead of the elevator or parking farther away from stores. Ask your healthcare provider about the best exercise plan for you.   Alcohol Use and Your Health    Drinking too much can harm your health.  Excessive alcohol use leads to about 88,000 death in the [place] each year, and shortens the life of those who diet by almost 30 years.  Further, excessive drinking cost the economy $249 billion in 2010.  Most excessive drinkers are not alcohol dependent.    Excessive alcohol use has immediate effects that increase the risk of many harmful health conditions.  These are most often the result of binge drinking.  Over time, excessive alcohol use can lead to the development of chronic diseases and other series health problems.    What is considered a \"drink\"?        Excessive alcohol use includes:  Binge Drinking: For women, 4 or more drinks consumed on one occasion. For men, 5 or more drinks consumed on one occasion.  Heavy Drinking: For women, 8 or more drinks per week. For men, 15 or more drinks per week  Any alcohol used by pregnant women  Any alcohol used by those under the age of 21 years    If you choose to drink, do so in moderation:  Do not drink at all if you are under the age of 21, or if you are or may be pregnant, or have health problems that could be made worse by drinking.  For women, up to 1 drink per day  For men, up to 2 drinks a " day    No one should begin drinking or drink more frequently based on potential health benefits    Short-Term Health Risks:  Injuries: motor vehicle crashes, falls, drownings, burns  Violence: homicide, suicide, sexual assault, intimate partner violence  Alcohol poisoning  Reproductive health: risky sexual behaviors, unintended prengnacy, sexually transmitted diseases, miscarriage, stillbirth, fetal alcohol syndrome    Long-Term Health Risks:  Chronic diseases: high blood pressure, heart disease, stroke, liver disease, digestive problems  Cancers: breast, mouth and throat, liver, colon  Learning and memory problems: dementia, poor school performance  Mental health: depression, anxiety, insomnia  Social problems: lost productivity, family problems, unemployment  Alcohol dependence    For support and more information:  Substance Abuse and Mental Health Services Administration  [address]  [place] [address]  Web Address: http://www.samhsa.gov    Alcoholics Anonymous        Web Address: http://www.aa.org    https://www.cdc.gov/alcohol/fact-sheets/alcohol-use.htm     © Copyright [employer] 2018 Information is for End User's use only and may not be sold, redistributed or otherwise used for commercial purposes. All illustrations and images included in CareNotes® are the copyrighted property of [employer]M., Inc. or [employer]

## 2024-10-16 ENCOUNTER — OFFICE VISIT (OUTPATIENT)
Dept: PHYSICAL THERAPY | Age: 69
End: 2024-10-16
Payer: MEDICARE

## 2024-10-16 DIAGNOSIS — G89.29 CHRONIC LEFT-SIDED LOW BACK PAIN WITH LEFT-SIDED SCIATICA: ICD-10-CM

## 2024-10-16 DIAGNOSIS — M70.62 TROCHANTERIC BURSITIS OF LEFT HIP: Primary | ICD-10-CM

## 2024-10-16 DIAGNOSIS — M54.42 CHRONIC LEFT-SIDED LOW BACK PAIN WITH LEFT-SIDED SCIATICA: ICD-10-CM

## 2024-10-16 PROCEDURE — 97110 THERAPEUTIC EXERCISES: CPT

## 2024-10-16 NOTE — PROGRESS NOTES
Daily Note     Today's date: 10/16/2024  Patient name: Britni Fermin  : 1955  MRN: [id number]  Referring provider: Johnathon Chang DO  Dx:   Encounter Diagnosis     ICD-10-CM    1. Trochanteric bursitis of left hip  M70.62       2. Chronic left-sided low back pain with left-sided sciatica  M54.42     G89.29                      Subjective: Patient presents to PT reporting soreness in muscle after last session, but no increase in pain. L hip has not been bothering her since receiving injection. Some soreness presently in R hip.       Objective: See treatment diary below      Assessment: Tolerated treatment well. Patient would benefit from continued PT. Interventions today to improve LE strength with focus on improving glute strength/load capacity. Patient tolerates all interventions with good target mm activation and no increase in symptoms. Will assess patient response to today's session and modify as needed to improve abilities with all functional tasks. Updated HEP handouts and Tband provided.       Plan: Progress treatment as tolerated.       Precautions: L lateral hip/thigh pain      Manuals 10/14 10/16                                                                 Neuro Re-Ed                                                                                                        Ther Ex               TM 20' 20'           Clamshells Iso 30x5s 3 x 10  5s  BB           Hip Add Iso 30x5s x           Bridges 3x10 3 x 10  5s           SL Hip ABd 3x10 3 x 10           SL Leg Press 30# 3x10 3 x 10  35#           SS w TB Yellow 5x             TA press down supine w pball  2 x 10   5s           Hip abduction cybex  3 x 10  30#           Glute med iso @ wall  2 x 15s B/L                        Ther Activity                                       Gait Training                                       Modalities

## 2024-10-21 ENCOUNTER — OFFICE VISIT (OUTPATIENT)
Dept: PHYSICAL THERAPY | Age: 69
End: 2024-10-21
Payer: MEDICARE

## 2024-10-21 DIAGNOSIS — G89.29 CHRONIC LEFT-SIDED LOW BACK PAIN WITH LEFT-SIDED SCIATICA: ICD-10-CM

## 2024-10-21 DIAGNOSIS — M54.42 CHRONIC LEFT-SIDED LOW BACK PAIN WITH LEFT-SIDED SCIATICA: ICD-10-CM

## 2024-10-21 DIAGNOSIS — M70.62 TROCHANTERIC BURSITIS OF LEFT HIP: Primary | ICD-10-CM

## 2024-10-21 PROCEDURE — 97110 THERAPEUTIC EXERCISES: CPT | Performed by: SPECIALIST/TECHNOLOGIST

## 2024-10-21 NOTE — PROGRESS NOTES
Daily Note     Today's date: 10/21/2024  Patient name: Britni Fermin  : 1955  MRN: [id number]  Referring provider: Johnathon Chang DO  Dx:   Encounter Diagnosis     ICD-10-CM    1. Trochanteric bursitis of left hip  M70.62       2. Chronic left-sided low back pain with left-sided sciatica  M54.42     G89.29                      Subjective: Pt reports overall improvement in L hip pain since starting PT.     Objective: See treatment diary below    Assessment: Pt continues to progress well with LE PRE program without exacerbation of L hip pain- consistent strength gains to date.  Tolerated treatment well. Patient demonstrated fatigue post treatment, exhibited good technique with therapeutic exercises, and would benefit from continued PT    Plan: Continue per plan of care.  Progress treatment as tolerated.       Precautions: L lateral hip/thigh pain      Manuals 10/14 10/16 10/21                                                                Neuro Re-Ed                                                                                                        Ther Ex               TM 20' 20' 20'          Clamshells Iso 30x5s 3 x 10  5s  BB 30x ea Blue          Hip Add Iso 30x5s x           Bridges 3x10 3 x 10  5s 30x          SL Hip ABd 3x10 3 x 10           SL Leg Press 30# 3x10 3 x 10  35# 40# 3x10          SS w TB Yellow 5x   Yellow 5x          TA press down supine w pball  2 x 10   5s NP          Hip abduction cybex  3 x 10  30# 3x10 30#          Glute med iso @ wall  2 x 15s B/L 3x15s B/L                       Ther Activity                                       Gait Training                                       Modalities

## 2024-10-23 ENCOUNTER — OFFICE VISIT (OUTPATIENT)
Dept: PHYSICAL THERAPY | Age: 69
End: 2024-10-23
Payer: MEDICARE

## 2024-10-23 DIAGNOSIS — M54.42 CHRONIC LEFT-SIDED LOW BACK PAIN WITH LEFT-SIDED SCIATICA: ICD-10-CM

## 2024-10-23 DIAGNOSIS — G89.29 CHRONIC LEFT-SIDED LOW BACK PAIN WITH LEFT-SIDED SCIATICA: ICD-10-CM

## 2024-10-23 DIAGNOSIS — M70.62 TROCHANTERIC BURSITIS OF LEFT HIP: Primary | ICD-10-CM

## 2024-10-23 PROCEDURE — 97110 THERAPEUTIC EXERCISES: CPT

## 2024-10-23 NOTE — PROGRESS NOTES
Daily Note     Today's date: 10/23/2024  Patient name: Britni Fermin  : 1955  MRN: [id number]  Referring provider: Johnathon Chang DO  Dx:   Encounter Diagnosis     ICD-10-CM    1. Trochanteric bursitis of left hip  M70.62       2. Chronic left-sided low back pain with left-sided sciatica  M54.42     G89.29                      Subjective: Hip doing well, no increase in symptoms after last session. Shoulder discomfort has resolved.       Objective: See treatment diary below      Assessment: Tolerated treatment well. Patient would benefit from continued PT. Progressed patient to higher level TE to further increase gluteal load capacity, which patient tolerates well with additional challenge. Patient is progressing well with PT and reporting improvements in symptoms over past couple of weeks. Will continue to progress patient as able to tolerate to improve abilities with all functional tasks.       Plan: Progress treatment as tolerated.       Precautions: L lateral hip/thigh pain      Manuals 10/14 10/16 10/21 10/23                                                               Neuro Re-Ed                                                                                                        Ther Ex               TM 20' 20' 20' 20'         Clamshells Iso 30x5s 3 x 10  5s  BB 30x ea Blue 3 x 10 ea   BB         Hip Add Iso 30x5s x           Bridges 3x10 3 x 10  5s 30x 30x  5s         SL Hip ABd 3x10 3 x 10  3 x 10         SL Leg Press 30# 3x10 3 x 10  35# 40# 3x10 40#  3 x 10         SS w TB Yellow 5x   Yellow 5x YB  5x         TA press down supine w pball  2 x 10   5s NP 2 x 10, 5s         Hip abduction cybex  3 x 10  30# 3x10 30# 3 x 10  30#         Glute med iso @ wall  2 x 15s B/L 3x15s B/L 3 x 15s  B/L                      Ther Activity                                       Gait Training                                       Modalities

## 2024-10-28 ENCOUNTER — OFFICE VISIT (OUTPATIENT)
Dept: PHYSICAL THERAPY | Age: 69
End: 2024-10-28
Payer: MEDICARE

## 2024-10-28 DIAGNOSIS — G89.29 CHRONIC LEFT-SIDED LOW BACK PAIN WITH LEFT-SIDED SCIATICA: ICD-10-CM

## 2024-10-28 DIAGNOSIS — M70.62 TROCHANTERIC BURSITIS OF LEFT HIP: Primary | ICD-10-CM

## 2024-10-28 DIAGNOSIS — M54.42 CHRONIC LEFT-SIDED LOW BACK PAIN WITH LEFT-SIDED SCIATICA: ICD-10-CM

## 2024-10-28 PROCEDURE — 97110 THERAPEUTIC EXERCISES: CPT

## 2024-10-28 NOTE — PROGRESS NOTES
Daily Note     Today's date: 10/28/2024  Patient name: Britni Fermin  : 1955  MRN: [id number]  Referring provider: Johnathon Chang DO  Dx:   Encounter Diagnosis     ICD-10-CM    1. Trochanteric bursitis of left hip  M70.62       2. Chronic left-sided low back pain with left-sided sciatica  M54.42     G89.29                      Subjective: Reports hip has been doing well.       Objective: See treatment diary below      Assessment: Tolerated treatment well. Patient would benefit from continued PT. Interventions continued to improve LE strength with focus on improving gluteal load capacity. Patient tolerates interventions well without exacerbation. Patient progressing well with PT at this time, and will continue to progress patient as able to tolerate to improve abilities with all functional tasks.       Plan: Progress treatment as tolerated.       Precautions: L lateral hip/thigh pain      Manuals 10/14 10/16 10/21 10/23 10/28                                                              Neuro Re-Ed                                                                                                        Ther Ex               TM 20' 20' 20' 20' 20'        Clamshells Iso 30x5s 3 x 10  5s  BB 30x ea Blue 3 x 10 ea   BB 3 x 10 ea  BB        Hip Add Iso 30x5s x           Bridges 3x10 3 x 10  5s 30x 30x  5s 3 x 10  staggered        SL Hip ABd 3x10 3 x 10  3 x 10 3 x 10        SL Leg Press 30# 3x10 3 x 10  35# 40# 3x10 40#  3 x 10 40#  3 x 10        SS w TB Yellow 5x   Yellow 5x YB  5x RB  5x        TA press down supine w pball  2 x 10   5s NP 2 x 10, 5s 2 x 10  5s        Hip abduction cybex  3 x 10  30# 3x10 30# 3 x 10  30# 3 x 30s  30#        Glute med iso @ wall  2 x 15s B/L 3x15s B/L 3 x 15s  B/L 3 x 20s  B/L                     Ther Activity                                       Gait Training                                       Modalities

## 2024-10-30 ENCOUNTER — HOSPITAL ENCOUNTER (OUTPATIENT)
Dept: RADIOLOGY | Facility: CLINIC | Age: 69
Discharge: HOME | End: 2024-10-30
Payer: MEDICARE

## 2024-10-30 ENCOUNTER — OFFICE VISIT (OUTPATIENT)
Dept: PAIN MEDICINE | Facility: CLINIC | Age: 69
End: 2024-10-30
Payer: MEDICARE

## 2024-10-30 VITALS
HEIGHT: 57 IN | WEIGHT: 141 LBS | HEART RATE: 69 BPM | DIASTOLIC BLOOD PRESSURE: 91 MMHG | BODY MASS INDEX: 30.42 KG/M2 | RESPIRATION RATE: 16 BRPM | SYSTOLIC BLOOD PRESSURE: 168 MMHG

## 2024-10-30 VITALS
RESPIRATION RATE: 18 BRPM | OXYGEN SATURATION: 95 % | SYSTOLIC BLOOD PRESSURE: 116 MMHG | HEART RATE: 61 BPM | TEMPERATURE: 97.2 F | DIASTOLIC BLOOD PRESSURE: 63 MMHG

## 2024-10-30 DIAGNOSIS — M48.062 SPINAL STENOSIS OF LUMBAR REGION WITH NEUROGENIC CLAUDICATION: ICD-10-CM

## 2024-10-30 DIAGNOSIS — M51.16 INTERVERTEBRAL DISC DISORDER WITH RADICULOPATHY OF LUMBAR REGION: Primary | ICD-10-CM

## 2024-10-30 DIAGNOSIS — G89.4 CHRONIC PAIN SYNDROME: ICD-10-CM

## 2024-10-30 DIAGNOSIS — M51.16 INTERVERTEBRAL DISC DISORDER WITH RADICULOPATHY OF LUMBAR REGION: ICD-10-CM

## 2024-10-30 PROCEDURE — 64484 NJX AA&/STRD TFRM EPI L/S EA: CPT | Performed by: ANESTHESIOLOGY

## 2024-10-30 PROCEDURE — 99214 OFFICE O/P EST MOD 30 MIN: CPT

## 2024-10-30 PROCEDURE — 64483 NJX AA&/STRD TFRM EPI L/S 1: CPT | Performed by: ANESTHESIOLOGY

## 2024-10-30 RX ORDER — METHYLPREDNISOLONE ACETATE 80 MG/ML
80 INJECTION, SUSPENSION INTRA-ARTICULAR; INTRALESIONAL; INTRAMUSCULAR; PARENTERAL; SOFT TISSUE ONCE
Status: COMPLETED | OUTPATIENT
Start: 2024-10-30 | End: 2024-10-30

## 2024-10-30 RX ORDER — 0.9 % SODIUM CHLORIDE 0.9 %
4 VIAL (ML) INJECTION ONCE
Status: COMPLETED | OUTPATIENT
Start: 2024-10-30 | End: 2024-10-30

## 2024-10-30 RX ORDER — BUPIVACAINE HCL/PF 2.5 MG/ML
2 VIAL (ML) INJECTION ONCE
Status: COMPLETED | OUTPATIENT
Start: 2024-10-30 | End: 2024-10-30

## 2024-10-30 RX ADMIN — Medication 4 ML: at 10:38

## 2024-10-30 RX ADMIN — BUPIVACAINE HYDROCHLORIDE 2 ML: 2.5 INJECTION, SOLUTION EPIDURAL; INFILTRATION; INTRACAUDAL at 10:41

## 2024-10-30 RX ADMIN — METHYLPREDNISOLONE ACETATE 80 MG: 80 INJECTION, SUSPENSION INTRA-ARTICULAR; INTRALESIONAL; INTRAMUSCULAR; SOFT TISSUE at 10:41

## 2024-10-30 RX ADMIN — IOHEXOL 1 ML: 300 INJECTION, SOLUTION INTRAVENOUS at 10:41

## 2024-10-30 RX ADMIN — LIDOCAINE HYDROCHLORIDE 4 ML: 20 INJECTION, SOLUTION EPIDURAL; INFILTRATION; INTRACAUDAL at 10:38

## 2024-10-30 NOTE — DISCHARGE INSTR - LAB
Epidural Steroid Injection   WHAT YOU NEED TO KNOW:   An epidural steroid injection (LENI) is a procedure to inject steroid medicine into the epidural space. The epidural space is between your spinal cord and vertebrae. Steroids reduce inflammation and fluid buildup in your spine that may be causing pain. You may be given pain medicine along with the steroids.          ACTIVITY  Do not drive or operate machinery today.  No strenuous activity today - bending, lifting, etc.  You may resume normal activites starting tomorrow - start slowly and as tolerated.  You may shower today, but no tub baths or hot tubs.  You may have numbness for several hours from the local anesthetic. Please use caution and common sense, especially with weight-bearing activities.    CARE OF THE INJECTION SITE  If you have soreness or pain, apply ice to the area today (20 minutes on/20 minutes off).  Starting tomorrow, you may use warm, moist heat or ice if needed.  You may have an increase or change in your discomfort for 36-48 hours after your treatment.  Apply ice and continue with any pain medication you have been prescribed.  Notify the [facility] if you have any of the following: redness, drainage, swelling, headache, stiff neck or fever above 100°F.    SPECIAL INSTRUCTIONS  Our office will contact you in approximately 14 days for a progress report.    MEDICATIONS  Continue to take all routine medications.  Our office may have instructed you to hold some medications.    As no general anesthesia was used in today's procedure, you should not experience any side effects related to anesthesia.     If you are diabetic, the steroids used in today's injection may temporarily increase your blood sugar levels after the first few days after your injection. Please keep a close eye on your sugars and alert the doctor who manages your diabetes if your sugars are significantly high from your baseline or you are symptomatic.     If you have a  problem specifically related to your procedure, please call our office at [phone].  Problems not related to your procedure should be directed to your primary care physician.

## 2024-10-30 NOTE — H&P
History of Present Illness: The patient is a 69 y.o. female who presents with complaints of left low back and leg pain is here today for left-sided L3-4 transforaminal epidural steroid injection    Past Medical History:   Diagnosis Date    Allergic ?    Anxiety     Bronchial malignant neoplasm, right (HCC)     Cancer (HCC)     lung    Chronic major depressive disorder, single episode     COPD (chronic obstructive pulmonary disease) (HCC)     Full incontinence of feces     GERD (gastroesophageal reflux disease)     Hyperlipidemia     Hypertension     Malignant neoplasm of bronchus and lung (HCC)     Multiple sclerosis (HCC)     Pulmonary emphysema (HCC)     Restless leg syndrome     Tobacco use        Past Surgical History:   Procedure Laterality Date    AUGMENTATION MAMMAPLASTY Bilateral 11/2014    retro-pectoral silicone    BREAST BIOPSY Left 03/23/2015    u/s core bx    BREAST BIOPSY Left 08/02/2010    high-risk lesion    BREAST EXCISIONAL BIOPSY Left 08/26/2010    high-risk lesion    BREAST EXCISIONAL BIOPSY Left 1994    BREAST EXCISIONAL BIOPSY Right 1984    CT NEEDLE BIOPSY LUNG  12/2/2020    FL GUIDED CENTRAL VENOUS ACCESS DEVICE INSERTION  03/09/2021    IR PORT REMOVAL  03/07/2022    LYMPH NODE BIOPSY  2021    MAMMO (HISTORICAL)  2020    [place] [facility] W/CELL WASHG SPX N/A 01/28/2021    Procedure: BRONCHOSCOPY FLEXIBLE;  Surgeon: Jose Glover MD;  [facility]: [facility];  Service: Thoracic    [place] INSJ TUNNELED CTR VAD W/SUBQ PORT AGE 5 YR/> N/A 03/09/2021    Procedure: INSERTION VENOUS PORT ( PORT-A-CATH) IR;  Surgeon: Krzysztof Barros DO;  [facility];  Service: Interventional Radiology    [place] THORACOSCOPY W/LOBECTOMY SINGLE LOBE Right 01/28/2021    Procedure: RIGHT VATS UPPER LOBECTOMY ; MEDIASTINAL LYMPH NODE DISSECTION; right lower lobe bleb resection  ;  Surgeon: Jose Glover MD;  Location: [facility];  Service: Thoracic    TONSILLECTOMY      TOTAL ABDOMINAL HYSTERECTOMY   2010    age 55    TOTAL ABDOMINAL HYSTERECTOMY W/ BILATERAL SALPINGOOPHORECTOMY Bilateral 2010    age 55    TUBAL LIGATION  1971         Current Outpatient Medications:     atorvastatin (LIPITOR) 10 mg tablet, Take 1 tablet (10 mg total) by mouth daily, Disp: 90 tablet, Rfl: 1    Biotin 5 MG CAPS, Take 5,000 mg by mouth daily, Disp: , Rfl:     buPROPion (WELLBUTRIN XL) 300 mg 24 hr tablet, Take 1 tablet (300 mg total) by mouth every morning (Patient not taking: Reported on 5/31/2024), Disp: 90 tablet, Rfl: 1    Cholecalciferol (Vitamin D3) 50 MCG (2000 UT) capsule, Take 5,000 Units by mouth daily , Disp: , Rfl:     cilostazol (PLETAL) 100 mg tablet, Take 1 tablet (100 mg total) by mouth in the morning, Disp: 90 tablet, Rfl: 0    famotidine (PEPCID) 40 MG tablet, Take 1 tablet (40 mg total) by mouth every morning, Disp: 90 tablet, Rfl: 1    FLUoxetine (PROzac) 20 mg capsule, Take 1 capsule (20 mg total) by mouth daily, Disp: 90 capsule, Rfl: 1    hydrocortisone 2.5 % cream, Apply topically 2 (two) times a day, Disp: 28 g, Rfl: 2    hydrOXYzine HCL (ATARAX) 25 mg tablet, Take 1 tablet (25 mg total) by mouth every 6 (six) hours as needed for anxiety (Patient not taking: Reported on 7/10/2024), Disp: 20 tablet, Rfl: 0    loperamide (IMODIUM) 2 mg capsule, Take 2 mg by mouth daily otc, Disp: , Rfl:     losartan-hydrochlorothiazide (HYZAAR) 50-12.5 mg per tablet, Take 1 tablet by mouth daily (Patient not taking: Reported on 10/14/2024), Disp: 90 tablet, Rfl: 1    pramipexole (MIRAPEX) 0.5 mg tablet, Take 1 tablet (0.5 mg total) by mouth daily at bedtime, Disp: 90 tablet, Rfl: 1    Current Facility-Administered Medications:     bupivacaine (PF) (MARCAINE) 0.25 % injection 2 mL, 2 mL, Epidural, Once, Matthew Sterling MD    iohexol (OMNIPAQUE) 300 mg/mL injection 1 mL, 1 mL, Epidural, Once, Matthew Sterling MD    lidocaine (PF) (XYLOCAINE-MPF) 2 % injection 4 mL, 4 mL, Infiltration, Once, Matthew Sterling MD     methylPREDNISolone acetate (DEPO-MEDROL) injection 80 mg, 80 mg, Epidural, Once, Matthew Sterling MD    sodium chloride (PF) 0.9 % injection 4 mL, 4 mL, Infiltration, Once, Matthew Sterling MD    Allergies   Allergen Reactions    Sulfa Antibiotics Hives       Physical Exam:   Vitals:    10/30/24 1006   BP: 119/77   Pulse: 67   Resp: 18   Temp: (!) 97.2 °F (36.2 °C)   SpO2: 95%     General: Awake, Alert, Oriented x 3, Mood and affect appropriate  Respiratory: Respirations even and unlabored  Cardiovascular: Peripheral pulses intact; no edema  Musculoskeletal Exam: Left lower back and leg pain    ASA Score: 3    Patient/Chart Verification  Patient ID Verified: Verbal  ID Band Applied: No  Consents Confirmed: Procedural, To be obtained in the Pre-Procedure area  H&P( within 30 days) Verified: To be obtained in the Procedural area  Allergies Reviewed: Yes  Anticoag/NSAID held?: No (denies blood thinners, states no longer taking pletal)  Currently on antibiotics?: No    Assessment:   1. Intervertebral disc disorder with radiculopathy of lumbar region        Plan: Left L3-L4 TFESI

## 2024-10-31 ENCOUNTER — APPOINTMENT (OUTPATIENT)
Dept: PHYSICAL THERAPY | Age: 69
End: 2024-10-31
Payer: MEDICARE

## 2024-11-04 ENCOUNTER — OFFICE VISIT (OUTPATIENT)
Dept: PHYSICAL THERAPY | Age: 69
End: 2024-11-04
Payer: MEDICARE

## 2024-11-04 DIAGNOSIS — G89.29 CHRONIC LEFT-SIDED LOW BACK PAIN WITH LEFT-SIDED SCIATICA: ICD-10-CM

## 2024-11-04 DIAGNOSIS — M54.42 CHRONIC LEFT-SIDED LOW BACK PAIN WITH LEFT-SIDED SCIATICA: ICD-10-CM

## 2024-11-04 DIAGNOSIS — M70.62 TROCHANTERIC BURSITIS OF LEFT HIP: Primary | ICD-10-CM

## 2024-11-04 PROCEDURE — 97110 THERAPEUTIC EXERCISES: CPT

## 2024-11-04 NOTE — PROGRESS NOTES
Daily Note     Today's date: 2024  Patient name: Britni Fermin  : 1955  MRN: [id number]  Referring provider: Johnathon Chang DO  Dx:   Encounter Diagnosis     ICD-10-CM    1. Trochanteric bursitis of left hip  M70.62       2. Chronic left-sided low back pain with left-sided sciatica  M54.42     G89.29                      Subjective: Patient presents to PT noting doing well, had epidural last week. Notes improvements since this.       Objective: See treatment diary below      Assessment: Tolerated treatment well. Patient would benefit from continued PT. Interventions continued to improve proximal hip strength, with focus on improving gluteal load capacity. Increased load slightly today, which patient tolerated well. Will continue to progress patient as able to tolerate to improve abilities with all functional tasks. Will plan to make next visit possible D/C as [place] over the winter months.       Plan: Progress treatment as tolerated.       Precautions: L lateral hip/thigh pain      Manuals 10/14 10/16 10/21 10/23 10/28 11/04                                                             Neuro Re-Ed                                                                                                        Ther Ex               TM 20' 20' 20' 20' 20' 20'       Clamshells Iso 30x5s 3 x 10  5s  BB 30x ea Blue 3 x 10 ea   BB 3 x 10 ea  BB 3 x 10  BB       Hip Add Iso 30x5s x           Bridges 3x10 3 x 10  5s 30x 30x  5s 3 x 10  staggered 3 x 10  staggered       SL Hip ABd 3x10 3 x 10  3 x 10 3 x 10 3 x 10  2#       SL Leg Press 30# 3x10 3 x 10  35# 40# 3x10 40#  3 x 10 40#  3 x 10 3 x 10  40#       SS w TB Yellow 5x   Yellow 5x YB  5x RB  5x RB  7x       TA press down supine w pball  2 x 10   5s NP 2 x 10, 5s 2 x 10  5s x       Hip abduction cybex  3 x 10  30# 3x10 30# 3 x 10  30# 3 x 30s  30# 3 x 30  30#       Glute med iso @ wall  2 x 15s B/L 3x15s B/L 3 x 15s  B/L 3 x 20s  B/L x      "  Lat/FWD Step Up      3 x 10  6\"       Ther Activity                                       Gait Training                                       Modalities                                                      "

## 2024-11-06 ENCOUNTER — OFFICE VISIT (OUTPATIENT)
Dept: PHYSICAL THERAPY | Age: 69
End: 2024-11-06
Payer: MEDICARE

## 2024-11-06 DIAGNOSIS — M54.42 CHRONIC LEFT-SIDED LOW BACK PAIN WITH LEFT-SIDED SCIATICA: ICD-10-CM

## 2024-11-06 DIAGNOSIS — M70.62 TROCHANTERIC BURSITIS OF LEFT HIP: Primary | ICD-10-CM

## 2024-11-06 DIAGNOSIS — G89.29 CHRONIC LEFT-SIDED LOW BACK PAIN WITH LEFT-SIDED SCIATICA: ICD-10-CM

## 2024-11-06 PROCEDURE — 97110 THERAPEUTIC EXERCISES: CPT | Performed by: SPECIALIST/TECHNOLOGIST

## 2024-11-06 NOTE — PROGRESS NOTES
Daily Note     Today's date: 2024  Patient name: Britni Fermin  : 1955  MRN: [id number]  Referring provider: Johnathon Chang DO  Dx:   Encounter Diagnosis     ICD-10-CM    1. Trochanteric bursitis of left hip  M70.62       2. Chronic left-sided low back pain with left-sided sciatica  M54.42     G89.29                      Subjective: Pt reports overall she is doing well and is confident in HEP application after discharge.       Objective: See treatment diary below    Goals  ST) Education: Patient to demonstrate compliance with attendance of therapy sessions and performance of introductory HEP.   2) Pain: Patient to report day to day pain levels <4/10, allowing for improvements with various functional tasks including stair negotiation, transfers, and improved ambulation tolerance.     LT) Education: Patient to demonstrate compliance with attendance of therapy sessions and performance of progressive HEP, allowing for transition to self-management of symptoms.  2) Pain: Patient to report <1/10 pain during her day to day activities, allowing for improvements with various functional tasks including stair negotiation, ambulation, sitting for prolonged periods of time, etc.   3) FOTO: Patient to score >/= expected FOTO score, signifying improvements in functional abilities.     Assessment: Pt has met all therapeutic goals and is appropriate for discharge to [facility]. Tolerated treatment well.       Plan: Discharge, recommend continuation of HEP.      Precautions: L lateral hip/thigh pain      Manuals 10/14 10/16 10/21 10/23 10/28 11/04 11/                                                            Neuro Re-Ed                                                                                                        Ther Ex               TM 20' 20' 20' 20' 20' 20' 20'       Clamshells Iso 30x5s 3 x 10  5s  BB 30x ea Blue 3 x 10 ea   BB 3 x 10 ea  BB 3 x 10  BB 3x10 blue      Hip Add Iso 30x5s x          "  Bridges 3x10 3 x 10  5s 30x 30x  5s 3 x 10  staggered 3 x 10  staggered 3x10 staggered       SL Hip ABd 3x10 3 x 10  3 x 10 3 x 10 3 x 10  2# 3x10 2#      SL Leg Press 30# 3x10 3 x 10  35# 40# 3x10 40#  3 x 10 40#  3 x 10 3 x 10  40# 3x10 40#      SS w TB Yellow 5x   Yellow 5x YB  5x RB  5x RB  7x Red 7x      TA press down supine w pball  2 x 10   5s NP 2 x 10, 5s 2 x 10  5s x       Hip abduction cybex  3 x 10  30# 3x10 30# 3 x 10  30# 3 x 30s  30# 3 x 30  30# 3x10 30#      Glute med iso @ wall  2 x 15s B/L 3x15s B/L 3 x 15s  B/L 3 x 20s  B/L x       Lat/FWD Step Up      3 x 10  6\" 3x10 6\"      Ther Activity                                       Gait Training                                       Modalities                                                        "

## 2024-11-11 ENCOUNTER — HOSPITAL ENCOUNTER (OUTPATIENT)
Dept: RADIOLOGY | Age: 69
Discharge: HOME | End: 2024-11-11
Payer: MEDICARE

## 2024-11-11 DIAGNOSIS — M81.6 LOCALIZED OSTEOPOROSIS WITHOUT CURRENT PATHOLOGICAL FRACTURE: ICD-10-CM

## 2024-11-11 PROCEDURE — 77080 DXA BONE DENSITY AXIAL: CPT

## 2024-11-13 ENCOUNTER — TELEPHONE (OUTPATIENT)
Dept: PAIN MEDICINE | Facility: CLINIC | Age: 69
End: 2024-11-13

## 2024-11-13 DIAGNOSIS — J44.9 CHRONIC OBSTRUCTIVE PULMONARY DISEASE, UNSPECIFIED COPD TYPE (HCC): Primary | ICD-10-CM

## 2024-11-14 DIAGNOSIS — J44.9 CHRONIC OBSTRUCTIVE PULMONARY DISEASE, UNSPECIFIED COPD TYPE (HCC): Primary | ICD-10-CM

## 2024-11-14 DIAGNOSIS — C34.91 SQUAMOUS CELL LUNG CANCER, RIGHT (HCC): ICD-10-CM

## 2024-12-25 DIAGNOSIS — K21.00 GASTROESOPHAGEAL REFLUX DISEASE WITH ESOPHAGITIS, UNSPECIFIED WHETHER HEMORRHAGE: ICD-10-CM

## 2024-12-25 DIAGNOSIS — I10 HYPERTENSION, ESSENTIAL: ICD-10-CM

## 2024-12-25 DIAGNOSIS — G25.81 RESTLESS LEGS SYNDROME (RLS): ICD-10-CM

## 2024-12-25 DIAGNOSIS — F33.9 DEPRESSION, RECURRENT (HCC): ICD-10-CM

## 2024-12-25 DIAGNOSIS — E78.49 OTHER HYPERLIPIDEMIA: ICD-10-CM

## 2024-12-25 RX ORDER — LOSARTAN POTASSIUM AND HYDROCHLOROTHIAZIDE 12.5; 5 MG/1; MG/1
1 TABLET ORAL DAILY
Qty: 90 TABLET | Refills: 1 | Status: SHIPPED | OUTPATIENT
Start: 2024-12-25

## 2024-12-25 RX ORDER — ATORVASTATIN CALCIUM 10 MG/1
10 TABLET, FILM COATED ORAL DAILY
Qty: 90 TABLET | Refills: 1 | Status: SHIPPED | OUTPATIENT
Start: 2024-12-25

## 2024-12-25 RX ORDER — PRAMIPEXOLE DIHYDROCHLORIDE 0.5 MG/1
0.5 TABLET ORAL
Qty: 90 TABLET | Refills: 1 | Status: SHIPPED | OUTPATIENT
Start: 2024-12-25

## 2024-12-25 RX ORDER — FAMOTIDINE 40 MG/1
40 TABLET, FILM COATED ORAL DAILY
Qty: 90 TABLET | Refills: 1 | Status: SHIPPED | OUTPATIENT
Start: 2024-12-25

## 2025-01-22 ENCOUNTER — TELEPHONE (OUTPATIENT)
Age: 70
End: 2025-01-22

## 2025-01-22 NOTE — TELEPHONE ENCOUNTER
Patient called currently residing in [place] for a few months and is in need to have handicapped placard. Pt will be faxing paperwork to have completed.

## 2025-02-25 ENCOUNTER — TELEPHONE (OUTPATIENT)
Dept: VASCULAR SURGERY | Facility: CLINIC | Age: 70
End: 2025-02-25

## 2025-02-25 NOTE — TELEPHONE ENCOUNTER
Called patient and asked her if she would like to schedule the 3 months follow up appointment with Marti Rico.  Patient will call us back she said if she would like to schedule.

## 2025-03-26 DIAGNOSIS — I10 HYPERTENSION, ESSENTIAL: ICD-10-CM

## 2025-03-26 DIAGNOSIS — E78.49 OTHER HYPERLIPIDEMIA: ICD-10-CM

## 2025-03-26 DIAGNOSIS — K21.00 GASTROESOPHAGEAL REFLUX DISEASE WITH ESOPHAGITIS, UNSPECIFIED WHETHER HEMORRHAGE: ICD-10-CM

## 2025-03-26 DIAGNOSIS — G25.81 RESTLESS LEGS SYNDROME (RLS): ICD-10-CM

## 2025-03-26 DIAGNOSIS — F33.9 DEPRESSION, RECURRENT (HCC): ICD-10-CM

## 2025-03-26 NOTE — TELEPHONE ENCOUNTER
Patient called requesting refill for Fluoxetine 20 mg,Pramipexole 0.5 mg . Patient made aware medication was refilled on 12/25/25 for 90 with 1 refills to [facility] Patient instructed to contact the pharmacy to obtain refills of medication. Patient verbalized understanding.

## 2025-03-27 RX ORDER — PRAMIPEXOLE DIHYDROCHLORIDE 0.5 MG/1
0.5 TABLET ORAL
Qty: 90 TABLET | Refills: 1 | Status: SHIPPED | OUTPATIENT
Start: 2025-03-27

## 2025-03-27 RX ORDER — ATORVASTATIN CALCIUM 10 MG/1
10 TABLET, FILM COATED ORAL DAILY
Qty: 90 TABLET | Refills: 1 | Status: SHIPPED | OUTPATIENT
Start: 2025-03-27

## 2025-03-27 RX ORDER — FAMOTIDINE 40 MG/1
40 TABLET, FILM COATED ORAL EVERY MORNING
Qty: 90 TABLET | Refills: 1 | Status: SHIPPED | OUTPATIENT
Start: 2025-03-27

## 2025-03-27 RX ORDER — LOSARTAN POTASSIUM AND HYDROCHLOROTHIAZIDE 12.5; 5 MG/1; MG/1
1 TABLET ORAL DAILY
Qty: 90 TABLET | Refills: 1 | Status: SHIPPED | OUTPATIENT
Start: 2025-03-27

## 2025-05-12 ENCOUNTER — OFFICE VISIT (OUTPATIENT)
Dept: INTERNAL MEDICINE CLINIC | Age: 70
End: 2025-05-12
Payer: MEDICARE

## 2025-05-12 ENCOUNTER — APPOINTMENT (OUTPATIENT)
Dept: LAB | Age: 70
End: 2025-05-12
Payer: MEDICARE

## 2025-05-12 VITALS
TEMPERATURE: 98 F | OXYGEN SATURATION: 98 % | SYSTOLIC BLOOD PRESSURE: 124 MMHG | HEART RATE: 65 BPM | HEIGHT: 57 IN | BODY MASS INDEX: 29.99 KG/M2 | WEIGHT: 139 LBS | DIASTOLIC BLOOD PRESSURE: 80 MMHG

## 2025-05-12 DIAGNOSIS — J43.8 OTHER EMPHYSEMA (HCC): ICD-10-CM

## 2025-05-12 DIAGNOSIS — C34.91 SQUAMOUS CELL LUNG CANCER, RIGHT (HCC): ICD-10-CM

## 2025-05-12 DIAGNOSIS — M81.6 LOCALIZED OSTEOPOROSIS WITHOUT CURRENT PATHOLOGICAL FRACTURE: ICD-10-CM

## 2025-05-12 DIAGNOSIS — S40.861A TICK BITE OF RIGHT UPPER ARM, INITIAL ENCOUNTER: ICD-10-CM

## 2025-05-12 DIAGNOSIS — F33.9 DEPRESSION, RECURRENT (HCC): ICD-10-CM

## 2025-05-12 DIAGNOSIS — E78.49 OTHER HYPERLIPIDEMIA: ICD-10-CM
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
## 2025-05-12 DIAGNOSIS — G35 MULTIPLE SCLEROSIS (HCC): ICD-10-CM

## 2025-05-12 DIAGNOSIS — I73.9 PERIPHERAL ARTERIAL DISEASE (HCC): ICD-10-CM

## 2025-05-12 DIAGNOSIS — E66.9 OBESITY (BMI 30-39.9): ICD-10-CM

## 2025-05-12 DIAGNOSIS — F41.8 OTHER SPECIFIED ANXIETY DISORDERS: ICD-10-CM

## 2025-05-12 DIAGNOSIS — I10 HYPERTENSION, ESSENTIAL: Primary | ICD-10-CM

## 2025-05-12 DIAGNOSIS — G25.81 RESTLESS LEGS SYNDROME (RLS): ICD-10-CM

## 2025-05-12 DIAGNOSIS — K21.9 GASTROESOPHAGEAL REFLUX DISEASE WITHOUT ESOPHAGITIS: ICD-10-CM

## 2025-05-12 PROBLEM — S06.5X9A TRAUMATIC SUBDURAL HEMORRHAGE WITH LOSS OF CONSCIOUSNESS OF UNSPECIFIED DURATION, INITIAL ENCOUNTER (HCC): Status: RESOLVED | Noted: 2023-03-06 | Resolved: 2025-05-12

## 2025-05-12 PROBLEM — C77.9 REGIONAL LYMPH NODE METASTASIS PRESENT (HCC): Status: RESOLVED | Noted: 2021-03-02 | Resolved: 2025-05-12

## 2025-05-12 LAB
25(OH)D3 SERPL-MCNC: 40.5 NG/ML (ref 30–100)
ALBUMIN SERPL BCG-MCNC: 4.4 G/DL (ref 3.5–5)
ALP SERPL-CCNC: 66 U/L (ref 34–104)
ALT SERPL W P-5'-P-CCNC: 18 U/L (ref 7–52)
ANION GAP SERPL CALCULATED.3IONS-SCNC: 6 MMOL/L (ref 4–13)
AST SERPL W P-5'-P-CCNC: 20 U/L (ref 13–39)
BASOPHILS # BLD AUTO: 0.04 THOUSANDS/ÂΜL (ref 0–0.1)
BASOPHILS NFR BLD AUTO: 1 % (ref 0–1)
BILIRUB SERPL-MCNC: 0.71 MG/DL (ref 0.2–1)
BUN SERPL-MCNC: 16 MG/DL (ref 5–25)
CALCIUM SERPL-MCNC: 9.7 MG/DL (ref 8.4–10.2)
CHLORIDE SERPL-SCNC: 102 MMOL/L (ref 96–108)
CO2 SERPL-SCNC: 30 MMOL/L (ref 21–32)
CREAT SERPL-MCNC: 0.68 MG/DL (ref 0.6–1.3)
EOSINOPHIL # BLD AUTO: 0.12 THOUSAND/ÂΜL (ref 0–0.61)
EOSINOPHIL NFR BLD AUTO: 2 % (ref 0–6)
ERYTHROCYTE [DISTWIDTH] IN BLOOD BY AUTOMATED COUNT: 12.5 % (ref 11.6–15.1)
GFR SERPL CREATININE-BSD FRML MDRD: 87 ML/MIN/1.73SQ M
GLUCOSE SERPL-MCNC: 90 MG/DL (ref 65–140)
HCT VFR BLD AUTO: 44.6 % (ref 34.8–46.1)
HGB BLD-MCNC: 13.9 G/DL (ref 11.5–15.4)
IMM GRANULOCYTES # BLD AUTO: 0.01 THOUSAND/UL (ref 0–0.2)
IMM GRANULOCYTES NFR BLD AUTO: 0 % (ref 0–2)
LYMPHOCYTES # BLD AUTO: 1.27 THOUSANDS/ÂΜL (ref 0.6–4.47)
LYMPHOCYTES NFR BLD AUTO: 20 % (ref 14–44)
MCH RBC QN AUTO: 30.5 PG (ref 26.8–34.3)
MCHC RBC AUTO-ENTMCNC: 31.2 G/DL (ref 31.4–37.4)
MCV RBC AUTO: 98 FL (ref 82–98)
MONOCYTES # BLD AUTO: 0.48 THOUSAND/ÂΜL (ref 0.17–1.22)
MONOCYTES NFR BLD AUTO: 8 % (ref 4–12)
NEUTROPHILS # BLD AUTO: 4.38 THOUSANDS/ÂΜL (ref 1.85–7.62)
NEUTS SEG NFR BLD AUTO: 69 % (ref 43–75)
NRBC BLD AUTO-RTO: 0 /100 WBCS
PLATELET # BLD AUTO: 305 THOUSANDS/UL (ref 149–390)
PMV BLD AUTO: 11 FL (ref 8.9–12.7)
POTASSIUM SERPL-SCNC: 4.8 MMOL/L (ref 3.5–5.3)
PROT SERPL-MCNC: 7.3 G/DL (ref 6.4–8.4)
RBC # BLD AUTO: 4.56 MILLION/UL (ref 3.81–5.12)
SODIUM SERPL-SCNC: 138 MMOL/L (ref 135–147)
WBC # BLD AUTO: 6.3 THOUSAND/UL (ref 4.31–10.16)

## 2025-05-12 PROCEDURE — 82306 VITAMIN D 25 HYDROXY: CPT

## 2025-05-12 PROCEDURE — 96372 THER/PROPH/DIAG INJ SC/IM: CPT | Performed by: INTERNAL MEDICINE

## 2025-05-12 PROCEDURE — 99214 OFFICE O/P EST MOD 30 MIN: CPT | Performed by: INTERNAL MEDICINE

## 2025-05-12 PROCEDURE — 80053 COMPREHEN METABOLIC PANEL: CPT

## 2025-05-12 PROCEDURE — 36415 COLL VENOUS BLD VENIPUNCTURE: CPT

## 2025-05-12 PROCEDURE — 85025 COMPLETE CBC W/AUTO DIFF WBC: CPT

## 2025-05-12 NOTE — ASSESSMENT & PLAN NOTE
-Well-controlled  -Continue with famotidine  -Continue to avoid diets and behaviors that trigger symptoms

## 2025-05-12 NOTE — ASSESSMENT & PLAN NOTE
- Not well controlled likely secondary to medication noncompliance as she has not been taking her inhaler  - Patient was counseled that she needs to follow-up with pulmonology  - Of note, she was referred to pulmonology on 11/14/2024

## 2025-05-12 NOTE — ASSESSMENT & PLAN NOTE
-Lipids are stable  -Continue with atorvastatin  -continue with a heart healthy diet  -continue with exercise

## 2025-05-12 NOTE — ASSESSMENT & PLAN NOTE
- Not well controlled as patient states because of her weight gain but patient also stopped taking the Wellbutrin  - continue with fluoxetine

## 2025-05-12 NOTE — ASSESSMENT & PLAN NOTE
-Status post lobectomy and chemotherapy and currently following with hematology  - Continue to follow with hematology  - Will follow-up with the result of her CT scan of the chest as scheduled

## 2025-05-12 NOTE — ASSESSMENT & PLAN NOTE
- Patient still has left-sided lower extremity pain but believes that the pain is coming from her back and has not been taking her cilostazol.  - continue with atorvastatin  - Follow-up with vascular surgery

## 2025-05-12 NOTE — ASSESSMENT & PLAN NOTE
- blood pressure is well controlled   -continue with losartan-hydrochlorothiazide  -continue with a low-salt diet and with exercise

## 2025-05-12 NOTE — ASSESSMENT & PLAN NOTE
- Prolia shot was given on RA  - lot number [id number]  and expiration date 07/31/2027  -patient tolerated injection without any adverse reactions  -continue with calcium and vitamin-D supplements  - follow-up in 6 months for repeat Prolia shot    Orders:    denosumab (PROLIA) subcutaneous injection 60 mg    Vitamin D 25 hydroxy; Future

## 2025-05-12 NOTE — PROGRESS NOTES
Name: Britni Fermin      : 1955      MRN: [id number]  Encounter Provider: Anai Alatorre DO  Encounter Date: 2025   Encounter department: [facility]  :  Assessment & Plan  Hypertension, essential   - blood pressure is well controlled   -continue with losartan-hydrochlorothiazide  -continue with a low-salt diet and with exercise         Peripheral arterial disease (HCC)  - Patient still has left-sided lower extremity pain but believes that the pain is coming from her back and has not been taking her cilostazol.  - continue with atorvastatin  - Follow-up with vascular surgery         Squamous cell lung cancer, right (HCC)  -Status post lobectomy and chemotherapy and currently following with hematology  - Continue to follow with hematology  - Will follow-up with the result of her CT scan of the chest as scheduled        Other emphysema (HCC)  - Not well controlled likely secondary to medication noncompliance as she has not been taking her inhaler  - Patient was counseled that she needs to follow-up with pulmonology  - Of note, she was referred to pulmonology on 2024       Gastroesophageal reflux disease without esophagitis  -Well-controlled  -Continue with famotidine  -Continue to avoid diets and behaviors that trigger symptoms         Depression, recurrent (HCC)  - Not well controlled as patient states because of her weight gain but patient also stopped taking the Wellbutrin  - continue with fluoxetine         Other specified anxiety disorders  - well controlled   - continue with fluoxetine       Other hyperlipidemia  -Lipids are stable  -Continue with atorvastatin  -continue with a heart healthy diet  -continue with exercise         Restless legs syndrome (RLS)  - Stable  - continue with pramipexole       Localized osteoporosis without current pathological fracture  - Prolia shot was given on RA  - lot number [id number]  and expiration date 2027  -patient  tolerated injection without any adverse reactions  -continue with calcium and vitamin-D supplements  - follow-up in 6 months for repeat Prolia shot    Orders:    denosumab (PROLIA) subcutaneous injection 60 mg    Vitamin D 25 hydroxy; Future    Multiple sclerosis (HCC)  -Stable without significant symptoms  - Follow-up with neurology       Tick bite of right upper arm, initial encounter  -Will order a Lyme antibody titer and follow-up with the results and start her on treatment for Lyme disease if positive       Obesity (BMI 30-39.9)    -BMI today is 30.08  - Patient believes that her shortness of breath is secondary to her weight gain even though she does not use her inhaler.  I did explain to her that I doubt that her shortness of breath is secondary to weight gain especially with her history of emphysema, lack of inhaler use and her history of lung cancer.  - She was counseled to diet and exercise and especially to avoid eating late in the day.               Depression Screening and Follow-up Plan: Patient's depression screening was positive with a PHQ-9 score of 6.   Patient with underlying depression and was advised to continue current medications as prescribed.       History of Present Illness   HPI  Patient presents for a follow-up visit regarding her hypertension, peripheral arterial disease, squamous cell cancer of the right lung, emphysema, GERD, depression, anxiety, hyperlipidemia, restless leg syndrome, multiple sclerosis and osteoporosis.  She states that she has been taking some of her medications as prescribed.  Today pt states that she would like to get her prolia today   Was supposed to get it 5 months ago but was in [place]    She states that she got an epidural injection along with 3 wks of physical therapy and that helped her left lower extremity pain.  She states that after the epidural injection to her lower back, she drove to [place] and her pain restarted.  She reports that breathing was bad  in [place] cos of the humidity and she still does not have an inhaler.  She is supposed to be following with pulmonology but states that she has not seen them in a while.  Of note, she does have a history of lung cancer status post lobectomy as well as emphysema and reports that she has been having sob.  States that her inhaler is too expensive so she has not been using it.  She states that when she bends over and gets up her breathing gets worse.  Will see her oncologist next week   Would like to be tested for lyme ds cos of 2 tick bites   Over a month ago she had one tick bite on her R antecubital fossa     Of note, she states that she feels that her breathing issues is 2/2 to wt gain   She reports that she is currently taking a medication that she brought online to lose wt but has not lost wt for 2 months and states that she paid 200 dollars for the medication.  She states that she is getting depressed cos of her wt   States that she is dieting and exercising but does not calorie count   Not seeing a dietician   She states that she did wt watchers years ago.  Later on, she admitted that she took medications that helped her suppress her appetite for years when she was younger and states that without those medications, she is eating all the time.  She would prefer to stop smoking rather than continue eating even though she has a history of lung cancer.  No symptoms of ms     Patient also presents for a Prolia injection.  She has a history of osteoporosis  Last DEXA scan was 11/11/24 and showed osteoporosis   Patient is taking calcium and vitamin-D supplements   Last vitamin-D level was checked on 2022 and was 83.9  Last calcium level was checked on 5/30/23 and was normal at 9.3  Doing weight-bearing exercise   Denies any Side effects of Prolia including jaw pain, infections, new Musculoskeletal pain, fractures, back pain, headaches, abdominal pain, constipation, acid reflux       Review of Systems   Constitutional:  " Negative for activity change, chills, fatigue, fever and unexpected weight change.   HENT:  Negative for ear pain, postnasal drip, rhinorrhea, sinus pressure and sore throat.    Eyes:  Negative for pain.   Respiratory:  Positive for shortness of breath and wheezing. Negative for cough, choking and chest tightness.    Cardiovascular:  Negative for chest pain, palpitations and leg swelling.   Gastrointestinal:  Negative for abdominal pain, constipation, diarrhea, nausea and vomiting.        Fecal incontinence -s/p rectal sx      Genitourinary:  Positive for urgency (with urge incontinence). Negative for dysuria and hematuria.   Musculoskeletal:  Negative for arthralgias, back pain, gait problem, joint swelling, myalgias and neck stiffness.   Skin:  Negative for pallor and rash.   Neurological:  Negative for dizziness, tremors, seizures, syncope, light-headedness and headaches.   Hematological:  Negative for adenopathy.   Psychiatric/Behavioral:  Negative for behavioral problems.        Objective   /80 (BP Location: Left arm, Patient Position: Sitting, Cuff Size: Adult)   Pulse 65   Temp 98 °F (36.7 °C)   Ht 4' 9\" (1.448 m)   Wt 63 kg (139 lb)   LMP  (LMP Unknown)   SpO2 98%   BMI 30.08 kg/m²      Physical Exam  Constitutional:       General: She is not in acute distress.     Appearance: She is well-developed. She is not diaphoretic.   HENT:      Head: Normocephalic and atraumatic.      Right Ear: External ear normal.      Left Ear: External ear normal.      Nose: Nose normal.      Mouth/Throat:      Mouth: Mucous membranes are dry.      Pharynx: No oropharyngeal exudate.   Eyes:      General: No scleral icterus.        Right eye: No discharge.         Left eye: No discharge.      Conjunctiva/sclera: Conjunctivae normal.      Pupils: Pupils are equal, round, and reactive to light.   Neck:      Thyroid: No thyromegaly.      Vascular: No JVD.      Trachea: No tracheal deviation.   Cardiovascular:      Rate " and Rhythm: Normal rate and regular rhythm.      Heart sounds: Normal heart sounds. No murmur heard.     No friction rub. No gallop.   Pulmonary:      Effort: Pulmonary effort is normal. No respiratory distress.      Breath sounds: Normal breath sounds. No wheezing or rales.   Chest:      Chest wall: No tenderness.   Abdominal:      General: Bowel sounds are normal. There is no distension.      Palpations: Abdomen is soft. There is no mass.      Tenderness: There is no abdominal tenderness. There is no guarding or rebound.   Musculoskeletal:         General: No tenderness or deformity. Normal range of motion.      Cervical back: Normal range of motion and neck supple.   Lymphadenopathy:      Cervical: No cervical adenopathy.   Skin:     General: Skin is warm and dry.      Coloration: Skin is not pale.      Findings: No erythema or rash.   Neurological:      Mental Status: She is alert and oriented to person, place, and time.      Cranial Nerves: No cranial nerve deficit.      Motor: No abnormal muscle tone.      Coordination: Coordination normal.      Deep Tendon Reflexes: Reflexes are normal and symmetric.   Psychiatric:         Mood and Affect: Mood is depressed (Dysphoric affect). Mood is not anxious.         Behavior: Behavior normal.

## 2025-05-14 ENCOUNTER — HOSPITAL ENCOUNTER (OUTPATIENT)
Dept: RADIOLOGY | Age: 70
Discharge: HOME | End: 2025-05-14
Attending: INTERNAL MEDICINE
Payer: MEDICARE

## 2025-05-14 ENCOUNTER — TELEPHONE (OUTPATIENT)
Age: 70
End: 2025-05-14

## 2025-05-14 DIAGNOSIS — S40.861A TICK BITE OF RIGHT UPPER ARM, INITIAL ENCOUNTER: Primary | ICD-10-CM
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
## 2025-05-14 DIAGNOSIS — D49.0 PAROTID TUMOR: ICD-10-CM

## 2025-05-14 DIAGNOSIS — C34.91 SQUAMOUS CELL LUNG CANCER, RIGHT (HCC): ICD-10-CM

## 2025-05-14 PROCEDURE — 71250 CT THORAX DX C-: CPT

## 2025-05-14 PROCEDURE — 76536 US EXAM OF HEAD AND NECK: CPT

## 2025-05-14 NOTE — TELEPHONE ENCOUNTER
Patient called in requesting an update,        Please review and advise.  Thank you    
Patient called. Seen on 5/12/2025 states discussed with PCP about ordering lab work to test for lyme's no order placed in chart.        Please review and advise.  Thank you    
Would you be able to add in lab orders to test for lyme's disease?    Thank you  
Severe decline in functional status

## 2025-05-15 ENCOUNTER — APPOINTMENT (OUTPATIENT)
Dept: LAB | Facility: CLINIC | Age: 70
End: 2025-05-15
Payer: MEDICARE
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
## 2025-05-15 DIAGNOSIS — S40.861A TICK BITE OF RIGHT UPPER ARM, INITIAL ENCOUNTER: ICD-10-CM

## 2025-05-15 PROCEDURE — 86618 LYME DISEASE ANTIBODY: CPT

## 2025-05-15 PROCEDURE — 36415 COLL VENOUS BLD VENIPUNCTURE: CPT

## 2025-05-16 ENCOUNTER — OFFICE VISIT (OUTPATIENT)
Dept: HEMATOLOGY ONCOLOGY | Facility: CLINIC | Age: 70
End: 2025-05-16
Payer: MEDICARE

## 2025-05-16 VITALS
RESPIRATION RATE: 18 BRPM | HEART RATE: 74 BPM | WEIGHT: 137.5 LBS | DIASTOLIC BLOOD PRESSURE: 70 MMHG | OXYGEN SATURATION: 97 % | SYSTOLIC BLOOD PRESSURE: 124 MMHG | HEIGHT: 57 IN | TEMPERATURE: 98.6 F | BODY MASS INDEX: 29.66 KG/M2

## 2025-05-16 DIAGNOSIS — K62.9 FECAL INCONTINENCE DUE TO ANORECTAL DISORDER: ICD-10-CM

## 2025-05-16 DIAGNOSIS — J44.9 CHRONIC OBSTRUCTIVE PULMONARY DISEASE, UNSPECIFIED COPD TYPE (HCC): ICD-10-CM

## 2025-05-16 DIAGNOSIS — I73.9 PERIPHERAL ARTERIAL DISEASE (HCC): ICD-10-CM

## 2025-05-16 DIAGNOSIS — G35 MULTIPLE SCLEROSIS (HCC): ICD-10-CM

## 2025-05-16 DIAGNOSIS — G25.81 RESTLESS LEGS SYNDROME (RLS): ICD-10-CM

## 2025-05-16 DIAGNOSIS — C34.91 SQUAMOUS CELL LUNG CANCER, RIGHT (HCC): Primary | ICD-10-CM

## 2025-05-16 DIAGNOSIS — R15.9 FECAL INCONTINENCE DUE TO ANORECTAL DISORDER: ICD-10-CM

## 2025-05-16 DIAGNOSIS — C77.9 REGIONAL LYMPH NODE METASTASIS PRESENT (HCC): ICD-10-CM

## 2025-05-16 DIAGNOSIS — M48.061 SPINAL STENOSIS OF LUMBAR REGION WITHOUT NEUROGENIC CLAUDICATION: ICD-10-CM

## 2025-05-16 DIAGNOSIS — I10 HYPERTENSION, ESSENTIAL: ICD-10-CM

## 2025-05-16 DIAGNOSIS — D49.0 PAROTID TUMOR: ICD-10-CM

## 2025-05-16 DIAGNOSIS — E07.9 THYROID DISORDER: ICD-10-CM

## 2025-05-16 LAB — B BURGDOR IGG+IGM SER QL IA: NEGATIVE

## 2025-05-16 PROCEDURE — G2211 COMPLEX E/M VISIT ADD ON: HCPCS | Performed by: INTERNAL MEDICINE

## 2025-05-16 PROCEDURE — 99214 OFFICE O/P EST MOD 30 MIN: CPT | Performed by: INTERNAL MEDICINE

## 2025-05-16 NOTE — ASSESSMENT & PLAN NOTE
Squamous cell cancer of right upper lung lobe was diagnosed in May 2021 and she had right upper lobectomy and lymph node sampling.  Path report showed  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 with invasion of the lymphatic channel and metastatic disease to 1 of 8 peribronchial lymph nodes, T2 N1, stage IIB. Tumor tested negative for EGFR.  Patient  Received 4 cycles of adjuvant chemotherapy, a combination of carboplatin and Gemzar and last treatment was on 06/07/2021. .  She did not receive taxane because of history of MS. She also received Granix because of neutropenia.  She did well and there has not been any documented recurrent or metastatic disease clinically.  She gets CT chest .  Orders:    Ambulatory Referral to Pulmonology; Future    CBC and differential; Future    Comprehensive metabolic panel; Future    TSH, 3rd generation with Free T4 reflex; Future

## 2025-05-16 NOTE — PROGRESS NOTES
Name: Britni Fermin      : 1955      MRN: [id number]  Encounter Provider: Van Ochoa MD  Encounter Date: 2025   Encounter department: [facility] [place]  :  Assessment & Plan  Squamous cell lung cancer, right (HCC)  Squamous cell cancer of right upper lung lobe was diagnosed in May 2021 and she had right upper lobectomy and lymph node sampling.  Path report showed  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 with invasion of the lymphatic channel and metastatic disease to 1 of 8 peribronchial lymph nodes, T2 N1, stage IIB. Tumor tested negative for EGFR.  Patient  Received 4 cycles of adjuvant chemotherapy, a combination of carboplatin and Gemzar and last treatment was on 2021. .  She did not receive taxane because of history of MS. She also received Granix because of neutropenia.  She did well and there has not been any documented recurrent or metastatic disease clinically.  She gets CT chest .  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  She does not want another biopsy.  Tumor is stable on ultrasound.       Chronic obstructive pulmonary disease, unspecified COPD type (HCC)  She has chronic cough and exertional dyspnea and she is being referred to pulmonary service.       Multiple sclerosis (HCC)  She follows with a neurologist.  Not symptomatic from MS at this time       Hypertension, essential         Spinal stenosis  of lumbar region without neurogenic claudication  Being managed by PCP       Peripheral arterial disease (HCC)  Was referred to vascular surgery       Thyroid disorder  To check thyroid functions because of tiredness.  They were checked previously and were within normal range  Orders:    TSH, 3rd generation with Free T4 reflex; Future    Restless legs syndrome (RLS)  Chronic       Fecal incontinence due to anorectal disorder  Chronic and she follows with rectal surgeon       Referral to pulmonary for shortness of breath.  Please see within 2 weeks.  Blood work prior to next visit in 6 months.  She appears to be in remission from lung cancer.  Stable right parotid gland nodule.  Goal is cured from lung cancer.  Patient is capable of self-care.  All discussed in detail.  Questions answered.  I suggested self breast examination, eating healthy foods, staying active but to avoid falls and trauma.  Suggested health screening tests. Patient to continue to follow-up with primary physician and other consultants.  Provided counseling and support.  I used a dictation device to dictate this note and there could be mistakes in my note and for that patient may contact my office.          History of Present Illness   Chief Complaint   Patient presents with    Follow-up   Follow-up visit for squamous cell cancer of right upper lung lobe that was diagnosed in May 2021 and she had right upper lobectomy and lymph node sampling.  Path report showed  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 with invasion of the lymphatic channel and metastatic disease to 1 of 8 peribronchial lymph nodes, T2 N1, stage IIB. Tumor tested negative for EGFR.  Patient  Received 4 cycles of adjuvant chemotherapy, a combination of carboplatin and Gemzar and last treatment was on 06/07/2021. .  She did not receive taxane because of history of MS. She also received Granix because of neutropenia.  She did well and there  has not been any documented recurrent or metastatic disease clinically.  She gets CT chest .  She has chronic cough and exertional dyspnea and she has been referred to pulmonary service.  She has tiredness and arthritic symptoms and low back discomfort.  She has restless legs at night.  History of PAD and she was referred to vascular surgery.  Stable right parotid gland tumor that was biopsied previously and was negative and is being followed with ultrasound and she does not want another biopsy.  Other problems are listed above in assessment and plan.  Oncology History   Cancer Staging   Squamous cell lung cancer, right (HCC)  Staging form: Lung, AJCC 8th Edition  - Pathologic stage from 1/28/2021: Stage IIB (pT2a, pN1, cM0) - Signed by Smita Real PA-C on 9/12/2023  Histopathologic type: Squamous cell carcinoma, NOS  Oncology History   Squamous cell lung cancer, right (HCC)   1/12/2021 Initial Diagnosis    Squamous cell lung cancer, right (HCC)     1/28/2021 -  Cancer Staged    Staging form: Lung, AJCC 8th Edition  - Pathologic stage from 1/28/2021: Stage IIB (pT2a, pN1, cM0) - Signed by Smita Real PA-C on 9/12/2023  Histopathologic type: Squamous cell carcinoma, NOS       1/28/2021 Surgery    VATS right upper lobectomy     3/10/2021 - 6/8/2021 Chemotherapy    fosaprepitant (EMEND) IVPB, 150 mg, Intravenous, Once, 4 of 4 cycles  Administration: 150 mg (3/10/2021), 150 mg (4/7/2021), 150 mg (4/28/2021), 150 mg (5/26/2021)  CARBOplatin (PARAPLATIN) IVPB (GO AUC DOSING), 429.5 mg, Intravenous, Once, 4 of 4 cycles  Administration: 429.5 mg (3/10/2021), 374 mg (4/7/2021), 399 mg (4/28/2021), 438.5 mg (5/26/2021)  gemcitabine (GEMZAR) infusion, 1,000 mg/m2 = 1,520.2 mg, Intravenous, Once, 4 of 4 cycles  Administration: 1,520.2 mg (3/10/2021), 1,520.2 mg (3/17/2021), 1,520.2 mg (4/7/2021), 1,520.2 mg (4/14/2021), 1,520.2 mg (4/28/2021), 1,520.2 mg (5/13/2021), 1,520.2 mg (5/26/2021), 1,520.2 mg  "(6/7/2021)  tbo-filgrastim (GRANIX), 300 mcg (100 % of original dose 300 mcg), Subcutaneous, Once, 3 of 3 cycles  Dose modification: 300 mcg (original dose 300 mcg, Cycle 2), 480 mcg (original dose 300 mcg, Cycle 3)  Administration: 300 mcg (4/8/2021), 300 mcg (4/15/2021), 300 mcg (4/29/2021), 480 mcg (5/14/2021), 480 mcg (5/27/2021), 480 mcg (6/8/2021)        Pertinent Medical History   See details in HPI and assessment and plan  05/16/25:      Review of Systems  Reviewed 12 systems.  Symptoms are as in HPI.  No fevers, chills, bleeding, bone pains, skin rash, weight loss, night sweats and no swelling of the ankles and no swollen glands.  No other neurological, cardiac, pulmonary, GI and  symptoms other than listed in HPI and assessment.      Objective   /70 (BP Location: Left arm, Patient Position: Sitting, Cuff Size: Adult)   Pulse 74   Temp 98.6 °F (37 °C) (Temporal)   Resp 18   Ht 4' 9\" (1.448 m)   Wt 62.4 kg (137 lb 8 oz)   LMP  (LMP Unknown)   SpO2 97%   BMI 29.75 kg/m²     Pain Screening:  Pain Score: 0-No pain  ECOG   1  Physical Exam  Vitals reviewed.   Constitutional:       General: She is not in acute distress.     Appearance: Normal appearance. She is not ill-appearing.   HENT:      Head: Normocephalic and atraumatic.      Mouth/Throat:      Comments: No thrush.    Eyes:      General: No scleral icterus.     Conjunctiva/sclera: Conjunctivae normal.       Cardiovascular:      Rate and Rhythm: Normal rate and regular rhythm.      Heart sounds: Normal heart sounds. No murmur heard.  Pulmonary:      Effort: Pulmonary effort is normal. No respiratory distress.      Breath sounds: Normal breath sounds. No rhonchi or rales.   Abdominal:      General: Abdomen is flat. There is no distension.      Palpations: Abdomen is soft. There is no mass.      Tenderness: There is no abdominal tenderness.      Comments: No ascites.      Musculoskeletal:         General: Tenderness present. No swelling. " Normal range of motion.      Cervical back: Normal range of motion. No rigidity or tenderness.      Right lower leg: No edema.      Left lower leg: No edema.      Comments: No calf tenderness.   Lymphadenopathy:      Cervical: No cervical adenopathy.      Upper Body:      Right upper body: No supraclavicular or axillary adenopathy.      Left upper body: No supraclavicular or axillary adenopathy.     Skin:     Coloration: Skin is not jaundiced or pale.      Findings: No bruising or rash.      Nails: There is no clubbing.     Neurological:      General: No focal deficit present.      Mental Status: She is alert and oriented to person, place, and time.      Motor: No weakness.      Coordination: Coordination normal.      Gait: Gait normal.     Psychiatric:         Behavior: Behavior normal.         Thought Content: Thought content normal.      Comments: Anxious         Labs: I have reviewed the following labs:  Lab Results   Component Value Date/Time    WBC 6.30 05/12/2025 11:04 AM    RBC 4.56 05/12/2025 11:04 AM    Hemoglobin 13.9 05/12/2025 11:04 AM    Hematocrit 44.6 05/12/2025 11:04 AM    MCV 98 05/12/2025 11:04 AM    MCH 30.5 05/12/2025 11:04 AM    RDW 12.5 05/12/2025 11:04 AM    Platelets 305 05/12/2025 11:04 AM    Segmented % 69 05/12/2025 11:04 AM    Lymphocytes % 20 05/12/2025 11:04 AM    Monocytes % 8 05/12/2025 11:04 AM    Eosinophils Relative 2 05/12/2025 11:04 AM    Basophils Relative 1 05/12/2025 11:04 AM    Immature Grans % 0 05/12/2025 11:04 AM    Absolute Neutrophils 4.38 05/12/2025 11:04 AM     Lab Results   Component Value Date/Time    Potassium 4.8 05/12/2025 11:04 AM    Chloride 102 05/12/2025 11:04 AM    CO2 30 05/12/2025 11:04 AM    BUN 16 05/12/2025 11:04 AM    Creatinine 0.68 05/12/2025 11:04 AM    Calcium 9.7 05/12/2025 11:04 AM    AST 20 05/12/2025 11:04 AM    ALT 18 05/12/2025 11:04 AM    Alkaline Phosphatase 66 05/12/2025 11:04 AM    Total Protein 7.3 05/12/2025 11:04 AM    Albumin 4.4  05/12/2025 11:04 AM    Total Bilirubin 0.71 05/12/2025 11:04 AM    eGFR 87 05/12/2025 11:04 AM     US head neck soft tissue  Status: Final result     PACS Images - GE     Show images for US head neck soft tissue  PACS Images - Sectra     Show images for US head neck soft tissue  Study Result    Narrative & Impression   HEAD AND NECK SOFT TISSUE ULTRASOUND     INDICATION: D49.0: Neoplasm of unspecified behavior of digestive system.     COMPARISON: 2/6/2023     TECHNIQUE: Real-time ultrasound of the right parotid was performed with a linear transducer with both volumetric sweeps and still imaging techniques.     FINDINGS:     Within the right parotid gland there is a oval elongated well-defined encapsulated hypoechoic nodule, again demonstrated, currently measuring 2.3 x 0.7 x 1.5 cm (prior 2.2 x 0.7 x 1.7 cm). Appearance and size remains stable. This finding correlates well   with prior MRI from 2021.     IMPRESSION:     Stable right parotid nodule.        Workstation performed: [id number]        Imaging    US head neck soft tissue (Order: [id number]) - 5/14/2025  CT chest wo contrast  Status: Final result     PACS Images - GE     Show images for CT chest wo contrast  PACS Images - Sectra     Show images for CT chest wo contrast  Study Result    Narrative & Impression   CT CHEST WITHOUT IV CONTRAST     INDICATION: C34.91: Malignant neoplasm of unspecified part of right bronchus or lung.     COMPARISON: CT chest 5/30/2024     TECHNIQUE: CT examination of the chest was performed without intravenous contrast. Multiplanar 2D reformatted images were created from the source data.     This examination, like all CT scans performed in the [facility], was performed utilizing techniques to minimize radiation dose exposure, including the use of iterative reconstruction and automated exposure control. Radiation dose length   product (DLP) for this visit: 193 mGy-cm.     FINDINGS:     LUNGS: Lungs are clear.  Postsurgical changes of right upper lobectomy without findings of recurrent neoplasm. Mild emphysematous changes. Scattered calcified granuloma. No suspicious nodule. There is no tracheal or endobronchial lesion.     PLEURA: Unremarkable.     HEART/GREAT VESSELS: Heavy atherosclerotic coronary artery calcification. No thoracic aortic aneurysm.     MEDIASTINUM AND CARMINA: Unremarkable.     CHEST WALL AND LOWER NECK: Partially visualized bilateral breast implants..     VISUALIZED STRUCTURES IN THE UPPER ABDOMEN: Unremarkable.     OSSEOUS STRUCTURES: No acute fracture or destructive osseous lesion.     IMPRESSION:     Stable postsurgical changes of right upper lobectomy without findings of local recurrence or metastasis in the chest.              Workstation performed: [id number]        Imaging    CT chest wo contrast (Order: [id number]) - 5/14/2025

## 2025-05-16 NOTE — PATIENT INSTRUCTIONS
Referral to pulmonary for shortness of breath.  Please see within 2 weeks.  Blood work prior to next visit in 6 months.

## 2025-05-16 NOTE — ASSESSMENT & PLAN NOTE
She has a stable right parotid gland tumor that was biopsied once at [facility] and was not malignant.  She does not want another biopsy.  Tumor is stable on ultrasound.

## 2025-05-16 NOTE — ASSESSMENT & PLAN NOTE
See above  Orders:    Ambulatory Referral to Pulmonology; Future    CBC and differential; Future    Comprehensive metabolic panel; Future    TSH, 3rd generation with Free T4 reflex; Future

## 2025-05-16 NOTE — ASSESSMENT & PLAN NOTE
To check thyroid functions because of tiredness.  They were checked previously and were within normal range  Orders:    TSH, 3rd generation with Free T4 reflex; Future

## 2025-06-03 ENCOUNTER — HOSPITAL ENCOUNTER (OUTPATIENT)
Dept: RADIOLOGY | Age: 70
Discharge: HOME | End: 2025-06-03
Payer: MEDICARE

## 2025-06-03 DIAGNOSIS — Z12.31 VISIT FOR SCREENING MAMMOGRAM: ICD-10-CM

## 2025-06-03 PROCEDURE — 77067 SCR MAMMO BI INCL CAD: CPT

## 2025-06-03 PROCEDURE — 77063 BREAST TOMOSYNTHESIS BI: CPT

## 2025-06-05 ENCOUNTER — TELEPHONE (OUTPATIENT)
Age: 70
End: 2025-06-05

## 2025-06-05 ENCOUNTER — CONSULT (OUTPATIENT)
Dept: PULMONOLOGY | Facility: CLINIC | Age: 70
End: 2025-06-05
Payer: MEDICARE

## 2025-06-05 VITALS
OXYGEN SATURATION: 96 % | HEIGHT: 57 IN | TEMPERATURE: 97.6 F | WEIGHT: 137 LBS | HEART RATE: 68 BPM | BODY MASS INDEX: 29.56 KG/M2 | SYSTOLIC BLOOD PRESSURE: 132 MMHG | RESPIRATION RATE: 12 BRPM | DIASTOLIC BLOOD PRESSURE: 68 MMHG

## 2025-06-05 DIAGNOSIS — J43.2 CENTRILOBULAR EMPHYSEMA (HCC): Primary | ICD-10-CM

## 2025-06-05 DIAGNOSIS — J20.9 ACUTE BRONCHITIS, UNSPECIFIED ORGANISM: Primary | ICD-10-CM

## 2025-06-05 PROCEDURE — 99204 OFFICE O/P NEW MOD 45 MIN: CPT | Performed by: STUDENT IN AN ORGANIZED HEALTH CARE EDUCATION/TRAINING PROGRAM

## 2025-06-05 PROCEDURE — 94010 BREATHING CAPACITY TEST: CPT | Performed by: STUDENT IN AN ORGANIZED HEALTH CARE EDUCATION/TRAINING PROGRAM

## 2025-06-05 RX ORDER — PREDNISONE 10 MG/1
40 TABLET ORAL DAILY
Qty: 20 TABLET | Refills: 0 | Status: SHIPPED | OUTPATIENT
Start: 2025-06-05 | End: 2025-06-10

## 2025-06-05 RX ORDER — FLUTICASONE FUROATE, UMECLIDINIUM BROMIDE AND VILANTEROL TRIFENATATE 100; 62.5; 25 UG/1; UG/1; UG/1
1 POWDER RESPIRATORY (INHALATION) DAILY
Qty: 180 BLISTER | Refills: 0 | Status: SHIPPED | OUTPATIENT
Start: 2025-06-05 | End: 2025-09-03

## 2025-06-05 RX ORDER — ALBUTEROL SULFATE 0.83 MG/ML
2.5 SOLUTION RESPIRATORY (INHALATION) EVERY 6 HOURS PRN
Qty: 720 ML | Refills: 0 | Status: SHIPPED | OUTPATIENT
Start: 2025-06-05 | End: 2025-09-03

## 2025-06-05 NOTE — TELEPHONE ENCOUNTER
Pt called in regarding her medications albuterol - nebulizer solution  and Trelegy  Patient would like to hold off on the nebulizer machine and nebulizer medication patient will not be able to afford the nebulizer solution its 99 dollars . The Trelegy is 900 dollars patient would like to know is she can be prescribed an alternative or a generic brand . Please advise

## 2025-06-05 NOTE — Clinical Note
Hello, referral placed.  Was following at [facility], neuro close 500 hours.  I ordered Trelegy it is tier 3.  Everything else seems to not be covered.  She has [employer].  Thank you

## 2025-06-05 NOTE — PROGRESS NOTES
Pulmonary Outpatient Consultation Note   Britni Fermin 70 y.o. female MRN: [id number]  6/5/2025    Referring provider:   Van Ochoa Md  [address] [facility] [facility]  [address]  [place] [address]     Reason for Consultation:  Evaluation for emphysema and worsening dyspnea     Assessment:    Acute bronchitis over the past week, complaining of worsening cough, congestion, post nasal drip.  No fevers, had a sick contact about 2 weeks ago.  Her cough has been going on for about 10 days.  Given her mild obstruction and emphysema, would favor we treat this as a COPD exacerbation.  Prescribe Augmentin 875 twice daily for 7 days along with 40 mg of prednisone  COPD mMRC 2-3 PFT with mild obstruction, CT with emphysema.  She used to follow with [facility] and was on Anoro but this cost about $500 a month.  She said with her AARP not many inhalers are covered.  She did reach out to her company and said everything was denied.  At this point I will refer her to complex care management to assist with inhaler optimization versus nebulizers.  I will prescribe Trelegy in the meantime and give her sample.  Also ordered nebulizer plus nebulizer solution of albuterol to use as needed.  In office from today shows moderate obstruction compared to mild previously  Nicotine dependence in remission quit smoking 4 years ago after a 50-pack-year history  Suspected pulmonary hypertension, she does have dizziness when getting up from a bent over position.  She had very mild pulmonary hypertension in 2021 on an echocardiogram.    Plan:    Augmentin 875 BID x 7 days  Prednisone 40 mg x 5 days  Consider updating an echocardiogram to evaluate for PH if inhaler optimization does not work  Start Trelegy 1 puff daily, rinse mouth out afterwards, we gave her sample of this in clinic  Use rescue inhaler as needed  Order placed for albuterol solution and nebulizer  In office spirometry shows FEV1 0.79 which is 50% compared to 84% 1.45 L in  2021  Referral to complex care management for inhaler optimization vs nebulizer   I have spent a total time of 45 minutes in caring for this patient on the day of the visit/encounter including Diagnostic results, Prognosis, Risks and benefits of tx options, Instructions for management, Patient and family education, Importance of tx compliance, Risk factor reductions, Impressions, Counseling / Coordination of care, Documenting in the medical record, Reviewing/placing orders in the medical record (including tests, medications, and/or procedures), and Obtaining or reviewing history  .    History of Present Illness   HPI:    70-year-old female here for evaluation, past medical history of stage IIb squamous cell lung cancer with right upper lobe lobectomy treated with adjuvant chemotherapy in 2021, history of MS, history of spinal stenosis, peripheral arterial disease.    Patient recently saw hematology service and was complaining of dyspnea and was referred to pulmonary.  Patient states used to follow with [facility] pulmonary group, was on Anoro but this cost her 500 hours.  He is now here because he recently saw oncology group and was complaining of worsening shortness of breath therefore sent to us.    She has Anoro but does not use this has not been using it for 1 year because of the price.  She is currently without inhaler does not use her rescue inhaler either.  She is mMRC 2-3, has difficulty walking up a flight of stairs most recently.  Has a cough, sounds productive, no fevers but has a sick contact recently 2 weeks ago.  The cough seems stable but is productive and concerning to be infected.    Aside from that regarding her COPD she is not on optimal treatment right now.  She had a recent CT chest that is normal    PFT at [facility] with mild obstruction  Echocardiogram 2022 pulmonary pressure 33, 65%, normal RV  CT chest May 2025 lobectomy scars, emphysema, calcified granulomas, no mediastinal  "lymphadenopathy    Review of Systems   Constitutional:  Positive for activity change.   HENT:  Positive for congestion, postnasal drip and rhinorrhea.    Eyes: Negative.    Respiratory:  Positive for cough and shortness of breath. Negative for wheezing.    Cardiovascular: Negative.    Gastrointestinal: Negative.    Endocrine: Negative.    Genitourinary: Negative.    Musculoskeletal: Negative.    Skin: Negative.    Allergic/Immunologic: Positive for immunocompromised state.   Neurological: Negative.    Hematological: Negative.    Psychiatric/Behavioral: Negative.           Historical Information   Past Medical History[1]  Past Surgical History[2]  Family History[3]    Occupational History: retired     Tobacco Use History[4]    Meds/Allergies   Current Medications[5]  Allergies[6]    Vitals: Blood pressure 132/68, pulse 68, temperature 97.6 °F (36.4 °C), temperature source Temporal, resp. rate 12, height 4' 9\" (1.448 m), weight 62.1 kg (137 lb), SpO2 96%, not currently breastfeeding., Body mass index is 29.65 kg/m². Oxygen Therapy  SpO2: 96 %    Physical Exam:    GEN: alert and oriented x 3, pleasant and cooperative   HEENT:  Normocephalic, atraumatic  NECK: No JVD   HEART: Rate, normal S1 and S2  LUNGS: Faint rhonchi at the bases  ABDOMEN:  Normoactive bowel sounds, soft, no tenderness, no distention  EXTREMITIES: no edema  NEURO: no gross focal findings  SKIN:  Dry, intact, warm to touch    Labs: I have personally reviewed pertinent lab results.  Peripheral eosinophilia 0.12, the highest was 0.3 to    Imaging and other studies: Results Review Statement: I personally reviewed the following image studies in PACS and associated radiology reports: CT chest. My interpretation of the radiology images/reports is: CT chest with lobectomy scarring, granuloma.    Pulmonary function testing:  Personally interpreted by me  FEV1 50    Other Studies: Results Review Statement: No pertinent imaging studies reviewed.    Angelito Romo" MD  Pulmonary and Critical Care Medicine            [1]  Past Medical History:  Diagnosis Date   • Allergic ?   • Anxiety    • Bronchial malignant neoplasm, right (HCC)    • Cancer (HCC)     lung   • Chronic major depressive disorder, single episode    • COPD (chronic obstructive pulmonary disease) (HCC)    • Full incontinence of feces    • GERD (gastroesophageal reflux disease)    • Hyperlipidemia    • Hypertension    • Malignant neoplasm of bronchus and lung (HCC)    • Multiple sclerosis (HCC)    • Pulmonary emphysema (HCC)    • Restless leg syndrome    • Tobacco use    [2]  Past Surgical History:  Procedure Laterality Date   • AUGMENTATION MAMMAPLASTY Bilateral 11/2014    retro-pectoral silicone   • BREAST BIOPSY Left 03/23/2015    u/s core bx   • BREAST BIOPSY Left 08/02/2010    high-risk lesion   • BREAST EXCISIONAL BIOPSY Left 08/26/2010    high-risk lesion   • BREAST EXCISIONAL BIOPSY Left 1994   • BREAST EXCISIONAL BIOPSY Right 1984   • CT NEEDLE BIOPSY LUNG  12/2/2020   • FL GUIDED CENTRAL VENOUS ACCESS DEVICE INSERTION  03/09/2021   • IR PORT REMOVAL  03/07/2022   • LYMPH NODE BIOPSY  2021   • MAMMO (HISTORICAL)  2020   • UT BRNCHSC INCL FLUOR GDNCE DX W/CELL WASHG SPX N/A 01/28/2021    Procedure: BRONCHOSCOPY FLEXIBLE;  Surgeon: Jose Glover MD;  Location: [facility];  Service: Thoracic   • UT INSJ TUNNELED CTR VAD W/SUBQ PORT AGE 5 YR/> N/A 03/09/2021    Procedure: INSERTION VENOUS PORT ( PORT-A-CATH) IR;  Surgeon: Krzysztof Barros DO;  Location: [facility];  Service: Interventional Radiology   • UT THORACOSCOPY W/LOBECTOMY SINGLE LOBE Right 01/28/2021    Procedure: RIGHT VATS UPPER LOBECTOMY ; MEDIASTINAL LYMPH NODE DISSECTION; right lower lobe bleb resection  ;  Surgeon: Jose Glover MD;  Location: [facility];  Service: Thoracic   • TONSILLECTOMY     • TOTAL ABDOMINAL HYSTERECTOMY  2010    age 55   • TOTAL ABDOMINAL HYSTERECTOMY W/ BILATERAL SALPINGOOPHORECTOMY Bilateral 2010    age 55   •  TUBAL LIGATION     [3]  Family History  Problem Relation Name Age of Onset   • No Known Problems Mother     • COPD Father linden Acuña    • Hypertension Father linden Acuña    • No Known Problems Sister     • No Known Problems Daughter     • No Known Problems Maternal Grandmother     • No Known Problems Maternal Grandfather     • No Known Problems Paternal Grandmother     • Rectal cancer Paternal Grandfather          unknown age   • No Known Problems Sister     • No Known Problems Maternal Aunt     • Breast cancer Paternal Aunt Kavya         unknown age   • No Known Problems Paternal Aunt     • No Known Problems Paternal Aunt     • No Known Problems Paternal Aunt     • No Known Problems Paternal Aunt     • No Known Problems Paternal Aunt     • No Known Problems Paternal Aunt     • COPD Brother Cristian Acuña    • No Known Problems Brother     [4]  Social History  Tobacco Use   Smoking Status Former   • Current packs/day: 0.00   • Average packs/day: 1 pack/day for 53.0 years (53.0 ttl pk-yrs)   • Types: Cigarettes   • Start date: 1968   • Quit date: 1/10/2021   • Years since quittin.4   Smokeless Tobacco Never   Tobacco Comments    Wish i never started   [5]    Current Outpatient Medications:   •  albuterol (2.5 mg/3 mL) 0.083 % nebulizer solution, Take 3 mL (2.5 mg total) by nebulization every 6 (six) hours as needed for wheezing or shortness of breath, Disp: 720 mL, Rfl: 0  •  amoxicillin-clavulanate (AUGMENTIN) 875-125 mg per tablet, Take 1 tablet by mouth every 12 (twelve) hours for 7 days, Disp: 14 tablet, Rfl: 0  •  atorvastatin (LIPITOR) 10 mg tablet, TAKE 1 TABLET BY MOUTH DAILY, Disp: 90 tablet, Rfl: 1  •  Biotin 5 MG CAPS, Take 5,000 mg by mouth in the morning., Disp: , Rfl:   •  Cholecalciferol (Vitamin D3) 50 MCG (2000 UT) capsule, Take 5,000 Units by mouth in the morning., Disp: , Rfl:   •  famotidine (PEPCID) 40 MG tablet, TAKE 1 TABLET BY MOUTH EVERY MORNING, Disp: 90 tablet, Rfl:  1  •  FLUoxetine (PROzac) 20 mg capsule, TAKE 1 CAPSULE BY MOUTH DAILY, Disp: 90 capsule, Rfl: 1  •  fluticasone-umeclidinium-vilanterol (Trelegy Ellipta) 100-62.5-25 mcg/actuation inhaler, Inhale 1 puff daily Rinse mouth after use., Disp: 180 blister, Rfl: 0  •  hydrocortisone 2.5 % cream, Apply topically 2 (two) times a day, Disp: 28 g, Rfl: 2  •  loperamide (IMODIUM) 2 mg capsule, Take 2 mg by mouth in the morning. otc., Disp: , Rfl:   •  losartan-hydrochlorothiazide (HYZAAR) 50-12.5 mg per tablet, TAKE 1 TABLET BY MOUTH DAILY, Disp: 90 tablet, Rfl: 1  •  pramipexole (MIRAPEX) 0.5 mg tablet, TAKE 1 TABLET BY MOUTH AT BEDTIME, Disp: 90 tablet, Rfl: 1  •  predniSONE 10 mg tablet, Take 4 tablets (40 mg total) by mouth daily for 5 days, Disp: 20 tablet, Rfl: 0  •  buPROPion (WELLBUTRIN XL) 300 mg 24 hr tablet, Take 1 tablet (300 mg total) by mouth every morning (Patient not taking: Reported on 5/31/2024), Disp: 90 tablet, Rfl: 1  •  hydrOXYzine HCL (ATARAX) 25 mg tablet, Take 1 tablet (25 mg total) by mouth every 6 (six) hours as needed for anxiety (Patient not taking: Reported on 5/12/2025), Disp: 20 tablet, Rfl: 0[6]  Allergies  Allergen Reactions   • Sulfa Antibiotics Hives

## 2025-06-05 NOTE — PATIENT INSTRUCTIONS
It was a pleasure seeing you today!    Use Augmentin for 7 days, 1 tablet twice a day  Use prednisone 40 mg for 5 days  Start Trelegy 1 puff daily rinse your mouth out afterwards  I will have our complex care manager reach out to you regarding inhaler optimization  Order placed for albuterol solution and nebulizer, uses up to 3 or 4 times a day as needed  Follow-up in 3 to 4 months    Angelito Romo MD  Pulmonary and Critical Care Medicine

## 2025-06-07 LAB
DME PARACHUTE DELIVERY DATE ACTUAL: NORMAL
DME PARACHUTE DELIVERY DATE REQUESTED: NORMAL
DME PARACHUTE ITEM DESCRIPTION: NORMAL
DME PARACHUTE ORDER STATUS: NORMAL
DME PARACHUTE SUPPLIER NAME: NORMAL
DME PARACHUTE SUPPLIER PHONE: NORMAL

## 2025-06-09 ENCOUNTER — PATIENT OUTREACH (OUTPATIENT)
Dept: CASE MANAGEMENT | Facility: OTHER | Age: 70
End: 2025-06-09

## 2025-06-09 RX ORDER — BENZONATATE 200 MG/1
200 CAPSULE ORAL 3 TIMES DAILY PRN
Qty: 20 CAPSULE | Refills: 0 | Status: SHIPPED | OUTPATIENT
Start: 2025-06-09

## 2025-06-09 NOTE — TELEPHONE ENCOUNTER
I called and spoke with Britni.  She is on the phone with her insurance company now looking into options for more affordable inhalers.  I did send Tessalon to her pharmacy of choice after confirmation.  We also discussed over-the-counter cough syrup options.  If she does not have improvement in her cough, she will return call to the office.

## 2025-06-09 NOTE — TELEPHONE ENCOUNTER
Spoke to Britni and she is going to call the insurance company to see what the alternative inhaler would be for her to get. Also she is asking if a  Could prescribe her something for her cough that isn't going away.

## 2025-06-09 NOTE — PROGRESS NOTES
.OP RT CM called and spoke with patient regarding her breathing, medications and O2.     Britni is using Albuterol rescue inhaler only. She stated she received a sample of Trelegy but was holding onto it for emergency and the cost of Anoro or Trelegy is too expensive.  She appears to be eligible for PACENET, contact information given. She is calling once we end our call.   Britni is raising clear sputum and stated she coughs a lot and seeking cough medication. She denies fever/chills. I encouraged her to use the Trelegy sample and we discussed use. She knows to rinse mouth.   Britni is on antibiotic and prednisone taper.   Britni is appreciative of outreach and accepted my contact information.   Britni stated she will contact me when she gets info. Regarding the PACENET. Conversation was brief. OP RT CM will discuss COPD booklet at next outreach.   OP RT CM will outreach next week, unless I hear back from Britni prior.

## 2025-06-12 ENCOUNTER — PATIENT OUTREACH (OUTPATIENT)
Dept: CASE MANAGEMENT | Facility: OTHER | Age: 70
End: 2025-06-12

## 2025-06-12 NOTE — PROGRESS NOTES
.OP RT CM called and spoke with patient regarding their breathing, medications and PACENET.   Britni stated she is over income for [employer] however she is receiving information regarding getting the Trelegy at a lower cost. She is awaiting a return call from [employer].   Britni has Trelegy samples she is still using. She went to [employer] to  her Albuterol/NSS solution and she was quoted $99.00 for this.   OP RT CM offered to call pharmacy regarding since this cost is so much.   Britni received her nebulizer compressor but does not have any solutions due to cost.   Britni does not have a rescue inhaler. She denies fever/chills. She is congested and raising white sputum. OP RT CM will call pharmacy and get back to Britni with more information.   _____________________________________________   [facility] and [facility] stated they did  not have her Medicare A & B on file. The cost would be $25.00/month. They need to scan her AARP in for additional savings. ___________________________  OP RT MC called and relayed the information to Britni. She doesn't understands she she has established care with them, that they do not have her health insurance on file. We discussed the [facility] pharmacy as an option and she would like this instead. OP RT CM placed message to Dr. Romo and staff to inquire.   Britni is appreciative of information and has my contact information. OP RT CM will outreach next week.

## 2025-06-18 ENCOUNTER — PATIENT OUTREACH (OUTPATIENT)
Dept: CASE MANAGEMENT | Facility: OTHER | Age: 70
End: 2025-06-18

## 2025-06-18 LAB
DME PARACHUTE DELIVERY DATE REQUESTED: NORMAL
DME PARACHUTE DELIVERY DATE REQUESTED: NORMAL
DME PARACHUTE ITEM DESCRIPTION: NORMAL
DME PARACHUTE ORDER STATUS: NORMAL
DME PARACHUTE ORDER STATUS: NORMAL
DME PARACHUTE SUPPLIER NAME: NORMAL
DME PARACHUTE SUPPLIER NAME: NORMAL
DME PARACHUTE SUPPLIER PHONE: NORMAL
DME PARACHUTE SUPPLIER PHONE: NORMAL

## 2025-06-18 NOTE — PROGRESS NOTES
.OP RT CM called and spoke with patient regarding her breathing, medications and DME.  Britni stated she is receiving nebulized solutions through the DME arriving tomorrow. We discussed use and Rx. She understands this RX taken TID will help replace the long acting LABA/LAMA that is unaffordable. She is hoping in the future that her Rx will be affordable.   Britni denies fever/chills and is raising clear sputum.   OP RT CM will outreach once more in two weeks and Britni has my contact information.

## 2025-07-01 ENCOUNTER — PATIENT OUTREACH (OUTPATIENT)
Dept: CASE MANAGEMENT | Facility: OTHER | Age: 70
End: 2025-07-01

## 2025-07-01 NOTE — PROGRESS NOTES
OP RT CM called and left a VM requesting a return phone call. I will outreach in two weeks unless I hear back from patient prior.Outgoing in basket reminder message placed .  --------------------------------------------------------------------------------

## 2025-07-09 DIAGNOSIS — G25.81 RESTLESS LEGS SYNDROME (RLS): ICD-10-CM

## 2025-07-09 DIAGNOSIS — F33.9 DEPRESSION, RECURRENT (HCC): ICD-10-CM

## 2025-07-09 NOTE — TELEPHONE ENCOUNTER
Reason for call:   [x] Refill   [] Prior Auth  [] Other:     Office:   [x] PCP/Provider -   [] Specialty/Provider -     Medication:     FLUoxetine (PROzac) 20 mg capsule       Dose/Frequency: TAKE 1 CAPSULE BY MOUTH DAILY     Quantity:  90 capsule     Medication: pramipexole (MIRAPEX) 0.5 mg tablet     Dose/Frequency:  TAKE 1 TABLET BY MOUTH AT BEDTIME     Quantity: 90 tablet     Pharmacy: [id number]  [place], [place] - [address]   Does the patient have enough for 3 days?   [x] Yes   [] No - Send as HP to POD

## 2025-07-10 RX ORDER — PRAMIPEXOLE DIHYDROCHLORIDE 0.5 MG/1
0.5 TABLET ORAL
Qty: 90 TABLET | Refills: 0 | Status: SHIPPED | OUTPATIENT
Start: 2025-07-10

## 2025-07-14 ENCOUNTER — PATIENT OUTREACH (OUTPATIENT)
Dept: CASE MANAGEMENT | Facility: OTHER | Age: 70
End: 2025-07-14

## 2025-07-14 NOTE — PROGRESS NOTES
.OP RT CM called and spoke with patient regarding her breathing, medications and pulmonary rehab.    Rosita stated she is using Duoneb Qday. She denies fever/chills and sputum at this time.   Rosita is interested in pulmonary rehab again since she is not exercising regularly. We discussed the guidelines. She has had rehab last October 2024. We discussed ways for her to increase her exercise routine and regime; walking her dogs, using her treadmill etc.   Rosita is appreciative of outreach. She has my contact information. No further outreach is needed with OP RT CM at this time.

## 2025-07-16 ENCOUNTER — TELEPHONE (OUTPATIENT)
Dept: PULMONOLOGY | Facility: CLINIC | Age: 70
End: 2025-07-16

## 2025-07-16 NOTE — TELEPHONE ENCOUNTER
Danielle at [facility] called the RX Refill Line. Message is being forwarded to the office.     [facility]  is requesting   NEW - Ipratropium/Albuterol (30 Day Supply)    through [facility] via [facility]

## 2025-07-17 NOTE — TELEPHONE ENCOUNTER
I called the pharmacy and had them process medication under Medicare B. Medication does not require a PA. Pharmacy will notify pt when medication is ready for

## 2025-07-18 ENCOUNTER — HOSPITAL ENCOUNTER (OUTPATIENT)
Dept: ULTRASOUND IMAGING | Facility: CLINIC | Age: 70
Discharge: HOME | End: 2025-07-18
Attending: INTERNAL MEDICINE
Payer: MEDICARE

## 2025-07-18 ENCOUNTER — HOSPITAL ENCOUNTER (OUTPATIENT)
Dept: MAMMOGRAPHY | Facility: CLINIC | Age: 70
Discharge: HOME | End: 2025-07-18
Attending: INTERNAL MEDICINE
Payer: MEDICARE

## 2025-07-18 DIAGNOSIS — R92.8 ABNORMAL MAMMOGRAM: ICD-10-CM

## 2025-07-18 LAB
DME PARACHUTE DELIVERY DATE ACTUAL: NORMAL
DME PARACHUTE DELIVERY DATE ACTUAL: NORMAL
DME PARACHUTE DELIVERY DATE REQUESTED: NORMAL
DME PARACHUTE DELIVERY DATE REQUESTED: NORMAL
DME PARACHUTE ITEM DESCRIPTION: NORMAL
DME PARACHUTE ORDER STATUS: NORMAL
DME PARACHUTE ORDER STATUS: NORMAL
DME PARACHUTE SUPPLIER NAME: NORMAL
DME PARACHUTE SUPPLIER NAME: NORMAL
DME PARACHUTE SUPPLIER PHONE: NORMAL
DME PARACHUTE SUPPLIER PHONE: NORMAL

## 2025-07-18 PROCEDURE — 77065 DX MAMMO INCL CAD UNI: CPT

## 2025-07-18 PROCEDURE — G0279 TOMOSYNTHESIS, MAMMO: HCPCS

## 2025-07-18 PROCEDURE — 76642 ULTRASOUND BREAST LIMITED: CPT

## 2025-07-24 ENCOUNTER — OFFICE VISIT (OUTPATIENT)
Dept: INTERNAL MEDICINE CLINIC | Age: 70
End: 2025-07-24
Payer: MEDICARE

## 2025-07-24 VITALS
HEART RATE: 78 BPM | BODY MASS INDEX: 30.2 KG/M2 | OXYGEN SATURATION: 95 % | WEIGHT: 140 LBS | SYSTOLIC BLOOD PRESSURE: 114 MMHG | TEMPERATURE: 97.8 F | DIASTOLIC BLOOD PRESSURE: 68 MMHG | HEIGHT: 57 IN

## 2025-07-24 DIAGNOSIS — J43.2 CENTRILOBULAR EMPHYSEMA (HCC): ICD-10-CM

## 2025-07-24 DIAGNOSIS — R63.5 WEIGHT GAIN: Primary | ICD-10-CM

## 2025-07-24 DIAGNOSIS — Z12.11 SCREENING FOR MALIGNANT NEOPLASM OF COLON: ICD-10-CM

## 2025-07-24 DIAGNOSIS — E66.9 OBESITY (BMI 30-39.9): ICD-10-CM

## 2025-07-24 PROCEDURE — 99214 OFFICE O/P EST MOD 30 MIN: CPT | Performed by: INTERNAL MEDICINE

## 2025-07-24 PROCEDURE — G2211 COMPLEX E/M VISIT ADD ON: HCPCS | Performed by: INTERNAL MEDICINE

## 2025-07-24 NOTE — PROGRESS NOTES
Name: Britni Fermin      : 1955      MRN: [id number]  Encounter Provider: Anai Alatorre DO  Encounter Date: 2025   Encounter department: [facility]  :  Assessment & Plan  Weight gain  -BMI is 30.30  - Will refer patient to weight management in order to help her lose weight in a healthy way and to keep it off  - Continue with diet and exercise  Orders:  •  Ambulatory Referral to Weight Management; Future    Screening for malignant neoplasm of colon  -Referral ordered to gastroenterology for colon cancer screening  Orders:  •  Ambulatory Referral to Gastroenterology; Future    Obesity (BMI 30-39.9)    -BMI is 30.30  - Patient would like to be prescribed a GLP-1 agonist but I will defer to weight management for that because of her comorbidities.  - Will refer patient to weight management.   Orders:  •  Ambulatory Referral to Weight Management; Future    Centrilobular emphysema (HCC)  -Stable  - Continue with Trelegy inhaler as well as albuterol nebulizer treatment and continue to follow with pulmonology plan  -patient was urged not to restart smoking especially with her history of squamous cell lung cancer             Urinary Incontinence Plan of Care: counseling topics discussed: limiting fluid intake 3-4 hours before bed and preventing constipation.       History of Present Illness   HPI  Pt presents with a request for help in wt loss  She states that she has gained weight ever since she stopped smoking and is tempted to go back to smoking.  Of note, she has a history of squamous cell cancer of the lung.  She also has emphysema and was referred to pulmonology at her last visit.  She states that she was prescribed an inhaler and could not afford it and was subsequently prescribed a nebulizer which she tries to use but does not really want to use because the inhaler is easier to use so she does not use it all the time  She states that she has not smoked since  when  "she was diagnosed with lung cancer  She states that she was getting a medication online to help her lose wt and it did not help-she does not really know the name of the medication.  She would like to be prescribed Zepbound.    She denies any fever, chills, night sweats, headache, dizziness, wheezing, cough, chest pain, sob, palpitations, nausea, vomiting, diarrhea, constipation, hematochezia, hematuria, melena stools, arthralgias, myalgias..    lipozem  Review of Systems   Constitutional:  Positive for unexpected weight change. Negative for activity change, chills, fatigue and fever.   HENT:  Negative for ear pain, postnasal drip, rhinorrhea, sinus pressure and sore throat.    Eyes:  Negative for pain.   Respiratory:  Negative for cough, choking, chest tightness, shortness of breath and wheezing.    Cardiovascular:  Negative for chest pain, palpitations and leg swelling.   Gastrointestinal:  Negative for abdominal pain, constipation, diarrhea, nausea and vomiting.   Genitourinary:  Negative for dysuria and hematuria.   Musculoskeletal:  Negative for arthralgias, back pain, gait problem, joint swelling, myalgias and neck stiffness.   Skin:  Negative for pallor and rash.   Neurological:  Negative for dizziness, tremors, seizures, syncope, light-headedness and headaches.   Hematological:  Negative for adenopathy.   Psychiatric/Behavioral:  Negative for behavioral problems.        Objective   /68 (BP Location: Left arm, Patient Position: Sitting, Cuff Size: Standard)   Pulse 78   Temp 97.8 °F (36.6 °C) (Temporal)   Ht 4' 9\" (1.448 m)   Wt 63.5 kg (140 lb)   LMP  (LMP Unknown)   SpO2 95%   BMI 30.30 kg/m²      Physical Exam  Constitutional:       General: She is not in acute distress.     Appearance: She is well-developed. She is not diaphoretic.   HENT:      Head: Normocephalic and atraumatic.      Right Ear: External ear normal.      Left Ear: External ear normal.      Nose: Nose normal.      Mouth/Throat: "      Mouth: Mucous membranes are dry.      Pharynx: Posterior oropharyngeal erythema present. No oropharyngeal exudate.     Eyes:      General: No scleral icterus.        Right eye: No discharge.         Left eye: No discharge.      Conjunctiva/sclera: Conjunctivae normal.      Pupils: Pupils are equal, round, and reactive to light.     Neck:      Thyroid: No thyromegaly.      Vascular: No JVD.      Trachea: No tracheal deviation.     Cardiovascular:      Rate and Rhythm: Normal rate and regular rhythm.      Heart sounds: Normal heart sounds. No murmur heard.     No friction rub. No gallop.   Pulmonary:      Effort: Pulmonary effort is normal. No respiratory distress.      Breath sounds: Normal breath sounds. No wheezing or rales.   Chest:      Chest wall: No tenderness.   Abdominal:      General: Bowel sounds are normal. There is no distension.      Palpations: Abdomen is soft. There is no mass.      Tenderness: There is no abdominal tenderness. There is no guarding or rebound.     Musculoskeletal:         General: No tenderness or deformity. Normal range of motion.      Cervical back: Normal range of motion and neck supple.   Lymphadenopathy:      Cervical: No cervical adenopathy.     Skin:     General: Skin is warm and dry.      Coloration: Skin is not pale.      Findings: No erythema or rash.     Neurological:      Mental Status: She is alert and oriented to person, place, and time.      Cranial Nerves: No cranial nerve deficit.      Motor: No abnormal muscle tone.      Coordination: Coordination normal.      Deep Tendon Reflexes: Reflexes are normal and symmetric.     Psychiatric:         Behavior: Behavior normal.

## 2025-07-24 NOTE — ASSESSMENT & PLAN NOTE
-Stable  - Continue with Trelegy inhaler as well as albuterol nebulizer treatment and continue to follow with pulmonology plan  -patient was urged not to restart smoking especially with her history of squamous cell lung cancer

## 2025-07-27 ENCOUNTER — OFFICE VISIT (OUTPATIENT)
Dept: URGENT CARE | Age: 70
End: 2025-07-27
Payer: MEDICARE

## 2025-07-27 VITALS
SYSTOLIC BLOOD PRESSURE: 110 MMHG | TEMPERATURE: 98.4 F | RESPIRATION RATE: 18 BRPM | WEIGHT: 140 LBS | HEART RATE: 76 BPM | OXYGEN SATURATION: 96 % | HEIGHT: 57 IN | BODY MASS INDEX: 30.2 KG/M2 | DIASTOLIC BLOOD PRESSURE: 60 MMHG

## 2025-07-27 DIAGNOSIS — N30.01 ACUTE CYSTITIS WITH HEMATURIA: Primary | ICD-10-CM

## 2025-07-27 DIAGNOSIS — R39.89 SENSATION OF PRESSURE IN BLADDER AREA: ICD-10-CM

## 2025-07-27 LAB
SL AMB  POCT GLUCOSE, UA: ABNORMAL
SL AMB LEUKOCYTE ESTERASE,UA: ABNORMAL
SL AMB POCT BILIRUBIN,UA: ABNORMAL
SL AMB POCT BLOOD,UA: ABNORMAL
SL AMB POCT CLARITY,UA: CLEAR
SL AMB POCT COLOR,UA: ABNORMAL
SL AMB POCT KETONES,UA: ABNORMAL
SL AMB POCT NITRITE,UA: POSITIVE
SL AMB POCT PH,UA: 6.5
SL AMB POCT SPECIFIC GRAVITY,UA: 1
SL AMB POCT URINE PROTEIN: ABNORMAL
SL AMB POCT UROBILINOGEN: 0.2

## 2025-07-27 PROCEDURE — 81002 URINALYSIS NONAUTO W/O SCOPE: CPT | Performed by: STUDENT IN AN ORGANIZED HEALTH CARE EDUCATION/TRAINING PROGRAM

## 2025-07-27 PROCEDURE — 87086 URINE CULTURE/COLONY COUNT: CPT | Performed by: STUDENT IN AN ORGANIZED HEALTH CARE EDUCATION/TRAINING PROGRAM

## 2025-07-27 PROCEDURE — G0463 HOSPITAL OUTPT CLINIC VISIT: HCPCS | Performed by: STUDENT IN AN ORGANIZED HEALTH CARE EDUCATION/TRAINING PROGRAM

## 2025-07-27 PROCEDURE — 99213 OFFICE O/P EST LOW 20 MIN: CPT | Performed by: STUDENT IN AN ORGANIZED HEALTH CARE EDUCATION/TRAINING PROGRAM

## 2025-07-27 RX ORDER — CEPHALEXIN 500 MG/1
500 CAPSULE ORAL EVERY 12 HOURS SCHEDULED
Qty: 10 CAPSULE | Refills: 0 | Status: SHIPPED | OUTPATIENT
Start: 2025-07-27 | End: 2025-08-01

## 2025-07-28 LAB — BACTERIA UR CULT: ABNORMAL

## 2025-07-29 ENCOUNTER — OFFICE VISIT (OUTPATIENT)
Age: 70
End: 2025-07-29
Payer: MEDICARE

## 2025-07-29 ENCOUNTER — TELEPHONE (OUTPATIENT)
Dept: BARIATRICS | Facility: CLINIC | Age: 70
End: 2025-07-29

## 2025-07-29 VITALS
HEIGHT: 57 IN | OXYGEN SATURATION: 99 % | DIASTOLIC BLOOD PRESSURE: 74 MMHG | BODY MASS INDEX: 30.42 KG/M2 | HEART RATE: 76 BPM | TEMPERATURE: 98 F | SYSTOLIC BLOOD PRESSURE: 116 MMHG | WEIGHT: 141 LBS

## 2025-07-29 DIAGNOSIS — Z86.73 HISTORY OF STROKE: ICD-10-CM

## 2025-07-29 DIAGNOSIS — I73.9 PERIPHERAL ARTERIAL DISEASE (HCC): ICD-10-CM

## 2025-07-29 DIAGNOSIS — E78.49 OTHER HYPERLIPIDEMIA: ICD-10-CM

## 2025-07-29 DIAGNOSIS — J43.8 OTHER EMPHYSEMA (HCC): ICD-10-CM

## 2025-07-29 DIAGNOSIS — E66.811 OBESITY, CLASS I, BMI 30-34.9: Primary | ICD-10-CM

## 2025-07-29 DIAGNOSIS — F33.9 DEPRESSION, RECURRENT (HCC): ICD-10-CM

## 2025-07-29 DIAGNOSIS — I10 HYPERTENSION, ESSENTIAL: ICD-10-CM

## 2025-07-29 PROCEDURE — 99204 OFFICE O/P NEW MOD 45 MIN: CPT | Performed by: PHYSICIAN ASSISTANT

## 2025-08-01 ENCOUNTER — TELEPHONE (OUTPATIENT)
Age: 70
End: 2025-08-01

## 2025-08-01 DIAGNOSIS — J43.2 CENTRILOBULAR EMPHYSEMA (HCC): Primary | ICD-10-CM

## 2025-08-01 RX ORDER — FLUTICASONE FUROATE, UMECLIDINIUM BROMIDE AND VILANTEROL TRIFENATATE 100; 62.5; 25 UG/1; UG/1; UG/1
1 POWDER RESPIRATORY (INHALATION) DAILY
Qty: 60 BLISTER | Refills: 5 | Status: SHIPPED | OUTPATIENT
Start: 2025-08-01 | End: 2025-08-04 | Stop reason: SDUPTHER

## 2025-08-04 ENCOUNTER — TELEPHONE (OUTPATIENT)
Age: 70
End: 2025-08-04

## 2025-08-04 DIAGNOSIS — J43.2 CENTRILOBULAR EMPHYSEMA (HCC): ICD-10-CM

## 2025-08-04 RX ORDER — FLUTICASONE FUROATE, UMECLIDINIUM BROMIDE AND VILANTEROL TRIFENATATE 100; 62.5; 25 UG/1; UG/1; UG/1
1 POWDER RESPIRATORY (INHALATION) DAILY
Qty: 180 BLISTER | Refills: 3 | Status: SHIPPED | OUTPATIENT
Start: 2025-08-04 | End: 2026-07-30

## 2025-08-13 ENCOUNTER — TELEPHONE (OUTPATIENT)
Age: 70
End: 2025-08-13

## 2025-08-18 DIAGNOSIS — E66.811 OBESITY, CLASS I, BMI 30-34.9: ICD-10-CM

## 2025-08-18 DIAGNOSIS — Z86.73 HISTORY OF STROKE: ICD-10-CM

## 2025-08-18 DIAGNOSIS — F33.9 DEPRESSION, RECURRENT (HCC): ICD-10-CM

## 2025-08-18 DIAGNOSIS — I10 HYPERTENSION, ESSENTIAL: Primary | ICD-10-CM

## (undated) DEVICE — SUT VICRYL 2-0 SH 27 IN UNDYED J417H

## (undated) DEVICE — ECHELON FLEX  POWERED VASCULAR STAPLER WITH ADVANCED PLACEMENT TIP, 35MM: Brand: ECHELON FLEX

## (undated) DEVICE — CHLORAPREP HI-LITE 26ML ORANGE

## (undated) DEVICE — GAUZE SPONGES,USP TYPE VII GAUZE, 12 PLY: Brand: CURITY

## (undated) DEVICE — GLOVE SRG BIOGEL ECLIPSE 7.5

## (undated) DEVICE — ENDOPATH ECHELON VASCULAR  RELOADS, WHITE, 35MM: Brand: ECHELON ENDOPATH

## (undated) DEVICE — LIGACLIP 10-M/L, 10MM ENDOSCOPIC ROTATING MULTIPLE CLIP APPLIERS: Brand: LIGACLIP

## (undated) DEVICE — UTILITY MARKER,BLACK WITH LABELS: Brand: DEVON

## (undated) DEVICE — PROGEL LONG TIP 6 IN

## (undated) DEVICE — ANTI-FOG SOLUTION WITH FOAM PAD: Brand: DEVON

## (undated) DEVICE — GAUZE SPONGES,16 PLY: Brand: CURITY

## (undated) DEVICE — INTENDED FOR TISSUE SEPARATION, AND OTHER PROCEDURES THAT REQUIRE A SHARP SURGICAL BLADE TO PUNCTURE OR CUT.: Brand: BARD-PARKER SAFETY BLADES SIZE 15, STERILE

## (undated) DEVICE — ADHESIVE SKIN HIGH VISCOSITY EXOFIN 1ML

## (undated) DEVICE — CANISTER FOR THORACIC SUCTION SYSTEM: Brand: THOPAZ DISPOSABLE CANISTER 0.8L

## (undated) DEVICE — ULTRASOUND GEL STERILE FOIL PK

## (undated) DEVICE — SINGLE TUBING WITH LARGE CONNECTOR FOR THORACIC SUCTION SYSTEM PUMP: Brand: THOPAZ TUBING SINGLE

## (undated) DEVICE — INTENDED FOR TISSUE SEPARATION, AND OTHER PROCEDURES THAT REQUIRE A SHARP SURGICAL BLADE TO PUNCTURE OR CUT.: Brand: BARD-PARKER SAFETY BLADES SIZE 10, STERILE

## (undated) DEVICE — THE ECHELON FLEX POWERED PLUS ARTICULATING ENDOSCOPIC LINEAR CUTTERS ARE STERILE, SINGLE PATIENT USE INSTRUMENTS THAT SIMULTANEOUSLYCUT AND STAPLE TISSUE. THERE ARE SIX STAGGERED ROWS OF STAPLES, THREE ON EITHER SIDE OF THE CUT LINE. THE ECHELON FLEX 45 POWERED PLUSINSTRUMENTS HAVE A STAPLE LINE THAT IS APPROXIMATELY 45 MM LONG AND A CUT LINE THAT IS APPROXIMATELY 42 MM LONG. THE SHAFT CAN ROTATE FREELYIN BOTH DIRECTIONS AND AN ARTICULATION MECHANISM ENABLES THE DISTAL PORTION OF THE SHAFT TO PIVOT TO FACILITATE LATERAL ACCESS TO THE OPERATIVESITE.THE INSTRUMENTS ARE PACKAGED WITH A PRIMARY LITHIUM BATTERY PACK THAT MUST BE INSTALLED PRIOR TO USE. THERE ARE SPECIFIC REQUIREMENTS FORDISPOSING OF THE BATTERY PACK. REFER TO THE BATTERY PACK DISPOSAL SECTION.THE INSTRUMENTS ARE PACKAGED WITHOUT A RELOAD AND MUST BE LOADED PRIOR TO USE. A STAPLE RETAINING CAP ON THE RELOAD PROTECTS THE STAPLE LEGPOINTS DURING SHIPPING AND TRANSPORTATION. THE INSTRUMENTS’ LOCK-OUT FEATURE IS DESIGNED TO PREVENT A USED OR IMPROPERLY INSTALLED RELOADFROM BEING REFIRED OR AN INSTRUMENT FROM BEING FIRED WITHOUT A RELOAD.: Brand: ECHELON FLEX

## (undated) DEVICE — TELFA NON-ADHERENT ABSORBENT DRESSING: Brand: TELFA

## (undated) DEVICE — THE ECHELON, ECHELON ENDOPATH™ AND ECHELON FLEX™ FAMILIES OF ENDOSCOPIC LINEAR CUTTERS AND RELOADS ARE STERILE, SINGLE PATIENT USE INSTRUMENTS THAT SIMULTANEOUSLY CUT AND STAPLE TISSUE. THERE ARE SIX STAGGERED ROWS OF STAPLES, THREE ON EITHER SIDE OF THE CUT LINE. THE 45 MM INSTRUMENTS HAVE A STAPLE LINE THATIS APPROXIMATELY 45 MM LONG AND A CUT LINE THAT IS APPROXIMATELY 42 MM LONG. THE SHAFT CAN ROTATE FREELY IN BOTH DIRECTIONS AND AN ARTICULATION MECHANISM ON ARTICULATING INSTRUMENTS ENABLES BENDING THE DISTAL PORTIONOF THE SHAFT TO FACILITATE LATERAL ACCESS OF THE OPERATIVE SITE.THE INSTRUMENTS ARE SHIPPED WITHOUT A RELOAD AND MUST BE LOADED PRIOR TO USE. A STAPLE RETAINING CAP ON THE RELOAD PROTECTS THE STAPLE LEG POINTS DURING SHIPPING AND TRANSPORTATION. THE INSTRUMENTS’ LOCK-OUT FEATURE IS DESIGNED TO PREVENT A USED RELOAD FROM BEING REFIRED.: Brand: ECHELON ENDOPATH

## (undated) DEVICE — SUT VICRYL 0 CT-1 27 IN J260H

## (undated) DEVICE — SUT MONOCRYL 4-0 PS-2 18 IN Y496G

## (undated) DEVICE — Device: Brand: TISSUE RETRIEVAL SYSTEM

## (undated) DEVICE — BAG DECANTER

## (undated) DEVICE — 24 FR STRAIGHT – SILICONE CATHETER: Brand: SILICONE THORACIC CATHETERS

## (undated) DEVICE — GLOVE INDICATOR PI UNDERGLOVE SZ 7 BLUE

## (undated) DEVICE — SINGLE USE SUCTION VALVE MAJ-209: Brand: SINGLE USE SUCTION VALVE (STERILE)

## (undated) DEVICE — SUT VICRYL 3-0 SH 27 IN J416H

## (undated) DEVICE — STERILE ICS MINOR PACK: Brand: CARDINAL HEALTH

## (undated) DEVICE — SUT PROLENE 0 CT-1 30 IN 8424H

## (undated) DEVICE — GLOVE SRG BIOGEL 6.5

## (undated) DEVICE — PROGEL

## (undated) DEVICE — PAD GROUNDING ADULT

## (undated) DEVICE — COVER PROBE INTRAOPERATIVE 6 X 96 IN

## (undated) DEVICE — WOUND RETRACTOR AND PROTECTOR: Brand: ALEXIS WOUND PROTECTOR-RETRACTOR

## (undated) DEVICE — FIRST STEP BEDSIDE KIT - STAND-UP POUCH, ENDOSCOPIC CLEANING PAD - 1 POUCH: Brand: FIRST STEP BEDSIDE KIT - STAND-UP POUCH, ENDOSCOPIC CLEANING PAD

## (undated) DEVICE — SURGICEL 4 X 8

## (undated) DEVICE — GLOVE INDICATOR PI UNDERGLOVE SZ 8 BLUE

## (undated) DEVICE — SPONGE 4 X 4 XRAY 16 PLY STRL LF RFD

## (undated) DEVICE — STERILE THORACIC PACK: Brand: CARDINAL HEALTH

## (undated) DEVICE — MINOR PROCEDURE DRAPE: Brand: CONVERTORS

## (undated) DEVICE — DRAPE TOWEL: Brand: CONVERTORS

## (undated) DEVICE — 2000CC GUARDIAN II: Brand: GUARDIAN

## (undated) DEVICE — SINGLE USE BIOPSY VALVE MAJ-210: Brand: SINGLE USE BIOPSY VALVE (STERILE)

## (undated) DEVICE — VIAL DECANTER

## (undated) DEVICE — NEEDLE 25G X 1 1/2

## (undated) DEVICE — 3M™ TEGADERM™ TRANSPARENT FILM DRESSING FRAME STYLE, 1626W, 4 IN X 4-3/4 IN (10 CM X 12 CM), 50/CT 4CT/CASE: Brand: 3M™ TEGADERM™

## (undated) DEVICE — DRAPE FLUID WARMER (BIRD BATH)

## (undated) DEVICE — SYRINGE 5ML LL